# Patient Record
Sex: MALE | URBAN - METROPOLITAN AREA
[De-identification: names, ages, dates, MRNs, and addresses within clinical notes are randomized per-mention and may not be internally consistent; named-entity substitution may affect disease eponyms.]

---

## 2019-02-27 ENCOUNTER — APPOINTMENT (RX ONLY)
Dept: URBAN - METROPOLITAN AREA CLINIC 12 | Facility: CLINIC | Age: 84
Setting detail: DERMATOLOGY
End: 2019-02-27

## 2019-02-27 DIAGNOSIS — L81.4 OTHER MELANIN HYPERPIGMENTATION: ICD-10-CM

## 2019-02-27 DIAGNOSIS — D22 MELANOCYTIC NEVI: ICD-10-CM

## 2019-02-27 DIAGNOSIS — L82.1 OTHER SEBORRHEIC KERATOSIS: ICD-10-CM

## 2019-02-27 DIAGNOSIS — D18.0 HEMANGIOMA: ICD-10-CM

## 2019-02-27 DIAGNOSIS — Z85.828 PERSONAL HISTORY OF OTHER MALIGNANT NEOPLASM OF SKIN: ICD-10-CM

## 2019-02-27 PROBLEM — D18.01 HEMANGIOMA OF SKIN AND SUBCUTANEOUS TISSUE: Status: ACTIVE | Noted: 2019-02-27

## 2019-02-27 PROBLEM — C44.42 SQUAMOUS CELL CARCINOMA OF SKIN OF SCALP AND NECK: Status: ACTIVE | Noted: 2019-02-27

## 2019-02-27 PROBLEM — D22.9 MELANOCYTIC NEVI, UNSPECIFIED: Status: ACTIVE | Noted: 2019-02-27

## 2019-02-27 PROBLEM — D04.39 CARCINOMA IN SITU OF SKIN OF OTHER PARTS OF FACE: Status: ACTIVE | Noted: 2019-02-27

## 2019-02-27 PROCEDURE — 99213 OFFICE O/P EST LOW 20 MIN: CPT | Mod: 25

## 2019-02-27 PROCEDURE — 17272 DSTR MAL LES S/N/H/F/G 1.1-2: CPT

## 2019-02-27 PROCEDURE — ? SUNSCREEN RECOMMENDATIONS

## 2019-02-27 PROCEDURE — ? CURETTAGE AND DESTRUCTION WITH PATHOLOGY

## 2019-02-27 PROCEDURE — ? COUNSELING

## 2019-02-27 PROCEDURE — 17282 DSTR MAL LS F/E/E/N/L/M1.1-2: CPT

## 2019-02-27 NOTE — HPI: EVALUATION OF SKIN LESION(S)
Hpi Title: Evaluation of a Skin Lesion
How Severe Are Your Spot(S)?: mild
Have Your Spot(S) Been Treated In The Past?: has not been treated
Additional History: Pt. Has skin lesions on the top of his head and he would like one of them removed.

## 2019-02-27 NOTE — PROCEDURE: CURETTAGE AND DESTRUCTION WITH PATHOLOGY
Cautery Type: electrodesiccation
Bill 28468 For Specimen Handling/Conveyance To Laboratory?: no
Size Of Lesion After Curettage: 1.7
Detail Level: Detailed
Biopsy Type: H and E
Post-Care Instructions: I reviewed with the patient in detail post-care instructions. Patient is to keep the area dry for 48 hours, and not to engage in any swimming until the area is healed. Should the patient develop any fevers, chills, bleeding, severe pain patient will contact the office immediately.
Bill As A Line Item Or As Units: Line Item
Number Of Curettages: 3
Billing Type: Third-Party Bill
Anesthesia Type: 1% lidocaine with epinephrine
Consent was obtained from the patient. The risks, benefits and alternatives to therapy were discussed in detail. Specifically, the risks of infection, scarring, bleeding, prolonged wound healing, nerve injury, incomplete removal, allergy to anesthesia and recurrence were addressed. Alternatives to ED&C, such as: surgical removal and XRT were also discussed.  Prior to the procedure, the treatment site was clearly identified and confirmed by the patient. All components of Universal Protocol/PAUSE Rule completed.
Body Location Override (Optional - Billing Will Still Be Based On Selected Body Map Location If Applicable): upper mid scalp
Lab: -101
Histology Text: Following the procedure a portion of the curetted material was sent for histologic evaluation.
Size Of Lesion In Cm: 1.5
Additional Information: (Optional): The wound was cleaned, and a pressure dressing was applied.  The patient received detailed post-op instructions.
Anesthesia Volume In Cc: 1
What Was Performed First?: Curettage
Size Of Lesion After Curettage: 1.6
Size Of Lesion In Cm: 1.4
Body Location Override (Optional - Billing Will Still Be Based On Selected Body Map Location If Applicable): left lateral frontal scalp

## 2019-08-28 ENCOUNTER — APPOINTMENT (RX ONLY)
Dept: URBAN - METROPOLITAN AREA CLINIC 12 | Facility: CLINIC | Age: 84
Setting detail: DERMATOLOGY
End: 2019-08-28

## 2019-08-28 PROBLEM — C44.42 SQUAMOUS CELL CARCINOMA OF SKIN OF SCALP AND NECK: Status: ACTIVE | Noted: 2019-08-28

## 2019-08-28 PROCEDURE — 17273 DSTR MAL LES S/N/H/F/G 2.1-3: CPT

## 2019-08-28 PROCEDURE — ? PRESCRIPTION

## 2019-08-28 PROCEDURE — ? BIOPSY BY SHAVE METHOD AND DESTRUCTION

## 2019-08-28 RX ORDER — MUPIROCIN 20 MG/G
OINTMENT TOPICAL
Qty: 1 | Refills: 0 | Status: ERX | COMMUNITY
Start: 2019-08-28

## 2019-08-28 RX ADMIN — MUPIROCIN: 20 OINTMENT TOPICAL at 19:00

## 2019-08-28 NOTE — PROCEDURE: BIOPSY BY SHAVE METHOD AND DESTRUCTION
Render Path Notes In Note?: No
Anesthesia Volume In Cc: 0.5
Detail Level: Detailed
Size Of Lesion In Cm (Optional): 1.9
Number Of Curettages: 3
Bill As?: Malignant Destruction
Size Of Lesion After Curettage: 2.1
Hemostasis: Electrocautery and Aluminum Chloride
Post-Care Instructions: I reviewed with the patient in detail post-care instructions. Patient is to keep the biopsy site dry for 24-48 hours.  Wash daily with gentle soap and apply bacitracin or aquaphor and bandaid until completely healed.
Billing Type: Third-Party Bill
Anesthesia Type: 1% lidocaine with epinephrine
Wound Care: Petrolatum
Path Notes (To The Dermatopathologist): Please check margins. Please check what type SCC
Consent: Written consent was obtained and risks were reviewed including but not limited to scarring, infection, bleeding, scabbing, incomplete removal, nerve damage and allergy to anesthesia.
Notification Instructions: Patient will be notified of biopsy results. However, patient instructed to call the office if not contacted within 2 weeks.
Destruction Type: electrodesiccation
Biopsy Type: H and E
Render Post-Care Instructions In Note?: yes
Lab: -101

## 2020-01-01 ENCOUNTER — APPOINTMENT (OUTPATIENT)
Dept: GENERAL RADIOLOGY | Age: 85
DRG: 177 | End: 2020-01-01
Payer: MEDICARE

## 2020-01-01 ENCOUNTER — TELEPHONE (OUTPATIENT)
Dept: PRIMARY CARE CLINIC | Age: 85
End: 2020-01-01

## 2020-01-01 ENCOUNTER — APPOINTMENT (OUTPATIENT)
Dept: CT IMAGING | Age: 85
DRG: 177 | End: 2020-01-01
Payer: MEDICARE

## 2020-01-01 ENCOUNTER — APPOINTMENT (OUTPATIENT)
Dept: ULTRASOUND IMAGING | Age: 85
DRG: 543 | End: 2020-01-01
Payer: MEDICARE

## 2020-01-01 ENCOUNTER — APPOINTMENT (OUTPATIENT)
Dept: GENERAL RADIOLOGY | Age: 85
DRG: 543 | End: 2020-01-01
Payer: MEDICARE

## 2020-01-01 ENCOUNTER — HOSPITAL ENCOUNTER (INPATIENT)
Age: 85
LOS: 5 days | Discharge: NURSING, REHAB OR LONG-TERM CARE | DRG: 177 | End: 2020-10-10
Attending: EMERGENCY MEDICINE | Admitting: FAMILY MEDICINE
Payer: MEDICARE

## 2020-01-01 ENCOUNTER — APPOINTMENT (OUTPATIENT)
Dept: MRI IMAGING | Age: 85
DRG: 543 | End: 2020-01-01
Payer: MEDICARE

## 2020-01-01 ENCOUNTER — APPOINTMENT (OUTPATIENT)
Dept: GENERAL RADIOLOGY | Age: 85
DRG: 560 | End: 2020-01-01
Attending: PHYSICAL MEDICINE & REHABILITATION
Payer: MEDICARE

## 2020-01-01 ENCOUNTER — OFFICE VISIT (OUTPATIENT)
Dept: PRIMARY CARE CLINIC | Age: 85
End: 2020-01-01
Payer: MEDICARE

## 2020-01-01 ENCOUNTER — TELEPHONE (OUTPATIENT)
Dept: ADMINISTRATIVE | Age: 85
End: 2020-01-01

## 2020-01-01 ENCOUNTER — ANESTHESIA (OUTPATIENT)
Dept: ENDOSCOPY | Age: 85
DRG: 177 | End: 2020-01-01
Payer: MEDICARE

## 2020-01-01 ENCOUNTER — HOSPITAL ENCOUNTER (INPATIENT)
Age: 85
LOS: 15 days | Discharge: HOME | DRG: 560 | End: 2020-06-18
Attending: PHYSICAL MEDICINE & REHABILITATION | Admitting: PHYSICAL MEDICINE & REHABILITATION
Payer: MEDICARE

## 2020-01-01 ENCOUNTER — HOSPITAL ENCOUNTER (INPATIENT)
Age: 85
LOS: 9 days | Discharge: ANOTHER HOSPITAL | DRG: 177 | End: 2020-12-31
Attending: EMERGENCY MEDICINE | Admitting: INTERNAL MEDICINE
Payer: MEDICARE

## 2020-01-01 ENCOUNTER — HOSPITAL ENCOUNTER (INPATIENT)
Age: 85
LOS: 7 days | Discharge: NURSING, REHAB OR LONG-TERM CARE | DRG: 543 | End: 2020-06-03
Attending: EMERGENCY MEDICINE | Admitting: INTERNAL MEDICINE
Payer: MEDICARE

## 2020-01-01 ENCOUNTER — APPOINTMENT (OUTPATIENT)
Dept: CT IMAGING | Age: 85
DRG: 543 | End: 2020-01-01
Payer: MEDICARE

## 2020-01-01 ENCOUNTER — OUTSIDE SERVICES (OUTPATIENT)
Dept: FAMILY MEDICINE CLINIC | Age: 85
End: 2020-01-01
Payer: MEDICARE

## 2020-01-01 ENCOUNTER — TELEPHONE (OUTPATIENT)
Dept: CARDIOLOGY CLINIC | Age: 85
End: 2020-01-01

## 2020-01-01 ENCOUNTER — ANESTHESIA EVENT (OUTPATIENT)
Dept: ENDOSCOPY | Age: 85
DRG: 177 | End: 2020-01-01
Payer: MEDICARE

## 2020-01-01 VITALS
BODY MASS INDEX: 17.89 KG/M2 | RESPIRATION RATE: 18 BRPM | HEART RATE: 101 BPM | WEIGHT: 113.98 LBS | DIASTOLIC BLOOD PRESSURE: 58 MMHG | HEIGHT: 67 IN | SYSTOLIC BLOOD PRESSURE: 105 MMHG | OXYGEN SATURATION: 96 % | TEMPERATURE: 98.3 F

## 2020-01-01 VITALS
RESPIRATION RATE: 16 BRPM | DIASTOLIC BLOOD PRESSURE: 61 MMHG | HEART RATE: 71 BPM | HEIGHT: 67 IN | BODY MASS INDEX: 17.74 KG/M2 | WEIGHT: 113 LBS | TEMPERATURE: 97.8 F | OXYGEN SATURATION: 95 % | SYSTOLIC BLOOD PRESSURE: 111 MMHG

## 2020-01-01 VITALS
OXYGEN SATURATION: 95 % | WEIGHT: 104.3 LBS | SYSTOLIC BLOOD PRESSURE: 99 MMHG | RESPIRATION RATE: 18 BRPM | HEART RATE: 84 BPM | BODY MASS INDEX: 16.37 KG/M2 | DIASTOLIC BLOOD PRESSURE: 56 MMHG | TEMPERATURE: 97.8 F | HEIGHT: 67 IN

## 2020-01-01 VITALS
HEART RATE: 63 BPM | HEIGHT: 67 IN | WEIGHT: 96.3 LBS | BODY MASS INDEX: 15.11 KG/M2 | TEMPERATURE: 96.9 F | OXYGEN SATURATION: 98 % | DIASTOLIC BLOOD PRESSURE: 59 MMHG | RESPIRATION RATE: 20 BRPM | SYSTOLIC BLOOD PRESSURE: 125 MMHG

## 2020-01-01 VITALS
SYSTOLIC BLOOD PRESSURE: 110 MMHG | HEART RATE: 63 BPM | OXYGEN SATURATION: 96 % | RESPIRATION RATE: 18 BRPM | TEMPERATURE: 97.8 F | DIASTOLIC BLOOD PRESSURE: 60 MMHG | WEIGHT: 102 LBS | HEIGHT: 67 IN | BODY MASS INDEX: 16.01 KG/M2

## 2020-01-01 VITALS
SYSTOLIC BLOOD PRESSURE: 145 MMHG | OXYGEN SATURATION: 97 % | RESPIRATION RATE: 8 BRPM | DIASTOLIC BLOOD PRESSURE: 63 MMHG

## 2020-01-01 DIAGNOSIS — R09.02 HYPOXIA: ICD-10-CM

## 2020-01-01 DIAGNOSIS — U07.1 COVID-19 VIRUS DETECTED: Primary | ICD-10-CM

## 2020-01-01 LAB
ALBUMIN SERPL-MCNC: 2.8 G/DL (ref 3.5–5.2)
ALBUMIN SERPL-MCNC: 3 G/DL (ref 3.5–5.2)
ALBUMIN SERPL-MCNC: 3 G/DL (ref 3.5–5.2)
ALBUMIN SERPL-MCNC: 3.2 G/DL (ref 3.5–5.2)
ALBUMIN SERPL-MCNC: 3.2 G/DL (ref 3.5–5.2)
ALBUMIN SERPL-MCNC: 3.5 G/DL (ref 3.5–5.2)
ALBUMIN SERPL-MCNC: 3.7 G/DL (ref 3.5–5.2)
ALP BLD-CCNC: 60 U/L (ref 40–129)
ALP BLD-CCNC: 74 U/L (ref 40–129)
ALP BLD-CCNC: 75 U/L (ref 40–129)
ALP BLD-CCNC: 75 U/L (ref 40–129)
ALP BLD-CCNC: 78 U/L (ref 40–129)
ALP BLD-CCNC: 78 U/L (ref 40–129)
ALP BLD-CCNC: 79 U/L (ref 40–129)
ALP BLD-CCNC: 85 U/L (ref 40–129)
ALP BLD-CCNC: 90 U/L (ref 40–129)
ALT SERPL-CCNC: 12 U/L (ref 0–40)
ALT SERPL-CCNC: 12 U/L (ref 0–40)
ALT SERPL-CCNC: 14 U/L (ref 0–40)
ALT SERPL-CCNC: 14 U/L (ref 0–40)
ALT SERPL-CCNC: 15 U/L (ref 0–40)
ALT SERPL-CCNC: 17 U/L (ref 0–40)
ALT SERPL-CCNC: 25 U/L (ref 0–40)
ALT SERPL-CCNC: 8 U/L (ref 0–40)
ALT SERPL-CCNC: 9 U/L (ref 0–40)
AMORPHOUS: ABNORMAL
ANION GAP SERPL CALCULATED.3IONS-SCNC: 10 MMOL/L (ref 7–16)
ANION GAP SERPL CALCULATED.3IONS-SCNC: 11 MMOL/L (ref 7–16)
ANION GAP SERPL CALCULATED.3IONS-SCNC: 13 MMOL/L (ref 7–16)
ANION GAP SERPL CALCULATED.3IONS-SCNC: 2 MMOL/L (ref 7–16)
ANION GAP SERPL CALCULATED.3IONS-SCNC: 5 MMOL/L (ref 7–16)
ANION GAP SERPL CALCULATED.3IONS-SCNC: 6 MMOL/L (ref 7–16)
ANION GAP SERPL CALCULATED.3IONS-SCNC: 7 MMOL/L (ref 7–16)
ANION GAP SERPL CALCULATED.3IONS-SCNC: 7 MMOL/L (ref 7–16)
ANION GAP SERPL CALCULATED.3IONS-SCNC: 8 MMOL/L (ref 7–16)
ANION GAP SERPL CALCULATED.3IONS-SCNC: 9 MMOL/L (ref 7–16)
ANION GAP SERPL CALCULATED.3IONS-SCNC: 9 MMOL/L (ref 7–16)
AST SERPL-CCNC: 11 U/L (ref 0–39)
AST SERPL-CCNC: 15 U/L (ref 0–39)
AST SERPL-CCNC: 15 U/L (ref 0–39)
AST SERPL-CCNC: 17 U/L (ref 0–39)
AST SERPL-CCNC: 17 U/L (ref 0–39)
AST SERPL-CCNC: 19 U/L (ref 0–39)
AST SERPL-CCNC: 22 U/L (ref 0–39)
AST SERPL-CCNC: 22 U/L (ref 0–39)
AST SERPL-CCNC: 39 U/L (ref 0–39)
ATYPICAL LYMPHOCYTE RELATIVE PERCENT: 2.6 % (ref 0–4)
B.E.: -0.2 MMOL/L (ref -3–3)
B.E.: -2.6 MMOL/L (ref -3–3)
BACTERIA: ABNORMAL /HPF
BACTERIA: ABNORMAL /HPF
BASOPHILS ABSOLUTE: 0 E9/L (ref 0–0.2)
BASOPHILS ABSOLUTE: 0.01 E9/L (ref 0–0.2)
BASOPHILS ABSOLUTE: 0.01 E9/L (ref 0–0.2)
BASOPHILS ABSOLUTE: 0.02 E9/L (ref 0–0.2)
BASOPHILS RELATIVE PERCENT: 0 % (ref 0–2)
BASOPHILS RELATIVE PERCENT: 0.1 % (ref 0–2)
BASOPHILS RELATIVE PERCENT: 0.2 % (ref 0–2)
BASOPHILS RELATIVE PERCENT: 0.3 % (ref 0–2)
BASOPHILS RELATIVE PERCENT: 0.4 % (ref 0–2)
BILIRUB SERPL-MCNC: 0.3 MG/DL (ref 0–1.2)
BILIRUB SERPL-MCNC: 0.4 MG/DL (ref 0–1.2)
BILIRUB SERPL-MCNC: 0.4 MG/DL (ref 0–1.2)
BILIRUB SERPL-MCNC: 0.7 MG/DL (ref 0–1.2)
BILIRUB SERPL-MCNC: <0.2 MG/DL (ref 0–1.2)
BILIRUBIN URINE: NEGATIVE
BILIRUBIN URINE: NEGATIVE
BLOOD CULTURE, ROUTINE: NORMAL
BLOOD, URINE: ABNORMAL
BLOOD, URINE: NEGATIVE
BUN BLDV-MCNC: 19 MG/DL (ref 8–23)
BUN BLDV-MCNC: 20 MG/DL (ref 8–23)
BUN BLDV-MCNC: 23 MG/DL (ref 8–23)
BUN BLDV-MCNC: 23 MG/DL (ref 8–23)
BUN BLDV-MCNC: 24 MG/DL (ref 8–23)
BUN BLDV-MCNC: 25 MG/DL (ref 8–23)
BUN BLDV-MCNC: 28 MG/DL (ref 8–23)
BUN BLDV-MCNC: 28 MG/DL (ref 8–23)
BUN BLDV-MCNC: 29 MG/DL (ref 8–23)
BUN BLDV-MCNC: 31 MG/DL (ref 8–23)
BUN BLDV-MCNC: 31 MG/DL (ref 8–23)
BUN BLDV-MCNC: 32 MG/DL (ref 8–23)
BUN BLDV-MCNC: 34 MG/DL (ref 8–23)
BUN BLDV-MCNC: 36 MG/DL (ref 8–23)
C-REACTIVE PROTEIN: 2.6 MG/DL (ref 0–0.4)
C-REACTIVE PROTEIN: 7.1 MG/DL (ref 0–0.4)
CALCIUM SERPL-MCNC: 8.3 MG/DL (ref 8.6–10.2)
CALCIUM SERPL-MCNC: 8.5 MG/DL (ref 8.6–10.2)
CALCIUM SERPL-MCNC: 8.7 MG/DL (ref 8.6–10.2)
CALCIUM SERPL-MCNC: 8.8 MG/DL (ref 8.6–10.2)
CALCIUM SERPL-MCNC: 8.9 MG/DL (ref 8.6–10.2)
CALCIUM SERPL-MCNC: 9.1 MG/DL (ref 8.6–10.2)
CALCIUM SERPL-MCNC: 9.2 MG/DL (ref 8.6–10.2)
CALCIUM SERPL-MCNC: 9.3 MG/DL (ref 8.6–10.2)
CALCIUM SERPL-MCNC: 9.3 MG/DL (ref 8.6–10.2)
CALCIUM SERPL-MCNC: 9.5 MG/DL (ref 8.6–10.2)
CALCIUM SERPL-MCNC: 9.6 MG/DL (ref 8.6–10.2)
CALCIUM SERPL-MCNC: 9.6 MG/DL (ref 8.6–10.2)
CALCIUM SERPL-MCNC: 9.7 MG/DL (ref 8.6–10.2)
CALCIUM SERPL-MCNC: 9.8 MG/DL (ref 8.6–10.2)
CHLORIDE BLD-SCNC: 101 MMOL/L (ref 98–107)
CHLORIDE BLD-SCNC: 102 MMOL/L (ref 98–107)
CHLORIDE BLD-SCNC: 103 MMOL/L (ref 98–107)
CHLORIDE BLD-SCNC: 103 MMOL/L (ref 98–107)
CHLORIDE BLD-SCNC: 104 MMOL/L (ref 98–107)
CHLORIDE BLD-SCNC: 107 MMOL/L (ref 98–107)
CHLORIDE BLD-SCNC: 108 MMOL/L (ref 98–107)
CHLORIDE BLD-SCNC: 108 MMOL/L (ref 98–107)
CHLORIDE BLD-SCNC: 98 MMOL/L (ref 98–107)
CHLORIDE URINE RANDOM: 178 MMOL/L
CLARITY: CLEAR
CLARITY: CLEAR
CO2: 22 MMOL/L (ref 22–29)
CO2: 23 MMOL/L (ref 22–29)
CO2: 25 MMOL/L (ref 22–29)
CO2: 25 MMOL/L (ref 22–29)
CO2: 26 MMOL/L (ref 22–29)
CO2: 27 MMOL/L (ref 22–29)
CO2: 27 MMOL/L (ref 22–29)
CO2: 28 MMOL/L (ref 22–29)
CO2: 29 MMOL/L (ref 22–29)
CO2: 29 MMOL/L (ref 22–29)
CO2: 30 MMOL/L (ref 22–29)
CO2: 31 MMOL/L (ref 22–29)
CO2: 32 MMOL/L (ref 22–29)
COHB: 0.5 % (ref 0–1.5)
COHB: 1.2 % (ref 0–1.5)
COLOR: YELLOW
COLOR: YELLOW
CREAT SERPL-MCNC: 0.5 MG/DL (ref 0.7–1.2)
CREAT SERPL-MCNC: 0.5 MG/DL (ref 0.7–1.2)
CREAT SERPL-MCNC: 0.6 MG/DL (ref 0.7–1.2)
CREAT SERPL-MCNC: 0.7 MG/DL (ref 0.7–1.2)
CREAT SERPL-MCNC: 0.7 MG/DL (ref 0.7–1.2)
CREAT SERPL-MCNC: 0.8 MG/DL (ref 0.7–1.2)
CREAT SERPL-MCNC: 1 MG/DL (ref 0.7–1.2)
CREAT SERPL-MCNC: 1.2 MG/DL (ref 0.7–1.2)
CRITICAL: ABNORMAL
CRITICAL: NORMAL
CRYSTALS, UA: ABNORMAL /HPF
CULTURE, BLOOD 2: NORMAL
D DIMER: 342 NG/ML DDU
D DIMER: 431 NG/ML DDU
D DIMER: 557 NG/ML DDU
DATE ANALYZED: ABNORMAL
DATE ANALYZED: NORMAL
DATE OF COLLECTION: ABNORMAL
DATE OF COLLECTION: NORMAL
EKG ATRIAL RATE: 110 BPM
EKG ATRIAL RATE: 71 BPM
EKG ATRIAL RATE: 97 BPM
EKG P AXIS: 28 DEGREES
EKG P AXIS: 68 DEGREES
EKG P AXIS: 75 DEGREES
EKG P-R INTERVAL: 142 MS
EKG P-R INTERVAL: 144 MS
EKG P-R INTERVAL: 152 MS
EKG Q-T INTERVAL: 322 MS
EKG Q-T INTERVAL: 346 MS
EKG Q-T INTERVAL: 392 MS
EKG QRS DURATION: 80 MS
EKG QRS DURATION: 82 MS
EKG QRS DURATION: 82 MS
EKG QTC CALCULATION (BAZETT): 425 MS
EKG QTC CALCULATION (BAZETT): 435 MS
EKG QTC CALCULATION (BAZETT): 439 MS
EKG R AXIS: -28 DEGREES
EKG R AXIS: 20 DEGREES
EKG R AXIS: 63 DEGREES
EKG T AXIS: 24 DEGREES
EKG T AXIS: 75 DEGREES
EKG T AXIS: 76 DEGREES
EKG VENTRICULAR RATE: 110 BPM
EKG VENTRICULAR RATE: 71 BPM
EKG VENTRICULAR RATE: 97 BPM
EOSINOPHILS ABSOLUTE: 0 E9/L (ref 0.05–0.5)
EOSINOPHILS ABSOLUTE: 0.02 E9/L (ref 0.05–0.5)
EOSINOPHILS ABSOLUTE: 0.04 E9/L (ref 0.05–0.5)
EOSINOPHILS ABSOLUTE: 0.06 E9/L (ref 0.05–0.5)
EOSINOPHILS ABSOLUTE: 0.09 E9/L (ref 0.05–0.5)
EOSINOPHILS ABSOLUTE: 0.11 E9/L (ref 0.05–0.5)
EOSINOPHILS ABSOLUTE: 0.12 E9/L (ref 0.05–0.5)
EOSINOPHILS RELATIVE PERCENT: 0 % (ref 0–6)
EOSINOPHILS RELATIVE PERCENT: 0.4 % (ref 0–6)
EOSINOPHILS RELATIVE PERCENT: 0.6 % (ref 0–6)
EOSINOPHILS RELATIVE PERCENT: 0.9 % (ref 0–6)
EOSINOPHILS RELATIVE PERCENT: 1 % (ref 0–6)
EOSINOPHILS RELATIVE PERCENT: 1.4 % (ref 0–6)
EOSINOPHILS RELATIVE PERCENT: 1.9 % (ref 0–6)
EPITHELIAL CELLS, UA: ABNORMAL /HPF
FERRITIN: 678 NG/ML
FERRITIN: 846 NG/ML
FIBRINOGEN: 592 MG/DL (ref 225–540)
FOLATE: 19.5 NG/ML (ref 4.8–24.2)
GFR AFRICAN AMERICAN: >60
GFR NON-AFRICAN AMERICAN: 56 ML/MIN/1.73
GFR NON-AFRICAN AMERICAN: >60 ML/MIN/1.73
GLUCOSE BLD-MCNC: 101 MG/DL (ref 74–99)
GLUCOSE BLD-MCNC: 102 MG/DL (ref 74–99)
GLUCOSE BLD-MCNC: 109 MG/DL (ref 74–99)
GLUCOSE BLD-MCNC: 109 MG/DL (ref 74–99)
GLUCOSE BLD-MCNC: 113 MG/DL (ref 74–99)
GLUCOSE BLD-MCNC: 114 MG/DL (ref 74–99)
GLUCOSE BLD-MCNC: 120 MG/DL (ref 74–99)
GLUCOSE BLD-MCNC: 125 MG/DL (ref 74–99)
GLUCOSE BLD-MCNC: 126 MG/DL (ref 74–99)
GLUCOSE BLD-MCNC: 127 MG/DL (ref 74–99)
GLUCOSE BLD-MCNC: 133 MG/DL (ref 74–99)
GLUCOSE BLD-MCNC: 144 MG/DL (ref 74–99)
GLUCOSE BLD-MCNC: 152 MG/DL (ref 74–99)
GLUCOSE BLD-MCNC: 154 MG/DL (ref 74–99)
GLUCOSE BLD-MCNC: 161 MG/DL (ref 74–99)
GLUCOSE BLD-MCNC: 165 MG/DL (ref 74–99)
GLUCOSE BLD-MCNC: 84 MG/DL (ref 74–99)
GLUCOSE BLD-MCNC: 90 MG/DL (ref 74–99)
GLUCOSE BLD-MCNC: 96 MG/DL (ref 74–99)
GLUCOSE URINE: NEGATIVE MG/DL
GLUCOSE URINE: NEGATIVE MG/DL
HCO3: 21.2 MMOL/L (ref 22–26)
HCO3: 24.7 MMOL/L (ref 22–26)
HCT VFR BLD CALC: 28.9 % (ref 37–54)
HCT VFR BLD CALC: 31.6 % (ref 37–54)
HCT VFR BLD CALC: 32.3 % (ref 37–54)
HCT VFR BLD CALC: 34.7 % (ref 37–54)
HCT VFR BLD CALC: 34.9 % (ref 37–54)
HCT VFR BLD CALC: 35.3 % (ref 37–54)
HCT VFR BLD CALC: 35.4 % (ref 37–54)
HCT VFR BLD CALC: 35.7 % (ref 37–54)
HCT VFR BLD CALC: 36.3 % (ref 37–54)
HCT VFR BLD CALC: 36.4 % (ref 37–54)
HCT VFR BLD CALC: 36.6 % (ref 37–54)
HCT VFR BLD CALC: 37.1 % (ref 37–54)
HCT VFR BLD CALC: 37.7 % (ref 37–54)
HCT VFR BLD CALC: 37.8 % (ref 37–54)
HCT VFR BLD CALC: 39.1 % (ref 37–54)
HCT VFR BLD CALC: 39.6 % (ref 37–54)
HCT VFR BLD CALC: 40 % (ref 37–54)
HCT VFR BLD CALC: 40.1 % (ref 37–54)
HCT VFR BLD CALC: 40.3 % (ref 37–54)
HCT VFR BLD CALC: 40.7 % (ref 37–54)
HEMOGLOBIN: 10.3 G/DL (ref 12.5–16.5)
HEMOGLOBIN: 10.7 G/DL (ref 12.5–16.5)
HEMOGLOBIN: 11.2 G/DL (ref 12.5–16.5)
HEMOGLOBIN: 11.3 G/DL (ref 12.5–16.5)
HEMOGLOBIN: 11.4 G/DL (ref 12.5–16.5)
HEMOGLOBIN: 11.6 G/DL (ref 12.5–16.5)
HEMOGLOBIN: 11.7 G/DL (ref 12.5–16.5)
HEMOGLOBIN: 11.7 G/DL (ref 12.5–16.5)
HEMOGLOBIN: 12 G/DL (ref 12.5–16.5)
HEMOGLOBIN: 12.2 G/DL (ref 12.5–16.5)
HEMOGLOBIN: 12.6 G/DL (ref 12.5–16.5)
HEMOGLOBIN: 12.9 G/DL (ref 12.5–16.5)
HEMOGLOBIN: 13.1 G/DL (ref 12.5–16.5)
HEMOGLOBIN: 13.1 G/DL (ref 12.5–16.5)
HEMOGLOBIN: 13.2 G/DL (ref 12.5–16.5)
HEMOGLOBIN: 13.3 G/DL (ref 12.5–16.5)
HEMOGLOBIN: 13.5 G/DL (ref 12.5–16.5)
HEMOGLOBIN: 9.5 G/DL (ref 12.5–16.5)
HHB: 10.4 % (ref 0–5)
HHB: 3.2 % (ref 0–5)
HYALINE CASTS: ABNORMAL /LPF (ref 0–2)
IMMATURE GRANULOCYTES #: 0.01 E9/L
IMMATURE GRANULOCYTES #: 0.01 E9/L
IMMATURE GRANULOCYTES #: 0.02 E9/L
IMMATURE GRANULOCYTES #: 0.04 E9/L
IMMATURE GRANULOCYTES #: 0.04 E9/L
IMMATURE GRANULOCYTES #: 0.05 E9/L
IMMATURE GRANULOCYTES #: 0.05 E9/L
IMMATURE GRANULOCYTES #: 0.06 E9/L
IMMATURE GRANULOCYTES #: 0.06 E9/L
IMMATURE GRANULOCYTES %: 0.2 % (ref 0–5)
IMMATURE GRANULOCYTES %: 0.2 % (ref 0–5)
IMMATURE GRANULOCYTES %: 0.3 % (ref 0–5)
IMMATURE GRANULOCYTES %: 0.4 % (ref 0–5)
IMMATURE GRANULOCYTES %: 0.4 % (ref 0–5)
IMMATURE GRANULOCYTES %: 0.6 % (ref 0–5)
IMMATURE GRANULOCYTES %: 0.6 % (ref 0–5)
IMMATURE GRANULOCYTES %: 0.7 % (ref 0–5)
IMMATURE GRANULOCYTES %: 0.8 % (ref 0–5)
IMMATURE GRANULOCYTES %: 0.8 % (ref 0–5)
IMMATURE GRANULOCYTES %: 0.9 % (ref 0–5)
INR BLD: 1.1
KETONES, URINE: 15 MG/DL
KETONES, URINE: NEGATIVE MG/DL
LAB: ABNORMAL
LAB: NORMAL
LACTIC ACID: 1.3 MMOL/L (ref 0.5–2.2)
LEUKOCYTE ESTERASE, URINE: NEGATIVE
LEUKOCYTE ESTERASE, URINE: NEGATIVE
LV EF: 40 %
LVEF MODALITY: NORMAL
LYMPHOCYTES ABSOLUTE: 0.28 E9/L (ref 1.5–4)
LYMPHOCYTES ABSOLUTE: 0.32 E9/L (ref 1.5–4)
LYMPHOCYTES ABSOLUTE: 0.5 E9/L (ref 1.5–4)
LYMPHOCYTES ABSOLUTE: 0.51 E9/L (ref 1.5–4)
LYMPHOCYTES ABSOLUTE: 0.53 E9/L (ref 1.5–4)
LYMPHOCYTES ABSOLUTE: 0.59 E9/L (ref 1.5–4)
LYMPHOCYTES ABSOLUTE: 0.61 E9/L (ref 1.5–4)
LYMPHOCYTES ABSOLUTE: 0.66 E9/L (ref 1.5–4)
LYMPHOCYTES ABSOLUTE: 0.69 E9/L (ref 1.5–4)
LYMPHOCYTES ABSOLUTE: 0.72 E9/L (ref 1.5–4)
LYMPHOCYTES ABSOLUTE: 0.81 E9/L (ref 1.5–4)
LYMPHOCYTES ABSOLUTE: 0.88 E9/L (ref 1.5–4)
LYMPHOCYTES ABSOLUTE: 0.95 E9/L (ref 1.5–4)
LYMPHOCYTES ABSOLUTE: 0.97 E9/L (ref 1.5–4)
LYMPHOCYTES RELATIVE PERCENT: 12.2 % (ref 20–42)
LYMPHOCYTES RELATIVE PERCENT: 12.9 % (ref 20–42)
LYMPHOCYTES RELATIVE PERCENT: 13.4 % (ref 20–42)
LYMPHOCYTES RELATIVE PERCENT: 13.5 % (ref 20–42)
LYMPHOCYTES RELATIVE PERCENT: 14.8 % (ref 20–42)
LYMPHOCYTES RELATIVE PERCENT: 14.9 % (ref 20–42)
LYMPHOCYTES RELATIVE PERCENT: 16.7 % (ref 20–42)
LYMPHOCYTES RELATIVE PERCENT: 20.8 % (ref 20–42)
LYMPHOCYTES RELATIVE PERCENT: 4.4 % (ref 20–42)
LYMPHOCYTES RELATIVE PERCENT: 4.4 % (ref 20–42)
LYMPHOCYTES RELATIVE PERCENT: 5.2 % (ref 20–42)
LYMPHOCYTES RELATIVE PERCENT: 5.5 % (ref 20–42)
LYMPHOCYTES RELATIVE PERCENT: 7.7 % (ref 20–42)
LYMPHOCYTES RELATIVE PERCENT: 9.1 % (ref 20–42)
Lab: ABNORMAL
Lab: NORMAL
MAGNESIUM: 1.9 MG/DL (ref 1.6–2.6)
MCH RBC QN AUTO: 32.7 PG (ref 26–35)
MCH RBC QN AUTO: 33.1 PG (ref 26–35)
MCH RBC QN AUTO: 33.2 PG (ref 26–35)
MCH RBC QN AUTO: 33.3 PG (ref 26–35)
MCH RBC QN AUTO: 33.4 PG (ref 26–35)
MCH RBC QN AUTO: 33.5 PG (ref 26–35)
MCH RBC QN AUTO: 33.6 PG (ref 26–35)
MCH RBC QN AUTO: 33.6 PG (ref 26–35)
MCH RBC QN AUTO: 33.7 PG (ref 26–35)
MCH RBC QN AUTO: 33.7 PG (ref 26–35)
MCH RBC QN AUTO: 33.8 PG (ref 26–35)
MCH RBC QN AUTO: 34 PG (ref 26–35)
MCH RBC QN AUTO: 34 PG (ref 26–35)
MCH RBC QN AUTO: 34.2 PG (ref 26–35)
MCH RBC QN AUTO: 34.2 PG (ref 26–35)
MCHC RBC AUTO-ENTMCNC: 32 % (ref 32–34.5)
MCHC RBC AUTO-ENTMCNC: 32.2 % (ref 32–34.5)
MCHC RBC AUTO-ENTMCNC: 32.2 % (ref 32–34.5)
MCHC RBC AUTO-ENTMCNC: 32.3 % (ref 32–34.5)
MCHC RBC AUTO-ENTMCNC: 32.4 % (ref 32–34.5)
MCHC RBC AUTO-ENTMCNC: 32.5 % (ref 32–34.5)
MCHC RBC AUTO-ENTMCNC: 32.6 % (ref 32–34.5)
MCHC RBC AUTO-ENTMCNC: 32.7 % (ref 32–34.5)
MCHC RBC AUTO-ENTMCNC: 32.9 % (ref 32–34.5)
MCHC RBC AUTO-ENTMCNC: 32.9 % (ref 32–34.5)
MCHC RBC AUTO-ENTMCNC: 33 % (ref 32–34.5)
MCHC RBC AUTO-ENTMCNC: 33.1 % (ref 32–34.5)
MCHC RBC AUTO-ENTMCNC: 33.1 % (ref 32–34.5)
MCHC RBC AUTO-ENTMCNC: 33.3 % (ref 32–34.5)
MCHC RBC AUTO-ENTMCNC: 33.8 % (ref 32–34.5)
MCV RBC AUTO: 100.5 FL (ref 80–99.9)
MCV RBC AUTO: 100.5 FL (ref 80–99.9)
MCV RBC AUTO: 101.4 FL (ref 80–99.9)
MCV RBC AUTO: 101.5 FL (ref 80–99.9)
MCV RBC AUTO: 101.8 FL (ref 80–99.9)
MCV RBC AUTO: 101.9 FL (ref 80–99.9)
MCV RBC AUTO: 101.9 FL (ref 80–99.9)
MCV RBC AUTO: 102 FL (ref 80–99.9)
MCV RBC AUTO: 102.5 FL (ref 80–99.9)
MCV RBC AUTO: 102.6 FL (ref 80–99.9)
MCV RBC AUTO: 102.8 FL (ref 80–99.9)
MCV RBC AUTO: 103.2 FL (ref 80–99.9)
MCV RBC AUTO: 103.6 FL (ref 80–99.9)
MCV RBC AUTO: 103.7 FL (ref 80–99.9)
MCV RBC AUTO: 103.9 FL (ref 80–99.9)
MCV RBC AUTO: 104.1 FL (ref 80–99.9)
MCV RBC AUTO: 104.2 FL (ref 80–99.9)
MCV RBC AUTO: 104.4 FL (ref 80–99.9)
MCV RBC AUTO: 99.8 FL (ref 80–99.9)
METAMYELOCYTES RELATIVE PERCENT: 3.6 % (ref 0–1)
METHB: 0 % (ref 0–1.5)
METHB: 0.3 % (ref 0–1.5)
MODE: ABNORMAL
MODE: NORMAL
MONOCYTES ABSOLUTE: 0.27 E9/L (ref 0.1–0.95)
MONOCYTES ABSOLUTE: 0.28 E9/L (ref 0.1–0.95)
MONOCYTES ABSOLUTE: 0.29 E9/L (ref 0.1–0.95)
MONOCYTES ABSOLUTE: 0.3 E9/L (ref 0.1–0.95)
MONOCYTES ABSOLUTE: 0.34 E9/L (ref 0.1–0.95)
MONOCYTES ABSOLUTE: 0.38 E9/L (ref 0.1–0.95)
MONOCYTES ABSOLUTE: 0.44 E9/L (ref 0.1–0.95)
MONOCYTES ABSOLUTE: 0.46 E9/L (ref 0.1–0.95)
MONOCYTES ABSOLUTE: 0.47 E9/L (ref 0.1–0.95)
MONOCYTES ABSOLUTE: 0.49 E9/L (ref 0.1–0.95)
MONOCYTES ABSOLUTE: 0.49 E9/L (ref 0.1–0.95)
MONOCYTES ABSOLUTE: 0.52 E9/L (ref 0.1–0.95)
MONOCYTES ABSOLUTE: 0.61 E9/L (ref 0.1–0.95)
MONOCYTES ABSOLUTE: 0.76 E9/L (ref 0.1–0.95)
MONOCYTES RELATIVE PERCENT: 10.2 % (ref 2–12)
MONOCYTES RELATIVE PERCENT: 11.7 % (ref 2–12)
MONOCYTES RELATIVE PERCENT: 12 % (ref 2–12)
MONOCYTES RELATIVE PERCENT: 4.4 % (ref 2–12)
MONOCYTES RELATIVE PERCENT: 4.8 % (ref 2–12)
MONOCYTES RELATIVE PERCENT: 5 % (ref 2–12)
MONOCYTES RELATIVE PERCENT: 5.1 % (ref 2–12)
MONOCYTES RELATIVE PERCENT: 5.4 % (ref 2–12)
MONOCYTES RELATIVE PERCENT: 6.1 % (ref 2–12)
MONOCYTES RELATIVE PERCENT: 6.6 % (ref 2–12)
MONOCYTES RELATIVE PERCENT: 7.7 % (ref 2–12)
MONOCYTES RELATIVE PERCENT: 7.7 % (ref 2–12)
MONOCYTES RELATIVE PERCENT: 7.9 % (ref 2–12)
MONOCYTES RELATIVE PERCENT: 8.8 % (ref 2–12)
MUCUS: PRESENT /LPF
NEUTROPHILS ABSOLUTE: 1.94 E9/L (ref 1.8–7.3)
NEUTROPHILS ABSOLUTE: 2.24 E9/L (ref 1.8–7.3)
NEUTROPHILS ABSOLUTE: 3.88 E9/L (ref 1.8–7.3)
NEUTROPHILS ABSOLUTE: 3.92 E9/L (ref 1.8–7.3)
NEUTROPHILS ABSOLUTE: 4.12 E9/L (ref 1.8–7.3)
NEUTROPHILS ABSOLUTE: 4.66 E9/L (ref 1.8–7.3)
NEUTROPHILS ABSOLUTE: 4.74 E9/L (ref 1.8–7.3)
NEUTROPHILS ABSOLUTE: 4.92 E9/L (ref 1.8–7.3)
NEUTROPHILS ABSOLUTE: 4.94 E9/L (ref 1.8–7.3)
NEUTROPHILS ABSOLUTE: 6.28 E9/L (ref 1.8–7.3)
NEUTROPHILS ABSOLUTE: 6.42 E9/L (ref 1.8–7.3)
NEUTROPHILS ABSOLUTE: 7.66 E9/L (ref 1.8–7.3)
NEUTROPHILS ABSOLUTE: 8.2 E9/L (ref 1.8–7.3)
NEUTROPHILS ABSOLUTE: 8.59 E9/L (ref 1.8–7.3)
NEUTROPHILS RELATIVE PERCENT: 68.6 % (ref 43–80)
NEUTROPHILS RELATIVE PERCENT: 70.3 % (ref 43–80)
NEUTROPHILS RELATIVE PERCENT: 70.7 % (ref 43–80)
NEUTROPHILS RELATIVE PERCENT: 74.4 % (ref 43–80)
NEUTROPHILS RELATIVE PERCENT: 75.5 % (ref 43–80)
NEUTROPHILS RELATIVE PERCENT: 77.4 % (ref 43–80)
NEUTROPHILS RELATIVE PERCENT: 80.3 % (ref 43–80)
NEUTROPHILS RELATIVE PERCENT: 80.9 % (ref 43–80)
NEUTROPHILS RELATIVE PERCENT: 81.1 % (ref 43–80)
NEUTROPHILS RELATIVE PERCENT: 85 % (ref 43–80)
NEUTROPHILS RELATIVE PERCENT: 86.9 % (ref 43–80)
NEUTROPHILS RELATIVE PERCENT: 88.9 % (ref 43–80)
NEUTROPHILS RELATIVE PERCENT: 89.6 % (ref 43–80)
NEUTROPHILS RELATIVE PERCENT: 91.2 % (ref 43–80)
NITRITE, URINE: NEGATIVE
NITRITE, URINE: NEGATIVE
NUCLEATED RED BLOOD CELLS: 0 /100 WBC
NUCLEATED RED BLOOD CELLS: 0.9 /100 WBC
NUCLEATED RED BLOOD CELLS: 1.8 /100 WBC
O2 CONTENT: 15.9 ML/DL
O2 CONTENT: 17.3 ML/DL
O2 SATURATION: 89.4 % (ref 92–98.5)
O2 SATURATION: 96.8 % (ref 92–98.5)
O2HB: 88.1 % (ref 94–97)
O2HB: 96.3 % (ref 94–97)
OPERATOR ID: 2856
OPERATOR ID: 3186
OVALOCYTES: ABNORMAL
PATIENT TEMP: 37 C
PATIENT TEMP: 37 C
PCO2: 34 MMHG (ref 35–45)
PCO2: 41.3 MMHG (ref 35–45)
PDW BLD-RTO: 12.6 FL (ref 11.5–15)
PDW BLD-RTO: 12.7 FL (ref 11.5–15)
PDW BLD-RTO: 12.8 FL (ref 11.5–15)
PDW BLD-RTO: 12.9 FL (ref 11.5–15)
PDW BLD-RTO: 12.9 FL (ref 11.5–15)
PDW BLD-RTO: 13 FL (ref 11.5–15)
PDW BLD-RTO: 13 FL (ref 11.5–15)
PDW BLD-RTO: 13.2 FL (ref 11.5–15)
PDW BLD-RTO: 13.2 FL (ref 11.5–15)
PDW BLD-RTO: 13.4 FL (ref 11.5–15)
PDW BLD-RTO: 13.7 FL (ref 11.5–15)
PDW BLD-RTO: 13.9 FL (ref 11.5–15)
PDW BLD-RTO: 14 FL (ref 11.5–15)
PDW BLD-RTO: 14.1 FL (ref 11.5–15)
PDW BLD-RTO: 14.1 FL (ref 11.5–15)
PDW BLD-RTO: 14.4 FL (ref 11.5–15)
PDW BLD-RTO: 14.5 FL (ref 11.5–15)
PH BLOOD GAS: 7.39 (ref 7.35–7.45)
PH BLOOD GAS: 7.41 (ref 7.35–7.45)
PH UA: 6 (ref 5–9)
PH UA: 7 (ref 5–9)
PHOSPHORUS: 2.6 MG/DL (ref 2.5–4.5)
PLATELET # BLD: 121 E9/L (ref 130–450)
PLATELET # BLD: 131 E9/L (ref 130–450)
PLATELET # BLD: 133 E9/L (ref 130–450)
PLATELET # BLD: 149 E9/L (ref 130–450)
PLATELET # BLD: 164 E9/L (ref 130–450)
PLATELET # BLD: 165 E9/L (ref 130–450)
PLATELET # BLD: 168 E9/L (ref 130–450)
PLATELET # BLD: 178 E9/L (ref 130–450)
PLATELET # BLD: 184 E9/L (ref 130–450)
PLATELET # BLD: 196 E9/L (ref 130–450)
PLATELET # BLD: 200 E9/L (ref 130–450)
PLATELET # BLD: 204 E9/L (ref 130–450)
PLATELET # BLD: 207 E9/L (ref 130–450)
PLATELET # BLD: 211 E9/L (ref 130–450)
PLATELET # BLD: 214 E9/L (ref 130–450)
PLATELET # BLD: 237 E9/L (ref 130–450)
PLATELET # BLD: 258 E9/L (ref 130–450)
PLATELET # BLD: 289 E9/L (ref 130–450)
PLATELET # BLD: 308 E9/L (ref 130–450)
PMV BLD AUTO: 10.1 FL (ref 7–12)
PMV BLD AUTO: 10.2 FL (ref 7–12)
PMV BLD AUTO: 10.3 FL (ref 7–12)
PMV BLD AUTO: 10.6 FL (ref 7–12)
PMV BLD AUTO: 10.9 FL (ref 7–12)
PMV BLD AUTO: 11.1 FL (ref 7–12)
PMV BLD AUTO: 9.8 FL (ref 7–12)
PMV BLD AUTO: 9.9 FL (ref 7–12)
PO2: 54.1 MMHG (ref 75–100)
PO2: 90 MMHG (ref 75–100)
POIKILOCYTES: ABNORMAL
POIKILOCYTES: ABNORMAL
POLYCHROMASIA: ABNORMAL
POLYCHROMASIA: ABNORMAL
POTASSIUM REFLEX MAGNESIUM: 3.6 MMOL/L (ref 3.5–5)
POTASSIUM REFLEX MAGNESIUM: 4 MMOL/L (ref 3.5–5)
POTASSIUM REFLEX MAGNESIUM: 4.1 MMOL/L (ref 3.5–5)
POTASSIUM REFLEX MAGNESIUM: 4.1 MMOL/L (ref 3.5–5)
POTASSIUM SERPL-SCNC: 3.7 MMOL/L (ref 3.5–5)
POTASSIUM SERPL-SCNC: 3.9 MMOL/L (ref 3.5–5)
POTASSIUM SERPL-SCNC: 4 MMOL/L (ref 3.5–5)
POTASSIUM SERPL-SCNC: 4.2 MMOL/L (ref 3.5–5)
POTASSIUM SERPL-SCNC: 4.3 MMOL/L (ref 3.5–5)
POTASSIUM SERPL-SCNC: 4.4 MMOL/L (ref 3.5–5)
POTASSIUM SERPL-SCNC: 4.5 MMOL/L (ref 3.5–5)
POTASSIUM SERPL-SCNC: 5 MMOL/L (ref 3.5–5)
POTASSIUM, UR: 30.1 MMOL/L
PRO-BNP: 2185 PG/ML (ref 0–450)
PRO-BNP: 5948 PG/ML (ref 0–450)
PRO-BNP: 787 PG/ML (ref 0–450)
PROCALCITONIN: 0.62 NG/ML (ref 0–0.08)
PROSTATE SPECIFIC ANTIGEN: 0.42 NG/ML (ref 0–4)
PROTEIN UA: ABNORMAL MG/DL
PROTEIN UA: NEGATIVE MG/DL
PROTHROMBIN TIME: 13.3 SEC (ref 9.3–12.4)
RBC # BLD: 2.82 E12/L (ref 3.8–5.8)
RBC # BLD: 3.05 E12/L (ref 3.8–5.8)
RBC # BLD: 3.17 E12/L (ref 3.8–5.8)
RBC # BLD: 3.39 E12/L (ref 3.8–5.8)
RBC # BLD: 3.42 E12/L (ref 3.8–5.8)
RBC # BLD: 3.46 E12/L (ref 3.8–5.8)
RBC # BLD: 3.49 E12/L (ref 3.8–5.8)
RBC # BLD: 3.5 E12/L (ref 3.8–5.8)
RBC # BLD: 3.57 E12/L (ref 3.8–5.8)
RBC # BLD: 3.57 E12/L (ref 3.8–5.8)
RBC # BLD: 3.59 E12/L (ref 3.8–5.8)
RBC # BLD: 3.61 E12/L (ref 3.8–5.8)
RBC # BLD: 3.76 E12/L (ref 3.8–5.8)
RBC # BLD: 3.84 E12/L (ref 3.8–5.8)
RBC # BLD: 3.85 E12/L (ref 3.8–5.8)
RBC # BLD: 3.86 E12/L (ref 3.8–5.8)
RBC # BLD: 3.96 E12/L (ref 3.8–5.8)
RBC # BLD: 4.01 E12/L (ref 3.8–5.8)
RBC # BLD: 4.01 E12/L (ref 3.8–5.8)
RBC # BLD: NORMAL 10*6/UL
RBC UA: ABNORMAL /HPF (ref 0–2)
RBC UA: ABNORMAL /HPF (ref 0–2)
SARS-COV-2, NAAT: DETECTED
SARS-COV-2, NAAT: NOT DETECTED
SEDIMENTATION RATE, ERYTHROCYTE: 26 MM/HR (ref 0–15)
SODIUM BLD-SCNC: 132 MMOL/L (ref 132–146)
SODIUM BLD-SCNC: 134 MMOL/L (ref 132–146)
SODIUM BLD-SCNC: 135 MMOL/L (ref 132–146)
SODIUM BLD-SCNC: 136 MMOL/L (ref 132–146)
SODIUM BLD-SCNC: 137 MMOL/L (ref 132–146)
SODIUM BLD-SCNC: 138 MMOL/L (ref 132–146)
SODIUM BLD-SCNC: 139 MMOL/L (ref 132–146)
SODIUM BLD-SCNC: 140 MMOL/L (ref 132–146)
SODIUM BLD-SCNC: 144 MMOL/L (ref 132–146)
SODIUM BLD-SCNC: 146 MMOL/L (ref 132–146)
SODIUM URINE: 168 MMOL/L
SOURCE, BLOOD GAS: ABNORMAL
SOURCE, BLOOD GAS: NORMAL
SPECIFIC GRAVITY UA: 1.01 (ref 1–1.03)
SPECIFIC GRAVITY UA: >=1.03 (ref 1–1.03)
T4 FREE: 1.31 NG/DL (ref 0.93–1.7)
THB: 11.7 G/DL (ref 11.5–16.5)
THB: 14 G/DL (ref 11.5–16.5)
THYROGLOBULIN AB: <0.9 IU/ML (ref 0–4)
THYROID PEROXIDASE (TPO) ABS: 3.2 IU/ML (ref 0–9)
TIME ANALYZED: 1429
TIME ANALYZED: 2030
TOTAL CK: 533 U/L (ref 20–200)
TOTAL PROTEIN: 5.5 G/DL (ref 6.4–8.3)
TOTAL PROTEIN: 5.6 G/DL (ref 6.4–8.3)
TOTAL PROTEIN: 5.7 G/DL (ref 6.4–8.3)
TOTAL PROTEIN: 5.9 G/DL (ref 6.4–8.3)
TOTAL PROTEIN: 5.9 G/DL (ref 6.4–8.3)
TOTAL PROTEIN: 6.3 G/DL (ref 6.4–8.3)
TOTAL PROTEIN: 6.8 G/DL (ref 6.4–8.3)
TOXIC GRANULATION: ABNORMAL
TROPONIN: 0.01 NG/ML (ref 0–0.03)
TROPONIN: 0.01 NG/ML (ref 0–0.03)
TROPONIN: 0.02 NG/ML (ref 0–0.03)
TROPONIN: 0.03 NG/ML (ref 0–0.03)
TROPONIN: 0.03 NG/ML (ref 0–0.03)
TROPONIN: <0.01 NG/ML (ref 0–0.03)
TROPONIN: <0.01 NG/ML (ref 0–0.03)
TSH SERPL DL<=0.05 MIU/L-ACNC: 0.03 UIU/ML (ref 0.27–4.2)
TSH SERPL DL<=0.05 MIU/L-ACNC: 2.56 UIU/ML (ref 0.27–4.2)
UROBILINOGEN, URINE: 0.2 E.U./DL
UROBILINOGEN, URINE: 0.2 E.U./DL
VITAMIN B-12: >2000 PG/ML (ref 211–946)
VITAMIN D 25-HYDROXY: 47 NG/ML (ref 30–100)
WBC # BLD: 2.8 E9/L (ref 4.5–11.5)
WBC # BLD: 3.2 E9/L (ref 4.5–11.5)
WBC # BLD: 4.5 E9/L (ref 4.5–11.5)
WBC # BLD: 5 E9/L (ref 4.5–11.5)
WBC # BLD: 5.1 E9/L (ref 4.5–11.5)
WBC # BLD: 5.7 E9/L (ref 4.5–11.5)
WBC # BLD: 6.1 E9/L (ref 4.5–11.5)
WBC # BLD: 6.1 E9/L (ref 4.5–11.5)
WBC # BLD: 6.3 E9/L (ref 4.5–11.5)
WBC # BLD: 6.4 E9/L (ref 4.5–11.5)
WBC # BLD: 6.6 E9/L (ref 4.5–11.5)
WBC # BLD: 6.9 E9/L (ref 4.5–11.5)
WBC # BLD: 7.2 E9/L (ref 4.5–11.5)
WBC # BLD: 7.6 E9/L (ref 4.5–11.5)
WBC # BLD: 7.9 E9/L (ref 4.5–11.5)
WBC # BLD: 9 E9/L (ref 4.5–11.5)
WBC # BLD: 9.2 E9/L (ref 4.5–11.5)
WBC # BLD: 9.5 E9/L (ref 4.5–11.5)
WBC # BLD: 9.6 E9/L (ref 4.5–11.5)
WBC UA: ABNORMAL /HPF (ref 0–5)
WBC UA: ABNORMAL /HPF (ref 0–5)

## 2020-01-01 PROCEDURE — 94640 AIRWAY INHALATION TREATMENT: CPT

## 2020-01-01 PROCEDURE — 97530 THERAPEUTIC ACTIVITIES: CPT

## 2020-01-01 PROCEDURE — 6360000002 HC RX W HCPCS: Performed by: INTERNAL MEDICINE

## 2020-01-01 PROCEDURE — 92526 ORAL FUNCTION THERAPY: CPT

## 2020-01-01 PROCEDURE — 36415 COLL VENOUS BLD VENIPUNCTURE: CPT

## 2020-01-01 PROCEDURE — 6370000000 HC RX 637 (ALT 250 FOR IP): Performed by: INTERNAL MEDICINE

## 2020-01-01 PROCEDURE — 80053 COMPREHEN METABOLIC PANEL: CPT

## 2020-01-01 PROCEDURE — 6370000000 HC RX 637 (ALT 250 FOR IP): Performed by: PHYSICAL MEDICINE & REHABILITATION

## 2020-01-01 PROCEDURE — 80048 BASIC METABOLIC PNL TOTAL CA: CPT

## 2020-01-01 PROCEDURE — 1280000000 HC REHAB R&B

## 2020-01-01 PROCEDURE — 84100 ASSAY OF PHOSPHORUS: CPT

## 2020-01-01 PROCEDURE — 83880 ASSAY OF NATRIURETIC PEPTIDE: CPT

## 2020-01-01 PROCEDURE — 1111F DSCHRG MED/CURRENT MED MERGE: CPT | Performed by: FAMILY MEDICINE

## 2020-01-01 PROCEDURE — 51798 US URINE CAPACITY MEASURE: CPT

## 2020-01-01 PROCEDURE — 6370000000 HC RX 637 (ALT 250 FOR IP): Performed by: FAMILY MEDICINE

## 2020-01-01 PROCEDURE — 97130 THER IVNTJ EA ADDL 15 MIN: CPT

## 2020-01-01 PROCEDURE — 99233 SBSQ HOSP IP/OBS HIGH 50: CPT | Performed by: INTERNAL MEDICINE

## 2020-01-01 PROCEDURE — 97162 PT EVAL MOD COMPLEX 30 MIN: CPT

## 2020-01-01 PROCEDURE — 99232 SBSQ HOSP IP/OBS MODERATE 35: CPT | Performed by: FAMILY MEDICINE

## 2020-01-01 PROCEDURE — 85025 COMPLETE CBC W/AUTO DIFF WBC: CPT

## 2020-01-01 PROCEDURE — 99233 SBSQ HOSP IP/OBS HIGH 50: CPT | Performed by: PHYSICAL MEDICINE & REHABILITATION

## 2020-01-01 PROCEDURE — 92526 ORAL FUNCTION THERAPY: CPT | Performed by: SPEECH-LANGUAGE PATHOLOGIST

## 2020-01-01 PROCEDURE — 99232 SBSQ HOSP IP/OBS MODERATE 35: CPT | Performed by: PHYSICAL MEDICINE & REHABILITATION

## 2020-01-01 PROCEDURE — 6360000002 HC RX W HCPCS: Performed by: FAMILY MEDICINE

## 2020-01-01 PROCEDURE — 97535 SELF CARE MNGMENT TRAINING: CPT

## 2020-01-01 PROCEDURE — 76870 US EXAM SCROTUM: CPT

## 2020-01-01 PROCEDURE — 2060000000 HC ICU INTERMEDIATE R&B

## 2020-01-01 PROCEDURE — 97129 THER IVNTJ 1ST 15 MIN: CPT

## 2020-01-01 PROCEDURE — 3E0G76Z INTRODUCTION OF NUTRITIONAL SUBSTANCE INTO UPPER GI, VIA NATURAL OR ARTIFICIAL OPENING: ICD-10-PCS | Performed by: SURGERY

## 2020-01-01 PROCEDURE — 85384 FIBRINOGEN ACTIVITY: CPT

## 2020-01-01 PROCEDURE — 85027 COMPLETE CBC AUTOMATED: CPT

## 2020-01-01 PROCEDURE — 99232 SBSQ HOSP IP/OBS MODERATE 35: CPT | Performed by: INTERNAL MEDICINE

## 2020-01-01 PROCEDURE — 97129 THER IVNTJ 1ST 15 MIN: CPT | Performed by: SPEECH-LANGUAGE PATHOLOGIST

## 2020-01-01 PROCEDURE — 99222 1ST HOSP IP/OBS MODERATE 55: CPT | Performed by: INTERNAL MEDICINE

## 2020-01-01 PROCEDURE — 97110 THERAPEUTIC EXERCISES: CPT

## 2020-01-01 PROCEDURE — 99285 EMERGENCY DEPT VISIT HI MDM: CPT

## 2020-01-01 PROCEDURE — 6360000002 HC RX W HCPCS: Performed by: NURSE PRACTITIONER

## 2020-01-01 PROCEDURE — 99204 OFFICE O/P NEW MOD 45 MIN: CPT | Performed by: FAMILY MEDICINE

## 2020-01-01 PROCEDURE — 2580000003 HC RX 258: Performed by: FAMILY MEDICINE

## 2020-01-01 PROCEDURE — 6370000000 HC RX 637 (ALT 250 FOR IP): Performed by: NURSE PRACTITIONER

## 2020-01-01 PROCEDURE — 93010 ELECTROCARDIOGRAM REPORT: CPT | Performed by: INTERNAL MEDICINE

## 2020-01-01 PROCEDURE — 74230 X-RAY XM SWLNG FUNCJ C+: CPT

## 2020-01-01 PROCEDURE — U0002 COVID-19 LAB TEST NON-CDC: HCPCS

## 2020-01-01 PROCEDURE — APPSS45 APP SPLIT SHARED TIME 31-45 MINUTES: Performed by: NURSE PRACTITIONER

## 2020-01-01 PROCEDURE — 82805 BLOOD GASES W/O2 SATURATION: CPT

## 2020-01-01 PROCEDURE — 97112 NEUROMUSCULAR REEDUCATION: CPT

## 2020-01-01 PROCEDURE — 6370000000 HC RX 637 (ALT 250 FOR IP): Performed by: EMERGENCY MEDICINE

## 2020-01-01 PROCEDURE — 2580000003 HC RX 258: Performed by: INTERNAL MEDICINE

## 2020-01-01 PROCEDURE — 86140 C-REACTIVE PROTEIN: CPT

## 2020-01-01 PROCEDURE — G8419 CALC BMI OUT NRM PARAM NOF/U: HCPCS | Performed by: FAMILY MEDICINE

## 2020-01-01 PROCEDURE — 2500000003 HC RX 250 WO HCPCS: Performed by: INTERNAL MEDICINE

## 2020-01-01 PROCEDURE — 99222 1ST HOSP IP/OBS MODERATE 55: CPT | Performed by: SURGERY

## 2020-01-01 PROCEDURE — L1932 AFO RIG ANT TIB PREFAB TCF/=: HCPCS

## 2020-01-01 PROCEDURE — 93005 ELECTROCARDIOGRAM TRACING: CPT | Performed by: STUDENT IN AN ORGANIZED HEALTH CARE EDUCATION/TRAINING PROGRAM

## 2020-01-01 PROCEDURE — 99223 1ST HOSP IP/OBS HIGH 75: CPT | Performed by: INTERNAL MEDICINE

## 2020-01-01 PROCEDURE — 97161 PT EVAL LOW COMPLEX 20 MIN: CPT | Performed by: PHYSICAL THERAPIST

## 2020-01-01 PROCEDURE — 92610 EVALUATE SWALLOWING FUNCTION: CPT | Performed by: SPEECH-LANGUAGE PATHOLOGIST

## 2020-01-01 PROCEDURE — 97130 THER IVNTJ EA ADDL 15 MIN: CPT | Performed by: SPEECH-LANGUAGE PATHOLOGIST

## 2020-01-01 PROCEDURE — APPSS30 APP SPLIT SHARED TIME 16-30 MINUTES: Performed by: NURSE PRACTITIONER

## 2020-01-01 PROCEDURE — 70450 CT HEAD/BRAIN W/O DYE: CPT

## 2020-01-01 PROCEDURE — 71045 X-RAY EXAM CHEST 1 VIEW: CPT

## 2020-01-01 PROCEDURE — 94660 CPAP INITIATION&MGMT: CPT

## 2020-01-01 PROCEDURE — 2580000003 HC RX 258: Performed by: EMERGENCY MEDICINE

## 2020-01-01 PROCEDURE — 2580000003 HC RX 258: Performed by: NURSE PRACTITIONER

## 2020-01-01 PROCEDURE — 1200000000 HC SEMI PRIVATE

## 2020-01-01 PROCEDURE — 87040 BLOOD CULTURE FOR BACTERIA: CPT

## 2020-01-01 PROCEDURE — 1123F ACP DISCUSS/DSCN MKR DOCD: CPT | Performed by: FAMILY MEDICINE

## 2020-01-01 PROCEDURE — 3700000001 HC ADD 15 MINUTES (ANESTHESIA): Performed by: SURGERY

## 2020-01-01 PROCEDURE — 84133 ASSAY OF URINE POTASSIUM: CPT

## 2020-01-01 PROCEDURE — 85378 FIBRIN DEGRADE SEMIQUANT: CPT

## 2020-01-01 PROCEDURE — 97166 OT EVAL MOD COMPLEX 45 MIN: CPT

## 2020-01-01 PROCEDURE — 84484 ASSAY OF TROPONIN QUANT: CPT

## 2020-01-01 PROCEDURE — 2700000000 HC OXYGEN THERAPY PER DAY

## 2020-01-01 PROCEDURE — 92523 SPEECH SOUND LANG COMPREHEN: CPT | Performed by: SPEECH-LANGUAGE PATHOLOGIST

## 2020-01-01 PROCEDURE — 0DH63UZ INSERTION OF FEEDING DEVICE INTO STOMACH, PERCUTANEOUS APPROACH: ICD-10-PCS | Performed by: SURGERY

## 2020-01-01 PROCEDURE — 72192 CT PELVIS W/O DYE: CPT

## 2020-01-01 PROCEDURE — 3609013300 HC EGD TUBE PLACEMENT: Performed by: SURGERY

## 2020-01-01 PROCEDURE — 71250 CT THORAX DX C-: CPT

## 2020-01-01 PROCEDURE — 99221 1ST HOSP IP/OBS SF/LOW 40: CPT | Performed by: PHYSICAL MEDICINE & REHABILITATION

## 2020-01-01 PROCEDURE — 74018 RADEX ABDOMEN 1 VIEW: CPT

## 2020-01-01 PROCEDURE — 97165 OT EVAL LOW COMPLEX 30 MIN: CPT

## 2020-01-01 PROCEDURE — 99239 HOSP IP/OBS DSCHRG MGMT >30: CPT | Performed by: INTERNAL MEDICINE

## 2020-01-01 PROCEDURE — 97161 PT EVAL LOW COMPLEX 20 MIN: CPT

## 2020-01-01 PROCEDURE — 2500000003 HC RX 250 WO HCPCS: Performed by: FAMILY MEDICINE

## 2020-01-01 PROCEDURE — 93306 TTE W/DOPPLER COMPLETE: CPT

## 2020-01-01 PROCEDURE — 4040F PNEUMOC VAC/ADMIN/RCVD: CPT | Performed by: FAMILY MEDICINE

## 2020-01-01 PROCEDURE — 7100000011 HC PHASE II RECOVERY - ADDTL 15 MIN: Performed by: SURGERY

## 2020-01-01 PROCEDURE — 84439 ASSAY OF FREE THYROXINE: CPT

## 2020-01-01 PROCEDURE — 96374 THER/PROPH/DIAG INJ IV PUSH: CPT

## 2020-01-01 PROCEDURE — 99283 EMERGENCY DEPT VISIT LOW MDM: CPT

## 2020-01-01 PROCEDURE — 84443 ASSAY THYROID STIM HORMONE: CPT

## 2020-01-01 PROCEDURE — 83735 ASSAY OF MAGNESIUM: CPT

## 2020-01-01 PROCEDURE — 99223 1ST HOSP IP/OBS HIGH 75: CPT | Performed by: FAMILY MEDICINE

## 2020-01-01 PROCEDURE — 99239 HOSP IP/OBS DSCHRG MGMT >30: CPT | Performed by: PHYSICAL MEDICINE & REHABILITATION

## 2020-01-01 PROCEDURE — 93005 ELECTROCARDIOGRAM TRACING: CPT | Performed by: EMERGENCY MEDICINE

## 2020-01-01 PROCEDURE — 85651 RBC SED RATE NONAUTOMATED: CPT

## 2020-01-01 PROCEDURE — 72195 MRI PELVIS W/O DYE: CPT

## 2020-01-01 PROCEDURE — APPSS60 APP SPLIT SHARED TIME 46-60 MINUTES: Performed by: NURSE PRACTITIONER

## 2020-01-01 PROCEDURE — 6360000002 HC RX W HCPCS: Performed by: EMERGENCY MEDICINE

## 2020-01-01 PROCEDURE — 2580000003 HC RX 258: Performed by: NURSE ANESTHETIST, CERTIFIED REGISTERED

## 2020-01-01 PROCEDURE — 82306 VITAMIN D 25 HYDROXY: CPT

## 2020-01-01 PROCEDURE — 99233 SBSQ HOSP IP/OBS HIGH 50: CPT | Performed by: FAMILY MEDICINE

## 2020-01-01 PROCEDURE — 84145 PROCALCITONIN (PCT): CPT

## 2020-01-01 PROCEDURE — 86800 THYROGLOBULIN ANTIBODY: CPT

## 2020-01-01 PROCEDURE — 94664 DEMO&/EVAL PT USE INHALER: CPT

## 2020-01-01 PROCEDURE — 43246 EGD PLACE GASTROSTOMY TUBE: CPT | Performed by: SURGERY

## 2020-01-01 PROCEDURE — 81001 URINALYSIS AUTO W/SCOPE: CPT

## 2020-01-01 PROCEDURE — 99309 SBSQ NF CARE MODERATE MDM 30: CPT | Performed by: NURSE PRACTITIONER

## 2020-01-01 PROCEDURE — 85018 HEMOGLOBIN: CPT

## 2020-01-01 PROCEDURE — 2709999900 HC NON-CHARGEABLE SUPPLY: Performed by: SURGERY

## 2020-01-01 PROCEDURE — 82728 ASSAY OF FERRITIN: CPT

## 2020-01-01 PROCEDURE — 6360000002 HC RX W HCPCS: Performed by: NURSE ANESTHETIST, CERTIFIED REGISTERED

## 2020-01-01 PROCEDURE — 73502 X-RAY EXAM HIP UNI 2-3 VIEWS: CPT

## 2020-01-01 PROCEDURE — 92611 MOTION FLUOROSCOPY/SWALLOW: CPT

## 2020-01-01 PROCEDURE — 99223 1ST HOSP IP/OBS HIGH 75: CPT | Performed by: PHYSICAL MEDICINE & REHABILITATION

## 2020-01-01 PROCEDURE — 72125 CT NECK SPINE W/O DYE: CPT

## 2020-01-01 PROCEDURE — G8427 DOCREV CUR MEDS BY ELIG CLIN: HCPCS | Performed by: FAMILY MEDICINE

## 2020-01-01 PROCEDURE — 2500000003 HC RX 250 WO HCPCS: Performed by: PHYSICIAN ASSISTANT

## 2020-01-01 PROCEDURE — 83605 ASSAY OF LACTIC ACID: CPT

## 2020-01-01 PROCEDURE — 92610 EVALUATE SWALLOWING FUNCTION: CPT

## 2020-01-01 PROCEDURE — 84300 ASSAY OF URINE SODIUM: CPT

## 2020-01-01 PROCEDURE — 82607 VITAMIN B-12: CPT

## 2020-01-01 PROCEDURE — 2500000003 HC RX 250 WO HCPCS: Performed by: EMERGENCY MEDICINE

## 2020-01-01 PROCEDURE — 0HBRXZZ EXCISION OF TOE NAIL, EXTERNAL APPROACH: ICD-10-PCS | Performed by: PODIATRIST

## 2020-01-01 PROCEDURE — 82436 ASSAY OF URINE CHLORIDE: CPT

## 2020-01-01 PROCEDURE — 1036F TOBACCO NON-USER: CPT | Performed by: FAMILY MEDICINE

## 2020-01-01 PROCEDURE — 85014 HEMATOCRIT: CPT

## 2020-01-01 PROCEDURE — 3700000000 HC ANESTHESIA ATTENDED CARE: Performed by: SURGERY

## 2020-01-01 PROCEDURE — 84153 ASSAY OF PSA TOTAL: CPT

## 2020-01-01 PROCEDURE — XW033E5 INTRODUCTION OF REMDESIVIR ANTI-INFECTIVE INTO PERIPHERAL VEIN, PERCUTANEOUS APPROACH, NEW TECHNOLOGY GROUP 5: ICD-10-PCS | Performed by: INTERNAL MEDICINE

## 2020-01-01 PROCEDURE — 7100000010 HC PHASE II RECOVERY - FIRST 15 MIN: Performed by: SURGERY

## 2020-01-01 PROCEDURE — 82746 ASSAY OF FOLIC ACID SERUM: CPT

## 2020-01-01 PROCEDURE — 86376 MICROSOMAL ANTIBODY EACH: CPT

## 2020-01-01 PROCEDURE — 85610 PROTHROMBIN TIME: CPT

## 2020-01-01 PROCEDURE — 82550 ASSAY OF CK (CPK): CPT

## 2020-01-01 RX ORDER — CARVEDILOL 3.12 MG/1
3.12 TABLET ORAL 2 TIMES DAILY WITH MEALS
Status: DISCONTINUED | OUTPATIENT
Start: 2020-01-01 | End: 2020-01-01 | Stop reason: HOSPADM

## 2020-01-01 RX ORDER — TAMSULOSIN HYDROCHLORIDE 0.4 MG/1
0.4 CAPSULE ORAL NIGHTLY
Status: DISCONTINUED | OUTPATIENT
Start: 2020-01-01 | End: 2020-01-01 | Stop reason: HOSPADM

## 2020-01-01 RX ORDER — IPRATROPIUM BROMIDE AND ALBUTEROL SULFATE 2.5; .5 MG/3ML; MG/3ML
3 SOLUTION RESPIRATORY (INHALATION) 4 TIMES DAILY
Status: DISCONTINUED | OUTPATIENT
Start: 2020-01-01 | End: 2020-01-01 | Stop reason: CLARIF

## 2020-01-01 RX ORDER — SODIUM CHLORIDE 0.9 % (FLUSH) 0.9 %
10 SYRINGE (ML) INJECTION PRN
Status: DISCONTINUED | OUTPATIENT
Start: 2020-01-01 | End: 2020-01-01

## 2020-01-01 RX ORDER — FUROSEMIDE 20 MG/1
20 TABLET ORAL DAILY
Qty: 60 TABLET | Refills: 0 | DISCHARGE
Start: 2021-01-01

## 2020-01-01 RX ORDER — FUROSEMIDE 20 MG/1
20 TABLET ORAL DAILY
COMMUNITY
End: 2020-01-01 | Stop reason: SDUPTHER

## 2020-01-01 RX ORDER — POTASSIUM CHLORIDE 1500 MG/1
20 TABLET, FILM COATED, EXTENDED RELEASE ORAL DAILY
Qty: 30 TABLET | Refills: 0 | Status: ON HOLD | OUTPATIENT
Start: 2020-01-01 | End: 2020-01-01 | Stop reason: HOSPADM

## 2020-01-01 RX ORDER — SODIUM CHLORIDE 9 MG/ML
INJECTION, SOLUTION INTRAVENOUS CONTINUOUS
Status: ACTIVE | OUTPATIENT
Start: 2020-01-01 | End: 2020-01-01

## 2020-01-01 RX ORDER — DIMENHYDRINATE 50 MG
1000 TABLET ORAL DAILY
Status: DISCONTINUED | OUTPATIENT
Start: 2020-01-01 | End: 2020-01-01 | Stop reason: CLARIF

## 2020-01-01 RX ORDER — TAMSULOSIN HYDROCHLORIDE 0.4 MG/1
0.4 CAPSULE ORAL NIGHTLY
Qty: 30 CAPSULE | Refills: 1 | Status: SHIPPED | OUTPATIENT
Start: 2020-01-01 | End: 2020-01-01 | Stop reason: SDUPTHER

## 2020-01-01 RX ORDER — SODIUM CHLORIDE 9 MG/ML
INJECTION, SOLUTION INTRAVENOUS CONTINUOUS
Status: DISCONTINUED | OUTPATIENT
Start: 2020-01-01 | End: 2020-01-01

## 2020-01-01 RX ORDER — OYSTER SHELL CALCIUM WITH VITAMIN D 500; 200 MG/1; [IU]/1
1 TABLET, FILM COATED ORAL 2 TIMES DAILY
Qty: 60 TABLET | Refills: 0 | Status: SHIPPED | OUTPATIENT
Start: 2020-01-01

## 2020-01-01 RX ORDER — CHLORAL HYDRATE 500 MG
3000 CAPSULE ORAL DAILY
Status: DISCONTINUED | OUTPATIENT
Start: 2020-01-01 | End: 2020-01-01 | Stop reason: CLARIF

## 2020-01-01 RX ORDER — ONDANSETRON 2 MG/ML
4 INJECTION INTRAMUSCULAR; INTRAVENOUS EVERY 6 HOURS PRN
Status: DISCONTINUED | OUTPATIENT
Start: 2020-01-01 | End: 2020-01-01

## 2020-01-01 RX ORDER — 0.9 % SODIUM CHLORIDE 0.9 %
30 INTRAVENOUS SOLUTION INTRAVENOUS PRN
Status: DISCONTINUED | OUTPATIENT
Start: 2020-01-01 | End: 2020-01-01 | Stop reason: HOSPADM

## 2020-01-01 RX ORDER — IPRATROPIUM BROMIDE AND ALBUTEROL SULFATE 2.5; .5 MG/3ML; MG/3ML
3 SOLUTION RESPIRATORY (INHALATION) 4 TIMES DAILY
Qty: 360 ML | Refills: 0
Start: 2020-01-01

## 2020-01-01 RX ORDER — GUAIFENESIN/DEXTROMETHORPHAN 100-10MG/5
5 SYRUP ORAL EVERY 4 HOURS PRN
Status: DISCONTINUED | OUTPATIENT
Start: 2020-01-01 | End: 2020-01-01 | Stop reason: HOSPADM

## 2020-01-01 RX ORDER — HYDROCODONE BITARTRATE AND ACETAMINOPHEN 5; 325 MG/1; MG/1
1 TABLET ORAL EVERY 6 HOURS PRN
Status: DISCONTINUED | OUTPATIENT
Start: 2020-01-01 | End: 2020-01-01 | Stop reason: HOSPADM

## 2020-01-01 RX ORDER — DOCUSATE SODIUM 100 MG/1
100 CAPSULE, LIQUID FILLED ORAL 2 TIMES DAILY
Status: DISCONTINUED | OUTPATIENT
Start: 2020-01-01 | End: 2020-01-01 | Stop reason: HOSPADM

## 2020-01-01 RX ORDER — ACETAMINOPHEN 650 MG/1
650 SUPPOSITORY RECTAL EVERY 6 HOURS PRN
Status: DISCONTINUED | OUTPATIENT
Start: 2020-01-01 | End: 2020-01-01 | Stop reason: HOSPADM

## 2020-01-01 RX ORDER — ACETAMINOPHEN 325 MG/1
650 TABLET ORAL EVERY 6 HOURS PRN
Status: DISCONTINUED | OUTPATIENT
Start: 2020-01-01 | End: 2020-01-01 | Stop reason: HOSPADM

## 2020-01-01 RX ORDER — ASCORBIC ACID 500 MG
1000 TABLET ORAL 2 TIMES DAILY
Status: DISCONTINUED | OUTPATIENT
Start: 2020-01-01 | End: 2020-01-01 | Stop reason: HOSPADM

## 2020-01-01 RX ORDER — GUAIFENESIN/DEXTROMETHORPHAN 100-10MG/5
5 SYRUP ORAL EVERY 4 HOURS PRN
Qty: 120 ML | DISCHARGE
Start: 2020-01-01 | End: 2021-01-01

## 2020-01-01 RX ORDER — IPRATROPIUM BROMIDE AND ALBUTEROL SULFATE 2.5; .5 MG/3ML; MG/3ML
1 SOLUTION RESPIRATORY (INHALATION) ONCE
Status: COMPLETED | OUTPATIENT
Start: 2020-01-01 | End: 2020-01-01

## 2020-01-01 RX ORDER — CARVEDILOL 3.12 MG/1
3.12 TABLET ORAL 2 TIMES DAILY WITH MEALS
Qty: 60 TABLET | Refills: 0 | Status: SHIPPED | OUTPATIENT
Start: 2020-01-01

## 2020-01-01 RX ORDER — POLYETHYLENE GLYCOL 3350 17 G/17G
17 POWDER, FOR SOLUTION ORAL DAILY PRN
Status: DISCONTINUED | OUTPATIENT
Start: 2020-01-01 | End: 2020-01-01 | Stop reason: HOSPADM

## 2020-01-01 RX ORDER — PROMETHAZINE HYDROCHLORIDE 25 MG/1
12.5 TABLET ORAL EVERY 6 HOURS PRN
Status: DISCONTINUED | OUTPATIENT
Start: 2020-01-01 | End: 2020-01-01 | Stop reason: HOSPADM

## 2020-01-01 RX ORDER — TAMSULOSIN HYDROCHLORIDE 0.4 MG/1
0.4 CAPSULE ORAL NIGHTLY
Qty: 30 CAPSULE | Refills: 0 | Status: SHIPPED
Start: 2020-01-01 | End: 2021-01-01

## 2020-01-01 RX ORDER — HEPARIN SODIUM 10000 [USP'U]/ML
5000 INJECTION, SOLUTION INTRAVENOUS; SUBCUTANEOUS EVERY 8 HOURS
Status: ON HOLD | DISCHARGE
Start: 2021-01-01 | End: 2021-01-01 | Stop reason: HOSPADM

## 2020-01-01 RX ORDER — METOPROLOL TARTRATE 5 MG/5ML
5 INJECTION INTRAVENOUS EVERY 6 HOURS
Status: DISCONTINUED | OUTPATIENT
Start: 2020-01-01 | End: 2020-01-01 | Stop reason: HOSPADM

## 2020-01-01 RX ORDER — MIDODRINE HYDROCHLORIDE 5 MG/1
10 TABLET ORAL
Status: DISCONTINUED | OUTPATIENT
Start: 2020-01-01 | End: 2020-01-01 | Stop reason: HOSPADM

## 2020-01-01 RX ORDER — IPRATROPIUM BROMIDE AND ALBUTEROL SULFATE 2.5; .5 MG/3ML; MG/3ML
1 SOLUTION RESPIRATORY (INHALATION)
Status: DISCONTINUED | OUTPATIENT
Start: 2020-01-01 | End: 2020-01-01

## 2020-01-01 RX ORDER — ERGOCALCIFEROL 1.25 MG/1
50000 CAPSULE ORAL WEEKLY
Status: DISCONTINUED | OUTPATIENT
Start: 2020-01-01 | End: 2020-01-01 | Stop reason: HOSPADM

## 2020-01-01 RX ORDER — SODIUM CHLORIDE 0.9 % (FLUSH) 0.9 %
10 SYRINGE (ML) INJECTION EVERY 12 HOURS SCHEDULED
Status: DISCONTINUED | OUTPATIENT
Start: 2020-01-01 | End: 2020-01-01 | Stop reason: HOSPADM

## 2020-01-01 RX ORDER — AMOXICILLIN AND CLAVULANATE POTASSIUM 500; 125 MG/1; MG/1
1 TABLET, FILM COATED ORAL 3 TIMES DAILY
Qty: 9 TABLET | Refills: 0
Start: 2020-01-01 | End: 2020-01-01

## 2020-01-01 RX ORDER — SODIUM CHLORIDE 0.9 % (FLUSH) 0.9 %
10 SYRINGE (ML) INJECTION PRN
Status: CANCELLED | OUTPATIENT
Start: 2020-01-01

## 2020-01-01 RX ORDER — CARVEDILOL 3.12 MG/1
3.12 TABLET ORAL 2 TIMES DAILY WITH MEALS
Qty: 60 TABLET | Refills: 0
Start: 2020-01-01 | End: 2020-01-01 | Stop reason: SDUPTHER

## 2020-01-01 RX ORDER — LEVOTHYROXINE AND LIOTHYRONINE 19; 4.5 UG/1; UG/1
60 TABLET ORAL DAILY
Status: DISCONTINUED | OUTPATIENT
Start: 2020-01-01 | End: 2020-01-01 | Stop reason: HOSPADM

## 2020-01-01 RX ORDER — HYDROCODONE BITARTRATE AND ACETAMINOPHEN 5; 325 MG/1; MG/1
1 TABLET ORAL EVERY 6 HOURS PRN
Status: DISCONTINUED | OUTPATIENT
Start: 2020-01-01 | End: 2020-01-01

## 2020-01-01 RX ORDER — HEPARIN SODIUM 10000 [USP'U]/ML
5000 INJECTION, SOLUTION INTRAVENOUS; SUBCUTANEOUS EVERY 12 HOURS
Status: DISCONTINUED | OUTPATIENT
Start: 2020-01-01 | End: 2020-01-01 | Stop reason: HOSPADM

## 2020-01-01 RX ORDER — SODIUM CHLORIDE 0.9 % (FLUSH) 0.9 %
10 SYRINGE (ML) INJECTION EVERY 12 HOURS SCHEDULED
Status: DISCONTINUED | OUTPATIENT
Start: 2020-01-01 | End: 2020-01-01

## 2020-01-01 RX ORDER — SODIUM CHLORIDE 9 MG/ML
INJECTION, SOLUTION INTRAVENOUS CONTINUOUS
Status: CANCELLED | OUTPATIENT
Start: 2020-01-01

## 2020-01-01 RX ORDER — ASCORBIC ACID 500 MG
500 TABLET ORAL DAILY
Status: DISCONTINUED | OUTPATIENT
Start: 2020-01-01 | End: 2020-01-01 | Stop reason: HOSPADM

## 2020-01-01 RX ORDER — BENZONATATE 100 MG/1
100 CAPSULE ORAL 3 TIMES DAILY PRN
Status: DISCONTINUED | OUTPATIENT
Start: 2020-01-01 | End: 2020-01-01 | Stop reason: HOSPADM

## 2020-01-01 RX ORDER — METOCLOPRAMIDE 10 MG/1
5 TABLET ORAL
Status: DISCONTINUED | OUTPATIENT
Start: 2020-01-01 | End: 2020-01-01 | Stop reason: HOSPADM

## 2020-01-01 RX ORDER — SODIUM CHLORIDE 0.9 % (FLUSH) 0.9 %
10 SYRINGE (ML) INJECTION EVERY 12 HOURS SCHEDULED
Status: CANCELLED | OUTPATIENT
Start: 2020-01-01

## 2020-01-01 RX ORDER — BISACODYL 10 MG
10 SUPPOSITORY, RECTAL RECTAL DAILY PRN
Status: DISCONTINUED | OUTPATIENT
Start: 2020-01-01 | End: 2020-01-01 | Stop reason: HOSPADM

## 2020-01-01 RX ORDER — FENTANYL CITRATE 50 UG/ML
25 INJECTION, SOLUTION INTRAMUSCULAR; INTRAVENOUS ONCE
Status: DISCONTINUED | OUTPATIENT
Start: 2020-01-01 | End: 2020-01-01 | Stop reason: HOSPADM

## 2020-01-01 RX ORDER — ONDANSETRON 2 MG/ML
4 INJECTION INTRAMUSCULAR; INTRAVENOUS EVERY 6 HOURS PRN
Status: DISCONTINUED | OUTPATIENT
Start: 2020-01-01 | End: 2020-01-01 | Stop reason: HOSPADM

## 2020-01-01 RX ORDER — DIAPER,BRIEF,INFANT-TODD,DISP
EACH MISCELLANEOUS 2 TIMES DAILY
Status: DISCONTINUED | OUTPATIENT
Start: 2020-01-01 | End: 2020-01-01 | Stop reason: HOSPADM

## 2020-01-01 RX ORDER — OMEGA-3S/DHA/EPA/FISH OIL/D3 300MG-1000
400 CAPSULE ORAL DAILY
Status: DISCONTINUED | OUTPATIENT
Start: 2020-01-01 | End: 2020-01-01

## 2020-01-01 RX ORDER — ASPIRIN 81 MG/1
81 TABLET, CHEWABLE ORAL DAILY
Status: DISCONTINUED | OUTPATIENT
Start: 2020-01-01 | End: 2020-01-01 | Stop reason: HOSPADM

## 2020-01-01 RX ORDER — TRAMADOL HYDROCHLORIDE 50 MG/1
50 TABLET ORAL EVERY 6 HOURS PRN
Status: DISCONTINUED | OUTPATIENT
Start: 2020-01-01 | End: 2020-01-01 | Stop reason: HOSPADM

## 2020-01-01 RX ORDER — MULTIVITAMIN WITH IRON
1 TABLET ORAL DAILY
Status: DISCONTINUED | OUTPATIENT
Start: 2020-01-01 | End: 2020-01-01 | Stop reason: HOSPADM

## 2020-01-01 RX ORDER — ASCORBIC ACID 1000 MG
3 TABLET ORAL DAILY
Status: DISCONTINUED | OUTPATIENT
Start: 2020-01-01 | End: 2020-01-01 | Stop reason: CLARIF

## 2020-01-01 RX ORDER — DEXAMETHASONE SODIUM PHOSPHATE 4 MG/ML
6 INJECTION, SOLUTION INTRA-ARTICULAR; INTRALESIONAL; INTRAMUSCULAR; INTRAVENOUS; SOFT TISSUE DAILY
Status: DISCONTINUED | OUTPATIENT
Start: 2020-01-01 | End: 2020-01-01 | Stop reason: HOSPADM

## 2020-01-01 RX ORDER — MIDODRINE HYDROCHLORIDE 5 MG/1
15 TABLET ORAL
Status: DISCONTINUED | OUTPATIENT
Start: 2020-01-01 | End: 2020-01-01

## 2020-01-01 RX ORDER — SODIUM CHLORIDE 9 MG/ML
INJECTION, SOLUTION INTRAVENOUS CONTINUOUS
Status: DISCONTINUED | OUTPATIENT
Start: 2020-01-01 | End: 2020-01-01 | Stop reason: HOSPADM

## 2020-01-01 RX ORDER — PROPOFOL 10 MG/ML
INJECTION, EMULSION INTRAVENOUS PRN
Status: DISCONTINUED | OUTPATIENT
Start: 2020-01-01 | End: 2020-01-01 | Stop reason: SDUPTHER

## 2020-01-01 RX ORDER — TAMSULOSIN HYDROCHLORIDE 0.4 MG/1
0.4 CAPSULE ORAL NIGHTLY
Status: CANCELLED | OUTPATIENT
Start: 2020-01-01

## 2020-01-01 RX ORDER — FUROSEMIDE 10 MG/ML
20 INJECTION INTRAMUSCULAR; INTRAVENOUS ONCE
Status: COMPLETED | OUTPATIENT
Start: 2020-01-01 | End: 2020-01-01

## 2020-01-01 RX ORDER — ACETAMINOPHEN 325 MG/1
650 TABLET ORAL EVERY 6 HOURS PRN
Status: CANCELLED | OUTPATIENT
Start: 2020-01-01

## 2020-01-01 RX ORDER — PANTOPRAZOLE SODIUM 40 MG/1
40 TABLET, DELAYED RELEASE ORAL
Status: DISCONTINUED | OUTPATIENT
Start: 2020-01-01 | End: 2020-01-01 | Stop reason: HOSPADM

## 2020-01-01 RX ORDER — POLYETHYLENE GLYCOL 3350 17 G/17G
17 POWDER, FOR SOLUTION ORAL DAILY PRN
Status: CANCELLED | OUTPATIENT
Start: 2020-01-01

## 2020-01-01 RX ORDER — OYSTER SHELL CALCIUM WITH VITAMIN D 500; 200 MG/1; [IU]/1
1 TABLET, FILM COATED ORAL 2 TIMES DAILY
Status: DISCONTINUED | OUTPATIENT
Start: 2020-01-01 | End: 2020-01-01 | Stop reason: HOSPADM

## 2020-01-01 RX ORDER — PSEUDOEPHEDRINE HCL 30 MG
100 TABLET ORAL 2 TIMES DAILY
Qty: 60 CAPSULE | Refills: 0 | Status: ON HOLD
Start: 2020-01-01 | End: 2020-01-01 | Stop reason: HOSPADM

## 2020-01-01 RX ORDER — TRAMADOL HYDROCHLORIDE 50 MG/1
50 TABLET ORAL EVERY 6 HOURS PRN
Status: CANCELLED | OUTPATIENT
Start: 2020-01-01

## 2020-01-01 RX ORDER — HYDROCODONE BITARTRATE AND ACETAMINOPHEN 5; 325 MG/1; MG/1
0.5 TABLET ORAL EVERY 6 HOURS PRN
Status: DISCONTINUED | OUTPATIENT
Start: 2020-01-01 | End: 2020-01-01

## 2020-01-01 RX ORDER — MIDODRINE HYDROCHLORIDE 5 MG/1
10 TABLET ORAL
Status: CANCELLED | OUTPATIENT
Start: 2020-01-01

## 2020-01-01 RX ORDER — ONDANSETRON 2 MG/ML
4 INJECTION INTRAMUSCULAR; INTRAVENOUS EVERY 6 HOURS PRN
Status: CANCELLED | OUTPATIENT
Start: 2020-01-01

## 2020-01-01 RX ORDER — 0.9 % SODIUM CHLORIDE 0.9 %
500 INTRAVENOUS SOLUTION INTRAVENOUS ONCE
Status: DISCONTINUED | OUTPATIENT
Start: 2020-01-01 | End: 2020-01-01 | Stop reason: HOSPADM

## 2020-01-01 RX ORDER — PROMETHAZINE HYDROCHLORIDE 25 MG/1
12.5 TABLET ORAL EVERY 6 HOURS PRN
Status: CANCELLED | OUTPATIENT
Start: 2020-01-01

## 2020-01-01 RX ORDER — HEPARIN SODIUM 10000 [USP'U]/ML
5000 INJECTION, SOLUTION INTRAVENOUS; SUBCUTANEOUS EVERY 8 HOURS
Status: DISCONTINUED | OUTPATIENT
Start: 2020-01-01 | End: 2020-01-01 | Stop reason: HOSPADM

## 2020-01-01 RX ORDER — MIDODRINE HYDROCHLORIDE 5 MG/1
10 TABLET ORAL
Status: DISCONTINUED | OUTPATIENT
Start: 2020-01-01 | End: 2020-01-01

## 2020-01-01 RX ORDER — IPRATROPIUM BROMIDE AND ALBUTEROL SULFATE 2.5; .5 MG/3ML; MG/3ML
1 SOLUTION RESPIRATORY (INHALATION)
Status: DISCONTINUED | OUTPATIENT
Start: 2020-01-01 | End: 2020-01-01 | Stop reason: HOSPADM

## 2020-01-01 RX ORDER — LANOLIN ALCOHOL/MO/W.PET/CERES
1.5 CREAM (GRAM) TOPICAL NIGHTLY
Qty: 30 TABLET | Refills: 0 | Status: ON HOLD
Start: 2020-01-01 | End: 2020-01-01 | Stop reason: HOSPADM

## 2020-01-01 RX ORDER — IPRATROPIUM BROMIDE AND ALBUTEROL SULFATE 2.5; .5 MG/3ML; MG/3ML
1 SOLUTION RESPIRATORY (INHALATION) EVERY 4 HOURS PRN
Status: DISCONTINUED | OUTPATIENT
Start: 2020-01-01 | End: 2020-01-01 | Stop reason: HOSPADM

## 2020-01-01 RX ORDER — ACETAMINOPHEN 325 MG/1
650 TABLET ORAL EVERY 6 HOURS PRN
Status: DISCONTINUED | OUTPATIENT
Start: 2020-01-01 | End: 2020-01-01

## 2020-01-01 RX ORDER — IPRATROPIUM BROMIDE AND ALBUTEROL SULFATE 2.5; .5 MG/3ML; MG/3ML
1 SOLUTION RESPIRATORY (INHALATION) 4 TIMES DAILY
Status: DISCONTINUED | OUTPATIENT
Start: 2020-01-01 | End: 2020-01-01 | Stop reason: HOSPADM

## 2020-01-01 RX ORDER — AMANTADINE HYDROCHLORIDE 100 MG/1
100 CAPSULE, GELATIN COATED ORAL
Status: DISCONTINUED | OUTPATIENT
Start: 2020-01-01 | End: 2020-01-01 | Stop reason: HOSPADM

## 2020-01-01 RX ORDER — LANOLIN ALCOHOL/MO/W.PET/CERES
3 CREAM (GRAM) TOPICAL NIGHTLY PRN
Status: DISCONTINUED | OUTPATIENT
Start: 2020-01-01 | End: 2020-01-01

## 2020-01-01 RX ORDER — FUROSEMIDE 20 MG/1
20 TABLET ORAL DAILY
Status: DISCONTINUED | OUTPATIENT
Start: 2020-01-01 | End: 2020-01-01 | Stop reason: HOSPADM

## 2020-01-01 RX ORDER — SODIUM CHLORIDE 0.9 % (FLUSH) 0.9 %
10 SYRINGE (ML) INJECTION PRN
Status: DISCONTINUED | OUTPATIENT
Start: 2020-01-01 | End: 2020-01-01 | Stop reason: HOSPADM

## 2020-01-01 RX ORDER — LANOLIN ALCOHOL/MO/W.PET/CERES
1.5 CREAM (GRAM) TOPICAL NIGHTLY
Status: DISCONTINUED | OUTPATIENT
Start: 2020-01-01 | End: 2020-01-01 | Stop reason: HOSPADM

## 2020-01-01 RX ORDER — IPRATROPIUM BROMIDE AND ALBUTEROL SULFATE 2.5; .5 MG/3ML; MG/3ML
3 SOLUTION RESPIRATORY (INHALATION) EVERY 4 HOURS PRN
Qty: 360 ML | Refills: 0
Start: 2020-01-01

## 2020-01-01 RX ORDER — ACETAMINOPHEN 650 MG/1
650 SUPPOSITORY RECTAL EVERY 6 HOURS PRN
Status: CANCELLED | OUTPATIENT
Start: 2020-01-01

## 2020-01-01 RX ORDER — POLYETHYLENE GLYCOL 3350 17 G/17G
17 POWDER, FOR SOLUTION ORAL DAILY PRN
Status: DISCONTINUED | OUTPATIENT
Start: 2020-01-01 | End: 2020-01-01

## 2020-01-01 RX ORDER — LANOLIN ALCOHOL/MO/W.PET/CERES
3 CREAM (GRAM) TOPICAL NIGHTLY
Status: DISCONTINUED | OUTPATIENT
Start: 2020-01-01 | End: 2020-01-01

## 2020-01-01 RX ORDER — ASCORBIC ACID 500 MG
500 TABLET ORAL DAILY
Status: CANCELLED | OUTPATIENT
Start: 2020-01-01

## 2020-01-01 RX ORDER — DEXAMETHASONE 6 MG/1
6 TABLET ORAL
Qty: 1 TABLET | Refills: 0 | DISCHARGE
Start: 2021-01-01 | End: 2021-01-01

## 2020-01-01 RX ORDER — TRAMADOL HYDROCHLORIDE 50 MG/1
50 TABLET ORAL EVERY 6 HOURS PRN
Status: DISCONTINUED | OUTPATIENT
Start: 2020-01-01 | End: 2020-01-01

## 2020-01-01 RX ORDER — METHOCARBAMOL 500 MG/1
1000 TABLET, FILM COATED ORAL 3 TIMES DAILY
Status: DISCONTINUED | OUTPATIENT
Start: 2020-01-01 | End: 2020-01-01

## 2020-01-01 RX ORDER — PROMETHAZINE HYDROCHLORIDE 25 MG/1
12.5 TABLET ORAL EVERY 6 HOURS PRN
Status: DISCONTINUED | OUTPATIENT
Start: 2020-01-01 | End: 2020-01-01

## 2020-01-01 RX ORDER — SODIUM CHLORIDE 9 MG/ML
INJECTION, SOLUTION INTRAVENOUS CONTINUOUS PRN
Status: DISCONTINUED | OUTPATIENT
Start: 2020-01-01 | End: 2020-01-01 | Stop reason: SDUPTHER

## 2020-01-01 RX ORDER — METOCLOPRAMIDE 5 MG/1
5 TABLET ORAL
Qty: 120 TABLET | Refills: 0
Start: 2020-01-01 | End: 2020-01-01 | Stop reason: SDUPTHER

## 2020-01-01 RX ORDER — FUROSEMIDE 20 MG/1
40 TABLET ORAL DAILY
Qty: 60 TABLET | Refills: 0 | Status: ON HOLD
Start: 2020-01-01 | End: 2020-01-01 | Stop reason: HOSPADM

## 2020-01-01 RX ORDER — ZINC SULFATE 50(220)MG
50 CAPSULE ORAL 2 TIMES DAILY
Status: DISCONTINUED | OUTPATIENT
Start: 2020-01-01 | End: 2020-01-01 | Stop reason: HOSPADM

## 2020-01-01 RX ORDER — METOCLOPRAMIDE 5 MG/1
5 TABLET ORAL
Qty: 60 TABLET | Refills: 0 | Status: SHIPPED | OUTPATIENT
Start: 2020-01-01

## 2020-01-01 RX ORDER — ACETAMINOPHEN 650 MG/1
650 SUPPOSITORY RECTAL EVERY 6 HOURS PRN
Status: DISCONTINUED | OUTPATIENT
Start: 2020-01-01 | End: 2020-01-01

## 2020-01-01 RX ORDER — FUROSEMIDE 40 MG/1
40 TABLET ORAL DAILY
Status: DISCONTINUED | OUTPATIENT
Start: 2020-01-01 | End: 2020-01-01

## 2020-01-01 RX ORDER — AMOXICILLIN AND CLAVULANATE POTASSIUM 600; 42.9 MG/5ML; MG/5ML
600 POWDER, FOR SUSPENSION ORAL EVERY 12 HOURS SCHEDULED
Status: DISCONTINUED | OUTPATIENT
Start: 2020-01-01 | End: 2020-01-01 | Stop reason: HOSPADM

## 2020-01-01 RX ORDER — ASPIRIN 81 MG/1
81 TABLET, CHEWABLE ORAL DAILY
Qty: 30 TABLET | Refills: 0
Start: 2020-01-01

## 2020-01-01 RX ORDER — ACETAMINOPHEN 325 MG/1
650 TABLET ORAL EVERY 4 HOURS PRN
Status: DISCONTINUED | OUTPATIENT
Start: 2020-01-01 | End: 2020-01-01 | Stop reason: HOSPADM

## 2020-01-01 RX ORDER — POTASSIUM CHLORIDE 1500 MG/1
20 TABLET, FILM COATED, EXTENDED RELEASE ORAL DAILY
COMMUNITY
End: 2020-01-01 | Stop reason: SDUPTHER

## 2020-01-01 RX ORDER — VITAMIN B COMPLEX
1000 TABLET ORAL DAILY
Status: DISCONTINUED | OUTPATIENT
Start: 2020-01-01 | End: 2020-01-01 | Stop reason: HOSPADM

## 2020-01-01 RX ORDER — MULTIVITAMIN WITH IRON
1 TABLET ORAL DAILY
Qty: 30 TABLET | Refills: 0 | Status: SHIPPED | OUTPATIENT
Start: 2020-01-01

## 2020-01-01 RX ORDER — HYDROCODONE BITARTRATE AND ACETAMINOPHEN 5; 325 MG/1; MG/1
1 TABLET ORAL EVERY 6 HOURS PRN
Status: CANCELLED | OUTPATIENT
Start: 2020-01-01

## 2020-01-01 RX ORDER — SENNA PLUS 8.6 MG/1
1 TABLET ORAL
Status: DISCONTINUED | OUTPATIENT
Start: 2020-01-01 | End: 2020-01-01 | Stop reason: HOSPADM

## 2020-01-01 RX ORDER — AMOXICILLIN AND CLAVULANATE POTASSIUM 600; 42.9 MG/5ML; MG/5ML
600 POWDER, FOR SUSPENSION ORAL EVERY 12 HOURS SCHEDULED
Qty: 50 ML | Refills: 0 | DISCHARGE
Start: 2020-01-01 | End: 2021-01-01

## 2020-01-01 RX ORDER — LEVOTHYROXINE AND LIOTHYRONINE 38; 9 UG/1; UG/1
60 TABLET ORAL DAILY
Status: CANCELLED | OUTPATIENT
Start: 2020-01-01

## 2020-01-01 RX ORDER — OMEGA-3S/DHA/EPA/FISH OIL/D3 300MG-1000
400 CAPSULE ORAL DAILY
Status: DISCONTINUED | OUTPATIENT
Start: 2020-01-01 | End: 2020-01-01 | Stop reason: HOSPADM

## 2020-01-01 RX ADMIN — Medication 1000 UNITS: at 08:49

## 2020-01-01 RX ADMIN — BACITRACIN ZINC: 500 OINTMENT TOPICAL at 08:00

## 2020-01-01 RX ADMIN — MELATONIN 3 MG ORAL TABLET 3 MG: 3 TABLET ORAL at 22:01

## 2020-01-01 RX ADMIN — OXYCODONE HYDROCHLORIDE AND ACETAMINOPHEN 1000 MG: 500 TABLET ORAL at 08:55

## 2020-01-01 RX ADMIN — IPRATROPIUM BROMIDE AND ALBUTEROL SULFATE 1 AMPULE: .5; 3 SOLUTION RESPIRATORY (INHALATION) at 07:25

## 2020-01-01 RX ADMIN — MIDODRINE HYDROCHLORIDE 10 MG: 5 TABLET ORAL at 14:03

## 2020-01-01 RX ADMIN — OXYCODONE HYDROCHLORIDE AND ACETAMINOPHEN 500 MG: 500 TABLET ORAL at 09:14

## 2020-01-01 RX ADMIN — Medication 500 MG: at 16:47

## 2020-01-01 RX ADMIN — IPRATROPIUM BROMIDE AND ALBUTEROL 1 PUFF: 20; 100 SPRAY, METERED RESPIRATORY (INHALATION) at 10:30

## 2020-01-01 RX ADMIN — BACITRACIN ZINC: 500 OINTMENT TOPICAL at 10:43

## 2020-01-01 RX ADMIN — ENOXAPARIN SODIUM 40 MG: 40 INJECTION SUBCUTANEOUS at 09:21

## 2020-01-01 RX ADMIN — METOPROLOL TARTRATE 5 MG: 5 INJECTION INTRAVENOUS at 17:05

## 2020-01-01 RX ADMIN — AMPICILLIN SODIUM AND SULBACTAM SODIUM 3 G: 2; 1 INJECTION, POWDER, FOR SOLUTION INTRAMUSCULAR; INTRAVENOUS at 21:00

## 2020-01-01 RX ADMIN — IPRATROPIUM BROMIDE AND ALBUTEROL SULFATE 1 AMPULE: 2.5; .5 SOLUTION RESPIRATORY (INHALATION) at 16:22

## 2020-01-01 RX ADMIN — HEPARIN SODIUM 5000 UNITS: 10000 INJECTION INTRAVENOUS; SUBCUTANEOUS at 22:00

## 2020-01-01 RX ADMIN — ERGOCALCIFEROL 50000 UNITS: 1.25 CAPSULE ORAL at 08:28

## 2020-01-01 RX ADMIN — CALCIUM CARBONATE-VITAMIN D TAB 500 MG-200 UNIT 1 TABLET: 500-200 TAB at 21:45

## 2020-01-01 RX ADMIN — CARVEDILOL 3.12 MG: 3.12 TABLET, FILM COATED ORAL at 17:25

## 2020-01-01 RX ADMIN — CARVEDILOL 3.12 MG: 3.12 TABLET, FILM COATED ORAL at 09:02

## 2020-01-01 RX ADMIN — PANTOPRAZOLE SODIUM 40 MG: 40 TABLET, DELAYED RELEASE ORAL at 06:20

## 2020-01-01 RX ADMIN — METHOCARBAMOL TABLETS 1000 MG: 500 TABLET, COATED ORAL at 10:53

## 2020-01-01 RX ADMIN — ENOXAPARIN SODIUM 40 MG: 40 INJECTION SUBCUTANEOUS at 08:27

## 2020-01-01 RX ADMIN — SODIUM CHLORIDE: 9 INJECTION, SOLUTION INTRAVENOUS at 11:46

## 2020-01-01 RX ADMIN — SODIUM CHLORIDE, PRESERVATIVE FREE 10 ML: 5 INJECTION INTRAVENOUS at 22:49

## 2020-01-01 RX ADMIN — LEVOTHYROXINE, LIOTHYRONINE 60 MG: 19; 4.5 TABLET ORAL at 09:27

## 2020-01-01 RX ADMIN — SODIUM CHLORIDE: 9 INJECTION, SOLUTION INTRAVENOUS at 17:34

## 2020-01-01 RX ADMIN — OYSTER SHELL CALCIUM WITH VITAMIN D 1 TABLET: 500; 200 TABLET, FILM COATED ORAL at 08:04

## 2020-01-01 RX ADMIN — CALCIUM CARBONATE-VITAMIN D TAB 500 MG-200 UNIT 1 TABLET: 500-200 TAB at 21:49

## 2020-01-01 RX ADMIN — HEPARIN SODIUM 5000 UNITS: 10000 INJECTION INTRAVENOUS; SUBCUTANEOUS at 06:30

## 2020-01-01 RX ADMIN — AMANTADINE HYDROCHLORIDE 100 MG: 100 CAPSULE, LIQUID FILLED ORAL at 06:48

## 2020-01-01 RX ADMIN — MULTIVITAMIN TABLET 1 TABLET: TABLET at 10:29

## 2020-01-01 RX ADMIN — SODIUM CHLORIDE, PRESERVATIVE FREE 10 ML: 5 INJECTION INTRAVENOUS at 09:27

## 2020-01-01 RX ADMIN — MULTIVITAMIN TABLET 1 TABLET: TABLET at 09:02

## 2020-01-01 RX ADMIN — METOCLOPRAMIDE 5 MG: 10 TABLET ORAL at 15:57

## 2020-01-01 RX ADMIN — PANTOPRAZOLE SODIUM 40 MG: 40 TABLET, DELAYED RELEASE ORAL at 05:17

## 2020-01-01 RX ADMIN — METOCLOPRAMIDE 5 MG: 10 TABLET ORAL at 16:45

## 2020-01-01 RX ADMIN — BACITRACIN ZINC: 500 OINTMENT TOPICAL at 21:13

## 2020-01-01 RX ADMIN — IPRATROPIUM BROMIDE AND ALBUTEROL 1 PUFF: 20; 100 SPRAY, METERED RESPIRATORY (INHALATION) at 08:50

## 2020-01-01 RX ADMIN — DOCUSATE SODIUM 100 MG: 100 CAPSULE, LIQUID FILLED ORAL at 21:12

## 2020-01-01 RX ADMIN — Medication 1 TABLET: at 07:49

## 2020-01-01 RX ADMIN — OXYCODONE HYDROCHLORIDE AND ACETAMINOPHEN 1000 MG: 500 TABLET ORAL at 22:53

## 2020-01-01 RX ADMIN — SODIUM CHLORIDE, PRESERVATIVE FREE 10 ML: 5 INJECTION INTRAVENOUS at 10:56

## 2020-01-01 RX ADMIN — Medication 10 ML: at 22:19

## 2020-01-01 RX ADMIN — METOCLOPRAMIDE 5 MG: 10 TABLET ORAL at 17:03

## 2020-01-01 RX ADMIN — OYSTER SHELL CALCIUM WITH VITAMIN D 1 TABLET: 500; 200 TABLET, FILM COATED ORAL at 22:48

## 2020-01-01 RX ADMIN — SODIUM CHLORIDE 3 G: 900 INJECTION INTRAVENOUS at 22:10

## 2020-01-01 RX ADMIN — SENNOSIDES 8.6 MG: 8.6 TABLET, FILM COATED ORAL at 12:27

## 2020-01-01 RX ADMIN — OYSTER SHELL CALCIUM WITH VITAMIN D 1 TABLET: 500; 200 TABLET, FILM COATED ORAL at 21:39

## 2020-01-01 RX ADMIN — CALCIUM CARBONATE-VITAMIN D TAB 500 MG-200 UNIT 1 TABLET: 500-200 TAB at 08:25

## 2020-01-01 RX ADMIN — METHOCARBAMOL TABLETS 1000 MG: 500 TABLET, COATED ORAL at 14:28

## 2020-01-01 RX ADMIN — MULTIVITAMIN TABLET 1 TABLET: TABLET at 09:05

## 2020-01-01 RX ADMIN — SODIUM CHLORIDE: 9 INJECTION, SOLUTION INTRAVENOUS at 01:01

## 2020-01-01 RX ADMIN — HEPARIN SODIUM 5000 UNITS: 10000 INJECTION INTRAVENOUS; SUBCUTANEOUS at 12:35

## 2020-01-01 RX ADMIN — SODIUM CHLORIDE, PRESERVATIVE FREE 10 ML: 5 INJECTION INTRAVENOUS at 22:54

## 2020-01-01 RX ADMIN — IPRATROPIUM BROMIDE AND ALBUTEROL 1 PUFF: 20; 100 SPRAY, METERED RESPIRATORY (INHALATION) at 17:11

## 2020-01-01 RX ADMIN — MICONAZOLE NITRATE: 2 OINTMENT TOPICAL at 23:03

## 2020-01-01 RX ADMIN — Medication 10 ML: at 09:10

## 2020-01-01 RX ADMIN — SENNOSIDES 8.6 MG: 8.6 TABLET, FILM COATED ORAL at 21:45

## 2020-01-01 RX ADMIN — IPRATROPIUM BROMIDE AND ALBUTEROL 1 PUFF: 20; 100 SPRAY, METERED RESPIRATORY (INHALATION) at 22:09

## 2020-01-01 RX ADMIN — METHOCARBAMOL TABLETS 1000 MG: 500 TABLET, COATED ORAL at 20:37

## 2020-01-01 RX ADMIN — PANTOPRAZOLE SODIUM 40 MG: 40 TABLET, DELAYED RELEASE ORAL at 06:49

## 2020-01-01 RX ADMIN — OYSTER SHELL CALCIUM WITH VITAMIN D 1 TABLET: 500; 200 TABLET, FILM COATED ORAL at 20:40

## 2020-01-01 RX ADMIN — IPRATROPIUM BROMIDE AND ALBUTEROL SULFATE 1 AMPULE: .5; 3 SOLUTION RESPIRATORY (INHALATION) at 12:13

## 2020-01-01 RX ADMIN — OXYCODONE HYDROCHLORIDE AND ACETAMINOPHEN 1000 MG: 500 TABLET ORAL at 11:33

## 2020-01-01 RX ADMIN — Medication 500 MG: at 09:22

## 2020-01-01 RX ADMIN — Medication 1 TABLET: at 08:05

## 2020-01-01 RX ADMIN — DOCUSATE SODIUM 100 MG: 100 CAPSULE, LIQUID FILLED ORAL at 20:54

## 2020-01-01 RX ADMIN — CARVEDILOL 3.12 MG: 3.12 TABLET, FILM COATED ORAL at 16:45

## 2020-01-01 RX ADMIN — SENNOSIDES 8.6 MG: 8.6 TABLET, FILM COATED ORAL at 11:59

## 2020-01-01 RX ADMIN — CALCIUM CARBONATE-VITAMIN D TAB 500 MG-200 UNIT 1 TABLET: 500-200 TAB at 08:42

## 2020-01-01 RX ADMIN — HEPARIN SODIUM 5000 UNITS: 10000 INJECTION INTRAVENOUS; SUBCUTANEOUS at 13:30

## 2020-01-01 RX ADMIN — PANTOPRAZOLE SODIUM 40 MG: 40 TABLET, DELAYED RELEASE ORAL at 06:44

## 2020-01-01 RX ADMIN — TAMSULOSIN HYDROCHLORIDE 0.4 MG: 0.4 CAPSULE ORAL at 22:30

## 2020-01-01 RX ADMIN — HEPARIN SODIUM 5000 UNITS: 10000 INJECTION INTRAVENOUS; SUBCUTANEOUS at 13:24

## 2020-01-01 RX ADMIN — AMPICILLIN SODIUM AND SULBACTAM SODIUM 3 G: 2; 1 INJECTION, POWDER, FOR SOLUTION INTRAMUSCULAR; INTRAVENOUS at 01:31

## 2020-01-01 RX ADMIN — Medication 500 MG: at 08:53

## 2020-01-01 RX ADMIN — LEVOTHYROXINE, LIOTHYRONINE 60 MG: 19; 4.5 TABLET ORAL at 09:44

## 2020-01-01 RX ADMIN — Medication 500 MG: at 07:49

## 2020-01-01 RX ADMIN — METHOCARBAMOL TABLETS 1000 MG: 500 TABLET, COATED ORAL at 13:43

## 2020-01-01 RX ADMIN — HEPARIN SODIUM 5000 UNITS: 10000 INJECTION INTRAVENOUS; SUBCUTANEOUS at 05:50

## 2020-01-01 RX ADMIN — MIDODRINE HYDROCHLORIDE 15 MG: 5 TABLET ORAL at 08:43

## 2020-01-01 RX ADMIN — CARVEDILOL 3.12 MG: 3.12 TABLET, FILM COATED ORAL at 17:11

## 2020-01-01 RX ADMIN — ASPIRIN 81 MG CHEWABLE TABLET 81 MG: 81 TABLET CHEWABLE at 08:55

## 2020-01-01 RX ADMIN — SODIUM CHLORIDE: 9 INJECTION, SOLUTION INTRAVENOUS at 21:44

## 2020-01-01 RX ADMIN — DEXAMETHASONE SODIUM PHOSPHATE 6 MG: 4 INJECTION, SOLUTION INTRAMUSCULAR; INTRAVENOUS at 09:09

## 2020-01-01 RX ADMIN — OXYCODONE HYDROCHLORIDE AND ACETAMINOPHEN 1000 MG: 500 TABLET ORAL at 20:15

## 2020-01-01 RX ADMIN — SODIUM CHLORIDE 3 G: 900 INJECTION INTRAVENOUS at 04:35

## 2020-01-01 RX ADMIN — BACITRACIN ZINC: 500 OINTMENT TOPICAL at 21:10

## 2020-01-01 RX ADMIN — OXYCODONE HYDROCHLORIDE AND ACETAMINOPHEN 1000 MG: 500 TABLET ORAL at 21:56

## 2020-01-01 RX ADMIN — METOCLOPRAMIDE 5 MG: 10 TABLET ORAL at 06:05

## 2020-01-01 RX ADMIN — IPRATROPIUM BROMIDE AND ALBUTEROL 1 PUFF: 20; 100 SPRAY, METERED RESPIRATORY (INHALATION) at 09:05

## 2020-01-01 RX ADMIN — FUROSEMIDE 40 MG: 40 TABLET ORAL at 09:34

## 2020-01-01 RX ADMIN — CARVEDILOL 3.12 MG: 3.12 TABLET, FILM COATED ORAL at 08:50

## 2020-01-01 RX ADMIN — Medication 10 ML: at 01:05

## 2020-01-01 RX ADMIN — PROPOFOL 25 MG: 10 INJECTION, EMULSION INTRAVENOUS at 15:30

## 2020-01-01 RX ADMIN — MIDODRINE HYDROCHLORIDE 10 MG: 5 TABLET ORAL at 09:27

## 2020-01-01 RX ADMIN — ENOXAPARIN SODIUM 40 MG: 40 INJECTION SUBCUTANEOUS at 08:36

## 2020-01-01 RX ADMIN — IPRATROPIUM BROMIDE AND ALBUTEROL SULFATE 1 AMPULE: .5; 3 SOLUTION RESPIRATORY (INHALATION) at 20:33

## 2020-01-01 RX ADMIN — SODIUM CHLORIDE, PRESERVATIVE FREE 10 ML: 5 INJECTION INTRAVENOUS at 21:10

## 2020-01-01 RX ADMIN — SODIUM CHLORIDE 3 G: 900 INJECTION INTRAVENOUS at 15:57

## 2020-01-01 RX ADMIN — BACITRACIN ZINC: 500 OINTMENT TOPICAL at 08:25

## 2020-01-01 RX ADMIN — IPRATROPIUM BROMIDE AND ALBUTEROL 1 PUFF: 20; 100 SPRAY, METERED RESPIRATORY (INHALATION) at 10:20

## 2020-01-01 RX ADMIN — OYSTER SHELL CALCIUM WITH VITAMIN D 1 TABLET: 500; 200 TABLET, FILM COATED ORAL at 21:10

## 2020-01-01 RX ADMIN — LEVOTHYROXINE, LIOTHYRONINE 60 MG: 19; 4.5 TABLET ORAL at 08:04

## 2020-01-01 RX ADMIN — HEPARIN SODIUM 5000 UNITS: 10000 INJECTION INTRAVENOUS; SUBCUTANEOUS at 14:08

## 2020-01-01 RX ADMIN — HEPARIN SODIUM 5000 UNITS: 10000 INJECTION INTRAVENOUS; SUBCUTANEOUS at 21:29

## 2020-01-01 RX ADMIN — DOCUSATE SODIUM 100 MG: 100 CAPSULE, LIQUID FILLED ORAL at 08:07

## 2020-01-01 RX ADMIN — ZINC SULFATE 220 MG (50 MG) CAPSULE 50 MG: CAPSULE at 08:55

## 2020-01-01 RX ADMIN — ZINC SULFATE 220 MG (50 MG) CAPSULE 50 MG: CAPSULE at 10:29

## 2020-01-01 RX ADMIN — LEVOTHYROXINE, LIOTHYRONINE 60 MG: 19; 4.5 TABLET ORAL at 08:43

## 2020-01-01 RX ADMIN — Medication 500 MG: at 10:53

## 2020-01-01 RX ADMIN — BARIUM SULFATE 15 ML: 400 PASTE ORAL at 11:30

## 2020-01-01 RX ADMIN — CALCIUM CARBONATE-VITAMIN D TAB 500 MG-200 UNIT 1 TABLET: 500-200 TAB at 08:52

## 2020-01-01 RX ADMIN — DOXYCYCLINE 200 MG: 100 INJECTION, POWDER, LYOPHILIZED, FOR SOLUTION INTRAVENOUS at 15:29

## 2020-01-01 RX ADMIN — REMDESIVIR 100 MG: 100 INJECTION, POWDER, LYOPHILIZED, FOR SOLUTION INTRAVENOUS at 22:13

## 2020-01-01 RX ADMIN — PANTOPRAZOLE SODIUM 40 MG: 40 TABLET, DELAYED RELEASE ORAL at 06:45

## 2020-01-01 RX ADMIN — CARVEDILOL 3.12 MG: 3.12 TABLET, FILM COATED ORAL at 09:33

## 2020-01-01 RX ADMIN — Medication 500 MG: at 08:43

## 2020-01-01 RX ADMIN — HEPARIN SODIUM 5000 UNITS: 10000 INJECTION INTRAVENOUS; SUBCUTANEOUS at 14:00

## 2020-01-01 RX ADMIN — Medication 1000 UNITS: at 11:46

## 2020-01-01 RX ADMIN — DOCUSATE SODIUM 100 MG: 100 CAPSULE, LIQUID FILLED ORAL at 21:05

## 2020-01-01 RX ADMIN — METHOCARBAMOL TABLETS 1000 MG: 500 TABLET, COATED ORAL at 14:25

## 2020-01-01 RX ADMIN — Medication 1000 UNITS: at 09:05

## 2020-01-01 RX ADMIN — ENOXAPARIN SODIUM 40 MG: 40 INJECTION SUBCUTANEOUS at 09:18

## 2020-01-01 RX ADMIN — HEPARIN SODIUM 5000 UNITS: 10000 INJECTION INTRAVENOUS; SUBCUTANEOUS at 22:30

## 2020-01-01 RX ADMIN — CALCIUM CARBONATE-VITAMIN D TAB 500 MG-200 UNIT 1 TABLET: 500-200 TAB at 08:28

## 2020-01-01 RX ADMIN — METOCLOPRAMIDE 5 MG: 10 TABLET ORAL at 07:04

## 2020-01-01 RX ADMIN — DOXYCYCLINE 100 MG: 100 INJECTION, POWDER, LYOPHILIZED, FOR SOLUTION INTRAVENOUS at 03:04

## 2020-01-01 RX ADMIN — METHOCARBAMOL TABLETS 1000 MG: 500 TABLET, COATED ORAL at 08:04

## 2020-01-01 RX ADMIN — IPRATROPIUM BROMIDE AND ALBUTEROL SULFATE 1 AMPULE: .5; 3 SOLUTION RESPIRATORY (INHALATION) at 12:06

## 2020-01-01 RX ADMIN — DEXAMETHASONE SODIUM PHOSPHATE 6 MG: 4 INJECTION, SOLUTION INTRAMUSCULAR; INTRAVENOUS at 10:29

## 2020-01-01 RX ADMIN — Medication 1 TABLET: at 08:07

## 2020-01-01 RX ADMIN — AMPICILLIN SODIUM AND SULBACTAM SODIUM 3 G: 2; 1 INJECTION, POWDER, FOR SOLUTION INTRAMUSCULAR; INTRAVENOUS at 14:51

## 2020-01-01 RX ADMIN — SODIUM CHLORIDE, PRESERVATIVE FREE 10 ML: 5 INJECTION INTRAVENOUS at 21:00

## 2020-01-01 RX ADMIN — OYSTER SHELL CALCIUM WITH VITAMIN D 1 TABLET: 500; 200 TABLET, FILM COATED ORAL at 09:44

## 2020-01-01 RX ADMIN — SODIUM CHLORIDE, PRESERVATIVE FREE 10 ML: 5 INJECTION INTRAVENOUS at 08:53

## 2020-01-01 RX ADMIN — CALCIUM CARBONATE-VITAMIN D TAB 500 MG-200 UNIT 1 TABLET: 500-200 TAB at 20:40

## 2020-01-01 RX ADMIN — AMPICILLIN SODIUM AND SULBACTAM SODIUM 3 G: 2; 1 INJECTION, POWDER, FOR SOLUTION INTRAMUSCULAR; INTRAVENOUS at 16:48

## 2020-01-01 RX ADMIN — SODIUM CHLORIDE, PRESERVATIVE FREE 10 ML: 5 INJECTION INTRAVENOUS at 09:08

## 2020-01-01 RX ADMIN — METHOCARBAMOL TABLETS 1000 MG: 500 TABLET, COATED ORAL at 13:41

## 2020-01-01 RX ADMIN — Medication 500 MG: at 08:23

## 2020-01-01 RX ADMIN — METOCLOPRAMIDE 5 MG: 10 TABLET ORAL at 06:00

## 2020-01-01 RX ADMIN — SENNOSIDES 8.6 MG: 8.6 TABLET, FILM COATED ORAL at 12:02

## 2020-01-01 RX ADMIN — CHOLECALCIFEROL TAB 10 MCG (400 UNIT) 400 UNITS: 10 TAB at 08:07

## 2020-01-01 RX ADMIN — CALCIUM CARBONATE-VITAMIN D TAB 500 MG-200 UNIT 1 TABLET: 500-200 TAB at 07:49

## 2020-01-01 RX ADMIN — HYDROCODONE BITARTRATE AND ACETAMINOPHEN 1 TABLET: 5; 325 TABLET ORAL at 15:41

## 2020-01-01 RX ADMIN — AMPICILLIN SODIUM AND SULBACTAM SODIUM 3 G: 2; 1 INJECTION, POWDER, FOR SOLUTION INTRAMUSCULAR; INTRAVENOUS at 13:32

## 2020-01-01 RX ADMIN — ZINC SULFATE 220 MG (50 MG) CAPSULE 50 MG: CAPSULE at 20:15

## 2020-01-01 RX ADMIN — MELATONIN 3 MG ORAL TABLET 1.5 MG: 3 TABLET ORAL at 20:40

## 2020-01-01 RX ADMIN — ZINC SULFATE 220 MG (50 MG) CAPSULE 50 MG: CAPSULE at 09:02

## 2020-01-01 RX ADMIN — DOCUSATE SODIUM 100 MG: 100 CAPSULE, LIQUID FILLED ORAL at 20:40

## 2020-01-01 RX ADMIN — IPRATROPIUM BROMIDE AND ALBUTEROL SULFATE 1 AMPULE: 2.5; .5 SOLUTION RESPIRATORY (INHALATION) at 13:35

## 2020-01-01 RX ADMIN — ZINC SULFATE 220 MG (50 MG) CAPSULE 50 MG: CAPSULE at 10:18

## 2020-01-01 RX ADMIN — CALCIUM CARBONATE-VITAMIN D TAB 500 MG-200 UNIT 1 TABLET: 500-200 TAB at 20:38

## 2020-01-01 RX ADMIN — ZINC SULFATE 220 MG (50 MG) CAPSULE 50 MG: CAPSULE at 10:20

## 2020-01-01 RX ADMIN — REMDESIVIR 100 MG: 100 INJECTION, POWDER, LYOPHILIZED, FOR SOLUTION INTRAVENOUS at 21:37

## 2020-01-01 RX ADMIN — IPRATROPIUM BROMIDE AND ALBUTEROL SULFATE 1 AMPULE: .5; 3 SOLUTION RESPIRATORY (INHALATION) at 07:45

## 2020-01-01 RX ADMIN — OXYCODONE HYDROCHLORIDE AND ACETAMINOPHEN 1000 MG: 500 TABLET ORAL at 10:18

## 2020-01-01 RX ADMIN — ZINC SULFATE 220 MG (50 MG) CAPSULE 50 MG: CAPSULE at 21:00

## 2020-01-01 RX ADMIN — IPRATROPIUM BROMIDE AND ALBUTEROL 1 PUFF: 20; 100 SPRAY, METERED RESPIRATORY (INHALATION) at 21:01

## 2020-01-01 RX ADMIN — HEPARIN SODIUM 5000 UNITS: 10000 INJECTION INTRAVENOUS; SUBCUTANEOUS at 06:00

## 2020-01-01 RX ADMIN — IPRATROPIUM BROMIDE AND ALBUTEROL SULFATE 1 AMPULE: .5; 3 SOLUTION RESPIRATORY (INHALATION) at 19:33

## 2020-01-01 RX ADMIN — DEXAMETHASONE SODIUM PHOSPHATE 6 MG: 4 INJECTION, SOLUTION INTRAMUSCULAR; INTRAVENOUS at 10:18

## 2020-01-01 RX ADMIN — CALCIUM CARBONATE-VITAMIN D TAB 500 MG-200 UNIT 1 TABLET: 500-200 TAB at 08:15

## 2020-01-01 RX ADMIN — SODIUM CHLORIDE, PRESERVATIVE FREE 10 ML: 5 INJECTION INTRAVENOUS at 20:25

## 2020-01-01 RX ADMIN — MIDODRINE HYDROCHLORIDE 15 MG: 5 TABLET ORAL at 16:56

## 2020-01-01 RX ADMIN — MELATONIN 3 MG ORAL TABLET 3 MG: 3 TABLET ORAL at 20:53

## 2020-01-01 RX ADMIN — IPRATROPIUM BROMIDE AND ALBUTEROL SULFATE 1 AMPULE: .5; 3 SOLUTION RESPIRATORY (INHALATION) at 12:20

## 2020-01-01 RX ADMIN — IPRATROPIUM BROMIDE AND ALBUTEROL SULFATE 1 AMPULE: .5; 3 SOLUTION RESPIRATORY (INHALATION) at 20:46

## 2020-01-01 RX ADMIN — HEPARIN SODIUM 5000 UNITS: 10000 INJECTION INTRAVENOUS; SUBCUTANEOUS at 14:53

## 2020-01-01 RX ADMIN — CHOLECALCIFEROL TAB 10 MCG (400 UNIT) 400 UNITS: 10 TAB at 09:14

## 2020-01-01 RX ADMIN — SODIUM CHLORIDE, PRESERVATIVE FREE 10 ML: 5 INJECTION INTRAVENOUS at 21:27

## 2020-01-01 RX ADMIN — MULTIVITAMIN TABLET 1 TABLET: TABLET at 09:33

## 2020-01-01 RX ADMIN — REMDESIVIR 200 MG: 100 INJECTION, POWDER, LYOPHILIZED, FOR SOLUTION INTRAVENOUS at 01:03

## 2020-01-01 RX ADMIN — DOCUSATE SODIUM 100 MG: 100 CAPSULE, LIQUID FILLED ORAL at 08:43

## 2020-01-01 RX ADMIN — Medication 1 TABLET: at 08:52

## 2020-01-01 RX ADMIN — DEXAMETHASONE SODIUM PHOSPHATE 6 MG: 4 INJECTION, SOLUTION INTRAMUSCULAR; INTRAVENOUS at 09:06

## 2020-01-01 RX ADMIN — ENOXAPARIN SODIUM 40 MG: 40 INJECTION SUBCUTANEOUS at 09:27

## 2020-01-01 RX ADMIN — METOCLOPRAMIDE 5 MG: 10 TABLET ORAL at 16:28

## 2020-01-01 RX ADMIN — IPRATROPIUM BROMIDE AND ALBUTEROL 1 PUFF: 20; 100 SPRAY, METERED RESPIRATORY (INHALATION) at 09:34

## 2020-01-01 RX ADMIN — SODIUM CHLORIDE, PRESERVATIVE FREE 10 ML: 5 INJECTION INTRAVENOUS at 08:36

## 2020-01-01 RX ADMIN — TAMSULOSIN HYDROCHLORIDE 0.4 MG: 0.4 CAPSULE ORAL at 20:15

## 2020-01-01 RX ADMIN — ENOXAPARIN SODIUM 30 MG: 30 INJECTION SUBCUTANEOUS at 08:04

## 2020-01-01 RX ADMIN — TAMSULOSIN HYDROCHLORIDE 0.4 MG: 0.4 CAPSULE ORAL at 20:40

## 2020-01-01 RX ADMIN — MAGNESIUM HYDROXIDE 30 ML: 2400 SUSPENSION ORAL at 09:45

## 2020-01-01 RX ADMIN — METOCLOPRAMIDE 5 MG: 10 TABLET ORAL at 06:56

## 2020-01-01 RX ADMIN — ENOXAPARIN SODIUM 40 MG: 40 INJECTION SUBCUTANEOUS at 08:53

## 2020-01-01 RX ADMIN — ASPIRIN 81 MG CHEWABLE TABLET 81 MG: 81 TABLET CHEWABLE at 09:05

## 2020-01-01 RX ADMIN — METOCLOPRAMIDE 5 MG: 10 TABLET ORAL at 17:58

## 2020-01-01 RX ADMIN — HEPARIN SODIUM 5000 UNITS: 10000 INJECTION INTRAVENOUS; SUBCUTANEOUS at 13:28

## 2020-01-01 RX ADMIN — DOCUSATE SODIUM 100 MG: 100 CAPSULE, LIQUID FILLED ORAL at 20:38

## 2020-01-01 RX ADMIN — ERGOCALCIFEROL 50000 UNITS: 1.25 CAPSULE ORAL at 08:17

## 2020-01-01 RX ADMIN — AMPICILLIN SODIUM AND SULBACTAM SODIUM 3 G: 2; 1 INJECTION, POWDER, FOR SOLUTION INTRAMUSCULAR; INTRAVENOUS at 08:47

## 2020-01-01 RX ADMIN — DOCUSATE SODIUM 100 MG: 100 CAPSULE, LIQUID FILLED ORAL at 08:17

## 2020-01-01 RX ADMIN — SODIUM CHLORIDE, PRESERVATIVE FREE 10 ML: 5 INJECTION INTRAVENOUS at 21:39

## 2020-01-01 RX ADMIN — ERGOCALCIFEROL 50000 UNITS: 1.25 CAPSULE ORAL at 08:15

## 2020-01-01 RX ADMIN — TAMSULOSIN HYDROCHLORIDE 0.4 MG: 0.4 CAPSULE ORAL at 22:18

## 2020-01-01 RX ADMIN — LEVOTHYROXINE, LIOTHYRONINE 60 MG: 19; 4.5 TABLET ORAL at 10:53

## 2020-01-01 RX ADMIN — OXYCODONE HYDROCHLORIDE AND ACETAMINOPHEN 500 MG: 500 TABLET ORAL at 13:24

## 2020-01-01 RX ADMIN — PETROLATUM: 420 OINTMENT TOPICAL at 13:08

## 2020-01-01 RX ADMIN — OXYCODONE HYDROCHLORIDE AND ACETAMINOPHEN 1000 MG: 500 TABLET ORAL at 09:33

## 2020-01-01 RX ADMIN — METOCLOPRAMIDE 5 MG: 10 TABLET ORAL at 06:37

## 2020-01-01 RX ADMIN — DEXAMETHASONE SODIUM PHOSPHATE 6 MG: 4 INJECTION, SOLUTION INTRAMUSCULAR; INTRAVENOUS at 08:49

## 2020-01-01 RX ADMIN — METOCLOPRAMIDE 5 MG: 10 TABLET ORAL at 18:04

## 2020-01-01 RX ADMIN — Medication 1000 UNITS: at 10:18

## 2020-01-01 RX ADMIN — ASPIRIN 81 MG CHEWABLE TABLET 81 MG: 81 TABLET CHEWABLE at 08:50

## 2020-01-01 RX ADMIN — OXYCODONE HYDROCHLORIDE AND ACETAMINOPHEN 1000 MG: 500 TABLET ORAL at 10:29

## 2020-01-01 RX ADMIN — METHOCARBAMOL TABLETS 1000 MG: 500 TABLET, COATED ORAL at 09:44

## 2020-01-01 RX ADMIN — ENOXAPARIN SODIUM 30 MG: 30 INJECTION SUBCUTANEOUS at 08:42

## 2020-01-01 RX ADMIN — CARVEDILOL 3.12 MG: 3.12 TABLET, FILM COATED ORAL at 09:14

## 2020-01-01 RX ADMIN — Medication 1 TABLET: at 08:15

## 2020-01-01 RX ADMIN — SODIUM CHLORIDE, PRESERVATIVE FREE 10 ML: 5 INJECTION INTRAVENOUS at 21:46

## 2020-01-01 RX ADMIN — HEPARIN SODIUM 5000 UNITS: 10000 INJECTION INTRAVENOUS; SUBCUTANEOUS at 22:53

## 2020-01-01 RX ADMIN — SENNOSIDES 8.6 MG: 8.6 TABLET, FILM COATED ORAL at 12:14

## 2020-01-01 RX ADMIN — Medication 10 ML: at 22:30

## 2020-01-01 RX ADMIN — PROPOFOL 25 MG: 10 INJECTION, EMULSION INTRAVENOUS at 15:37

## 2020-01-01 RX ADMIN — Medication 400 MG: at 08:37

## 2020-01-01 RX ADMIN — SENNOSIDES 8.6 MG: 8.6 TABLET, FILM COATED ORAL at 12:22

## 2020-01-01 RX ADMIN — OXYCODONE HYDROCHLORIDE AND ACETAMINOPHEN 1000 MG: 500 TABLET ORAL at 09:05

## 2020-01-01 RX ADMIN — IPRATROPIUM BROMIDE AND ALBUTEROL 1 PUFF: 20; 100 SPRAY, METERED RESPIRATORY (INHALATION) at 13:12

## 2020-01-01 RX ADMIN — MELATONIN 3 MG ORAL TABLET 3 MG: 3 TABLET ORAL at 21:27

## 2020-01-01 RX ADMIN — CALCIUM CARBONATE-VITAMIN D TAB 500 MG-200 UNIT 1 TABLET: 500-200 TAB at 22:09

## 2020-01-01 RX ADMIN — IPRATROPIUM BROMIDE AND ALBUTEROL SULFATE 1 AMPULE: .5; 3 SOLUTION RESPIRATORY (INHALATION) at 11:27

## 2020-01-01 RX ADMIN — OYSTER SHELL CALCIUM WITH VITAMIN D 1 TABLET: 500; 200 TABLET, FILM COATED ORAL at 09:27

## 2020-01-01 RX ADMIN — CALCIUM CARBONATE-VITAMIN D TAB 500 MG-200 UNIT 1 TABLET: 500-200 TAB at 20:53

## 2020-01-01 RX ADMIN — Medication 500 MG: at 09:07

## 2020-01-01 RX ADMIN — MICONAZOLE NITRATE: 2 OINTMENT TOPICAL at 22:30

## 2020-01-01 RX ADMIN — METHOCARBAMOL TABLETS 1000 MG: 500 TABLET, COATED ORAL at 09:27

## 2020-01-01 RX ADMIN — AMPICILLIN SODIUM AND SULBACTAM SODIUM 3 G: 2; 1 INJECTION, POWDER, FOR SOLUTION INTRAMUSCULAR; INTRAVENOUS at 06:50

## 2020-01-01 RX ADMIN — Medication 1000 UNITS: at 09:02

## 2020-01-01 RX ADMIN — REMDESIVIR 100 MG: 100 INJECTION, POWDER, LYOPHILIZED, FOR SOLUTION INTRAVENOUS at 22:35

## 2020-01-01 RX ADMIN — MULTIVITAMIN TABLET 1 TABLET: TABLET at 11:35

## 2020-01-01 RX ADMIN — CARVEDILOL 3.12 MG: 3.12 TABLET, FILM COATED ORAL at 17:58

## 2020-01-01 RX ADMIN — OXYCODONE HYDROCHLORIDE AND ACETAMINOPHEN 500 MG: 500 TABLET ORAL at 08:07

## 2020-01-01 RX ADMIN — AMPICILLIN SODIUM AND SULBACTAM SODIUM 3 G: 2; 1 INJECTION, POWDER, FOR SOLUTION INTRAMUSCULAR; INTRAVENOUS at 22:52

## 2020-01-01 RX ADMIN — SODIUM CHLORIDE, PRESERVATIVE FREE 10 ML: 5 INJECTION INTRAVENOUS at 20:44

## 2020-01-01 RX ADMIN — BACITRACIN ZINC: 500 OINTMENT TOPICAL at 20:38

## 2020-01-01 RX ADMIN — METHOCARBAMOL TABLETS 1000 MG: 500 TABLET, COATED ORAL at 08:36

## 2020-01-01 RX ADMIN — SODIUM CHLORIDE, PRESERVATIVE FREE 10 ML: 5 INJECTION INTRAVENOUS at 08:47

## 2020-01-01 RX ADMIN — FUROSEMIDE 40 MG: 40 TABLET ORAL at 08:50

## 2020-01-01 RX ADMIN — ZINC SULFATE 220 MG (50 MG) CAPSULE 50 MG: CAPSULE at 11:34

## 2020-01-01 RX ADMIN — DOCUSATE SODIUM 100 MG: 100 CAPSULE, LIQUID FILLED ORAL at 20:01

## 2020-01-01 RX ADMIN — IPRATROPIUM BROMIDE AND ALBUTEROL SULFATE 1 AMPULE: .5; 3 SOLUTION RESPIRATORY (INHALATION) at 19:58

## 2020-01-01 RX ADMIN — METHOCARBAMOL TABLETS 1000 MG: 500 TABLET, COATED ORAL at 21:10

## 2020-01-01 RX ADMIN — SODIUM CHLORIDE: 9 INJECTION, SOLUTION INTRAVENOUS at 14:28

## 2020-01-01 RX ADMIN — DOCUSATE SODIUM 100 MG: 100 CAPSULE, LIQUID FILLED ORAL at 09:21

## 2020-01-01 RX ADMIN — GUAIFENESIN AND DEXTROMETHORPHAN 5 ML: 100; 10 SYRUP ORAL at 17:57

## 2020-01-01 RX ADMIN — IPRATROPIUM BROMIDE AND ALBUTEROL 1 PUFF: 20; 100 SPRAY, METERED RESPIRATORY (INHALATION) at 11:17

## 2020-01-01 RX ADMIN — OYSTER SHELL CALCIUM WITH VITAMIN D 1 TABLET: 500; 200 TABLET, FILM COATED ORAL at 22:00

## 2020-01-01 RX ADMIN — DEXAMETHASONE SODIUM PHOSPHATE 6 MG: 4 INJECTION, SOLUTION INTRAMUSCULAR; INTRAVENOUS at 08:42

## 2020-01-01 RX ADMIN — MELATONIN 3 MG ORAL TABLET 3 MG: 3 TABLET ORAL at 21:49

## 2020-01-01 RX ADMIN — ASPIRIN 81 MG CHEWABLE TABLET 81 MG: 81 TABLET CHEWABLE at 10:29

## 2020-01-01 RX ADMIN — DOCUSATE SODIUM 100 MG: 100 CAPSULE, LIQUID FILLED ORAL at 21:46

## 2020-01-01 RX ADMIN — IPRATROPIUM BROMIDE AND ALBUTEROL SULFATE 1 AMPULE: .5; 3 SOLUTION RESPIRATORY (INHALATION) at 07:32

## 2020-01-01 RX ADMIN — CALCIUM CARBONATE-VITAMIN D TAB 500 MG-200 UNIT 1 TABLET: 500-200 TAB at 09:22

## 2020-01-01 RX ADMIN — PANTOPRAZOLE SODIUM 40 MG: 40 TABLET, DELAYED RELEASE ORAL at 06:19

## 2020-01-01 RX ADMIN — OXYCODONE HYDROCHLORIDE AND ACETAMINOPHEN 1000 MG: 500 TABLET ORAL at 09:02

## 2020-01-01 RX ADMIN — Medication 10 ML: at 09:06

## 2020-01-01 RX ADMIN — IPRATROPIUM BROMIDE AND ALBUTEROL SULFATE 1 AMPULE: 2.5; .5 SOLUTION RESPIRATORY (INHALATION) at 20:13

## 2020-01-01 RX ADMIN — DOCUSATE SODIUM 100 MG: 100 CAPSULE ORAL at 21:59

## 2020-01-01 RX ADMIN — PANTOPRAZOLE SODIUM 40 MG: 40 TABLET, DELAYED RELEASE ORAL at 06:33

## 2020-01-01 RX ADMIN — METHOCARBAMOL TABLETS 1000 MG: 500 TABLET, COATED ORAL at 20:44

## 2020-01-01 RX ADMIN — ENOXAPARIN SODIUM 40 MG: 40 INJECTION SUBCUTANEOUS at 09:08

## 2020-01-01 RX ADMIN — REMDESIVIR 100 MG: 100 INJECTION, POWDER, LYOPHILIZED, FOR SOLUTION INTRAVENOUS at 21:02

## 2020-01-01 RX ADMIN — ENOXAPARIN SODIUM 40 MG: 40 INJECTION SUBCUTANEOUS at 08:06

## 2020-01-01 RX ADMIN — METOPROLOL TARTRATE 5 MG: 5 INJECTION INTRAVENOUS at 12:29

## 2020-01-01 RX ADMIN — Medication 500 MG: at 08:05

## 2020-01-01 RX ADMIN — OYSTER SHELL CALCIUM WITH VITAMIN D 1 TABLET: 500; 200 TABLET, FILM COATED ORAL at 09:14

## 2020-01-01 RX ADMIN — BACITRACIN ZINC: 500 OINTMENT TOPICAL at 20:40

## 2020-01-01 RX ADMIN — TAMSULOSIN HYDROCHLORIDE 0.4 MG: 0.4 CAPSULE ORAL at 21:56

## 2020-01-01 RX ADMIN — SODIUM CHLORIDE 3 G: 900 INJECTION INTRAVENOUS at 09:33

## 2020-01-01 RX ADMIN — PANTOPRAZOLE SODIUM 40 MG: 40 TABLET, DELAYED RELEASE ORAL at 06:31

## 2020-01-01 RX ADMIN — SENNOSIDES 8.6 MG: 8.6 TABLET, FILM COATED ORAL at 12:30

## 2020-01-01 RX ADMIN — Medication 1 TABLET: at 07:59

## 2020-01-01 RX ADMIN — SODIUM CHLORIDE 3 G: 900 INJECTION INTRAVENOUS at 16:45

## 2020-01-01 RX ADMIN — CALCIUM CARBONATE-VITAMIN D TAB 500 MG-200 UNIT 1 TABLET: 500-200 TAB at 08:23

## 2020-01-01 RX ADMIN — CARVEDILOL 3.12 MG: 3.12 TABLET, FILM COATED ORAL at 08:07

## 2020-01-01 RX ADMIN — CALCIUM CARBONATE-VITAMIN D TAB 500 MG-200 UNIT 1 TABLET: 500-200 TAB at 08:08

## 2020-01-01 RX ADMIN — HEPARIN SODIUM 5000 UNITS: 10000 INJECTION INTRAVENOUS; SUBCUTANEOUS at 06:15

## 2020-01-01 RX ADMIN — SENNOSIDES 8.6 MG: 8.6 TABLET, FILM COATED ORAL at 11:35

## 2020-01-01 RX ADMIN — SODIUM CHLORIDE 3 G: 900 INJECTION INTRAVENOUS at 16:27

## 2020-01-01 RX ADMIN — Medication 10 ML: at 21:17

## 2020-01-01 RX ADMIN — CALCIUM CARBONATE-VITAMIN D TAB 500 MG-200 UNIT 1 TABLET: 500-200 TAB at 20:46

## 2020-01-01 RX ADMIN — MIDODRINE HYDROCHLORIDE 15 MG: 5 TABLET ORAL at 12:35

## 2020-01-01 RX ADMIN — DEXAMETHASONE SODIUM PHOSPHATE 6 MG: 4 INJECTION, SOLUTION INTRAMUSCULAR; INTRAVENOUS at 09:34

## 2020-01-01 RX ADMIN — SODIUM CHLORIDE, PRESERVATIVE FREE 10 ML: 5 INJECTION INTRAVENOUS at 22:08

## 2020-01-01 RX ADMIN — SODIUM CHLORIDE 3 G: 900 INJECTION INTRAVENOUS at 22:30

## 2020-01-01 RX ADMIN — IPRATROPIUM BROMIDE AND ALBUTEROL SULFATE 1 AMPULE: .5; 3 SOLUTION RESPIRATORY (INHALATION) at 16:23

## 2020-01-01 RX ADMIN — DOCUSATE SODIUM 100 MG: 100 CAPSULE, LIQUID FILLED ORAL at 21:26

## 2020-01-01 RX ADMIN — IPRATROPIUM BROMIDE AND ALBUTEROL 1 PUFF: 20; 100 SPRAY, METERED RESPIRATORY (INHALATION) at 09:02

## 2020-01-01 RX ADMIN — SODIUM CHLORIDE, PRESERVATIVE FREE 10 ML: 5 INJECTION INTRAVENOUS at 08:04

## 2020-01-01 RX ADMIN — PANTOPRAZOLE SODIUM 40 MG: 40 TABLET, DELAYED RELEASE ORAL at 06:39

## 2020-01-01 RX ADMIN — SODIUM CHLORIDE: 9 INJECTION, SOLUTION INTRAVENOUS at 01:34

## 2020-01-01 RX ADMIN — POLYETHYLENE GLYCOL 3350 17 G: 17 POWDER, FOR SOLUTION ORAL at 01:34

## 2020-01-01 RX ADMIN — MIDODRINE HYDROCHLORIDE 15 MG: 5 TABLET ORAL at 16:16

## 2020-01-01 RX ADMIN — CALCIUM CARBONATE-VITAMIN D TAB 500 MG-200 UNIT 1 TABLET: 500-200 TAB at 22:01

## 2020-01-01 RX ADMIN — Medication 1000 UNITS: at 10:29

## 2020-01-01 RX ADMIN — DOCUSATE SODIUM 100 MG: 100 CAPSULE, LIQUID FILLED ORAL at 21:32

## 2020-01-01 RX ADMIN — METOCLOPRAMIDE 5 MG: 10 TABLET ORAL at 06:43

## 2020-01-01 RX ADMIN — DOCUSATE SODIUM 100 MG: 100 CAPSULE, LIQUID FILLED ORAL at 08:03

## 2020-01-01 RX ADMIN — PANTOPRAZOLE SODIUM 40 MG: 40 TABLET, DELAYED RELEASE ORAL at 06:38

## 2020-01-01 RX ADMIN — METOCLOPRAMIDE 5 MG: 10 TABLET ORAL at 17:29

## 2020-01-01 RX ADMIN — SODIUM CHLORIDE: 9 INJECTION, SOLUTION INTRAVENOUS at 13:35

## 2020-01-01 RX ADMIN — AMPICILLIN SODIUM AND SULBACTAM SODIUM 3 G: 2; 1 INJECTION, POWDER, FOR SOLUTION INTRAMUSCULAR; INTRAVENOUS at 02:00

## 2020-01-01 RX ADMIN — SODIUM CHLORIDE, PRESERVATIVE FREE 10 ML: 5 INJECTION INTRAVENOUS at 20:37

## 2020-01-01 RX ADMIN — MIDODRINE HYDROCHLORIDE 15 MG: 5 TABLET ORAL at 12:22

## 2020-01-01 RX ADMIN — IPRATROPIUM BROMIDE AND ALBUTEROL 1 PUFF: 20; 100 SPRAY, METERED RESPIRATORY (INHALATION) at 22:30

## 2020-01-01 RX ADMIN — GUAIFENESIN AND DEXTROMETHORPHAN 5 ML: 100; 10 SYRUP ORAL at 22:53

## 2020-01-01 RX ADMIN — SODIUM CHLORIDE 3 G: 900 INJECTION INTRAVENOUS at 22:45

## 2020-01-01 RX ADMIN — ENOXAPARIN SODIUM 40 MG: 40 INJECTION SUBCUTANEOUS at 10:15

## 2020-01-01 RX ADMIN — CARVEDILOL 3.12 MG: 3.12 TABLET, FILM COATED ORAL at 10:18

## 2020-01-01 RX ADMIN — SODIUM CHLORIDE: 9 INJECTION, SOLUTION INTRAVENOUS at 04:18

## 2020-01-01 RX ADMIN — SODIUM CHLORIDE: 9 INJECTION, SOLUTION INTRAVENOUS at 01:26

## 2020-01-01 RX ADMIN — MIDODRINE HYDROCHLORIDE 10 MG: 5 TABLET ORAL at 17:25

## 2020-01-01 RX ADMIN — HEPARIN SODIUM 5000 UNITS: 10000 INJECTION INTRAVENOUS; SUBCUTANEOUS at 12:27

## 2020-01-01 RX ADMIN — DOCUSATE SODIUM 100 MG: 100 CAPSULE, LIQUID FILLED ORAL at 07:48

## 2020-01-01 RX ADMIN — BARIUM SULFATE 15 ML: 0.81 POWDER, FOR SUSPENSION ORAL at 11:30

## 2020-01-01 RX ADMIN — IPRATROPIUM BROMIDE AND ALBUTEROL 1 PUFF: 20; 100 SPRAY, METERED RESPIRATORY (INHALATION) at 17:14

## 2020-01-01 RX ADMIN — Medication 500 MG: at 08:15

## 2020-01-01 RX ADMIN — Medication 1000 UNITS: at 10:20

## 2020-01-01 RX ADMIN — ASPIRIN 81 MG CHEWABLE TABLET 81 MG: 81 TABLET CHEWABLE at 09:33

## 2020-01-01 RX ADMIN — DOCUSATE SODIUM 100 MG: 100 CAPSULE, LIQUID FILLED ORAL at 22:01

## 2020-01-01 RX ADMIN — Medication 400 MG: at 08:06

## 2020-01-01 RX ADMIN — HEPARIN SODIUM 5000 UNITS: 10000 INJECTION INTRAVENOUS; SUBCUTANEOUS at 22:18

## 2020-01-01 RX ADMIN — OYSTER SHELL CALCIUM WITH VITAMIN D 1 TABLET: 500; 200 TABLET, FILM COATED ORAL at 08:06

## 2020-01-01 RX ADMIN — SODIUM CHLORIDE: 9 INJECTION, SOLUTION INTRAVENOUS at 05:08

## 2020-01-01 RX ADMIN — OXYCODONE HYDROCHLORIDE AND ACETAMINOPHEN 1000 MG: 500 TABLET ORAL at 22:30

## 2020-01-01 RX ADMIN — LEVOTHYROXINE, LIOTHYRONINE 60 MG: 19; 4.5 TABLET ORAL at 08:36

## 2020-01-01 RX ADMIN — LEVOTHYROXINE, LIOTHYRONINE 60 MG: 19; 4.5 TABLET ORAL at 08:18

## 2020-01-01 RX ADMIN — SENNOSIDES 8.6 MG: 8.6 TABLET, FILM COATED ORAL at 11:56

## 2020-01-01 RX ADMIN — CARVEDILOL 3.12 MG: 3.12 TABLET, FILM COATED ORAL at 17:14

## 2020-01-01 RX ADMIN — IPRATROPIUM BROMIDE AND ALBUTEROL SULFATE 1 AMPULE: .5; 3 SOLUTION RESPIRATORY (INHALATION) at 09:09

## 2020-01-01 RX ADMIN — TAMSULOSIN HYDROCHLORIDE 0.4 MG: 0.4 CAPSULE ORAL at 21:06

## 2020-01-01 RX ADMIN — ENOXAPARIN SODIUM 40 MG: 40 INJECTION SUBCUTANEOUS at 08:42

## 2020-01-01 RX ADMIN — ENOXAPARIN SODIUM 30 MG: 30 INJECTION SUBCUTANEOUS at 10:53

## 2020-01-01 RX ADMIN — AMPICILLIN SODIUM AND SULBACTAM SODIUM 3 G: 2; 1 INJECTION, POWDER, FOR SOLUTION INTRAMUSCULAR; INTRAVENOUS at 06:41

## 2020-01-01 RX ADMIN — Medication 500 MG: at 08:28

## 2020-01-01 RX ADMIN — PANTOPRAZOLE SODIUM 40 MG: 40 TABLET, DELAYED RELEASE ORAL at 07:26

## 2020-01-01 RX ADMIN — SODIUM CHLORIDE: 9 INJECTION, SOLUTION INTRAVENOUS at 15:12

## 2020-01-01 RX ADMIN — CALCIUM CARBONATE-VITAMIN D TAB 500 MG-200 UNIT 1 TABLET: 500-200 TAB at 21:32

## 2020-01-01 RX ADMIN — DEXAMETHASONE SODIUM PHOSPHATE 6 MG: 4 INJECTION, SOLUTION INTRAMUSCULAR; INTRAVENOUS at 11:33

## 2020-01-01 RX ADMIN — LEVOTHYROXINE, LIOTHYRONINE 60 MG: 19; 4.5 TABLET ORAL at 08:05

## 2020-01-01 RX ADMIN — LEVOTHYROXINE, LIOTHYRONINE 60 MG: 19; 4.5 TABLET ORAL at 08:23

## 2020-01-01 RX ADMIN — Medication 10 ML: at 20:16

## 2020-01-01 RX ADMIN — CARVEDILOL 3.12 MG: 3.12 TABLET, FILM COATED ORAL at 18:04

## 2020-01-01 RX ADMIN — LEVOTHYROXINE, LIOTHYRONINE 60 MG: 19; 4.5 TABLET ORAL at 08:07

## 2020-01-01 RX ADMIN — PROPOFOL 25 MG: 10 INJECTION, EMULSION INTRAVENOUS at 15:31

## 2020-01-01 RX ADMIN — CARVEDILOL 3.12 MG: 3.12 TABLET, FILM COATED ORAL at 10:29

## 2020-01-01 RX ADMIN — MIDODRINE HYDROCHLORIDE 10 MG: 5 TABLET ORAL at 08:28

## 2020-01-01 RX ADMIN — Medication 10 ML: at 22:23

## 2020-01-01 RX ADMIN — Medication 500 MG: at 08:17

## 2020-01-01 RX ADMIN — METOCLOPRAMIDE 5 MG: 10 TABLET ORAL at 16:48

## 2020-01-01 RX ADMIN — ZINC SULFATE 220 MG (50 MG) CAPSULE 50 MG: CAPSULE at 22:18

## 2020-01-01 RX ADMIN — TAMSULOSIN HYDROCHLORIDE 0.4 MG: 0.4 CAPSULE ORAL at 22:14

## 2020-01-01 RX ADMIN — METHOCARBAMOL TABLETS 1000 MG: 500 TABLET, COATED ORAL at 09:07

## 2020-01-01 RX ADMIN — IPRATROPIUM BROMIDE AND ALBUTEROL SULFATE 1 AMPULE: .5; 3 SOLUTION RESPIRATORY (INHALATION) at 19:40

## 2020-01-01 RX ADMIN — CARVEDILOL 3.12 MG: 3.12 TABLET, FILM COATED ORAL at 09:05

## 2020-01-01 RX ADMIN — Medication 500 MG: at 08:25

## 2020-01-01 RX ADMIN — CALCIUM CARBONATE-VITAMIN D TAB 500 MG-200 UNIT 1 TABLET: 500-200 TAB at 21:27

## 2020-01-01 RX ADMIN — SODIUM CHLORIDE: 9 INJECTION, SOLUTION INTRAVENOUS at 10:53

## 2020-01-01 RX ADMIN — IPRATROPIUM BROMIDE AND ALBUTEROL SULFATE 1 AMPULE: 2.5; .5 SOLUTION RESPIRATORY (INHALATION) at 13:43

## 2020-01-01 RX ADMIN — MIDODRINE HYDROCHLORIDE 15 MG: 5 TABLET ORAL at 11:55

## 2020-01-01 RX ADMIN — METOPROLOL TARTRATE 5 MG: 5 INJECTION INTRAVENOUS at 17:25

## 2020-01-01 RX ADMIN — IPRATROPIUM BROMIDE AND ALBUTEROL SULFATE 1 AMPULE: 2.5; .5 SOLUTION RESPIRATORY (INHALATION) at 19:32

## 2020-01-01 RX ADMIN — BACITRACIN ZINC: 500 OINTMENT TOPICAL at 07:49

## 2020-01-01 RX ADMIN — AMPICILLIN SODIUM AND SULBACTAM SODIUM 3 G: 2; 1 INJECTION, POWDER, FOR SOLUTION INTRAMUSCULAR; INTRAVENOUS at 15:17

## 2020-01-01 RX ADMIN — DOCUSATE SODIUM 100 MG: 100 CAPSULE, LIQUID FILLED ORAL at 08:04

## 2020-01-01 RX ADMIN — ENOXAPARIN SODIUM 40 MG: 40 INJECTION SUBCUTANEOUS at 10:36

## 2020-01-01 RX ADMIN — AMPICILLIN SODIUM AND SULBACTAM SODIUM 3 G: 2; 1 INJECTION, POWDER, FOR SOLUTION INTRAMUSCULAR; INTRAVENOUS at 12:25

## 2020-01-01 RX ADMIN — LEVOTHYROXINE, LIOTHYRONINE 60 MG: 19; 4.5 TABLET ORAL at 08:15

## 2020-01-01 RX ADMIN — Medication 10 ML: at 10:20

## 2020-01-01 RX ADMIN — IPRATROPIUM BROMIDE AND ALBUTEROL SULFATE 1 AMPULE: .5; 3 SOLUTION RESPIRATORY (INHALATION) at 15:49

## 2020-01-01 RX ADMIN — LEVOTHYROXINE, LIOTHYRONINE 60 MG: 19; 4.5 TABLET ORAL at 08:25

## 2020-01-01 RX ADMIN — Medication 1000 UNITS: at 08:55

## 2020-01-01 RX ADMIN — MULTIVITAMIN TABLET 1 TABLET: TABLET at 08:50

## 2020-01-01 RX ADMIN — Medication 1 TABLET: at 12:22

## 2020-01-01 RX ADMIN — OYSTER SHELL CALCIUM WITH VITAMIN D 1 TABLET: 500; 200 TABLET, FILM COATED ORAL at 08:37

## 2020-01-01 RX ADMIN — HEPARIN SODIUM 5000 UNITS: 10000 INJECTION INTRAVENOUS; SUBCUTANEOUS at 15:03

## 2020-01-01 RX ADMIN — ASPIRIN 81 MG CHEWABLE TABLET 81 MG: 81 TABLET CHEWABLE at 10:18

## 2020-01-01 RX ADMIN — METHOCARBAMOL TABLETS 1000 MG: 500 TABLET, COATED ORAL at 17:25

## 2020-01-01 RX ADMIN — CALCIUM CARBONATE-VITAMIN D TAB 500 MG-200 UNIT 1 TABLET: 500-200 TAB at 08:18

## 2020-01-01 RX ADMIN — MULTIVITAMIN TABLET 1 TABLET: TABLET at 10:20

## 2020-01-01 RX ADMIN — IPRATROPIUM BROMIDE AND ALBUTEROL 1 PUFF: 20; 100 SPRAY, METERED RESPIRATORY (INHALATION) at 16:04

## 2020-01-01 RX ADMIN — METOCLOPRAMIDE 5 MG: 10 TABLET ORAL at 17:12

## 2020-01-01 RX ADMIN — Medication 500 MG: at 09:27

## 2020-01-01 RX ADMIN — IPRATROPIUM BROMIDE AND ALBUTEROL SULFATE 1 AMPULE: 2.5; .5 SOLUTION RESPIRATORY (INHALATION) at 12:59

## 2020-01-01 RX ADMIN — SODIUM CHLORIDE 3 G: 900 INJECTION INTRAVENOUS at 08:55

## 2020-01-01 RX ADMIN — ZINC SULFATE 220 MG (50 MG) CAPSULE 50 MG: CAPSULE at 21:08

## 2020-01-01 RX ADMIN — DOCUSATE SODIUM 100 MG: 100 CAPSULE, LIQUID FILLED ORAL at 09:18

## 2020-01-01 RX ADMIN — DOCUSATE SODIUM 100 MG: 100 CAPSULE, LIQUID FILLED ORAL at 20:46

## 2020-01-01 RX ADMIN — DEXAMETHASONE SODIUM PHOSPHATE 6 MG: 4 INJECTION, SOLUTION INTRAMUSCULAR; INTRAVENOUS at 10:20

## 2020-01-01 RX ADMIN — MELATONIN 3 MG ORAL TABLET 3 MG: 3 TABLET ORAL at 20:01

## 2020-01-01 RX ADMIN — Medication 1.5 MG: at 21:39

## 2020-01-01 RX ADMIN — OXYCODONE HYDROCHLORIDE AND ACETAMINOPHEN 1000 MG: 500 TABLET ORAL at 22:14

## 2020-01-01 RX ADMIN — OYSTER SHELL CALCIUM WITH VITAMIN D 1 TABLET: 500; 200 TABLET, FILM COATED ORAL at 20:44

## 2020-01-01 RX ADMIN — METHOCARBAMOL TABLETS 1000 MG: 500 TABLET, COATED ORAL at 21:20

## 2020-01-01 RX ADMIN — CARVEDILOL 3.12 MG: 3.12 TABLET, FILM COATED ORAL at 18:20

## 2020-01-01 RX ADMIN — OYSTER SHELL CALCIUM WITH VITAMIN D 1 TABLET: 500; 200 TABLET, FILM COATED ORAL at 20:37

## 2020-01-01 RX ADMIN — LEVOTHYROXINE, LIOTHYRONINE 60 MG: 19; 4.5 TABLET ORAL at 10:33

## 2020-01-01 RX ADMIN — Medication 500 MG: at 08:18

## 2020-01-01 RX ADMIN — ASPIRIN 81 MG: 81 TABLET, CHEWABLE ORAL at 08:06

## 2020-01-01 RX ADMIN — ZINC SULFATE 220 MG (50 MG) CAPSULE 50 MG: CAPSULE at 09:05

## 2020-01-01 RX ADMIN — LEVOTHYROXINE, LIOTHYRONINE 60 MG: 19; 4.5 TABLET ORAL at 09:23

## 2020-01-01 RX ADMIN — ACETAMINOPHEN 650 MG: 325 TABLET ORAL at 22:52

## 2020-01-01 RX ADMIN — CALCIUM CARBONATE-VITAMIN D TAB 500 MG-200 UNIT 1 TABLET: 500-200 TAB at 08:06

## 2020-01-01 RX ADMIN — AMPICILLIN SODIUM AND SULBACTAM SODIUM 3 G: 2; 1 INJECTION, POWDER, FOR SOLUTION INTRAMUSCULAR; INTRAVENOUS at 23:39

## 2020-01-01 RX ADMIN — METHOCARBAMOL TABLETS 1000 MG: 500 TABLET, COATED ORAL at 20:40

## 2020-01-01 RX ADMIN — MIDODRINE HYDROCHLORIDE 15 MG: 5 TABLET ORAL at 09:22

## 2020-01-01 RX ADMIN — GUAIFENESIN AND DEXTROMETHORPHAN 5 ML: 100; 10 SYRUP ORAL at 10:56

## 2020-01-01 RX ADMIN — MELATONIN 3 MG ORAL TABLET 1.5 MG: 3 TABLET ORAL at 21:07

## 2020-01-01 RX ADMIN — IPRATROPIUM BROMIDE AND ALBUTEROL SULFATE 1 AMPULE: .5; 3 SOLUTION RESPIRATORY (INHALATION) at 07:58

## 2020-01-01 RX ADMIN — LEVOTHYROXINE, LIOTHYRONINE 60 MG: 19; 4.5 TABLET ORAL at 09:07

## 2020-01-01 RX ADMIN — SENNOSIDES 8.6 MG: 8.6 TABLET, FILM COATED ORAL at 12:35

## 2020-01-01 RX ADMIN — DOCUSATE SODIUM 100 MG: 100 CAPSULE, LIQUID FILLED ORAL at 22:08

## 2020-01-01 RX ADMIN — TAMSULOSIN HYDROCHLORIDE 0.4 MG: 0.4 CAPSULE ORAL at 22:53

## 2020-01-01 RX ADMIN — CALCIUM CARBONATE-VITAMIN D TAB 500 MG-200 UNIT 1 TABLET: 500-200 TAB at 07:59

## 2020-01-01 RX ADMIN — HEPARIN SODIUM 5000 UNITS: 10000 INJECTION INTRAVENOUS; SUBCUTANEOUS at 06:40

## 2020-01-01 RX ADMIN — HEPARIN SODIUM 5000 UNITS: 10000 INJECTION INTRAVENOUS; SUBCUTANEOUS at 13:32

## 2020-01-01 RX ADMIN — Medication 1 TABLET: at 08:42

## 2020-01-01 RX ADMIN — Medication 500 MG: at 08:04

## 2020-01-01 RX ADMIN — TAMSULOSIN HYDROCHLORIDE 0.4 MG: 0.4 CAPSULE ORAL at 21:09

## 2020-01-01 RX ADMIN — IPRATROPIUM BROMIDE AND ALBUTEROL SULFATE 1 AMPULE: .5; 3 SOLUTION RESPIRATORY (INHALATION) at 12:23

## 2020-01-01 RX ADMIN — BACITRACIN ZINC: 500 OINTMENT TOPICAL at 21:46

## 2020-01-01 RX ADMIN — Medication 1000 UNITS: at 09:34

## 2020-01-01 RX ADMIN — OYSTER SHELL CALCIUM WITH VITAMIN D 1 TABLET: 500; 200 TABLET, FILM COATED ORAL at 08:36

## 2020-01-01 RX ADMIN — Medication 1 TABLET: at 08:18

## 2020-01-01 RX ADMIN — DOCUSATE SODIUM 100 MG: 100 CAPSULE, LIQUID FILLED ORAL at 08:53

## 2020-01-01 RX ADMIN — SODIUM CHLORIDE: 9 INJECTION, SOLUTION INTRAVENOUS at 14:22

## 2020-01-01 RX ADMIN — Medication 500 MG: at 08:36

## 2020-01-01 RX ADMIN — SODIUM CHLORIDE 3 G: 900 INJECTION INTRAVENOUS at 10:41

## 2020-01-01 RX ADMIN — Medication 10 ML: at 10:29

## 2020-01-01 RX ADMIN — OYSTER SHELL CALCIUM WITH VITAMIN D 1 TABLET: 500; 200 TABLET, FILM COATED ORAL at 21:20

## 2020-01-01 RX ADMIN — DOCUSATE SODIUM 100 MG: 100 CAPSULE, LIQUID FILLED ORAL at 07:59

## 2020-01-01 RX ADMIN — PANTOPRAZOLE SODIUM 40 MG: 40 TABLET, DELAYED RELEASE ORAL at 06:48

## 2020-01-01 RX ADMIN — ASPIRIN 81 MG: 81 TABLET, CHEWABLE ORAL at 09:14

## 2020-01-01 RX ADMIN — BACITRACIN ZINC: 500 OINTMENT TOPICAL at 08:17

## 2020-01-01 RX ADMIN — IPRATROPIUM BROMIDE AND ALBUTEROL SULFATE 1 AMPULE: 2.5; .5 SOLUTION RESPIRATORY (INHALATION) at 15:26

## 2020-01-01 RX ADMIN — OXYCODONE HYDROCHLORIDE AND ACETAMINOPHEN 1000 MG: 500 TABLET ORAL at 22:18

## 2020-01-01 RX ADMIN — DOCUSATE SODIUM 100 MG: 100 CAPSULE ORAL at 21:39

## 2020-01-01 RX ADMIN — OXYCODONE HYDROCHLORIDE AND ACETAMINOPHEN 1000 MG: 500 TABLET ORAL at 21:08

## 2020-01-01 RX ADMIN — IPRATROPIUM BROMIDE AND ALBUTEROL 1 PUFF: 20; 100 SPRAY, METERED RESPIRATORY (INHALATION) at 16:49

## 2020-01-01 RX ADMIN — LEVOTHYROXINE, LIOTHYRONINE 60 MG: 19; 4.5 TABLET ORAL at 09:22

## 2020-01-01 RX ADMIN — IPRATROPIUM BROMIDE AND ALBUTEROL SULFATE 1 AMPULE: .5; 3 SOLUTION RESPIRATORY (INHALATION) at 00:00

## 2020-01-01 RX ADMIN — HEPARIN SODIUM 5000 UNITS: 10000 INJECTION INTRAVENOUS; SUBCUTANEOUS at 06:36

## 2020-01-01 RX ADMIN — DOCUSATE SODIUM 100 MG: 100 CAPSULE, LIQUID FILLED ORAL at 08:25

## 2020-01-01 RX ADMIN — Medication 1 TABLET: at 09:22

## 2020-01-01 RX ADMIN — ZINC SULFATE 220 MG (50 MG) CAPSULE 50 MG: CAPSULE at 21:56

## 2020-01-01 RX ADMIN — ZINC SULFATE 220 MG (50 MG) CAPSULE 50 MG: CAPSULE at 09:34

## 2020-01-01 RX ADMIN — IPRATROPIUM BROMIDE AND ALBUTEROL 1 PUFF: 20; 100 SPRAY, METERED RESPIRATORY (INHALATION) at 22:50

## 2020-01-01 RX ADMIN — Medication 1.5 MG: at 21:59

## 2020-01-01 RX ADMIN — MULTIVITAMIN TABLET 1 TABLET: TABLET at 13:35

## 2020-01-01 RX ADMIN — METOCLOPRAMIDE 5 MG: 10 TABLET ORAL at 05:54

## 2020-01-01 RX ADMIN — GUAIFENESIN AND DEXTROMETHORPHAN 5 ML: 100; 10 SYRUP ORAL at 22:14

## 2020-01-01 RX ADMIN — ZINC SULFATE 220 MG (50 MG) CAPSULE 50 MG: CAPSULE at 08:50

## 2020-01-01 RX ADMIN — SENNOSIDES 8.6 MG: 8.6 TABLET, FILM COATED ORAL at 11:53

## 2020-01-01 RX ADMIN — MIDODRINE HYDROCHLORIDE 15 MG: 5 TABLET ORAL at 12:27

## 2020-01-01 RX ADMIN — SENNOSIDES 8.6 MG: 8.6 TABLET, FILM COATED ORAL at 12:17

## 2020-01-01 RX ADMIN — DOCUSATE SODIUM 100 MG: 100 CAPSULE, LIQUID FILLED ORAL at 08:15

## 2020-01-01 RX ADMIN — BENZONATATE 100 MG: 100 CAPSULE ORAL at 08:06

## 2020-01-01 RX ADMIN — AMPICILLIN SODIUM AND SULBACTAM SODIUM 3 G: 2; 1 INJECTION, POWDER, FOR SOLUTION INTRAMUSCULAR; INTRAVENOUS at 17:25

## 2020-01-01 RX ADMIN — OYSTER SHELL CALCIUM WITH VITAMIN D 1 TABLET: 500; 200 TABLET, FILM COATED ORAL at 20:25

## 2020-01-01 RX ADMIN — PANTOPRAZOLE SODIUM 40 MG: 40 TABLET, DELAYED RELEASE ORAL at 06:55

## 2020-01-01 RX ADMIN — LEVOTHYROXINE, LIOTHYRONINE 60 MG: 19; 4.5 TABLET ORAL at 08:53

## 2020-01-01 RX ADMIN — IPRATROPIUM BROMIDE AND ALBUTEROL 1 PUFF: 20; 100 SPRAY, METERED RESPIRATORY (INHALATION) at 10:19

## 2020-01-01 RX ADMIN — IPRATROPIUM BROMIDE AND ALBUTEROL SULFATE 1 AMPULE: 2.5; .5 SOLUTION RESPIRATORY (INHALATION) at 19:09

## 2020-01-01 RX ADMIN — Medication 500 MG: at 07:59

## 2020-01-01 RX ADMIN — FUROSEMIDE 20 MG: 10 INJECTION, SOLUTION INTRAMUSCULAR; INTRAVENOUS at 10:56

## 2020-01-01 RX ADMIN — MIDODRINE HYDROCHLORIDE 15 MG: 5 TABLET ORAL at 08:24

## 2020-01-01 RX ADMIN — DOCUSATE SODIUM 100 MG: 100 CAPSULE, LIQUID FILLED ORAL at 08:28

## 2020-01-01 RX ADMIN — SODIUM CHLORIDE 3 G: 900 INJECTION INTRAVENOUS at 04:30

## 2020-01-01 RX ADMIN — HEPARIN SODIUM 5000 UNITS: 10000 INJECTION INTRAVENOUS; SUBCUTANEOUS at 21:10

## 2020-01-01 RX ADMIN — SODIUM CHLORIDE, PRESERVATIVE FREE 10 ML: 5 INJECTION INTRAVENOUS at 22:00

## 2020-01-01 RX ADMIN — AMANTADINE HYDROCHLORIDE 100 MG: 100 CAPSULE, LIQUID FILLED ORAL at 06:31

## 2020-01-01 RX ADMIN — AMPICILLIN SODIUM AND SULBACTAM SODIUM 3 G: 2; 1 INJECTION, POWDER, FOR SOLUTION INTRAMUSCULAR; INTRAVENOUS at 02:32

## 2020-01-01 RX ADMIN — SODIUM CHLORIDE: 9 INJECTION, SOLUTION INTRAVENOUS at 01:30

## 2020-01-01 RX ADMIN — MIDODRINE HYDROCHLORIDE 15 MG: 5 TABLET ORAL at 12:14

## 2020-01-01 RX ADMIN — CALCIUM CARBONATE-VITAMIN D TAB 500 MG-200 UNIT 1 TABLET: 500-200 TAB at 08:17

## 2020-01-01 RX ADMIN — DOCUSATE SODIUM 100 MG: 100 CAPSULE, LIQUID FILLED ORAL at 20:53

## 2020-01-01 RX ADMIN — ZINC SULFATE 220 MG (50 MG) CAPSULE 50 MG: CAPSULE at 22:30

## 2020-01-01 RX ADMIN — METOCLOPRAMIDE 5 MG: 10 TABLET ORAL at 12:26

## 2020-01-01 RX ADMIN — CALCIUM CARBONATE-VITAMIN D TAB 500 MG-200 UNIT 1 TABLET: 500-200 TAB at 20:01

## 2020-01-01 RX ADMIN — MIDODRINE HYDROCHLORIDE 15 MG: 5 TABLET ORAL at 17:30

## 2020-01-01 RX ADMIN — SODIUM CHLORIDE 3 G: 900 INJECTION INTRAVENOUS at 14:13

## 2020-01-01 RX ADMIN — TAMSULOSIN HYDROCHLORIDE 0.4 MG: 0.4 CAPSULE ORAL at 21:12

## 2020-01-01 RX ADMIN — HEPARIN SODIUM 5000 UNITS: 10000 INJECTION INTRAVENOUS; SUBCUTANEOUS at 02:00

## 2020-01-01 RX ADMIN — BACITRACIN ZINC: 500 OINTMENT TOPICAL at 21:37

## 2020-01-01 RX ADMIN — OYSTER SHELL CALCIUM WITH VITAMIN D 1 TABLET: 500; 200 TABLET, FILM COATED ORAL at 10:53

## 2020-01-01 RX ADMIN — Medication 1 TABLET: at 08:23

## 2020-01-01 RX ADMIN — WATER 2 G: 1 INJECTION INTRAMUSCULAR; INTRAVENOUS; SUBCUTANEOUS at 15:28

## 2020-01-01 RX ADMIN — DOCUSATE SODIUM 100 MG: 100 CAPSULE, LIQUID FILLED ORAL at 21:49

## 2020-01-01 RX ADMIN — BARIUM SULFATE 15 ML: 400 SUSPENSION ORAL at 11:30

## 2020-01-01 RX ADMIN — SODIUM CHLORIDE 3 G: 900 INJECTION INTRAVENOUS at 22:42

## 2020-01-01 RX ADMIN — LEVOTHYROXINE, LIOTHYRONINE 60 MG: 19; 4.5 TABLET ORAL at 07:49

## 2020-01-01 RX ADMIN — IPRATROPIUM BROMIDE AND ALBUTEROL SULFATE 1 AMPULE: .5; 3 SOLUTION RESPIRATORY (INHALATION) at 15:50

## 2020-01-01 RX ADMIN — LEVOTHYROXINE, LIOTHYRONINE 60 MG: 19; 4.5 TABLET ORAL at 07:59

## 2020-01-01 RX ADMIN — MULTIVITAMIN TABLET 1 TABLET: TABLET at 08:06

## 2020-01-01 RX ADMIN — CALCIUM CARBONATE-VITAMIN D TAB 500 MG-200 UNIT 1 TABLET: 500-200 TAB at 08:04

## 2020-01-01 RX ADMIN — MULTIVITAMIN TABLET 1 TABLET: TABLET at 08:37

## 2020-01-01 RX ADMIN — METHOCARBAMOL TABLETS 1000 MG: 500 TABLET, COATED ORAL at 20:25

## 2020-01-01 RX ADMIN — HEPARIN SODIUM 5000 UNITS: 10000 INJECTION INTRAVENOUS; SUBCUTANEOUS at 22:15

## 2020-01-01 RX ADMIN — OYSTER SHELL CALCIUM WITH VITAMIN D 1 TABLET: 500; 200 TABLET, FILM COATED ORAL at 09:07

## 2020-01-01 RX ADMIN — SENNOSIDES 8.6 MG: 8.6 TABLET, FILM COATED ORAL at 11:50

## 2020-01-01 RX ADMIN — MIDODRINE HYDROCHLORIDE 15 MG: 5 TABLET ORAL at 08:52

## 2020-01-01 RX ADMIN — Medication 1 TABLET: at 08:04

## 2020-01-01 RX ADMIN — HEPARIN SODIUM 5000 UNITS: 10000 INJECTION INTRAVENOUS; SUBCUTANEOUS at 01:30

## 2020-01-01 RX ADMIN — METOCLOPRAMIDE 5 MG: 10 TABLET ORAL at 06:15

## 2020-01-01 RX ADMIN — ASPIRIN 81 MG CHEWABLE TABLET 81 MG: 81 TABLET CHEWABLE at 09:02

## 2020-01-01 RX ADMIN — AMANTADINE HYDROCHLORIDE 100 MG: 100 CAPSULE, LIQUID FILLED ORAL at 06:33

## 2020-01-01 RX ADMIN — SODIUM CHLORIDE 3 G: 900 INJECTION INTRAVENOUS at 10:18

## 2020-01-01 RX ADMIN — Medication 500 MG: at 09:44

## 2020-01-01 RX ADMIN — OYSTER SHELL CALCIUM WITH VITAMIN D 1 TABLET: 500; 200 TABLET, FILM COATED ORAL at 10:36

## 2020-01-01 RX ADMIN — MIDODRINE HYDROCHLORIDE 15 MG: 5 TABLET ORAL at 16:48

## 2020-01-01 RX ADMIN — SODIUM CHLORIDE 3 G: 900 INJECTION INTRAVENOUS at 04:13

## 2020-01-01 RX ADMIN — ZINC SULFATE 220 MG (50 MG) CAPSULE 50 MG: CAPSULE at 22:14

## 2020-01-01 RX ADMIN — MULTIVITAMIN TABLET 1 TABLET: TABLET at 08:56

## 2020-01-01 RX ADMIN — Medication 10 ML: at 22:40

## 2020-01-01 RX ADMIN — Medication 400 MG: at 09:14

## 2020-01-01 RX ADMIN — METOCLOPRAMIDE 5 MG: 10 TABLET ORAL at 17:14

## 2020-01-01 RX ADMIN — ENOXAPARIN SODIUM 40 MG: 40 INJECTION SUBCUTANEOUS at 09:44

## 2020-01-01 RX ADMIN — IPRATROPIUM BROMIDE AND ALBUTEROL SULFATE 1 AMPULE: .5; 3 SOLUTION RESPIRATORY (INHALATION) at 13:48

## 2020-01-01 RX ADMIN — MICONAZOLE NITRATE: 2 OINTMENT TOPICAL at 10:19

## 2020-01-01 RX ADMIN — IPRATROPIUM BROMIDE AND ALBUTEROL 1 PUFF: 20; 100 SPRAY, METERED RESPIRATORY (INHALATION) at 22:37

## 2020-01-01 RX ADMIN — SENNOSIDES 8.6 MG: 8.6 TABLET, FILM COATED ORAL at 11:55

## 2020-01-01 RX ADMIN — MELATONIN 3 MG ORAL TABLET 1.5 MG: 3 TABLET ORAL at 21:05

## 2020-01-01 RX ADMIN — Medication 500 MG: at 08:08

## 2020-01-01 RX ADMIN — OYSTER SHELL CALCIUM WITH VITAMIN D 1 TABLET: 500; 200 TABLET, FILM COATED ORAL at 22:52

## 2020-01-01 RX ADMIN — Medication 1 TABLET: at 08:28

## 2020-01-01 RX ADMIN — MIDODRINE HYDROCHLORIDE 15 MG: 5 TABLET ORAL at 16:43

## 2020-01-01 RX ADMIN — MICONAZOLE NITRATE: 2 OINTMENT TOPICAL at 10:29

## 2020-01-01 RX ADMIN — Medication 1 TABLET: at 08:17

## 2020-01-01 RX ADMIN — ASPIRIN 81 MG CHEWABLE TABLET 81 MG: 81 TABLET CHEWABLE at 10:20

## 2020-01-01 RX ADMIN — METHOCARBAMOL TABLETS 1000 MG: 500 TABLET, COATED ORAL at 17:49

## 2020-01-01 RX ADMIN — DOCUSATE SODIUM 100 MG: 100 CAPSULE, LIQUID FILLED ORAL at 08:24

## 2020-01-01 RX ADMIN — ZINC SULFATE 220 MG (50 MG) CAPSULE 50 MG: CAPSULE at 22:53

## 2020-01-01 RX ADMIN — Medication 500 MG: at 10:33

## 2020-01-01 RX ADMIN — LEVOTHYROXINE, LIOTHYRONINE 60 MG: 19; 4.5 TABLET ORAL at 08:28

## 2020-01-01 RX ADMIN — PANTOPRAZOLE SODIUM 40 MG: 40 TABLET, DELAYED RELEASE ORAL at 05:05

## 2020-01-01 RX ADMIN — Medication 1 TABLET: at 08:25

## 2020-01-01 RX ADMIN — IPRATROPIUM BROMIDE AND ALBUTEROL SULFATE 1 AMPULE: 2.5; .5 SOLUTION RESPIRATORY (INHALATION) at 16:26

## 2020-01-01 RX ADMIN — SODIUM CHLORIDE, PRESERVATIVE FREE 10 ML: 5 INJECTION INTRAVENOUS at 08:07

## 2020-01-01 RX ADMIN — CHOLECALCIFEROL TAB 10 MCG (400 UNIT) 400 UNITS: 10 TAB at 13:24

## 2020-01-01 RX ADMIN — METHOCARBAMOL TABLETS 1000 MG: 500 TABLET, COATED ORAL at 10:34

## 2020-01-01 RX ADMIN — DOCUSATE SODIUM 100 MG: 100 CAPSULE, LIQUID FILLED ORAL at 21:45

## 2020-01-01 RX ADMIN — MELATONIN 3 MG ORAL TABLET 3 MG: 3 TABLET ORAL at 20:46

## 2020-01-01 RX ADMIN — Medication 10 ML: at 08:50

## 2020-01-01 RX ADMIN — MULTIVITAMIN TABLET 1 TABLET: TABLET at 09:14

## 2020-01-01 RX ADMIN — OXYCODONE HYDROCHLORIDE AND ACETAMINOPHEN 1000 MG: 500 TABLET ORAL at 08:49

## 2020-01-01 RX ADMIN — OXYCODONE HYDROCHLORIDE AND ACETAMINOPHEN 1000 MG: 500 TABLET ORAL at 21:00

## 2020-01-01 RX ADMIN — SODIUM CHLORIDE: 9 INJECTION, SOLUTION INTRAVENOUS at 14:20

## 2020-01-01 RX ADMIN — GUAIFENESIN AND DEXTROMETHORPHAN 5 ML: 100; 10 SYRUP ORAL at 07:04

## 2020-01-01 RX ADMIN — ASPIRIN 81 MG CHEWABLE TABLET 81 MG: 81 TABLET CHEWABLE at 11:35

## 2020-01-01 RX ADMIN — Medication 10 ML: at 09:34

## 2020-01-01 RX ADMIN — AMPICILLIN SODIUM AND SULBACTAM SODIUM 3 G: 2; 1 INJECTION, POWDER, FOR SOLUTION INTRAMUSCULAR; INTRAVENOUS at 08:37

## 2020-01-01 RX ADMIN — ASPIRIN 81 MG: 81 TABLET, CHEWABLE ORAL at 08:37

## 2020-01-01 RX ADMIN — TAMSULOSIN HYDROCHLORIDE 0.4 MG: 0.4 CAPSULE ORAL at 21:00

## 2020-01-01 RX ADMIN — HEPARIN SODIUM 5000 UNITS: 10000 INJECTION INTRAVENOUS; SUBCUTANEOUS at 13:35

## 2020-01-01 RX ADMIN — IPRATROPIUM BROMIDE AND ALBUTEROL 1 PUFF: 20; 100 SPRAY, METERED RESPIRATORY (INHALATION) at 21:10

## 2020-01-01 RX ADMIN — ACETAMINOPHEN 650 MG: 325 TABLET ORAL at 11:46

## 2020-01-01 RX ADMIN — BENZONATATE 100 MG: 100 CAPSULE ORAL at 08:17

## 2020-01-01 RX ADMIN — CALCIUM CARBONATE-VITAMIN D TAB 500 MG-200 UNIT 1 TABLET: 500-200 TAB at 22:17

## 2020-01-01 RX ADMIN — SODIUM CHLORIDE 3 G: 900 INJECTION INTRAVENOUS at 04:45

## 2020-01-01 RX ADMIN — METOCLOPRAMIDE 5 MG: 10 TABLET ORAL at 08:07

## 2020-01-01 ASSESSMENT — PAIN DESCRIPTION - PAIN TYPE: TYPE: ACUTE PAIN

## 2020-01-01 ASSESSMENT — PAIN SCALES - GENERAL
PAINLEVEL_OUTOF10: 0
PAINLEVEL_OUTOF10: 7
PAINLEVEL_OUTOF10: 0
PAINLEVEL_OUTOF10: 3
PAINLEVEL_OUTOF10: 0
PAINLEVEL_OUTOF10: 3
PAINLEVEL_OUTOF10: 0
PAINLEVEL_OUTOF10: 5
PAINLEVEL_OUTOF10: 0

## 2020-01-01 ASSESSMENT — ENCOUNTER SYMPTOMS
PHOTOPHOBIA: 0
SHORTNESS OF BREATH: 0
ABDOMINAL PAIN: 0
CONSTIPATION: 0
COUGH: 0
NAUSEA: 0
NAUSEA: 0
BACK PAIN: 0
SHORTNESS OF BREATH: 0
SORE THROAT: 0
COLOR CHANGE: 0
VOMITING: 0
ABDOMINAL PAIN: 0
BLOOD IN STOOL: 0
RHINORRHEA: 0
CONSTIPATION: 0
DIARRHEA: 0
DIARRHEA: 0
WHEEZING: 0
VOMITING: 0
COUGH: 0

## 2020-01-01 ASSESSMENT — PATIENT HEALTH QUESTIONNAIRE - PHQ9
SUM OF ALL RESPONSES TO PHQ QUESTIONS 1-9: 0
SUM OF ALL RESPONSES TO PHQ QUESTIONS 1-9: 0
1. LITTLE INTEREST OR PLEASURE IN DOING THINGS: 0
SUM OF ALL RESPONSES TO PHQ9 QUESTIONS 1 & 2: 0
2. FEELING DOWN, DEPRESSED OR HOPELESS: 0

## 2020-01-01 ASSESSMENT — PAIN DESCRIPTION - LOCATION: LOCATION: HIP

## 2020-01-01 ASSESSMENT — PAIN DESCRIPTION - DESCRIPTORS: DESCRIPTORS: ACHING

## 2020-01-01 ASSESSMENT — PAIN DESCRIPTION - ORIENTATION: ORIENTATION: RIGHT

## 2020-05-27 PROBLEM — R55 SYNCOPE AND COLLAPSE: Status: ACTIVE | Noted: 2020-01-01

## 2020-05-28 NOTE — CARE COORDINATION
Social Work discharge planning   Sw spoke to pt over phone for discharge planning. Pt lives alone 1 story 2-3 steps to enter. Has a cane and ww. Pt said his neighbor Yemi Alfaro goes grocery shopping for him. Pt said the last 4-5 days he has been weaker than normal. Pt said he is not sure about needing any type of rehab (Sw noted dx pelvic fx). Pt denied hx of hhc and denied ever going to SNF. Pt said his nearest family is near [place]. Moe called Jennifer Parks at [phone], who advised she is pt's daughter and they live in [place]. Sw discussed discharge planning with Jennifer Parks as well, We discussed ARU, SNF, ICF, AL, private duty care, and hhc. Pt is NOT a [place]. Jennifer Parks said the ultimate goal is to get pt back to his independent state, which she said pt was very active, rode a bike every day, and is of \"sound mind\". Jennifer Parks wants to see how pt does with PT OT once medically able to do evals. She said \"I don't want him to go to a nursing home for rehab, and then they don't push him and do rehab\". Moe explained to her that ARU is more aggressive at 3 hours a day, but not sure if he would be a candidate or not. At this time, Moe emailed SNF, ARU, hhc, private duty, and [employer] and brochure to her [email or web address]. MOE will need to follow up with pt and daughter once PT OT AMPAC scores are known.    Electronically signed by Lena Ramirez on 5/28/2020 at 11:18 AM

## 2020-05-28 NOTE — PROGRESS NOTES
Physical Therapy    Facility/Department: [address] INTERNAL MEDICINE 2      NAME: Connor Mcgregor  : 7/3/1927  MRN: [id number]     Chart reviewed and PT eval attempted this am.  Per nursing, hold PT eval this am due to low BP. Will check back at later time/date. Checked back in PM to complete PT eval.  Awaiting orthopedic recommendations. Will continue to follow.       Shannon Moscoso, [address]

## 2020-05-28 NOTE — PROGRESS NOTES
05/27/20 2046 05/28/20  0555    140   K 4.3 4.0    107   CO2 31* 27   BUN 31* 28*   CREATININE 1.0 0.8   GLUCOSE 127* 90   CALCIUM 9.2 8.7       Recent Labs     05/27/20 2046 05/28/20  0555   WBC 6.6 5.7   RBC 3.85 3.57*   HGB 13.1 12.2*   HCT 40.1 37.1   .2* 103.9*   MCH 34.0 34.2   MCHC 32.7 32.9   RDW 12.7 12.6    200   MPV 9.8 9.8           Assessment:    Active Problems:    Syncope and collapse  Resolved Problems:    * No resolved hospital problems. *      Plan:  1. Right Hip Inferior Ramus Fracture: Related to recent fall. +ortho BP. Ortho consult placed, appreciate input. Hip precautions. Continue IVF for now. Monitor I&O closely. 2. Osteopenia with likely Osteoporosis: Supportive care. Will need to follow up outpatient with PCP for treatment. Continue Vitamin D supplement. 3. CAD:History. Does not take any current medications for this. Monitor. 4. CKD stage 3: Kidney function stable. Continue to monitor. 5. Hypothyroidism: Continue home medication. NOTE: This report was transcribed using voice recognition software. Every effort was made to ensure accuracy; however, inadvertent computerized transcription errors may be present.   Electronically signed by SAHRA Marsh CNP on 5/28/2020 at 11:55 AM

## 2020-05-28 NOTE — DISCHARGE INSTR - COC
Pulse 67   Temp 97.6 °F (36.4 °C)   Resp 18   Ht 5' 7\" (1.702 m)   Wt 96 lb 4.8 oz (43.7 kg)   SpO2 97%   BMI 15.08 kg/m²     Last documented pain score (0-10 scale): Pain Level: 0  Last Weight:   Wt Readings from Last 1 Encounters:   05/28/20 96 lb 4.8 oz (43.7 kg)     Mental Status:  {IP PT MENTAL STATUS:20030}    IV Access:  { ELAYNE IV ACCESS:947766237}    Nursing Mobility/ADLs:  Walking   {CHP DME GQAN:165563354}  Transfer  {CHP DME LSAX:363065462}  Bathing  {CHP DME JAJF:939042752}  Dressing  {CHP DME RNQY:324822766}  Toileting  {CHP DME RRZC:240881812}  Feeding  {CHP DME YLCH:520353277}  Med Admin  {P DME VAUS:165911028}  Med Delivery   { ELAYNE MED Delivery:211012095}    Wound Care Documentation and Therapy:  Wound 03/09/12 Catheter entry/exit site Groin Right (Active)   Number of days: 3001        Elimination:  Continence:   · Bowel: {YES / HY:03165}  · Bladder: {YES / HARMON:98802}  Urinary Catheter: {Urinary Catheter:247558750}   Colostomy/Ileostomy/Ileal Conduit: {YES / CU:94400}       Date of Last BM: ***    Intake/Output Summary (Last 24 hours) at 5/28/2020 1140  Last data filed at 5/28/2020 0948  Gross per 24 hour   Intake 0 ml   Output 200 ml   Net -200 ml     No intake/output data recorded.     Safety Concerns:     [address] Safety Concerns:741422686}    Impairments/Disabilities:      [address] Impairments/Disabilities:018974638}    Nutrition Therapy:  Current Nutrition Therapy:   [address] Diet List:553173691}    Routes of Feeding: {CHP DME Other Feedings:806588652}  Liquids: {Slp liquid thickness:62469}  Daily Fluid Restriction: {CHP DME Yes amt example:566890326}  Last Modified Barium Swallow with Video (Video Swallowing Test): {Done Not Done GI:228599958}    Treatments at the Time of Hospital Discharge:   Respiratory Treatments: ***  Oxygen Therapy:  {Therapy; copd oxygen:37347}  Ventilator:    {MH CC Vent EWZA:600983265}    Rehab Therapies: {THERAPEUTIC INTERVENTION:0146866489}  Weight Bearing Status/Restrictions: [address] CC Weight Bearin}  Other Medical Equipment (for information only, NOT a DME order):  {EQUIPMENT:940094329}  Other Treatments: ***    Patient's personal belongings (please select all that are sent with patient):  {CHP DME Belongings:769533929}    RN SIGNATURE:  {Esignature:688145411}    CASE MANAGEMENT/SOCIAL WORK SECTION    Inpatient Status Date: ***    Readmission Risk Assessment Score:  Readmission Risk              Risk of Unplanned Readmission:        12           Discharging to Facility/ Agency   · Name: [facility]. · Address:  · Phone:[phone]  · Fax:    Dialysis Facility (if applicable)   · Name:  · Address:  · Dialysis Schedule:  · Phone:  · Fax:    / signature: Electronically signed by RADHA Ross on 2020 at 11:24 AM      PHYSICIAN SECTION    Prognosis: {Prognosis:7956043283}    Condition at Discharge: [address] Patient Condition:210222578}    Rehab Potential (if transferring to Rehab): {Prognosis:7892748763}    Recommended Labs or Other Treatments After Discharge: ***    Physician Certification: I certify the above information and transfer of Connor Mcgregor  is necessary for the continuing treatment of the diagnosis listed and that he requires acute rehab for {LESS:323977562} 30 days.      Update Admission H&P: {CHP DME Changes in HGSPT:620604823}    PHYSICIAN SIGNATURE:  {Esignature:774818095}

## 2020-05-28 NOTE — H&P
Date Taking? Authorizing Provider   Betaine, Trimethylglycine, (TMG, TRIMETHYLGLYCINE, PO) Take  by mouth. Historical Provider, MD   Omega-3 Fatty Acids (FISH OIL PO) Take 3 g by mouth daily. Historical Provider, MD   Flaxseed, Linseed, (FLAX SEED OIL) 1000 MG CAPS Take 1,000 mg by mouth daily. Historical Provider, MD   Ascorbic Acid (VITAMIN C) 500 MG CAPS Take 500 mg by mouth daily. Historical Provider, MD   Linda, Zingiber officinalis, (LINDA ROOT PO) Take 3 g by mouth daily. Historical Provider, MD   GRAPE SEED EXTRACT PO Take 150 mg by mouth daily. Historical Provider, MD   Magnesium 400 MG CAPS Take  by mouth daily. Historical Provider, MD   VITAMIN A PO Take 5,000 Units by mouth. Historical Provider, MD   vitamin D (ERGOCALCIFEROL) 400 UNITS CAPS Take 400 Units by mouth daily. Historical Provider, MD   thyroid (ARMOUR) 60 MG tablet Take 60 mg by mouth daily. Historical Provider, MD       Allergies:    Quinolones and Septra [bactrim]    Social History:    reports that he has never smoked. He has never used smokeless tobacco. He reports that he does not drink alcohol or use drugs.     Family History:   Not reviewed, no significant family history    PHYSICAL EXAM:  Vitals:  BP (!) 103/53   Pulse 73   Temp 97.9 °F (36.6 °C) (Oral)   Resp 18   SpO2 97%   General Appearance: alert and oriented to person, place and time and in no acute distress  Skin: warm and dry, turgor not diminished, has lesions on his scalp  Head: normocephalic and atraumatic  Eyes: pupils equal, round, and reactive to light, extraocular eye movements intact, conjunctivae normal  Neck: neck supple and non tender without mass   Pulmonary/Chest: clear to auscultation bilaterally- no wheezes, rales or rhonchi, normal air movement, no respiratory distress  Cardiovascular: normal rate, normal S1 and S2 and noR/R 2 out of 6 systolic ejection murmur  Abdomen: soft, non-tender, non-distended, normal

## 2020-05-28 NOTE — ED PROVIDER NOTES
Head: Normocephalic and atraumatic. Right Ear: External ear normal.      Left Ear: External ear normal.      Mouth/Throat:      Pharynx: No oropharyngeal exudate. Eyes:      Pupils: Pupils are equal, round, and reactive to light. Neck:      Musculoskeletal: Normal range of motion. Cardiovascular:      Rate and Rhythm: Normal rate and regular rhythm. Heart sounds: Normal heart sounds. No murmur. No friction rub. No gallop. Comments: +2/4 DP and PT pulses bilaterally. Pulmonary:      Effort: Pulmonary effort is normal. No respiratory distress. Breath sounds: Normal breath sounds. No wheezing or rales. Chest:      Chest wall: No tenderness. Abdominal:      General: Bowel sounds are normal. There is no distension. Palpations: Abdomen is soft. There is no mass. Tenderness: There is no abdominal tenderness. There is no guarding or rebound. Hernia: No hernia is present. Musculoskeletal:         General: Tenderness (tenderness to palpation of the right hip) present. No deformity. Comments: Decrease ROM of the right extremity due to pain. Lymphadenopathy:      Cervical: No cervical adenopathy. Skin:     General: Skin is warm and dry. Capillary Refill: Capillary refill takes less than 2 seconds. Coloration: Skin is not pale. Findings: No erythema or rash. Neurological:      General: No focal deficit present. Mental Status: He is alert and oriented to person, place, and time. Mental status is at baseline. Cranial Nerves: No cranial nerve deficit. Sensory: No sensory deficit. Motor: No weakness. Coordination: Coordination normal.   Psychiatric:         Behavior: Behavior normal.         Thought Content:  Thought content normal.         Judgment: Judgment normal.          Procedures   --------------------------------------------- PAST HISTORY ---------------------------------------------  Past Medical History:  has a past medical (H) 8 - 23 mg/dL    CREATININE 1.0 0.7 - 1.2 mg/dL    GFR Non-African American >60 >=60 mL/min/1.73    GFR African American >60     Calcium 9.2 8.6 - 10.2 mg/dL   Brain Natriuretic Peptide   Result Value Ref Range    Pro- (H) 0 - 450 pg/mL   Protime-INR   Result Value Ref Range    Protime 13.3 (H) 9.3 - 12.4 sec    INR 1.1    Troponin   Result Value Ref Range    Troponin <0.01 0.00 - 0.03 ng/mL   Urinalysis with Microscopic   Result Value Ref Range    Color, UA Yellow Straw/Yellow    Clarity, UA Clear Clear    Glucose, Ur Negative Negative mg/dL    Bilirubin Urine Negative Negative    Ketones, Urine Negative Negative mg/dL    Specific Gravity, UA 1.015 1.005 - 1.030    Blood, Urine Negative Negative    pH, UA 7.0 5.0 - 9.0    Protein, UA Negative Negative mg/dL    Urobilinogen, Urine 0.2 <2.0 E.U./dL    Nitrite, Urine Negative Negative    Leukocyte Esterase, Urine Negative Negative   EKG 12 Lead   Result Value Ref Range    Ventricular Rate 71 BPM    Atrial Rate 71 BPM    P-R Interval 152 ms    QRS Duration 82 ms    Q-T Interval 392 ms    QTc Calculation (Bazett) 425 ms    P Axis 68 degrees    R Axis 63 degrees    T Axis 75 degrees       RADIOLOGY:  Ct Head Wo Contrast    Result Date: 2020  Patient MRN:  [id number] : 7/3/1927 Age: 80 years Gender: Male Order Date:  2020 8:30 PM TECHNIQUE/NUMBER OF IMAGES/COMPARISON/CLINICAL HISTORY: CT brain Axial images were obtained sagittal and coronal reconstructions 6 images Comparison 2014. The 44-year-old male patient with a history of fall few days ago trauma. FINDINGS: Peripheral CSF space and ventricular system correlates with the age group. There is no focal mass effect or midline shift. Hypodensity in the white matter of both cerebral hemispheres correlate with chronic small vessel changes or microangiopathy particularly considering the age group. There is no indication for an acute intracranial process to the brain parenchyma.  Midline for radiation dose reduction. Clinical indications: Pain status post trauma. Pelvic fracture. COMPARISON: Conventional radiographs of the pelvis obtained earlier today. TECHNIQUE: Axial, sagittal, and coronal computed tomography of the pelvis was performed without contrast. FINDINGS: These images confirm acute essentially nondisplaced cortical disruption of the right inferior ramus. Sclerotic lesion in the left symphysis pubis is probably a benign bone island with blastic malignancy to be excluded. There are no comparison studies. Diffuse bone demineralization. Degenerative spondylosis, rotoscoliosis and facet arthropathy within the spine. Osteophytosis and eburnation of sacroiliac joints and hips. Arterial calcifications are present. Nondisplaced fracture of the right inferior pubic ramus. Sclerotic lesion in the left symphysis pubis is probably a benign bone island with blastic malignancy to be excluded. There are no comparison studies. Osteopenia. Xr Chest Portable    Result Date: 2020  Patient MRN: [id number] : 7/3/1927 Age:  80 years Gender: Male Order Date: 2020 8:15 PM Exam: XR CHEST PORTABLE Number of Images: 1 view Indication:   fever fever Comparison: 2020 FINDINGS: There is streaky opacity at the left base which may relate to atelectasis and/or infiltrate. The heart and pulmonary vascularity are normal. Neither costophrenic angles blunted. Left lower lobe atelectasis and/or infiltrate. Xr Hip 2-3 Vw W Pelvis Right    Result Date: 2020  3 views of the pelvis and right hip. COMPARISON: None. There is a linear density and evidence of slight buckling of the inferior right ramus. No displaced fracture or dislocation is seen. There is diffuse bone demineralization. Degenerative spondylosis, rotoscoliosis and facet arthropathy are identified in this time. Osteophytosis and eburnation of sacroiliac joints and hips.      Cannot exclude nondisplaced fracture of the inferior right ramus. ------------------------- NURSING NOTES AND VITALS REVIEWED ---------------------------  Date / Time Roomed:  5/27/2020  8:00 PM  ED Bed Assignment:  20/20    The nursing notes within the ED encounter and vital signs as below have been reviewed. Patient Vitals for the past 24 hrs:   BP Temp Temp src Pulse Resp SpO2   05/27/20 2000 (!) 103/53 97.9 °F (36.6 °C) Oral 73 18 97 %       Oxygen Saturation Interpretation: Normal    ------------------------------------------ PROGRESS NOTES ------------------------------------------  ED Course as of May 27 2301   Wed May 27, 2020   2054 EKG: This EKG is signed and interpreted by me. Rate: 71  Rhythm: Sinus  Interpretation: NSR, normal NE interval, normal QRS, normal QT interval, nonspecific T wave changes appear similar when compared to prior EKG  Comparison: stable as compared to patient's most recent EKG      [JA]   2242 I spoke with the hospitalist, Dr. Mamta Mijares. He accepted the patient for admission. [JA]      ED Course User Index  [JA] Zofia Murillo MD       Counseling:  I have spoken with the patient and discussed todays results, in addition to providing specific details for the plan of care and counseling regarding the diagnosis and prognosis. Their questions are answered at this time and they are agreeable with the plan of admission.    --------------------------------- ADDITIONAL PROVIDER NOTES ---------------------------------  Consultations:  Time: 2242. Spoke with Dr. Mamta Mijares. Discussed case. They will admit the patient. This patient's ED course included: a personal history and physicial examination, re-evaluation prior to disposition, multiple bedside re-evaluations, IV medications, cardiac monitoring and continuous pulse oximetry    This patient has remained hemodynamically stable during their ED course. Diagnosis:  1. Closed fracture of other parts of pelvis, initial encounter ([facility] [address]    2.  Syncope and collapse Disposition:  Patient's disposition: Admit to telemetry  Patient's condition is stable. MDM  Number of Diagnoses or Management Options  Diagnosis management comments: Patient is a 40-year-old male presented to ED after fall. Patient evaluated, found to have an inferior ramus fracture nondisplaced. Patient evaluated, rest comfortably otherwise no acute distress. Patient's lab work was unremarkable. He was agreeable with plan. Patient is nonambulatory, and needs to be evaluated for syncope and collapse as well as the fracture. Agreeable with admission. Discussed with family, they were agreeable with admission, stated that they would come in and see the patient as well. Patient's daughter is German Alexandra [phone]. Lavell Weems also a contact - [phone]. Amount and/or Complexity of Data Reviewed  Clinical lab tests: ordered and reviewed  Tests in the radiology section of CPT®: ordered and reviewed         ED Course as of May 27 2301   Wed May 27, 2020   2054 EKG: This EKG is signed and interpreted by me. Rate: 71  Rhythm: Sinus  Interpretation: NSR, normal CT interval, normal QRS, normal QT interval, nonspecific T wave changes appear similar when compared to prior EKG  Comparison: stable as compared to patient's most recent EKG      [JA]   0205 I spoke with the hospitalist, Dr. Christian Carty. He accepted the patient for admission.     [JA]      ED Course User Index  [JA] MD Allyson Abbasi DO  Resident  05/27/20 0141

## 2020-05-28 NOTE — PROGRESS NOTES
Pharmacy Note    Saintclair Peabody was ordered Flax Seed Oil, Linda Root, Fish Oil, and Vitamin A. As per the [address] [place] [address], herbals and certain dietary supplements will be discontinued.   The herbals or dietary supplements may be continued after discharge from the hospital.  Lucio Matta, [facility]  5/28/2020  1:04 AM

## 2020-05-28 NOTE — ED NOTES
Neighbor Poppy Lujan updated on plan of care and probable admission to hospital.       Kelvin Guerrero, LIZBETH  05/27/20 5015

## 2020-05-29 PROBLEM — E44.0 MODERATE PROTEIN-CALORIE MALNUTRITION (HCC): Chronic | Status: ACTIVE | Noted: 2020-01-01

## 2020-05-29 NOTE — PROGRESS NOTES
Occupational Therapy  OCCUPATIONAL THERAPY INITIAL EVALUATION      Date:2020  Patient Name: Myra Cuevas  MRN: [id number]  : 7/3/1927  Room: [address] [place] [address]    Referring Provider: Marcelle Beltre MD  Evaluating OT: Ish Chi. Kavin Jeong - [phone]    AM-PAC Daily Activity Raw Score: 1524      Recommended Adaptive Equipment: Continue to assess. Diagnosis: Syncope and collapse [R55]   Patient presented s/p fall at home. Per ortho note, patient found to have:  1. Nondisplaced right inferior pubic ramus fracture. 2.  Sclerotic lesion left symphysis pubis, probably benign bone island, rule out blastic malignancy. Pertinent Medical History: CHF, CAD, DJD    Precautions: falls, WBAT B LEs, bed/chair alarms, skin integrity - waffle cushion    Home Living: Patient lives alone in a two-floor home (two steps to enter); patient's bedroom and bathroom are on the second floor. Patient noted that the laundry is in the basement. Equipment Owned: rollator    Prior Level of Function (PLOF): Per patient, he was independent, independent with most IADLs, and independent with functional mobility (with walker on main living level) prior to this hospitalization. Patient noted that there are wall-mounted grab bars throughout the hallway in the second floor of his home. Patient noted that he has intermittent assistance with some IADLs (e.g. provision of an occasional meal) from a neighbor. Driving: Yes - a neighbor assists with transportation, as needed. Pain Level: Patient reported experiencing pain in his R hip/groin, but did not rate his pain. Cognition: Patient alert and oriented grossly. WFL command follow demonstrated. Patient pleasant and cooperative.    Memory: Fair+   Sequencing: Fair   Problem Solving: Fair   Judgement/Safety: Impaired    Functional Assessment:   Initial Eval Status  Date: 2020 Treatment Status  Date:  Short Term Goals  Treatment Frequency/Duration: 2-5x/week for 3-7 days PRN   Feeding

## 2020-05-29 NOTE — PLAN OF CARE
Problem: Falls - Risk of:  Goal: Will remain free from falls  Description: Will remain free from falls  5/29/2020 1820 by Yonathan Garcia RN  Outcome: Met This Shift  5/29/2020 1820 by Yonathan Garcia RN  Outcome: Met This Shift  Goal: Absence of physical injury  Description: Absence of physical injury  5/29/2020 1820 by Yonathan Garcia RN  Outcome: Met This Shift  5/29/2020 1820 by Yonathan Garcia RN  Outcome: Met This Shift

## 2020-05-29 NOTE — PROGRESS NOTES
HGB 13.1 12.2*   HCT 40.1 37.1   .2* 103.9*   MCH 34.0 34.2   MCHC 32.7 32.9   RDW 12.7 12.6    200   MPV 9.8 9.8       CBC:   Lab Results   Component Value Date    WBC 5.7 05/28/2020    RBC 3.57 05/28/2020    HGB 12.2 05/28/2020    HCT 37.1 05/28/2020    .9 05/28/2020    MCH 34.2 05/28/2020    MCHC 32.9 05/28/2020    RDW 12.6 05/28/2020     05/28/2020    MPV 9.8 05/28/2020     CBC with Differential:    Lab Results   Component Value Date    WBC 5.7 05/28/2020    RBC 3.57 05/28/2020    HGB 12.2 05/28/2020    HCT 37.1 05/28/2020     05/28/2020    .9 05/28/2020    MCH 34.2 05/28/2020    MCHC 32.9 05/28/2020    RDW 12.6 05/28/2020    LYMPHOPCT 14.8 05/27/2020    MONOPCT 7.9 05/27/2020    BASOPCT 0.3 05/27/2020    MONOSABS 0.52 05/27/2020    LYMPHSABS 0.97 05/27/2020    EOSABS 0.04 05/27/2020    BASOSABS 0.02 05/27/2020     BMP:    Lab Results   Component Value Date     05/28/2020    K 4.0 05/28/2020     05/28/2020    CO2 27 05/28/2020    BUN 28 05/28/2020    LABALBU 3.4 03/11/2012    CREATININE 0.8 05/28/2020    CALCIUM 8.7 05/28/2020    GFRAA >60 05/28/2020    LABGLOM >60 05/28/2020    GLUCOSE 90 05/28/2020    GLUCOSE 95 03/11/2012     PT/INR:    Lab Results   Component Value Date    PROTIME 13.3 05/27/2020    PROTIME 11.7 03/09/2012    INR 1.1 05/27/2020     Troponin:    Lab Results   Component Value Date    TROPONINI <0.01 05/27/2020     U/A:    Lab Results   Component Value Date    COLORU Yellow 05/27/2020    PROTEINU Negative 05/27/2020    PHUR 7.0 05/27/2020    WBCUA 0-1 05/27/2020    RBCUA NONE 05/27/2020    BACTERIA MODERATE 05/27/2020    CLARITYU Clear 05/27/2020    SPECGRAV 1.015 05/27/2020    LEUKOCYTESUR Negative 05/27/2020    UROBILINOGEN 0.2 05/27/2020    BILIRUBINUR Negative 05/27/2020    BLOODU Negative 05/27/2020    GLUCOSEU Negative 05/27/2020    AMORPHOUS FEW 05/27/2020        Radiology:   MRI PELVIS WO CONTRAST   Final Result   1. Limited study. by nursing    Time spent is 35 min      Electronically signed by Patricia Britt DO on 5/29/2020 at 5:52 PM

## 2020-05-29 NOTE — PROGRESS NOTES
Physical Therapy    Facility/Department: [address] INTERNAL MEDICINE 2  Initial Assessment    NAME: Elvin Sierra  : 7/3/1927  MRN: [id number]    Date of Service: 2020      Attending Provider:  Ana Caputo DO    Evaluating PT:  Shannan Tipton PT DPT     Room #:  1636/6960-Z  Diagnosis:  Syncope and collapse; 1. Nondisplaced right inferior pubic ramus fracture. 2.  Sclerotic lesion left symphysis pubis, probably benign bone island,  rule out blastic malignancy. Pertinent PMHx/PSHx:  See chart  Procedure/Surgery:  NA  Precautions:  WBAT B LE's  Equipment Needs: To be determined. SUBJECTIVE:    Pt lives alone in a 2 story home but reports can stay first floor, 2 stairs and 0 rail to enter. There are 14 steps to 2nd floor bed and bath. Pt ambulated with w/w PTA. OBJECTIVE:   Initial Evaluation  Date:  Treatment Short Term/ Long Term   Goals   Was pt agreeable to Eval/treatment? yes     Does pt have pain? With activity in R groin area     Bed Mobility  Rolling: NT  Supine to sit: mod A   Sit to supine: NT  Scooting: NT  Min A    Transfers Sit to stand: mod A x 2  Stand to sit: max A x 2  Stand pivot: mod A x 2  Mod A    Ambulation   3-4 steps from bed to bedside chair with w/w mod A x 2  50 feet with w/w min A    Stair negotiation: ascended and descended NT  NT   AM-PAC 6 Clicks        BLE ROM is WFL. BLE strength is grossly 3/5. Sensation:  Pt denies numbness and tingling to extremities  Edema:  None noted. Balance: sitting varies SB to min A  and standing with w/w  is mod A x 2  Endurance: poor    Therapeutic Exercises:  NT    Patient education  Pt educated on mobility, precautions    Patient response to education:   Pt verbalized understanding Pt demonstrated skill Pt requires further education in this area     x     ASSESSMENT:    Comments:  Pt found laying in bed agreeable to evaluation.      Treatment:  Patient practiced and was instructed in the following treatment:

## 2020-05-30 NOTE — PLAN OF CARE
Problem: Falls - Risk of:  Goal: Will remain free from falls  Description: Will remain free from falls  5/30/2020 0528 by Juan Galvez RN  Outcome: Met This Shift  5/29/2020 1820 by Phylicia Figueroa RN  Outcome: Met This Shift  5/29/2020 1820 by Phylicia Figueroa RN  Outcome: Met This Shift     Problem: Falls - Risk of:  Goal: Absence of physical injury  Description: Absence of physical injury  5/30/2020 0528 by Juan Galvez RN  Outcome: Met This Shift  5/29/2020 1820 by Phylicia Figueroa RN  Outcome: Met This Shift  5/29/2020 1820 by Phylicia Figueroa RN  Outcome: Met This Shift

## 2020-05-30 NOTE — PLAN OF CARE
Problem: Falls - Risk of:  Goal: Will remain free from falls  Description: Will remain free from falls  5/30/2020 1201 by Marissa Gould RN  Outcome: Met This Shift  5/30/2020 0528 by Piedad Clay RN  Outcome: Met This Shift  Goal: Absence of physical injury  Description: Absence of physical injury  5/30/2020 1201 by Marissa Gould RN  Outcome: Met This Shift  5/30/2020 0528 by Piedad Clay RN  Outcome: Met This Shift

## 2020-05-31 NOTE — PROGRESS NOTES
Dedicated R upper thigh MRI reviewed and d/w Dr. Marci Florian. Report states straining of R pectineus and right obturator externus muscles in addition to fx of the superior and inferior ramus of the R pubic bone. Also straining of the L pectineus and L obturator externus muscles. There is presence of a larger complex of formation with the fat component in the L scrotal region. Lipomatous lesion of the L hemiscrotum is considered. It can represent a complicated lipoma. It can further be evaluated with testicular US which can be complemented by CT or MRI with and without contrast.    Plan: 1. WBAT BLE            2. F/u with Dr. Marci Florian in 2-3 weeks            3. If medicine feels further workup is necessary re: L lipomatous scrotal lesion, testicular US, CT or MRI and urology eval may be indicated.

## 2020-05-31 NOTE — PROGRESS NOTES
left symphysis pubis is probably a benign bone   island with blastic malignancy to be excluded. There are no comparison   studies. Osteopenia. CT Head WO Contrast   Final Result   No indication for an acute intracranial process. Suspect a small focal lytic lesion in the right frontal bone. Further evaluation with a nuclear medicine bone scan for skeletal   survey is recommended on routine basis. CT Cervical Spine WO Contrast   Final Result      No acute fracture or spondylolisthesis is identified on CT of cervical   spine. Diffuse degenerative disc and facet disease result in multilevel   central and foraminal stenosis. Atherosclerotic vascular disease. XR CHEST PORTABLE   Final Result   Left lower lobe atelectasis and/or infiltrate. XR HIP 2-3 VW W PELVIS RIGHT   Final Result      Cannot exclude nondisplaced fracture of the inferior right ramus. Assessment:    Active Problems:    Syncope and collapse    Closed fracture of pelvis (HCC)    Moderate protein-calorie malnutrition (HCC)    Orthostatic hypotension  Resolved Problems:    * No resolved hospital problems. *      Plan:  1. Orthostatic hypotension continue iv fluids  2. Dehydration continue iv fluids  3. Moderate protein calorie malnutrition dietician on consult  4. Acute right superior and inferior ramus fractures ortho following MRI per ortho activity per ortho  5. Hypothyroidism continue med  6.  Scrotal mass will check u/s        Electronically signed by Ramakrishna Hardy DO on 5/31/2020 at 2:48 PM

## 2020-06-01 NOTE — PROGRESS NOTES
Initial Inpatient Wound Care    Admit Date: 5/27/2020  8:00 PM    Reason for consult: reddened coccyx  Significant history:  Adm with hip pain, fell backwards at home    See H&P    Findings: awake and verbal  mepilex in place over sacrum. Coccyx buttocks intact. Heels intact  Impression: At risk    Interventions in place:  Comfort glide, heel protectors  Plan: continue SOS and monitor  Ml Screen.  DARLIN Whipple, Alvarado 6/1/2020 12:08 PM

## 2020-06-01 NOTE — CONSULTS
SKIN: No erythema, C/D/I  PSYCH: normal mood and affect    Neuro Exam:    Cranial Nerves:  I: olfactory not tested  II: Full to confrontation  III, IV, VI: extra occular muscles intact  Pupils (II, III): ERRL  V: Facial Sensation/Jaw clench: intact  VII: Facial Motor: intact  VIII. Hearing: intact  XI/X: Palate: intact  XI: Shoulder shrug/SCM: intact  XII: Tongue: intact    Neglect testing: No evidence of tactile or visual neglect    Sensory:    LT: intact    Motor:   Tone was normal    Motor Findings  Strength: Right  Strength: Left    Deltoid  4/5  4/5    Biceps  4/5  4/5    Triceps  4/5  4/5    Wrist Extensors  4/5  4/5    FDP 4/5 4/5   Finger abductors 4/5 4/5   Iliospoas  3/5  3/5    Quadriceps  4/5  4/5    Tibialis anterior  4/5  2/5    EHL 4/5 2/5   Gastroc soleus  4/5  2/5        DTRs:  Reflex Right Left   Biceps 1+ 1+   Triceps 1+ 1+   Brachioradialis 1+ 1+   Patella 0 0   Achilles  0 0   Babinski Negative Negative   Yesika Negative Negative       Balance/Gait:  Gait: NT    Laboratory:    Lab Results   Component Value Date    WBC 9.0 06/01/2020    HGB 12.2 (L) 06/01/2020    HCT 37.7 06/01/2020    .4 (H) 06/01/2020     06/01/2020     Lab Results   Component Value Date     06/01/2020    K 4.4 06/01/2020    K 4.0 05/28/2020     06/01/2020    CO2 25 06/01/2020    BUN 20 06/01/2020    CREATININE 0.6 06/01/2020    GLUCOSE 96 06/01/2020    GLUCOSE 95 03/11/2012    CALCIUM 9.2 06/01/2020      Lab Results   Component Value Date    ALT 12 06/01/2020    AST 15 06/01/2020    ALKPHOS 79 06/01/2020    BILITOT 0.3 06/01/2020     Lab Results   Component Value Date    COLORU Yellow 05/27/2020    NITRU Negative 05/27/2020    GLUCOSEU Negative 05/27/2020    KETUA Negative 05/27/2020    UROBILINOGEN 0.2 05/27/2020    BILIRUBINUR Negative 05/27/2020     No results found for: LABA1C  No results found for: CANELO    Microbiology:   5/27/20: UA -   + bacteria    Pertinent Imaging (radiologist

## 2020-06-01 NOTE — HOME CARE
Received notification from Dr. Thuy Sweeney office patient is no longer an active patient there. Will not follow home care. Unable to reach patient in room this a.m. spoke with daughter. [facility] patient has not established with any other physician and is currently not being seen by any Dr in [facility]. Explained that in order to have home care patient would need a physician to follow and sign plan of care. States understanding. If patient does not have a physician to follow will not be able to receive home care at discharge, he would need to establish with a pcp and then they can refer back to home care once patient is seen if appropriate. Updated social work. Awaiting to see what plan will be at d/c and if Dr there is willing to follow.    Valerie Yanez LPN/[facility]

## 2020-06-01 NOTE — PROGRESS NOTES
[facility] Hospitalist   Progress Note    Admitting Date and Time: 5/27/2020  8:00 PM  Admit Dx: Syncope and collapse [R55]    Subjective:    Pt feels well this morning. Patient denies shortness of breath, fevers, chills, N/V, and pain. Per RN: Patient for possible placement to [facility].    ROS: denies fever, chills, cp, sob, n/v, HA unless stated above.  sodium chloride  500 mL Intravenous Once    enoxaparin  40 mg Subcutaneous Daily    ascorbic acid  500 mg Oral Daily    thyroid  60 mg Oral Daily    sodium chloride flush  10 mL Intravenous 2 times per day    methocarbamol  1,000 mg Oral TID    calcium-vitamin D  1 tablet Oral BID    fentanNYL  25 mcg Intravenous Once     sodium chloride flush, 10 mL, PRN  acetaminophen, 650 mg, Q6H PRN    Or  acetaminophen, 650 mg, Q6H PRN  polyethylene glycol, 17 g, Daily PRN  promethazine, 12.5 mg, Q6H PRN    Or  ondansetron, 4 mg, Q6H PRN  HYDROcodone 5 mg - acetaminophen, 1 tablet, Q6H PRN  traMADol, 50 mg, Q6H PRN         Objective:    BP (!) 86/40 Comment: manual  Pulse 96   Temp 97.4 °F (36.3 °C)   Resp 20   Ht 5' 7\" (1.702 m)   Wt 102 lb (46.3 kg)   SpO2 95%   BMI 15.98 kg/m²   General Appearance: alert and oriented to person, place and time and in no acute distress  Skin: warm and dry  Head: normocephalic and atraumatic  Eyes: pupils equal, round, and reactive to light, extraocular eye movements intact, conjunctivae normal  Neck: neck supple and non tender without mass   Pulmonary/Chest:- Diminished to auscultation bilaterally- no wheezes, rales or rhonchi, normal air movement, no respiratory distress  Cardiovascular: normal rate, normal S1 and S2 no rubs, gallops, murmur noted.   Abdomen: soft, non-tender, non-distended, normal bowel sounds, no masses or organomegaly  Extremities: no cyanosis, no clubbing and no edema  Neurologic: no cranial nerve deficit and speech normal      Recent Labs     05/31/20  0604 06/01/20  0420

## 2020-06-01 NOTE — PROGRESS NOTES
Acute Rehab Pre-Admission Screen      Referral date: 5/29/20  Onset/Hospital Admit Date: 5/27/2020  8:00 PM    [place] [facility] [id number]    Name: Danika Lopez  YOB: 1927  Age: 80 y.o. Admitting Diagnosis: syncope and collapse   Address: [address] [place] [id number]  Home Phone: [phone] (home)  [address]:     Sex: male  Race:   Marital Status:    Ethnic/Cultural/Jain Considerations: Baptist    Advanced Directives: [x] Full Code  [] [facility] [] Medications only       [] Living Will  [] DPOA      []Organ donor      [] No mechanical breathing or ventilation     [] no tube feeding, nutrition or hydration      [x] Patient does not have advanced directives or living will       COVERAGE INFORMATION   Primary Insurer: Medicare  Secondary Insurer: [employer] #: [id number]  Verified coverage: [] Patient  [] Family/caregiver    [x] financial department [] insurance carrier    MEDICAL UPDATE:  History of present admission: Patient is a 80-year-old male history of CAD, CHF, hypothyroidism, hyperlipidemia, presents the ED after fall 5/27/20. Patient states that he was going through a filing cabinet, and states that he is unsure how he had fallen but felt as if he was falling backwards. He states that he hit his head, and hit his bottom on a wooden floor. He states since then, he has been having worsening right sided hip pain, and called his neighbor, and brought the patient in for evaluation. CT of the head showed no significant normality, C-spine showed DJD. CT of the pelvis showed nondisplaced right inferior and superior pubic ramus fracture. Also findings that can indicate enthesopathy related with the muscle attachment of the right obturator external muscle with the posterior femoral attachment. Also straining of the L pectineus and L obturator externus muscles. He is WBAT BLE.  Pt also diagnosed with orthostatic hypotension and HERNIA REPAIR      TONSILLECTOMY         Past Medical:  Past Medical History:   Diagnosis Date    CAD (coronary artery disease)     [facility] [address]     CHF (congestive heart failure) (HCC)     DJD (degenerative joint disease) 3/10/2012    Hyperlipidemia     Hypothyroid     Hypothyroid     Myocardial infarction acute ([facility] [address] 3/12/12    BMS to LAD       Current Co-morbidities:  [] Alzheimer's   [] Dysphasia     [] Parkinsonism  [] Amputation   [] GERD  [] Peripheral artery disease   [] Anemia      [] Encephalopathy  [] Peripheral vascular disease  [] Anxiety   [] Gangrene   [] Pneumonia  [] Aphasia   [] Gout    [] Polyneuropathy  [] Asthma   [] Heart Failure (diastolic) [] Post-polio syndrome  [] Atrial fibrillation  [] Heart Failure (left-sided) [] Pseudomonas enteritis   [] Blind    [] Heart Failure (right-sided) [] Pulmonary embolism  [] Cellulitis     [] Heart Failure (systolic) [] Renal dialysis  [] Clostridium difficile  [] Hemiparesis   [] Renal failure  [x] Congestive heart failure [] Hypertension   [] Rheumatoid arthritis  [] COPD   [] Hypotension   [] Seizure disorder   [x] Coronary Artery Disease [x] Hypothyroidism  [] Septicemia   [x] DJD    [x] Hyperlipidemia   [] Sleep apnea  [] Depression   [] Morbid obesity  [] Spinal cord injury  [] Diabetes   [] MRSA   [] Stroke  [] Diabetic nephropathy  [] Myocardial infarction  [] Tracheostomy  [] Diabetic neuropathy  [] Osteoarthritis  [] Traumatic brain injury   [] Diabetic retinopathy  [] Osteoporosis   [] Urinary tract infection  [] DVT    [] Pancytopenia  [] Vocal cord paralysis  []  Spinal stenosis   []  kidney disease [] VRE  [] Post op    [x]pubic rami fracture  []        Medical/Functional Conditions requiring inpatient rehabilitation: CAD(monitor labs and vitals),  Pubic rami fracture (monitor pain)    Risk for Medical/Clinical Complications: Falls, injury, pain, skin breakdown, abnormal vitals, abnormal labs, DVT, PE, pneumonia, decreased mobility, neuro changes    CLINICAL DATA:     Height : 5'7\"     Weight:  102#   BMI: 16       Date: 6/1/20 Date: 6/2/20 Date:    temperature 97.4 97.4    pulse 96 93    respirations 20 16    Blood pressure 81/47 96/52    Pulse oximeter 95% room air 92% room air       ALLERGIES: Quinolones and Septra [bactrim]    DIET : DIET GENERAL;  Dietary Nutrition Supplements: Standard High Calorie Oral Supplement  Dietary Nutrition Supplements: Frozen Oral Supplement    Current Lab and Diagnostic Tests:   Recent Results (from the past 24 hour(s))   CBC Auto Differential    Collection Time: 06/01/20 11:56 AM   Result Value Ref Range    WBC 9.0 4.5 - 11.5 E9/L    RBC 3.61 (L) 3.80 - 5.80 E12/L    Hemoglobin 12.2 (L) 12.5 - 16.5 g/dL    Hematocrit 37.7 37.0 - 54.0 %    .4 (H) 80.0 - 99.9 fL    MCH 33.8 26.0 - 35.0 pg    MCHC 32.4 32.0 - 34.5 %    RDW 12.8 11.5 - 15.0 fL    Platelets 389 007 - 019 E9/L    MPV 9.9 7.0 - 12.0 fL    Neutrophils % 85.0 (H) 43.0 - 80.0 %    Immature Granulocytes % 0.7 0.0 - 5.0 %    Lymphocytes % 7.7 (L) 20.0 - 42.0 %    Monocytes % 5.4 2.0 - 12.0 %    Eosinophils % 1.0 0.0 - 6.0 %    Basophils % 0.2 0.0 - 2.0 %    Neutrophils Absolute 7.66 (H) 1.80 - 7.30 E9/L    Immature Granulocytes # 0.06 E9/L    Lymphocytes Absolute 0.69 (L) 1.50 - 4.00 E9/L    Monocytes Absolute 0.49 0.10 - 0.95 E9/L    Eosinophils Absolute 0.09 0.05 - 0.50 E9/L    Basophils Absolute 0.02 0.00 - 0.20 E9/L   Comprehensive Metabolic Panel    Collection Time: 06/02/20  3:38 AM   Result Value Ref Range    Sodium 137 132 - 146 mmol/L    Potassium 4.3 3.5 - 5.0 mmol/L    Chloride 104 98 - 107 mmol/L    CO2 25 22 - 29 mmol/L    Anion Gap 8 7 - 16 mmol/L    Glucose 102 (H) 74 - 99 mg/dL    BUN 23 8 - 23 mg/dL    CREATININE 0.6 (L) 0.7 - 1.2 mg/dL    GFR Non-African American >60 >=60 mL/min/1.73    GFR African American >60     Calcium 8.9 8.6 - 10.2 mg/dL    Total Protein 5.7 (L) 6.4 - 8.3 g/dL    Alb 3.0 (L) 3.5 - 5.2 g/dL    Total Bi-PAP [] CPAP  [] Nasal cannula  [] Liters:      [] Wound Care:   [] Pressure ulcers(stage and location) -    [] Wound vac   [] Wound or incision care    [] Pain Management (level of pain, meds):      [x] Incontinence Bladder [] Miguel  Insertion date:    []Hemodialysis and  Frequency:   [] Incontinence Bowel    [x] Last bowel movement : 6/2/20    Substance use history: [] Yes  [x] No   [] Tobacco  [] Alcohol  [] Other     [] Ethnic  [] Cultural  [] Spiritual  [] Language [] Needs  [] Other than English  [] Hearing Impaired  [] Visually Impaired  [] Speaking Impaired  [] Blind  [] Special equipment:  [] Devices/Splints  [] Type   [] Brace   [] Type  [] Bariatric bed  [] Extra wide commode  [] Extra wide wheelchair [] Extra wide walker  [] Ivan walker  [] Ivan wheelchair  [] Transfer lift    [] Other equipment     FUNCTIONAL STATUS PT / Zach Stark / Mary Horner:  Alejandro Wilson / JENNIFER Discipline Initial: 5/29/20 Follow Up: 6/1/20 Current: 6/3/2020   Eating OT Set up   Set up   na   Grooming OT Minimum assistance   nt Minimum assistance    Bathing OT Max Assist   nt nt   Dressing Upper Extremity OT Minimum assistance   nt nt   Dressing Lower Extremity OT Max Assist   Max Assist   Moderate Assist    Toileting OT Max Assist   Max Assist   Max Assist    Toilet Transfers OT Moderate Assist  x2 Minimum assistance   Moderate Assist    Tub/Shower Transfers OT nt nt nt   Homemaking OT nt nt nt   Bed Mobility PT Moderate Assist   Moderate Assist   Moderate Assist    Bed/Wheelchair Transfers PT Moderate Assist  To Max Assist   Moderate Assist   Moderate Assist    Locomotion Walk / Wheelchair  Device:  Distance: PT  3-4 steps from bed to bedside chair with w/w mod A x 2      30 feet x 1 using [employer] for support Min/Mod A for balance 20'Wheeled walker Moderate Assist    Endurance PT      Expression SP      Social Interaction SP      Problem Solving SP      Memory SP      Comprehension SP      Swallowing SP      Bowel Management NSG fracture    Primary Rehabilitation Diagnosis: pubic ramus fracture    Electronically signed by Anna Banks RN on 6/2/2020 at 10:56 AM    Prescreen completed __________________________________ (signature of prescreener)    Date:   6/3/20 Time:  0940    JUSTIFICATION FOR ADMISSION TO ACUTE REHABILITATION:  Patient has suffered decline in functional abilities for gait, transfers,  ADL's and IADL's as well as endurance. Patient has functional deficits requiring intensive therapy across multiple disciplines in order to return home safely. Patient will need physician oversight for respiratory issues, abnormal vital signs, nutritional and hydration status, safety issues, medications and therapy modalities. PT, OT  will work on deficits as noted in evaluations. Case management and social work will provide services for DME and management of a safe discharge home. RECOMMEND LEVEL OF CARE  Recommend inpatient rehabilitation: [x] Yes   [] No  If no indicate reason:  [] Functional level too high  [] Unmotivated  [] No insurance carrier approval [] Unlikely to return to community  [] No medical necessity  [] Patient or family chose other facility  [] Too medically complex  [] Inadequate discharge plan  [] Rehabilitation bed unavailable [] Functional level too low  [] patient or family refused ARU    If patient not accepted for IRF admission, recommended level of care:  [] [address]  [] [address]  [] [place]   [] Home Care  [] Other      [] LTAC       Physician Assigned:  [] Dr. Sadiq Roberts         [] Dr. Theron Dance              [] Dr. Magali Mohs [] Dr. Carline James  [x] Dr. Mani Manjarrez (if not admitted within 48 hours of initial pre-screen)    Medical Update/Changes: Prescreen updated to reflect current data since initiated. Functional Update/Changes: Therapy notes updated on prescreen graph to reflect current status.      Reviewer

## 2020-06-02 NOTE — PROGRESS NOTES
Good ability to reach to bilateral feet. Min A to manage B socks. See comments. SBA - with use of AE, as needed/appropriate   Bathing Max A  UE: Min A  LE: Mod A Min A - with use of AE/DME, as needed/appropriate   Toileting Max A Max A  With hygiene while standing   Min A   Bed Mobility  Supine-to-Sit: Mod A  Supine <> sit: Mod A  SBA   Functional Transfers Sit-to-Stand: Mod Ax2   from EOB  Posterior lean noted upon sit-to-stand. Cues needed to maximize safety with functional transfers.  STS: Min A from EOB 2xs SBA   Functional Mobility Mod Ax2   (with walker) for few steps from EOB to bedside chair; unsteadiness demonstrated. Min- Mod A using ww taking side steps towards HOB Min A - with use of device, as needed/appropriate   Balance Sitting: Fair+  (at EOB)  Standing: Poor+  (with walker) Sitting: SBA  Standing: Min A  Fair+ dynamic standing balance during completion of ADLs and other functional tasks. Activity Tolerance Fair-  Fair- Patient will demonstrate Good understanding and consistent implementation of energy conservation techniques and work simplification techniques into ADL/IADL routines. B UE were WFL during tasks. Comments: Upon arrival pt was supine in bed. At end of session pt was transferred back to bed per Rn request with alarm on, all lines and tubes intact and call light within reach. Treatment: Educated pt on management of sock aid to don B socks. Pt may required increased level of assistance when standing during LE dressing tasks due to balance deficits. Pt c/o dizziness when transferring to EOB. BP was 96/52. Per pt request BP was taking two additional times during tx. BP sittin/52 and standing 96/56. Due to dizziness pt was transferred back to bed per RN. Education: AE, safety training and techniques to improve standing balance. · Pt has made good progress towards set goals.    · Continue with current plan of care      Treatment Charges: Mins Units   Ther Ex

## 2020-06-02 NOTE — PROGRESS NOTES
inferior pubic ramus fracture-- WBAT bilateral lower extremities. Patient to follow-up as outpatient with Dr. Daniel Gonzalez in 2 to 3 weeks. Orthopedic surgery input appreciated  3. Orthostatic hypotension- continue IVF hydration. Follow closely. 4.Moderate protein-calorie malnutrition- continue frozen oral supplements/high-calorie oral supplements. Follow intake. Dietitian input appreciated. 5.Hypothyroidism- continue current regimen. 6.Scrotal mass- US with left hemiscrotal mass containing fat likely an inguinal hernia. 7.  Generalized weakness- 2/2 deconditioning. Continue PT/OT. For placement to [facility]. 8.Disposition- possible placement to [facility] at [facility] vs. home with home health. Case management following    NOTE: This report was transcribed using voice recognition software. Every effort was made to ensure accuracy; however, inadvertent computerized transcription errors may be present. Electronically signed by SAHRA Carlos CNP on 6/2/2020 at 9:50 AM     Addendum: I have personally participated in the history, exam, medical decision making with Janelle Warren NP on the date of service, I have personally reviewed imaging and lab data as well as EKG and I agree with all of the pertinent clinical information unless otherwise noted. I have also reviewed and agree with the past medical, family, and social history unless otherwise noted.       PHYSICAL EXAM:  Vitals:  /65   Pulse 98   Temp 97.7 °F (36.5 °C) (Oral)   Resp 16   Ht 5' 7\" (1.702 m)   Wt 106 lb 9.6 oz (48.4 kg)   SpO2 95%   BMI 16.70 kg/m²     Gen: NAD, alert, sitting in bed  HEENT: NCAT  CV: Tachycardia, no m/r/g  Resp: Equal chest rise b/l, CTAB  Abd: Soft, thin, NT, ND  Ext: Good ROM of b/l upper and lower extremities, no BLE edema    Impression:  Active Problems:    Syncope and collapse    Closed fracture of pelvis (HCC)    Moderate protein-calorie malnutrition (HCC)    Orthostatic hypotension  Resolved Problems:    * No

## 2020-06-02 NOTE — PROGRESS NOTES
6/2/2020  1:31 PM      Nutrition Assessment    Type and Reason for Visit: Reassess    Nutrition Recommendations: Continue current PO and ONS    Nutrition Assessment: Pt feeling better and discharge planning for Rehab, as pt was active/independent PTA. He remains weak with fair appetite    Malnutrition Assessment:  · Malnutrition Status: Meets the criteria for moderate malnutrition  · Context: Chronic illness  · Findings of the 6 clinical characteristics of malnutrition (Minimum of 2 out of 6 clinical characteristics is required to make the diagnosis of moderate or severe Protein Calorie Malnutrition based on AND/ASPEN Guidelines):  1. Energy Intake-Less than or equal to 50% of estimated energy requirement, Greater than or equal to 7 days    2. Weight Loss-Unable to assess(no recent EMR hx available),    3. Fat Loss-Moderate subcutaneous fat loss, Orbital, Triceps  4. Muscle Loss-Severe muscle mass loss, Clavicles (pectoralis and deltoids), Calf (gastrocnemius)  5. Fluid Accumulation-No significant fluid accumulation,    6.  Strength-Not measured    Nutrition Risk Level: Moderate    Nutrient Needs:  · Estimated Daily Total Kcal:  (MSJ x1.35)  · Estimated Daily Protein (g): 65-75 (1.4-1.6 g/kg)   Estimated Daily Total Fluid (ml/day):  (1 ml/christian)    Nutrition Diagnosis:   · Problem:  Moderate malnutrition, In context of chronic illness  · Etiology: related to Insufficient energy/nutrient consumption(had been feeling too weak PTA)     Signs and symptoms:  as evidenced by Severe muscle loss, Moderate loss of subcutaneous fat, BMI, Diet history of poor intake, Intake 25-50%    Objective Information:  · Nutrition-Focused Physical Findings: abdomen WNL, A&Ox4/[place], no edema, -I/O  · Wound Type: (per wound care - buttocks, heels intact and at risk of breakdown/sacrum with mepilex)  · Current Nutrition Therapies:  · Oral Diet Orders: General   · Oral Diet intake: 26-50%(average at meals)  · Oral Nutrition

## 2020-06-03 PROBLEM — S72.001D CLOSED RIGHT HIP FRACTURE, WITH ROUTINE HEALING, SUBSEQUENT ENCOUNTER: Status: ACTIVE | Noted: 2020-01-01

## 2020-06-03 NOTE — PROGRESS NOTES
Occupational Therapy  OT BEDSIDE TREATMENT NOTE      Date:6/3/2020  Patient Name: Anton Collado  MRN: [id number]  : 7/3/1927  Room: [address] [place] [address]     Referring Beulah Blizzard, MD  Evaluating OT: Griselda Bur. Petemary, OTR/L - [id number]     AM-PAC Daily Activity Raw Score: 15/24     Recommended Adaptive Equipment: Continue to assess.      Diagnosis: Syncope and collapse [R55]   Patient presented s/p fall at home. Per ortho note, patient found to have:  1.  Nondisplaced right inferior pubic ramus fracture. 2.  Sclerotic lesion left symphysis pubis, probably benign bone island, rule out blastic malignancy. Pertinent Medical History: CHF, CAD, DJD     Precautions: falls, WBAT B LEs, bed/chair alarms, skin integrity - waffle cushion     Home Living: Patient lives alone in a two-floor home (two steps to enter); patient's bedroom and bathroom are on the second floor. Patient noted that the laundry is in the basement. Equipment Owned: rollator     Prior Level of Function (PLOF): Per patient, he was independent, independent with most IADLs, and independent with functional mobility (with walker on main living level) prior to this hospitalization. Patient noted that there are wall-mounted grab bars throughout the hallway in the second floor of his home. Patient noted that he has intermittent assistance with some IADLs (e.g. provision of an occasional meal) from a neighbor.   Driving: Yes - a neighbor assists with transportation, as needed.     Pain Level: B hips- Mild  Cognition: Patient alert and oriented grossly with fair ability to follow commands.     Functional Assessment:    Initial Eval Status  Date: 2020 Treatment Status  Date: 6/3/20 Short Term Goals  Treatment Frequency/Duration: 2-5x/week for 3-7 days PRN   Feeding Setup   Independent   Grooming Min A  Min A Setup  (seated/standing at sink)   UB Dressing Min A   Setup   LB Dressing Max A  SBA to adjust socks while seated at EOB; patient demonstrates

## 2020-06-03 NOTE — PROGRESS NOTES
Charge nurse, [address] [place] [address] notified of need for discharge readmit navigator by attending physician. Also offer to provide assistance for the navigator if the physician needed. Notified [address] [place] [address] of need to change  time to 1400. Gave Jessica contact number for screener of [phone] if needed.

## 2020-06-03 NOTE — H&P
socks, shoes and brief   Bathing Max A     Toileting Max A Max A   Bed Mobility  Supine-to-Sit: Mod A  Supine to sit: Mod A    Functional Transfers Sit-to-Stand: Mod Ax2   from EOB  Posterior lean noted upon sit-to-stand. Cues needed to maximize safety with functional transfers.  STS: Mod A from EOB, arm chair and low surface commode. Functional Mobility Mod Ax2   (with walker) for few steps from EOB to bedside chair; unsteadiness demonstrated. Min- Mod A using ww in room   Balance Sitting: Fair+  (at EOB)  Standing: Poor+  (with walker) Sitting: SBA  Standing: Min A    Activity Tolerance Fair-  Fair-       Review Of Systems:   Constitutional: No Fever, chills  Skin: No rash, ulcers, or other lesions  HEENT: No HA, blurry vision  Respiratory: + intermittent cough. No SOB  Cardiovascular: No CP  Gastrointestinal: No nausea, vomiting, diarrhea, constipation, abdominal discomfort; + continent   Genitourinary: No Dysuria, frequency, urgency; + continent   Musculoskeletal: + weakness; rest as per HPI  Neurologic: No numbness or tingling; rest as per HPI  Psychiatric: No Depression, No anxiety    Physical Examination:  Vitals:   Vitals:    06/03/20 1830   Weight: 113 lb 15.7 oz (51.7 kg)   Height: 5' 7\" (1.702 m)       GEN: Frail elderly gentleman in NAD, conversational   HEENT: NC/AT, PERRLA, EOMI  RESP: + cough, CTAB, no R/R/W   CV: +S1 S2, RRR  ABD: thin, NT, ND, normal BS   : no garrett   EXT: Normal ROM, No clubbing/cyanosis, 2+ pulses   SKIN: No erythema, C/D/I  PSYCH: normal mood and affect    Neuro Exam:  Cranial Nerves:  I: olfactory not tested  II: Full to confrontation  III, IV, VI: extra occular muscles intact  Pupils (II, III): ERRL  V: Facial Sensation/Jaw clench: intact  VII: Facial Motor: intact  VIII. Hearing: intact  XI/X: Palate: intact  XI: Shoulder shrug/SCM: intact  XII: Tongue: intact     Neglect testing: No evidence of tactile or visual neglect     Sensory:    LT: intact     Motor:   Tone was for skeletal   survey is recommended on routine basis.        5/27/2020: CT C SPINE -   Impression       No acute fracture or spondylolisthesis is identified on CT of cervical   spine.       Diffuse degenerative disc and facet disease result in multilevel   central and foraminal stenosis.       Atherosclerotic vascular disease.      IMPRESSION:  Danika Lopez is a 80 y.o. male with PMH significant for atherosclerotic heart disease status post LAD stent in 1215, chronic diastolic heart failure, hypokinesis with LVEF of 40 to 45% in 2012, hypothyroidism, osteopenia; who presented to [facility] on 5/27/2020 s/p fall backwards at home. The patient was found to have a nondisplaced fracture of the right inferior pubic ramus which was managed nonoperatively. He was admitted into acute inpatient rehabilitation on 6/3/2020. Hospital course was complicated by right hip pain, anemia, dehydration, bacteriuria, orthostatic hypotension. Patient has impairments in:  Demonstrating impaired strength, impaired ROM, impaired balance, impaired safety awareness, decreased endurance. Patient has activities limitations in self care, mobility, functional communication and cognition, and is [facility] for acute comprehensive rehabilitation. I. Rehabilitation Necessity/Diagnosis:   Medical impact on function as a result of impaired functional mobility, ambulation, bed mobility, transfers, self-care/ADL's, strength, endurance, range of motion/flexibility, coordination and impaired gait/balance. Treatment plan will include a comprehensive rehabilitation program with intense therapies for 3 hours/day, 5 days/week.     1. Physical therapy to address bed mobidility, car and mat transfer, progressive ambulation with use of least restrictive assistive device, optimization of gait biomechanics, truncal strengthening, lower extremity strengthening, coordination, range of motion/flexibility, wheelchair and cushion evaluation, durable medical equipment evaluation, patient and family instruction and endurance training. 2. Occupational therapy to address feeding, grooming, upper and lower body dressing and bathing, toileting, tub/toilet/bed transfers, durable medical equipment evaluation, upper extremity strengthening, range of motion/flexibility, dexterity, coordination and patient and family instruction. 3. Rehabilitation nursing 24 hours per day to monitor bowel and bladder function, work on bowel routine, voiding trials/garrett catheter management as per bladder management protocol, assess falls risk upon admission and periodically thereafter, implement and revise falls prevention strategies, maintain skin integrity through initial and daily pressure sore risk assessment (Stuart scale), implement and revise pressure sore prevention strategies, educate patient and family members regarding medication administration, ADL's, transfers, and mobility and continue therapy carryover with ADL's, transfers, and mobility  4. Social work and case management consults for discharge planning/disposition issues, as well as coordination and communication of patient progress between family and providers. 5. Rehab psychology - as needed for adjustment, coping  6. Recreational therapy for community reintegration activities. II. Medical Management:  # Ortho - Nondisplaced fracture of the right inferior pubic ramus s/p fall  - Managed nonoperatively   - Will need ortho and PM&R OP follow up. - Control pain as below. # CVS - orthostatic hypotension  - Continue fluid resuscitation as needed. Thigh high TEDs. - Midodrine 10 mg TID. Can increase to 15 mg TID. # Pulm - At risk for atelectasis  - Continue good pull hygiene  - incentive spirometry. # GI/bowel/FEN -   - Diet: general, thin liquids.   High protein supplementation  - Bowel program: Colace 100 BID, senna 2 tabs at lunch,

## 2020-06-04 NOTE — PROGRESS NOTES
Patient oriented to room and new admission folder given. No patient guide present.  Magnolia Ferreira  6/4/2020  6:14 PM

## 2020-06-04 NOTE — PROGRESS NOTES
prior to initiation of this evaluation.      ADMITTING DIAGNOSIS: Closed right hip fracture, with routine healing, subsequent encounter [S72.001D]     ACTIVE PROBLEM LIST:   Patient Active Problem List   Diagnosis    [facility] transm[facility] anterior wall [facility] ([facility]    Hypothyroidism    DJD (degenerative joint disease)    Hyperlipidemia    Hypothyroid    CAD (coronary artery disease)    [facility]    CHF (congestive heart failure) (HCC)    Myocardial infarction acute (HCC)    Syncope and collapse    Closed fracture of pelvis (HCC)    Moderate protein-calorie malnutrition (HCC)    Orthostatic hypotension    Closed right hip fracture, with routine healing, subsequent encounter

## 2020-06-04 NOTE — PROGRESS NOTES
Occupational Therapy  OCCUPATIONAL THERAPY DAILY NOTE    Date:2020  Patient Name: Matias Patel  MRN: [id number]  : 7/3/1927  Room: [address] [place] [address]     Diagnosis: s/p fall backwards- nondisplaced R inferior & superior pubic rami fx    Additional Pertinent Hx: CAD, CHF, HLD, hx CABG, hypothyroidism & orthostatic hypotension    Precautions: falls ,WBAT     Functional Assessment:   Date Status AE  Comments   Feeding 20  Minimal Assist      Grooming 20  Minimal Assist      Bathing 20  Moderate Assist      UB Dressing 20  Moderate Assist       LB Dressing 20  Maximal Assist      Homemaking 20  TBA         Functional Transfers / Balance:   Date Status DME  Comments   Sit Balance 20  Stand by Assist      Stand Balance 20  Moderate Assist      [] Tub  [] Shower   Transfer 20  TBA      Commode   Transfer 20  Moderate Assist      Functional   Mobility 20  Moderate Assist   ww    Other:         Functional Exercises / Activity:       Sensory / Neuromuscular Re-Education:      Cognitive Skills:   Status Comments   Problem   Solving fair     Memory fair     Sequencing fair     Safety fair       Visual Perception:    Education:    [] Family teach completed on:    Pain Level: pt had complaints of shoulder pain but did not rate a number     Additional Notes:       Patient has made fair  progress during treatment sessions toward set goals. Therapy emphasis to obtain goals:Current Treatment Recommendations: Strengthening, Positioning, ROM, Gait Training, Safety Education & Training, Balance Training, Patient/Caregiver Education & Training, Self-Care / ADL, Functional Mobility Training, Equipment Evaluation, Education, & procurement, Home Management Training, Endurance Training, Cognitive Reorientation      [x] Continue with current OT Plan of care.   [] Prepare for Discharge     DISCHARGE RECOMMENDATIONS  Recommended DME:    Post Discharge Care:   []Home Independently  [x]Home with 24hr

## 2020-06-04 NOTE — PROGRESS NOTES
standing & min vc's for safety  Long term goal 4: Pt demo s/u UE & SBA-CGA LE dress with AE as needed & min vc's for sequencing & safety  Long term goal 5: Pt demo SBA-CGA commode trf with appropriate DME to ensure pt safety  Long term goals 6: Pt demo CGA tub trf with appropraite DME to ensure pt safety & min vc's to ensure pt safety  Long term goal 7: Pt demo MIn A light homemaking tasks & min vc's for initiation, sequencing & insight  Long term goal 8: Pt demo F+ enudrance for a 20 minute functional insight  Long term goal 9: Pt demo G- initiation, inisght, reasoning & safety with min vc's during functionally based tasks  Patient Goals   Patient goals : Pt u/a to state a goal when asked     Therapy Time   Individual Concurrent Group Co-treatment   Time In 535-OT eval  545-OT rx         Time Out 545-OT eval  630-OT rx         Minutes 55 OT total  10 Min OT eval  45 Min OT rx          Josie Lechuga OTR/L [id number]

## 2020-06-04 NOTE — PROGRESS NOTES
Physical Therapy    Facility/Department: [address] [facility]  Treatment Note     NAME: Jeanette Mosqueda  : 7/3/1927  MRN: [id number]    Date of Service: 2020  Evaluating Therapist: Kirstie Johnson PT, DPT    ROOM: R  DIAGNOSIS: Pubic Ramus Fracture  PRECAUTIONS: falls, WBAT BLE    HPI: Pt presents to ED following fall from standing height on , pt has history of CAD, CHF.  He states since then, he has been having worsening right sided hip pain, and called his neighbor, and brought the patient in for evaluation. CT of the head showed no significant normality, C-spine showed DJD.  CT of the pelvis showed nondisplaced right inferior and superior pubic ramus fracture. He is WBAT BLE. Pt also diagnosed with orthostatic hypotension and given IVF. Social Hx: Pt lives alone in a 2 story home with 1 + 1 + 1 steps without HR to enter. The pt's bedroom is on the 2nd floor. There are 14 steps to the 2nd floor with 2 rails. Bathroom on first and second floor. Laundry in basement with 12 steps down with 1 HR. Pt's daughter lives in [place], reports neighbor has been assisting him with transportation, yard work, some meal prep. Pt reports he was largely independent prior to fall leading to admission, ambulated without device, performed homemaking tasks, etc. Since fall at home pt has been utilizing [employer] and staying on first floor. Initial Evaluation  24 AM     PM    Short Term Goals Long Term Goals    Was pt agreeable to Eval/treatment? yes See eval See comments      Does pt have pain?  Moderate c/o R groin  Pain while standing       Bed Mobility  Rolling: SBA  Supine to sit: SBA  Sit to supine: SBA  Scooting: SBA   Supervision Modified Independent   Transfers Sit to stand: ModA  Stand to sit: Skye  Stand pivot: Skye with [employer]   SBA Supervision   Ambulation    50 feet x 2 reps with [employer] with WC follow and Skye/ModA   200 feet with AAD and  feet with AAD with Supervision   Walking 10 feet on uneven surface TBA   10 feet with AAD with CGA 10 feet with AAD with Supervision   Wheel Chair Mobility NT       Car Transfers TBA   Skye SBA   Stair negotiation: ascended and descended  4 steps with 2 rail with Skye/ModA   8 steps with 2 rail with SBA 14 steps with 1 rail with SBA   Curb Step:   ascended and descended TBA   2 inch step with [employer] and SBA 7.5 inch step with AAD and SBA   Picking up object off the floor TBA   Will  cone with SB assist Will  cone with Supervision   BLE ROM WNL       BLE Strength BLE grossly 4/5       Balance  Static and dynamic standing balance Skye/ModA with WW (moderate retropulsion)       Date Family Teach Completed        Is additional Family Teaching Needed? Y or N        Hindering Progress Balance deficits, debility, R groin pain       PT recommended ELOS        Team's Discharge Plan        Therapist at Team Meeting          PM  Pt sitting in chair sleeping. Several attempts to wake pt for therapy. Pt finally awakened and reported increased fatigue. Pt requested to return to bed. Assisted pt with transfers from  to ww with min A . Pt pivoted with ww with min A. Pt able to complete sit to supine with SBA. Pt fell right back to sleep once in supine. Shortly after pt returned to bed , transport arrived to take pt off unit for testing. Will attempt again at a later time. Continue with POC.     Time 1345  Time out [address] [place] [address] [id number]

## 2020-06-04 NOTE — CARE COORDINATION
Social Work Assessment Summary    PCP: Dr. Doyle Hicks    Patient Resides: Alone. He is a  since 2017. Home Architecture : He lives in a 2story home with 1 threshold step, walk a short distance then another threshold step,  Walk short distance and one last threshold step. The pt's bedroom is on the 2nd floor, he has been sleeping on the sofa only for the last week. There are (14) steps to the 2nd floor with (2) rails. Pt. Had a tub only (claw foot) on the 1st floor. Laundry in basement with (12-13) steps down (1) rail. Will you return to your own home? Pt. Would like to. Primary Caregiver: The pt. Has (1) daughter; Rohini Green (48). Candice Allen lives in [place] and works FT for [employer]. Archives as an .  The pt's neighbor, Noa Wise (63) lives next door and has only known the pt. For about 3 years. Level of Function PTA : The pt. Was independent in all areas prior to fall. Brianna Osuna did the grocery shopping and driving. He did not use a device but used the furniture. 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 DME Pta  : Nitin Dickerson, rollator    Community Agency Involvement PTA : [facility] (5/30/2020)    Do they have a medical profile: No    Family Teaching: N/A- Per hospital policy    Strength: \"[place]\"    Preference: Per [facility]. \"      NAME RELATION HOME # WORK # CELL #   Ines Sandoval daughter   [phone]   Noel tico in law   [phone]   Noa Wise neighbor   [phone]     [email or web address]    Height: 5'7  Weight: [address] to leave [place] to return to [place] on 6/5. SW emailed her a list of AL's in [address] [place] [address]. Candice Allen stated \"he can afford it but doesn't want to spend any of his money. \" She suggested we strongly emphasize our recommendation of 24hr supervision because if she suggests, he will shut her out. \"  There are no plans for him to leave [facility].     [facility]  6/4/2020

## 2020-06-04 NOTE — CARE COORDINATION
Per Team:  Plan re eval (3 wks). Updated the pt. And his daughter, Cammie Chandra. LTG OT:  SBA / Mod I   PT/SP: to eval.  Due to his age, lack of support, failure to thrive and possible slight TBI, the pt. Will require 24hr supervision post dc. He is unsteady on his feet and requires cues for safety and insight. Rohini aware he will need clothes. AL list emailed to Rohini: [email or web address]    Cammie Chandra has been in contact with [facility] for services if he was to dc home.     Kota Stuart  6/4/2020

## 2020-06-04 NOTE — PROGRESS NOTES
PM&R Progress Note  Patient: Holly Byrne  Age/sex: 80 y.o. male  Medical Record #: [id number]  Location: 5501/5501-B  Admission date: 6/3/2020    CC: ARU follow up; R hip fracture and TBI after fall    S: Patient was seen and examined. No acute events overnight. No acute issues this AM. Patient was planned for MBS today. Denies chest pain, sob, abd pain. No change in [facility] since H&P done on 6/3/2020.     Meds:  Scheduled  midodrine, 15 mg, TID WC  sodium chloride flush, 10 mL, 2 times per day  ascorbic acid, 500 mg, Daily  calcium-vitamin D, 1 tablet, BID  enoxaparin, 40 mg, Daily  thyroid, 60 mg, Daily  docusate sodium, 100 mg, BID  senna, 1 tablet, Lunch  vitamin D, 50,000 Units, Weekly  multivitamin, 1 tablet, Daily  pantoprazole, 40 mg, QAM AC        PRN  magnesium hydroxide, 30 mL, Daily PRN  barium sulfate, 15 mL, ONCE PRN  barium sulfate, 15 mL, ONCE PRN  barium sulfate, 15 mL, ONCE PRN  sodium chloride flush, 10 mL, PRN  acetaminophen, 650 mg, Q4H PRN  HYDROcodone 5 mg - acetaminophen, 0.5 tablet, Q6H PRN    Or  HYDROcodone 5 mg - acetaminophen, 1 tablet, Q6H PRN  bisacodyl, 10 mg, Daily PRN        O:  Vitals:    06/03/20 1830 06/03/20 1845 06/04/20 0800   BP:  (!) 106/56 (!) 103/47   Pulse:  76 82   Resp:  16 24   Temp:  97.3 °F (36.3 °C) 97.2 °F (36.2 °C)   TempSrc:  Oral Oral   SpO2:   (!) 87%   Weight: 113 lb 15.7 oz (51.7 kg)     Height: 5' 7\" (1.702 m)         GEN: Frail elderly gentleman in NAD, conversational   HEENT: NC/AT, PERRLA, EOMI  RESP: CTAB, no R/R/W   CV: +S1 S2, RRR  ABD: thin, NT, ND, normal BS   : no garrett   EXT: Normal ROM, No clubbing/cyanosis, 2+ pulses   SKIN: No erythema, C/D/I  NEURO: Alert, no new focal deficits     Labs:    Lab Results   Component Value Date    WBC 7.9 06/03/2020    HGB 12.0 (L) 06/03/2020    HCT 36.4 (L) 06/03/2020    .0 (H) 06/03/2020     06/03/2020     Lab Results   Component Value Date     06/03/2020    K 4.5 06/03/2020

## 2020-06-04 NOTE — PROGRESS NOTES
Physical Therapy    Facility/Department: [address] REHAB  Initial Assessment    NAME: Fabiana Concepcion  : 7/3/1927  MRN: [id number]    Date of Service: 2020  Evaluating Therapist: Juliann Birch PT, DPT    ROOM: 7482H  DIAGNOSIS: Pubic Ramus Fracture  PRECAUTIONS: falls, WBAT BLE    HPI: Pt presents to ED following fall from standing height on , pt has history of CAD, CHF.  He states since then, he has been having worsening right sided hip pain, and called his neighbor, and brought the patient in for evaluation. CT of the head showed no significant normality, C-spine showed DJD.  CT of the pelvis showed nondisplaced right inferior and superior pubic ramus fracture. He is WBAT BLE. Pt also diagnosed with orthostatic hypotension and given IVF. Social Hx: Pt lives alone in a 2 story home with 1 + 1 + 1 steps without HR to enter. The pt's bedroom is on the 2nd floor. There are 14 steps to the 2nd floor with 2 rails. Bathroom on first and second floor. Laundry in basement with 12 steps down with 1 HR. Pt's daughter lives in [place], reports neighbor has been assisting him with transportation, yard work, some meal prep. Pt reports he was largely independent prior to fall leading to admission, ambulated without device, performed homemaking tasks, etc. Since fall at home pt has been utilizing [employer] and staying on first floor. Initial Evaluation  24 AM     PM    Short Term Goals Long Term Goals    Was pt agreeable to Eval/treatment? yes       Does pt have pain?  Moderate c/o R groin  Pain while standing       Bed Mobility  Rolling: SBA  Supine to sit: SBA  Sit to supine: SBA  Scooting: SBA   Supervision Modified Independent   Transfers Sit to stand: ModA  Stand to sit: Skye  Stand pivot: Skye with [employer]   SBA Supervision   Ambulation    50 feet x 2 reps with [employer] with WC follow and Skye/ModA   200 feet with AAD and  feet with AAD with Supervision   Walking 10 feet on uneven surface TBA   10 feet with AAD with CGA 10 feet with AAD with Supervision   Wheel Chair Mobility NT       Car Transfers TBA   Skye SBA   Stair negotiation: ascended and descended  4 steps with 2 rail with Skye/ModA   8 steps with 2 rail with SBA 14 steps with 1 rail with SBA   Curb Step:   ascended and descended TBA   2 inch step with [employer] and SBA 7.5 inch step with AAD and SBA   Picking up object off the floor TBA   Will  cone with SB assist Will  cone with Supervision   BLE ROM WNL       BLE Strength BLE grossly 4/5       Balance  Static and dynamic standing balance Skye/ModA with WW (moderate retropulsion)       Date Family Teach Completed        Is additional Family Teaching Needed? Y or N        Hindering Progress Balance deficits, debility, R groin pain       PT recommended ELOS        Team's Discharge Plan        Therapist at Team Meeting          Pt is alert and Oriented x3, exhibits delayed response time  Sensation: grossly intact  Edema: none noted  Endurance: fair   Skin was inspected: grossly intact     ASSESSMENT  Pt displays functional ability as noted in the objective portion of this evaluation. Comments:  Pt pleasant and agreeable to session however is Syc[place] and executive function/attention are mildly impaired. Pt unaware of retropulsion with standing however can correct once identified by therapist. Pt has minimal c/o pain at R groin, worse after seated rest breaks upon initial standing. Verbal cueing required for postural correction and to avoid retropulsion. Pt ambulates with slow gait speed and increased step height bilaterally. Pt tolerated activity with mild c/o fatigue. Anticipate physical improvement however pt will likely require 24/7 supervision upon discharge for safety.      Patient education  Pt educated on safe hand placement with transfers    Patient response to education:   Pt verbalized understanding Pt demonstrated skill Pt requires further education in this area   yes partial yes     Rehab potential

## 2020-06-05 PROBLEM — S06.9XAA TRAUMATIC BRAIN INJURY: Status: ACTIVE | Noted: 2020-01-01

## 2020-06-05 NOTE — PROGRESS NOTES
Physical Therapy    Facility/Department: [address] REHAB  Daily Treatment Note    NAME: Sybil Chery  : 7/3/1927  MRN: [id number]    Date of Service: 2020  Evaluating Therapist: Evonne Pink PT, DPT    ROOM: 8650X  DIAGNOSIS: Pubic Ramus Fracture  PRECAUTIONS: falls, WBAT BLE, B DOC hose  HPI: Pt presents to ED following fall from standing height on , pt has history of CAD, CHF.  He states since then, he has been having worsening right sided hip pain, and called his neighbor, and brought the patient in for evaluation. CT of the head showed no significant normality, C-spine showed DJD.  CT of the pelvis showed nondisplaced right inferior and superior pubic ramus fracture. He is WBAT BLE. Pt also diagnosed with orthostatic hypotension and given IVF. Social Hx: Pt lives alone in a 2 story home with 1 + 1 + 1 steps without HR to enter. The pt's bedroom is on the 2nd floor. There are 14 steps to the 2nd floor with 2 rails. Bathroom on first and second floor. Laundry in basement with 12 steps down with 1 HR. Pt's daughter lives in [place], reports neighbor has been assisting him with transportation, yard work, some meal prep. Pt reports he was largely independent prior to fall leading to admission, ambulated without device, performed homemaking tasks, etc. Since fall at home pt has been utilizing [employer] and staying on first floor. Initial Evaluation  24 AM     PM    Short Term Goals Long Term Goals    Was pt agreeable to Eval/treatment? yes yes yes     Does pt have pain?  Moderate c/o R groin  Pain while standing Mild c/o R groin pain No c/o pain     Bed Mobility  Rolling: SBA  Supine to sit: SBA  Sit to supine: SBA  Scooting: SBA Supine>Sit SBA  Scooting SBA Supine>Sit SBA  Scooting SBA Supervision Modified Independent   Transfers Sit to stand: ModA  Stand to sit: Skye  Stand pivot: Skye with [employer] Sit<>Stand Skye  Stand-pivot Skye with [employer] Sit<>Stand Skye  Stand-pivot Skye with [employer] SBA Supervision understanding Pt demonstrated skill Pt requires further education in this area   yes partial yes     Additional Comments: Pt supine in bed upon room entry, agreeable to session. L steppage gait continues even with L foot drop corrected by L ankle DF wrap due to pt's gait habits. Pt able to correct retropulsion with verbal cueing to identify. Frequent cueing required for safe hand placement. Pt denies significant pain with standing activity during AM session. Trial L toe off brace caused mild discomfort on L tibial crest, will attempt with increased padding. Retropulsion during transfers and ambulation remains significant barrier to mobility without manual assistance. Plan to progress mobility training with [employer]    AM  Time in: 1000  Time out: 1045    PM  Time in: 1430  Time out: 1515    Pt is making good progress toward established Physical Therapy goals. Continue with physical therapy current plan of care.     Juan Ramon Munson, PT, DPT  [id number]

## 2020-06-05 NOTE — PROGRESS NOTES
PM&R Progress Note  Patient: Teddy May  Age/sex: 80 y.o. male  Medical Record #: [id number]  Location: 5501/5501-B  Admission date: 6/3/2020    CC: ARU follow up; TBI and R hip fracture after fall    S: Patient was seen and examined. No acute events overnight. No acute issues this AM.  Therapies just getting started. BP better controlled today. Denies chest pain, sob, abd pain. Downgraded to minced/moist diet with nectar thick liquids after MBS. LBM 6/5. No change in [facility] since H&P done on 6/3/2020.     Meds:  Scheduled  midodrine, 15 mg, TID WC  melatonin, 3 mg, Nightly  sodium chloride flush, 10 mL, 2 times per day  ascorbic acid, 500 mg, Daily  calcium-vitamin D, 1 tablet, BID  enoxaparin, 40 mg, Daily  thyroid, 60 mg, Daily  docusate sodium, 100 mg, BID  senna, 1 tablet, Lunch  vitamin D, 50,000 Units, Weekly  multivitamin, 1 tablet, Daily  pantoprazole, 40 mg, QAM AC        PRN  magnesium hydroxide, 30 mL, Daily PRN  sodium chloride flush, 10 mL, PRN  acetaminophen, 650 mg, Q4H PRN  HYDROcodone 5 mg - acetaminophen, 0.5 tablet, Q6H PRN    Or  HYDROcodone 5 mg - acetaminophen, 1 tablet, Q6H PRN  bisacodyl, 10 mg, Daily PRN        O:  Vitals:    06/03/20 1845 06/04/20 0800 06/04/20 1600 06/05/20 0249   BP: (!) 106/56 (!) 103/47 (!) 106/58 117/64   Pulse: 76 82 81 78   Resp: 16 24 20 18   Temp: 97.3 °F (36.3 °C) 97.2 °F (36.2 °C) 97.1 °F (36.2 °C) 99.7 °F (37.6 °C)   TempSrc: Oral Oral  Tympanic   SpO2:  (!) 87% 93%    Weight:       Height:           GEN: Frail elderly gentleman in NAD, conversational   HEENT: NC/AT, PERRLA, EOMI, moist vocal quality  RESP: CTAB, no R/R/W   CV: +S1 S2, RRR  ABD: thin, NT, ND, normal BS   : no garrett   EXT: Normal ROM, No clubbing/cyanosis, 2+ pulses   SKIN: No erythema, C/D/I  NEURO: Alert, no new focal deficits     Labs:    Lab Results   Component Value Date    WBC 7.9 06/03/2020    HGB 12.0 (L) 06/03/2020    HCT 36.4 (L) 06/03/2020    .0 (H) 06/03/2020    PLT 211 06/03/2020     Lab Results   Component Value Date     06/03/2020    K 4.5 06/03/2020     06/03/2020    CO2 23 06/03/2020    BUN 23 06/03/2020    CREATININE 0.6 (L) 06/03/2020    GLUCOSE 109 (H) 06/03/2020    CALCIUM 8.7 06/03/2020    PROT 5.6 (L) 06/03/2020    LABALBU 2.8 (L) 06/03/2020    BILITOT 0.3 06/03/2020    ALKPHOS 78 06/03/2020    AST 19 06/03/2020    ALT 15 06/03/2020    LABGLOM >60 06/03/2020    GFRAA >60 06/03/2020         Lab Results   Component Value Date    ALT 15 06/03/2020    AST 19 06/03/2020    ALKPHOS 78 06/03/2020    BILITOT 0.3 06/03/2020       Functional Status  Mobility:   General Mobility:  Min-Mod A  Gait:  48' with WW, Min-Mod A  Transfers:  Min-Mod A  Bed Mobility:  SBA    ADL's: Minimal assistance with feeding, grooming, UB bathing; Moderate assistance with LE bathing, UB dressing; Maximal assistance with LB dressing and toileting. Cognition:  TBD    IMPRESSION:  Vivek Miller is a 80 y.o. male with PMH significant for atherosclerotic heart disease status post LAD stent in 0285, chronic diastolic heart failure, hypokinesis with LVEF of 40 to 45% in 2012, hypothyroidism, osteopenia; who presented to [facility] on 5/27/2020 s/p fall backwards at home.  The patient was found to have a nondisplaced fracture of the right inferior pubic ramus which was managed nonoperatively. He was admitted into acute inpatient rehabilitation on 6/3/2020. Hospital course was complicated by right hip pain, anemia, dehydration, bacteriuria, orthostatic hypotension. PLAN:  I. Rehabilitation - PT, OT, SLP, Rehab Nursing    II. Medical management:  # Ortho - Nondisplaced fracture of the right inferior pubic ramus s/p fall  - Managed nonoperatively. WBAT BLE.  - Will need ortho and PM&R OP follow up. - Control pain as below.      # CVS - orthostatic hypotension  - Continue fluid resuscitation as needed, ideally orally. Thigh high TEDs. - Midodrine 15 mg TID. # Pulm - At risk for atelectasis  - Continue good pull hygiene  - incentive spirometry.      # GI/bowel/FEN -   - Diet: general, thin liquids. High protein supplementation  - Bowel program: Colace 100 BID, senna 2 tabs at lunch, Dulcolax suppository PRN  - GI prophylaxis: Protonix 40 mg daily  - Given high prevalence of Vitamin D deficiency in [place]: supplement with ergocalciferol 50K units/week x8 weeks. - MVI daily. Vitamin C 500 mg daily. # Pain control - Tylenol 650 mg q6H PRN for mild pain, Norco 0.5-1 tab q6H prn for moderate/severe pain     # DVT prophylaxis - chemoprophylaxis with Lovenox    # Endocrine - Hypothyroidism   - Continue [place] 60 mg daily     # Skin - at risk for DTI  - maintain skin integrity. - turns q2H.     # Renal/urinary -   - Renal: No evidence of renal failure on last BMP on 6/3/20.  - Bladder: check PVR's times three.      # Heme -   - ABLA: Hgb stable at 12.0 on 6/3/20.     # ID - no acute issues     # Psych - Melatonin 3 mg QHS PRN for insomnia     # Disposition/social - likely discharge home, will discuss in team rounds. # Rehab Discharge Goals: modified independence in mobility, ADLs, cog/comm, and swallowing. # IOPC: Please see individualized plan of care from progress note dated 6/4/2020. Paul Araujo, DO  Physical Medicine and Rehabilitation     Please note that the above documentation was prepared using voice recognition software. Every attempt was made to ensure accuracy but there may be spelling, grammatical, and contextual errors.

## 2020-06-05 NOTE — PROGRESS NOTES
Patient on list for telesitter per [facility] office. Patient is 3rd on list this AM. Will pass along in report to oncoming nurse.

## 2020-06-05 NOTE — PROGRESS NOTES
Patient frequently getting OOB without utilizing call light. Bed alarm noted to be going off. Patient educated on safety and fall prevention. Telesitter offjoele called for monitor, but none available at this time. Patient placed on waiting list and per javid in [facility] office, as soon as one is available. It will be placed in room. Will continue to monitor.

## 2020-06-06 NOTE — PROGRESS NOTES
Therapeutic Recreation Assessment     LEISURE INTEREST SURVEY               Key: P = Past Interest C = Current Interest F = Future Interest     Arts/Crafts:  _P__Mechanical  ___Woodworking    ___ Painting   ___Floral arranging ___ Ceramics         _ __ Knitting                                                     ___ Crocheting        ___Other: ___________      Cooking:  _ _Baking _C__Cooking    ___   Other: _______   Comments:       Games:  ___Cards  ___Darts  ___Board games    ___Word games  ___Crossword puzzles    ___Seek-n-find/jumble                   ___Other: _________      Horticulture:  _P__Vegetables  _P__Flowers  ___House plants                                                     ___Other: ___________      House/Yard Care:  ___Ironing  ___Laundry  ___Cleaning                                                      ___Repairs              ___Lawn care                                                     ___Other: ___________      Music Listening/Playing:  ___Classical       C__Big Band       ___Rock-n-Roll                                                     ___Country          ___R&B             ___Gospel/Hymns                                                     ___Other: ___________      Outdoor Activities:  ___Hunting          ___Fishing          ___Camping                                                     ___Other: ___________      Pets/Animals:  ___Dogs             P___Cats                ___Fish                                                     ___Birds             ___Livestock         ___Other: _________    Reading:   ___Newspapers  ___Magazines ___Other:stories                                                     ___Other: ___________      Sabianism Activities:  Buddhism: ___________   Babara Misty Activities: ___________      Shopping:   ___Grocery  ___Clothing  ___Flea market     ___Other: ___________      Socialization: _C__Family  _C__Friends  _C__Neighbors       ___Church Refused Services  []No Therapy  []Other: ___________    Objectives:  []Establish adaptations for leisure involvement   []Increase Self worth/self esteem  []Community reintegration training   [x]Social interaction  [x]Leisure participation  []Other: ___________    Goals for Therapeutic Recreation Services:   Social Interaction:   Admission Functional [place] Measure: ____4_______  Discharge Functional [place] Measure: ____5/6_______   Other:________________________________      Leisure Choice/ Increase [place] Quality of Life: To participate in 1 premorbid leisure activities of choice by discharge to increase leisure choice and independence thus increasing the overall quality of his/her life. Socialization/Social Interaction: To increase social interaction skills by introducing self 1 x per week at the beginning of TR session. Leisure Activity Tolerance: To increase leisure tolerance by attending 1 leisure activities per week for 15 minutes with active participation. Confusion/Disorientation:  To display greater awareness of surroundings by participating in reality orientation 1x per week    James Tran

## 2020-06-06 NOTE — PROGRESS NOTES
Speech Language Pathology  ACUTE REHABILITATION--DAILY PROGRESS NOTE            PATIENT NAME:  Kaity Magaña      :  7/3/1927         TODAY'S DATE:  2020 ROOM:  [address] [place], [place] [address]     ADMITTING DIAGNOSIS: Closed right hip fracture, with routine healing, subsequent encounter [W04.558J]    SPEECH PATHOLOGY DIAGNOSIS:    Moderate Cognitive-Linguistic Deficits     THERAPY RECOMMENDATIONS:   Speech Pathology intervention is recommended 3-6 times per week for LOS or when goals are met with emphasis on the following:     Attention to increase safety awareness during transfers/ ambulation and completion of ADL/iADL tasks with minimal.  Orientation to spatial and temporal surroundings with use of external memory aides. Immediate/ STM for functional information to complete tasks as instructed and recall pertinent medical information with minimal and/or use of external memory aide training. Problem solving/ insight/ judgement for various ADL/iADL tasks, including but not limited to: medication management and money/ finance management, overall safety awareness in the PLOF with 80% accuracy  Receptive and expressive language skills with adequate thought content, organization, and processing time to facilitate improved communication with minimal.  Provide appropriate verbal/motor responses to material presented in both auditory/written format (3 level commands, conversation level) on initial presentation with decreased latency.                                    FIMS SCORES      Swallowing    Current Status  5--Supervision    Short Term Goal 6--Modified Independent    Long Term Goal 6--Modified Independent     Receptive    Current Status  3--Moderate Assistance    Short Term Goal 4--Minimal Assistance    Long Term Goal 5--Supervision     Expressive    Current Status  3--Moderate Assistance    Short Term Goal 4--Minimal Assistance    Long Term Goal 5--Supervision     Social Interaction    Current Status  4--Minimal

## 2020-06-08 NOTE — PROGRESS NOTES
[employer] with WC follow and Efrem/ModA 100 feet x1 and 75 feet x1 with toe off brace Efrem 120 feet x 1 rep and 100 feet x 1 rep with [employer] with CGA/Efrem (L ankle DF wrap) 200 feet with AAD and  feet with AAD with Supervision   Walking 10 feet on uneven surface TBA NT NT 10 feet with AAD with CGA 10 feet with AAD with Supervision   Wheel Chair Mobility NT NT NT     Car Transfers TBA N/T NT Efrem SBA   Stair negotiation: ascended and descended  4 steps with 2 rail with Efrem/ModA 8 steps with 2 HR with Min A (NR) 8 steps with 2 HR x 1 rep and 4 steps with 2 HR x 1 rep with CGA 8 steps with 2 rail with SBA 14 steps with 1 rail with SBA   Curb Step:   ascended and descended TBA NT NT 2 inch step with [employer] and SBA 7.5 inch step with AAD and SBA   Picking up object off the floor TBA NT NT Will  cone with SB assist Will  cone with Supervision   BLE ROM WNL WNL WNL     BLE Strength BLE grossly 4/5 with exception of L ankle DF 0/5       Balance  Static and dynamic standing balance Efrem/ModA with WW (moderate retropulsion) Sitting: Supervision  Standing: Min A with [employer]      Date Family Teach Completed        Is additional Family Teaching Needed? Y or N  Y      Hindering Progress Balance deficits, debility, R groin pain Balance deficits, debility, R groin pain      PT recommended ELOS        Team's Discharge Plan        Therapist at Team Meeting          Therapeutic Exercise:   AM:   - Functional mobility as noted above in chart  - Sit<>stand x5 reps  PM:   FW/BW ambulation with [employer] 10 feet x 3 reps with CGA/Efrem  Weaving between 2 standing quad canes with [employer] x 4 reps with CGA      Patient education  Pt educated on safety with functional mobility, gait with [employer], decreasing posterolateral lean with ambulation    Patient response to education:   Pt verbalized understanding Pt demonstrated skill Pt requires further education in this area   yes partial yes     Additional Comments: Pt trialed toe off brace again.  Pt though

## 2020-06-08 NOTE — PROGRESS NOTES
CREATININE 0.6 (L) 06/03/2020    GLUCOSE 109 (H) 06/03/2020    CALCIUM 8.7 06/03/2020    PROT 5.6 (L) 06/03/2020    LABALBU 2.8 (L) 06/03/2020    BILITOT 0.3 06/03/2020    ALKPHOS 78 06/03/2020    AST 19 06/03/2020    ALT 15 06/03/2020    LABGLOM >60 06/03/2020    GFRAA >60 06/03/2020         Lab Results   Component Value Date    ALT 15 06/03/2020    AST 19 06/03/2020    ALKPHOS 78 06/03/2020    BILITOT 0.3 06/03/2020       Functional Status  Mobility:   General Mobility:  Min-Mod A  Gait:  48' with WW, Min A  Transfers:  Min A  Bed Mobility:  SBA    ADL's: Minimal assistance with feeding, grooming; Moderate assistance with bathing, UB dressing; Maximal assistance with LB dressing and toileting. Cognition:  Moderate Cognitive-Linguistic Deficits     IMPRESSION:  Matias Patel is a 80 y.o. male with PMH significant for atherosclerotic heart disease status post LAD stent in 8335, chronic diastolic heart failure, hypokinesis with LVEF of 40 to 45% in 2012, hypothyroidism, osteopenia; who presented to [facility] on 5/27/2020 s/p fall backwards at home.  The patient was found to have a nondisplaced fracture of the right inferior pubic ramus which was managed nonoperatively. He was admitted into acute inpatient rehabilitation on 6/3/2020. Hospital course was complicated by right hip pain, anemia, dehydration, bacteriuria, orthostatic hypotension. PLAN:  I. Rehabilitation - PT, OT, SLP, Rehab Nursing    II. Medical management:  # Ortho - Nondisplaced fracture of the right inferior pubic ramus s/p fall  - Managed nonoperatively. WBAT BLE.  - Will need ortho and PM&R OP follow up. - Control pain as below.      # CVS - orthostatic hypotension  - Continue fluid resuscitation as needed, ideally orally. Thigh high TEDs. - Midodrine 15 mg TID. Salt in diet (patient directed).                  # Pulm - At risk for atelectasis  - Continue good pull hygiene  - incentive spirometry.      # GI/bowel/FEN - - Diet: minced and moist, nectar thick liquids. High protein supplementation  - Bowel program: Colace 100 BID, senna 2 tabs at lunch, Dulcolax suppository PRN  - GI prophylaxis: Protonix 40 mg daily  - Given high prevalence of Vitamin D deficiency in [place]: supplement with ergocalciferol 50K units/week x8 weeks. - MVI daily. Vitamin C 500 mg daily. # Pain control - Tylenol 650 mg q6H PRN for mild pain, Norco 0.5-1 tab q6H prn for moderate/severe pain     # DVT prophylaxis - chemoprophylaxis with Lovenox    # Endocrine - Hypothyroidism   - Continue [place] 60 mg daily     # Skin - at risk for DTI  - maintain skin integrity. - turns q2H.     # Renal/urinary -   - Renal: No evidence of renal failure on last BMP on 6/3/20.  - Bladder: voiding volitionally.      # Heme -   - ABLA: Hgb stable at 12.0 on 6/3/20.     # ID - no acute issues     # Psych - Melatonin 3 mg QHS PRN for insomnia     # Disposition/social - likely discharge home, will discuss in team rounds. # Rehab Discharge Goals: modified independence in mobility, ADLs, cog/comm, and swallowing. # IOPC: Please see individualized plan of care from progress note dated 6/4/2020. Paul Araujo, DO  Physical Medicine and Rehabilitation     Please note that the above documentation was prepared using voice recognition software. Every attempt was made to ensure accuracy but there may be spelling, grammatical, and contextual errors.

## 2020-06-08 NOTE — PROGRESS NOTES
Reviewed aspiration precautions. Fair implementation with ongoing cues. Encouraged patient to engage SLP in unstructured Q&A session relative to identified deficit areas -- patient indicated understanding of all information provided via satisfactory verbal response. See other note for individual therapy session. SP recommended after discharge:  TBD    Will continue SP intervention as per previously established POC.     Minute Tracking:    Individual therapy:     0 minutes  Concurrent therapy:  15 minutes  Group therapy:     0 minutes  Co-treatment therapy:    0 minutes    Total minutes for 6/8/2020: 15 minutes

## 2020-06-09 NOTE — PROGRESS NOTES
and moist, nectar thick liquids. High protein supplementation  - Bowel program: Colace 100 BID, senna 2 tabs at lunch, Dulcolax suppository PRN  - GI prophylaxis: Protonix 40 mg daily  - Given high prevalence of Vitamin D deficiency in [place]: supplement with ergocalciferol 50K units/week x8 weeks. - MVI daily. Vitamin C 500 mg daily. # Pain control - Tylenol 650 mg q6H PRN for mild pain, Norco 0.5-1 tab q6H prn for moderate/severe pain. Not requiring meds.      # DVT prophylaxis - chemoprophylaxis with Lovenox    # Endocrine - Hypothyroidism   - Continue [place] 60 mg daily     # Skin - at risk for DTI  - maintain skin integrity. - turns q2H.     # Renal/urinary -   - Renal: No evidence of renal failure on last BMP on 6/3/20.  - Bladder: voiding volitionally.      # Heme -   - ABLA: Hgb stable at 12.0 on 6/3/20.     # ID - no acute issues     # Psych - Melatonin 3 mg QHS PRN for insomnia     # Disposition/social - likely discharge home, will discuss in team rounds. # Rehab Discharge Goals: modified independence in mobility, ADLs, cog/comm, and swallowing. # IOPC: Please see individualized plan of care from progress note dated 6/4/2020. Paul Araujo, DO  Physical Medicine and Rehabilitation     Please note that the above documentation was prepared using voice recognition software. Every attempt was made to ensure accuracy but there may be spelling, grammatical, and contextual errors.

## 2020-06-09 NOTE — PROGRESS NOTES
Occupational Therapy  OCCUPATIONAL THERAPY DAILY NOTE    Date:2020  Patient Name: Sybil Chery  MRN: [id number]  : 7/3/1927  Room: [address] [place] [address]     Diagnosis: s/p fall backwards- nondisplaced R inferior & superior pubic rami fx    Additional Pertinent Hx: CAD, CHF, HLD, hx CABG, hypothyroidism & orthostatic hypotension    Precautions: falls ,WBAT     Functional Assessment:   Date Status AE  Comments   Feeding 20  SBA     Grooming 20  modA     Bathing 20  Moderate Assist      UB Dressing 20  Elyssa     LB Dressing 20  modA     Homemaking 20  TBA         Functional Transfers / Balance:   Date Status DME  Comments   Sit Balance 20  Stand by Assist      Stand Balance 20  Min A   At ww level    [x] Tub    [] Shower   Transfer 20  Min Assist  Ww, extended tub bench  Verbal cues for hand placement    Commode   Transfer 20  Minimal Assist  ww    Functional   Mobility 20  Elyssa/CGA   ww Short distance in therapy apt setting over tile and carpet floor surfaces.  Verbal cues for walker management  PM session: pt ambulated throughout OT clinic wearing R AFO brace    Other:w/c to bed     On/off recliner  20  Minimal Assist       Minimal Assist  ww        ww       Verbal cues for hand placement on ww      Functional Exercises / Activity:   BUE ROM and strength exercises: shoulder ladder with 2 # dowel gideon 10 reps                                                             Uriel box with 8 # wt on table top surface 3 sets 10 reps in all planes   B forearm strength with red can do bar 3 sets 15 reps     Sensory / Neuromuscular Re-Education:      Cognitive Skills:   Status Comments   Problem   Solving fair     Memory fair     Sequencing fair     Safety fair       Visual Perception:    Education:  Pt was educated on safe functional transfers and types of DME for tub/shower combo     [] Family teach completed on:    Pain Level: 5/10 pelvis     Additional Notes: Patient has made fair  progress during treatment sessions toward set goals. Therapy emphasis to obtain goals:Current Treatment Recommendations: Strengthening, Positioning, ROM, Gait Training, Safety Education & Training, Balance Training, Patient/Caregiver Education & Training, Self-Care / ADL, Functional Mobility Training, Equipment Evaluation, Education, & procurement, Home Management Training, Endurance Training, Cognitive Reorientation      [x] Continue with current OT Plan of care.   [] Prepare for Discharge     DISCHARGE RECOMMENDATIONS  Recommended DME:  Ww, extended tub bench   Post Discharge Care:   []Home Independently  [x]Home with 24hr Care / Supervision []Home with Partial Supervision []Home with Home Health OT []Home with Out Pt OT []Other: ___   Comments:         Time in Time out Tx Time Breakdown  Variance:   First Session  0830  0915  [x] Individual Tx- 45mins  [] Concurrent Tx -  [] Co-Tx -   [] Group Tx -   [] Time Missed -     Second Session [id number]  [x] Individual Tx- 45  [] Concurrent Tx   [] Co-Tx -   [] Group Tx -   [] Time Missed -         Third Session    [] Individual Tx-   [] Concurrent Tx -  [] Co-Tx -   [] Group Tx -   [] Time Missed -         Total Tx Time: 90 mins        Adri Wilkinson OTR/L [id number]

## 2020-06-09 NOTE — PROGRESS NOTES
Skye/ModA 150 feet x 1 rep with [employer] and L trial toe off brace with  feet x 1 rep and 100 feet x 1 rep with [employer] with CGA/Skye (L ankle DF wrap) 200 feet with AAD and  feet with AAD with Supervision   Walking 10 feet on uneven surface TBA NT NT 10 feet with AAD with CGA 10 feet with AAD with Supervision   Wheel Chair Mobility NT NT NT     Car Transfers TBA N/T Skye with [employer] Skye SBA   Stair negotiation: ascended and descended  4 steps with 2 rail with Skye/ModA NT 12 steps with 2 HR x 1 rep with CGA 8 steps with 2 rail with SBA 14 steps with 1 rail with SBA   Curb Step:   ascended and descended TBA NT NT 2 inch step with [employer] and SBA 7.5 inch step with AAD and SBA   Picking up object off the floor TBA NT NT Will  cone with SB assist Will  cone with Supervision   BLE ROM WNL WNL WNL     BLE Strength BLE grossly 4/5 with exception of L ankle DF 0/5       Balance  Static and dynamic standing balance Skye/ModA with WW (moderate retropulsion) Sitting: Supervision  Standing: CGA with [employer]      Date Family Teach Completed        Is additional Family Teaching Needed?   Y or N  Y      Hindering Progress Balance deficits, debility, R groin pain Balance deficits, debility, R groin pain      PT recommended ELOS        Team's Discharge Plan        Therapist at Team Meeting          Therapeutic Exercise:   AM: Step ups onto 4 inch step alternating leading LE x 10 reps BLE with BUE support on parallel bars with SBA  NR stepping over 3 SPC on floor of parallel bars with single UE support x 10 reps with SBA/CGA  Ambulation length of parallel bars with single UE support x 8 reps with SBA/CGA  Side stepping length of parallel bars with single UE support x 3 reps L and R with SBA/CGA  Static standing without UE support 5 seconds x 5 reps with Skye/ModA  PM: Weaving between 3 standing quad canes with [employer] x 4 reps with CGA    Patient education  Pt educated on slowed speed and NR pattern to maintain standing balance with reduced UE support in parallel bars    Patient response to education:   Pt verbalized understanding Pt demonstrated skill Pt requires further education in this area   yes partial yes     Additional Comments: Pt exhibits improving activity tolerance and dynamic standing balance with WW support. L toe off brace continues to correct L foot drop, recommend orthotist consult for pt to receive personal toe off brace, RN notified. Pt exhibits significant difficulty with basic static standing without UE support indicating significant balance deficits. Pt compensates for balance deficits well with UE support on parallel bars/WW. Occasional retropulsion persists with turning using [employer] leading to mostly self corrected LOB. Plan to progress static standing balance tasks next session. AM  Time in: 1000  Time out: 1045    PM  Time in: 1345  Time out: 1430    Pt is making good progress toward established Physical Therapy goals. Continue with physical therapy current plan of care.     Cheryl Mckeon, PT, DPT  [phone]

## 2020-06-10 NOTE — PLAN OF CARE
Problem: Falls - Risk of:  Goal: Will remain free from falls  Description: Will remain free from falls  6/10/2020 1330 by Irl Billallie  Outcome: Met This Shift  6/10/2020 0101 by Pamella Goins RN  Outcome: Ongoing  Goal: Absence of physical injury  Description: Absence of physical injury  6/10/2020 1330 by Irl Nestor  Outcome: Met This Shift  6/10/2020 0101 by Pamella Goins RN  Outcome: Met This Shift     Problem: Musculor/Skeletal Functional Status  Goal: Absence of falls  6/10/2020 1330 by Irl Billallie  Outcome: Met This Shift  6/10/2020 0101 by Pamella Goins RN  Outcome: Met This Shift     Problem: Role Relationship:  Goal: Ability to participate appropriately in conversations will improve  Description: Ability to participate appropriately in conversations will improve  6/10/2020 1330 by Irl Billallie  Outcome: Met This Shift  6/10/2020 0101 by Pamella Goins RN  Outcome: Ongoing     Problem: Self-Care:  Goal: Ability to participate in self-care as condition permits will improve  Description: Ability to participate in self-care as condition permits will improve  6/10/2020 1330 by Irl Billallie  Outcome: Met This Shift  6/10/2020 0101 by Pamella Goins RN  Outcome: Ongoing     Problem: SAFETY  Goal: Free from accidental physical injury  6/10/2020 1330 by Irl Billallie  Outcome: Met This Shift  6/10/2020 0101 by Pamella Goins RN  Outcome: Met This Shift     Problem: DAILY CARE  Goal: Daily care needs are met  6/10/2020 1330 by Iraddison Baez  Outcome: Met This Shift  6/10/2020 0101 by Pamella Goins RN  Outcome: Ongoing     Problem: PAIN  Goal: Patient's pain/discomfort is manageable  6/10/2020 1330 by Irl Billallie  Outcome: Met This Shift  6/10/2020 0101 by Pamella Goins RN  Outcome: Met This Shift     Problem: SKIN INTEGRITY  Goal: Skin integrity is maintained or improved  6/10/2020 1330 by Irl Billallie  Outcome: Met This Shift  6/10/2020 0101 by Pamella Goins

## 2020-06-10 NOTE — PROGRESS NOTES
Occupational Therapy  OCCUPATIONAL THERAPY DAILY NOTE    Date:6/10/2020  Patient Name: Helene Alfaro  MRN: [id number]  : 7/3/1927  Room: [address] [place] [address]     Diagnosis: s/p fall backwards- nondisplaced R inferior & superior pubic rami fx    Additional Pertinent Hx: CAD, CHF, HLD, hx CABG, hypothyroidism & orthostatic hypotension    Precautions: falls ,WBAT     Functional Assessment:   Date Status AE  Comments   Feeding 20  SBA     Grooming 20  modA     Bathing 20  Moderate Assist      UB Dressing 20  Elyssa     LB Dressing 6/10/20  Mod A   Pt donned right shoe. Pt assisted with donning left AFO and shoe    Homemaking 20  TBA         Functional Transfers / Balance:   Date Status DME  Comments   Sit Balance 20  Stand by Assist      Stand Balance 20  Min A      [x] Tub    [] Shower   Transfer 6/10/20  Min Assist  Ww, extended tub bench  Cues for hand placement. Pt with loss of balance posteriorly    Commode   Transfer 6/10/20  Minimal Assist  Ww,BSC  Verbal cues for hand placement    Functional   Mobility 6/10/20  Min A  ww Short distance in pt's room   Pm session: in OT clinic with pt demonstrating decreased balance    Other:w/c to bed       On/off recliner  6/10/20         6/9/20  Minimal Assist       Minimal Assist  ww        ww      Functional Exercises / Activity:   BUE strength exercises: arm bike 5 mins for 3 reps                                             Thin red can do bar 3 sets 15 reps                                               Wheel and medium resistive theraputty on table top surface     Sensory / Neuromuscular Re-Education:      Cognitive Skills:   Status Comments   Problem   Solving fair     Memory fair     Sequencing fair     Safety fair       Visual Perception:    Education:  Pt was educated on donning L AFO brace     [] Family teach completed on:    Pain Level: 5/10 pelvis     Additional Notes:       Patient has made fair  progress during treatment sessions toward set

## 2020-06-10 NOTE — PROGRESS NOTES
significant balance deficits apparent with reduced UE support manifesting as strong retropulsion. Pt unable to even stand statically without UE support. Additional comments:  Pt received personal toe off brace for L foot drop. Recommend 24/7 supervision with FT with caregiver prior to discharge. Pt is a high fall risk.    DME:  [employer] (pt owns [employer])  After Care:  [address], PT, DPT  [phone]

## 2020-06-10 NOTE — PROGRESS NOTES
06/03/2020    K 4.5 06/03/2020     06/03/2020    CO2 23 06/03/2020    BUN 23 06/03/2020    CREATININE 0.6 (L) 06/03/2020    GLUCOSE 109 (H) 06/03/2020    CALCIUM 8.7 06/03/2020    PROT 5.6 (L) 06/03/2020    LABALBU 2.8 (L) 06/03/2020    BILITOT 0.3 06/03/2020    ALKPHOS 78 06/03/2020    AST 19 06/03/2020    ALT 15 06/03/2020    LABGLOM >60 06/03/2020    GFRAA >60 06/03/2020         Lab Results   Component Value Date    ALT 15 06/03/2020    AST 19 06/03/2020    ALKPHOS 78 06/03/2020    BILITOT 0.3 06/03/2020       Functional Status  Mobility:   General Mobility:  Min-Mod A  Gait:  48' with WW, Min A  Transfers:  Min A  Bed Mobility:  SBA    ADL's: Minimal assistance with feeding, grooming; Moderate assistance with bathing, UB dressing; Maximal assistance with LB dressing and toileting. Cognition:  Moderate Cognitive-Linguistic Deficits     IMPRESSION:  Teddy May is a 80 y.o. male with PMH significant for atherosclerotic heart disease status post LAD stent in 9457, chronic diastolic heart failure, hypokinesis with LVEF of 40 to 45% in 2012, hypothyroidism, osteopenia; who presented to [facility] on 5/27/2020 s/p fall backwards at home.  The patient was found to have a nondisplaced fracture of the right inferior pubic ramus which was managed nonoperatively. He was admitted into acute inpatient rehabilitation on 6/3/2020. Hospital course was complicated by right hip pain, anemia, dehydration, bacteriuria, orthostatic hypotension. PLAN:  I. Rehabilitation - PT, OT, SLP, Rehab Nursing    II. Medical management:  # Ortho - Nondisplaced fracture of the right inferior pubic ramus s/p fall  - Managed nonoperatively. WBAT BLE.  - Will need ortho and PM&R OP follow up. - Control pain as below.      # CVS - orthostatic hypotension  - Continue fluid resuscitation as needed, ideally orally. Thigh high TEDs. - Midodrine 15 mg TID. Salt in diet (patient directed).                  # Pulm - At risk for atelectasis  - Continue good pull hygiene  - incentive spirometry.      # GI/bowel/FEN -   - Diet: minced and moist, nectar thick liquids. High protein supplementation  - Bowel program: Colace 100 BID, senna 2 tabs at lunch, Dulcolax suppository PRN  - GI prophylaxis: Protonix 40 mg daily  - Given high prevalence of Vitamin D deficiency in [place]: supplement with ergocalciferol 50K units/week x8 weeks. - MVI daily. Vitamin C 500 mg daily. # Pain control - Tylenol 650 mg q6H PRN for mild pain, Norco 0.5-1 tab q6H prn for moderate/severe pain. Not requiring meds.      # DVT prophylaxis - ambulating >150 feet daily. # Endocrine - Hypothyroidism   - Continue [place] 60 mg daily     # Skin - at risk for DTI  - maintain skin integrity. - turns q2H.     # Renal/urinary -   - Renal: No evidence of renal failure on last BMP on 6/3/20.  - Bladder: voiding volitionally.      # Heme -   - ABLA: Hgb stable at 12.0 on 6/3/20.     # ID - no acute issues     # Psych - Melatonin 3 mg QHS PRN for insomnia     # Disposition/social - likely discharge home, will discuss in team rounds. # Rehab Discharge Goals: modified independence in mobility, ADLs, cog/comm, and swallowing. # IOPC: Please see individualized plan of care from progress note dated 6/4/2020. Paul Araujo, DO  Physical Medicine and Rehabilitation     Please note that the above documentation was prepared using voice recognition software. Every attempt was made to ensure accuracy but there may be spelling, grammatical, and contextual errors.

## 2020-06-10 NOTE — PROGRESS NOTES
Physical Therapy    Facility/Department: [address] REHAB  Daily Treatment Note    NAME: Jorge Arevalo  : 7/3/1927  MRN: [id number]    Date of Service: 6/10/2020  Evaluating Therapist: Mark Harmon PT, DPT    ROOM: 06B  DIAGNOSIS: Pubic Ramus Fracture  PRECAUTIONS: falls, WBAT BLE, B DOC hose  HPI: Pt presents to ED following fall from standing height on , pt has history of CAD, CHF.  He states since then, he has been having worsening right sided hip pain, and called his neighbor, and brought the patient in for evaluation. CT of the head showed no significant normality, C-spine showed DJD.  CT of the pelvis showed nondisplaced right inferior and superior pubic ramus fracture. He is WBAT BLE. Pt also diagnosed with orthostatic hypotension and given IVF. Social Hx: Pt lives alone in a 2 story home with 1 + 1 + 1 steps without HR to enter. The pt's bedroom is on the 2nd floor. There are 14 steps to the 2nd floor with 2 rails. Bathroom on first and second floor. Laundry in basement with 12 steps down with 1 HR. Pt's daughter lives in [place], reports neighbor has been assisting him with transportation, yard work, some meal prep. Pt reports he was largely independent prior to fall leading to admission, ambulated without device, performed homemaking tasks, etc. Since fall at home pt has been utilizing [employer] and staying on first floor. Initial Evaluation  24 AM     PM    Short Term Goals Long Term Goals    Was pt agreeable to Eval/treatment? yes yes yes     Does pt have pain?  Moderate c/o R groin  Pain while standing Mild c/o R groin pain No c/o pain     Bed Mobility  Rolling: SBA  Supine to sit: SBA  Sit to supine: SBA  Scooting: SBA N/T Supine<>Sit Supervision  Scooting/Rolling Supervision Supervision Modified Independent   Transfers Sit to stand: ModA  Stand to sit: Skye  Stand pivot: Skye with [facility] [place] Sit<>Stand CGA  Stand-pivot CGA with [facility] [place] Sit<>Stand CGA  Stand-pivot CGA with [facility] [place] SBA Supervision Ambulation    50 feet x 2 reps with [employer] with WC follow and Skye/ModA 200 feet x 1 rep and 100 feet x 1 rep with [employer] and L trial toe off brace with  feet x 1 rep and 100 feet x 1 rep with [employer] with CGA (L personal toe off brace 200 feet with AAD and  feet with AAD with Supervision   Walking 10 feet on uneven surface TBA NT NT 10 feet with AAD with CGA 10 feet with AAD with Supervision   Wheel Chair Mobility NT NT NT     Car Transfers TBA N/T Skye with [employer] Skye SBA   Stair negotiation: ascended and descended  4 steps with 2 rail with Skye/ModA NT 8 steps with 2 HR x 1 rep with CGA 8 steps with 2 rail with SBA 14 steps with 1 rail with SBA   Curb Step:   ascended and descended TBA NT 7.5 inch curb step x 3 reps with MaxA 2 inch step with [employer] and SBA 7.5 inch step with AAD and SBA   Picking up object off the floor TBA NT NT Will  cone with SB assist Will  cone with Supervision   BLE ROM WNL WNL WNL     BLE Strength BLE grossly 4/5 with exception of L ankle DF 0/5       Balance  Static and dynamic standing balance Skye/ModA with WW (moderate retropulsion) Sitting: Supervision  Standing: CGA with [employer]      Date Family Teach Completed        Is additional Family Teaching Needed?   Y or N  Y      Hindering Progress Balance deficits, debility, R groin pain Balance deficits, debility, R groin pain      PT recommended ELOS        Team's Discharge Plan        Therapist at Team Meeting          Therapeutic Exercise:   AM:  Static standing without UE support 5 seconds x 8 reps with Skye  Side stepping length of parallel bars with single UE support x  4 reps L and R with SBA/CGA  Ambulation length of parallel bars with single UE support x 8 reps with SBA  NR stepping over 4 quad canes on floor of parallel bars x 4 reps with single UE support with CGA  Ascending descending 2 inch step and 4 inch step with single UE support in parallel bars with CGA  PM: NT    Patient education  Pt educated on impact of speed on

## 2020-06-10 NOTE — PROGRESS NOTES
Nutrition Assessment    Type and Reason for Visit: Initial(LOS)    Nutrition Recommendations: Recommend to continue Magic Cup supplement BID to help meet increased nutritional needs. Recommend and start Boost Pudding supplement BID for additional calories, protein, and nutrients. Nutrition Assessment: Patients po intake has been improving since admission, averaging ~75% of most meals served in the past week ; Pt meets criteria for moderate malnutrition AEB muscle and fat wasting ;  pt admitted with R hip fx ; s/p fall ; will continue and modify ONS    Malnutrition Assessment:  · Malnutrition Status: Meets the criteria for moderate malnutrition  · Context: Chronic illness  · Findings of the 6 clinical characteristics of malnutrition (Minimum of 2 out of 6 clinical characteristics is required to make the diagnosis of moderate or severe Protein Calorie Malnutrition based on AND/ASPEN Guidelines):  1. Energy Intake-Greater than 75% of estimated energy requirement, Greater than or equal to 7 days    2. Weight Loss-Unable to assess(d/t lack of weight history ), unable to assess  3. Fat Loss-Moderate subcutaneous fat loss, Orbital, Triceps  4. Muscle Loss-Moderate muscle mass loss, Temples (temporalis muscle), Clavicles (pectoralis and deltoids)  5. Fluid Accumulation-No significant fluid accumulation,    6.  Strength-Not measured    Nutrition Risk Level: Moderate    Nutrient Needs:  · Estimated Daily Total Kcal: 3034-1709 (REE 1122 x 1.2 SF)  · Estimated Daily Protein (g): 65-80 (1.3-1.5g/kg CBW)  · Estimated Daily Total Fluid (ml/day): 8034-9508    Nutrition Diagnosis:   · Problem:  Moderate malnutrition, In context of chronic illness  · Etiology: related to Cognitive or neurological impairment     Signs and symptoms:  as evidenced by Moderate muscle loss, Moderate loss of subcutaneous fat    Objective Information:  · Nutrition-Focused Physical Findings: +I&Os (+3.5 L), 2+ pitting edema, active BS, dysphagia, dry mucous membranes, [place], missing teeth, A&O x 3, tremors, pale/dry skin, muscle and fat wasting      · Wound Type: None     · Current Nutrition Therapies:  · Oral Diet Orders: Dysphagia Minced and Moist (Dysphagia 2), Mildly Thick(NTL)   · Oral Diet intake: %(per flowsheets )  · Oral Nutrition Supplement (ONS) Orders: Frozen Oral Supplement  · ONS intake: %     · Anthropometric Measures:  · Ht: 5' 7\" (170.2 cm)   · Current Body Wt: 113 lb (51.3 kg)(6/3/20, actual )  · Admission Body Wt: 113 lb (51.3 kg)(6/3/20, actual )  · Usual Body Wt: 104 lb (47.2 kg)(5/27/20, bedscale)  · EMR shows only one weight recorded of 104# bedscale on 5/27/20   · Ideal Body Wt: 148 lb (67.1 kg), % Ideal Body 76%  · BMI Classification: BMI <18.5 Underweight    Nutrition Interventions:   Continue current diet, Continue current ONS, Modify current ONS  Continued Inpatient Monitoring, Coordination of Care, Speech Therapy, Swallow Evaluation    Nutrition Evaluation:   · Evaluation: Goals set   · Goals: Patient will consume ~75% of most meals and supplements served     · Monitoring: Meal Intake, Supplement Intake, Diet Tolerance, Skin Integrity, I&O, Monitor Hemodynamic Status, Monitor Bowel Function, Mental Status/Confusion, Weight, Pertinent Labs, Chewing/Swallowing      Electronically signed by Melissa Newberry RD, LD on 6/10/20 at 10:09 AM EDT    Contact Number: [phone]

## 2020-06-11 NOTE — PROGRESS NOTES
with intermittent mild crossing of BLE requiring cues to take smaller steps with turns. Pt had decreased step length and increased forward trunk lean at 150 feet with pt making corrects after cue to finish remaining 50 feet. Pt has significant increase in postural instability when taking backward step prior to sitting. Plan to progress dynamic balance in all directions and turning with use of WW in variable environments. The pt had some difficulty with standing initially but improved with repetition, dynamic standing activities performed to progress balance, pt's safety awareness remains poor. Patient/family education  Pt/family educated on maintaining wider LAWANDA with all activities to reduce risk of fall. Pt educated on standing with more erect posture to improve balance with ambulation. Patient/family response to education:   Pt/family verbalized understanding Pt/family demonstrated skill Pt/family requires further education in this area   yes partial yes     AM  Time in: 0915  Time out: 1000    PM  Time in: 1345  Time out: 1430    Pt is making good progress toward established Physical Therapy goals. Continue with physical therapy current plan of care. Gwenn Homans, SPT Laurene Ganser.  Bob Baltazar, [facility]  License Number: [id number]

## 2020-06-11 NOTE — PROGRESS NOTES
-  [] Co-Tx -   [] Group Tx -   [] Time Missed -     Second Session [phone] [x] Individual Tx-45    [] Concurrent Tx   [] Co-Tx -   [] Group Tx -   [] Time Missed -              Third Session    [] Individual Tx-   [] Concurrent Tx -  [] Co-Tx -   [] Group Tx -   [] Time Missed -         Total Tx Time: [facility] 6 MCMILLAN/L  [address] 6 OTR/L [id number]

## 2020-06-11 NOTE — PROGRESS NOTES
PM&R Progress Note  Patient: Speedy Lara  Age/sex: 80 y.o. male  Medical Record #: [id number]  Location: 5501/5501-B  Admission date: 6/3/2020    CC: ARU follow up; TBI and R hip fracture after fall    S: Patient was seen and examined. No acute events overnight. No acute issues this AM.  Per RN, patient refusing ramo hose and midodrine. Denies chest pain, sob, abd pain. LBM 6/10. Working with AFO in PT. No change in [facility] since H&P done on 6/3/2020.     Meds:  Scheduled  midodrine, 10 mg, TID WC  melatonin, 3 mg, Nightly  ascorbic acid, 500 mg, Daily  calcium-vitamin D, 1 tablet, BID  thyroid, 60 mg, Daily  docusate sodium, 100 mg, BID  senna, 1 tablet, Lunch  vitamin D, 50,000 Units, Weekly  multivitamin, 1 tablet, Daily  pantoprazole, 40 mg, QAM AC        PRN  benzonatate, 100 mg, TID PRN  magnesium hydroxide, 30 mL, Daily PRN  acetaminophen, 650 mg, Q4H PRN  HYDROcodone 5 mg - acetaminophen, 0.5 tablet, Q6H PRN    Or  HYDROcodone 5 mg - acetaminophen, 1 tablet, Q6H PRN  bisacodyl, 10 mg, Daily PRN        O:  Vitals:    06/10/20 1000 06/10/20 1515 06/10/20 1615 06/11/20 0800   BP:   121/65 133/70   Pulse:   83 75   Resp:    15   Temp:    97.3 °F (36.3 °C)   TempSrc:    Oral   SpO2:  95%  91%   Weight:       Height: 5' 7\" (1.702 m)          GEN: Frail elderly gentleman in NAD, conversational   HEENT: NC/AT, PERRLA, EOMI, moist vocal quality  RESP: CTAB, no R/R/W   CV: +S1 S2, RRR  ABD: thin, NT, ND, normal BS   : no garrett   EXT: Normal ROM, No clubbing/cyanosis, 2+ pulses   SKIN: No erythema, C/D/I  NEURO: Alert, no new focal deficits     Labs:    Lab Results   Component Value Date    WBC 7.9 06/03/2020    HGB 12.0 (L) 06/03/2020    HCT 36.4 (L) 06/03/2020    .0 (H) 06/03/2020     06/03/2020     Lab Results   Component Value Date     06/03/2020    K 4.5 06/03/2020     06/03/2020    CO2 23 06/03/2020    BUN 23 06/03/2020    CREATININE 0.6 (L) 06/03/2020    GLUCOSE 109 (H) 06/03/2020    CALCIUM 8.7 06/03/2020    PROT 5.6 (L) 06/03/2020    LABALBU 2.8 (L) 06/03/2020    BILITOT 0.3 06/03/2020    ALKPHOS 78 06/03/2020    AST 19 06/03/2020    ALT 15 06/03/2020    LABGLOM >60 06/03/2020    GFRAA >60 06/03/2020         Lab Results   Component Value Date    ALT 15 06/03/2020    AST 19 06/03/2020    ALKPHOS 78 06/03/2020    BILITOT 0.3 06/03/2020       Functional Status  Mobility:   General Mobility:  Min-Mod A  Gait:  48' with WW, Min A  Transfers:  Min A  Bed Mobility:  SBA    ADL's: Minimal assistance with feeding, grooming; Moderate assistance with bathing, UB dressing; Maximal assistance with LB dressing and toileting. Cognition:  Moderate Cognitive-Linguistic Deficits     IMPRESSION:  Teddy May is a 80 y.o. male with PMH significant for atherosclerotic heart disease status post LAD stent in 3290, chronic diastolic heart failure, hypokinesis with LVEF of 40 to 45% in 2012, hypothyroidism, osteopenia; who presented to [facility] on 5/27/2020 s/p fall backwards at home.  The patient was found to have a nondisplaced fracture of the right inferior pubic ramus which was managed nonoperatively. He was admitted into acute inpatient rehabilitation on 6/3/2020. Hospital course was complicated by right hip pain, anemia, dehydration, bacteriuria, orthostatic hypotension. PLAN:  I. Rehabilitation - PT, OT, SLP, Rehab Nursing    II. Medical management:  # Ortho - Nondisplaced fracture of the right inferior pubic ramus s/p fall  - Managed nonoperatively. WBAT BLE.  - Will need ortho and PM&R OP follow up. - Control pain as below.      # CVS - orthostatic hypotension  - Continue fluid resuscitation as needed, ideally orally. Thigh high TEDs. - Midodrine 10 mg TID. Salt in diet (patient directed). # Pulm - At risk for atelectasis  - Continue good pull hygiene  - incentive spirometry.      # GI/bowel/FEN -   - Diet: minced and moist, nectar thick liquids.

## 2020-06-11 NOTE — PROGRESS NOTES
tomorrow. Completed education with the patient regarding type of swallowing impairment. Reviewed current solid/liquid consistency diet recommendations and reinforced need for consistent use of compensatory strategies (multiple swallow and oral clearance) to ensure safe PO intake. Reviewed aspiration precautions. Fair implementation with ongoing cues. Encouraged patient to engage SLP in unstructured Q&A session relative to identified deficit areas -- patient indicated understanding of all information provided via satisfactory verbal response. See other note for individual therapy session. SP recommended after discharge:  TBD    Will continue SP intervention as per previously established POC.     Minute Tracking:    Individual therapy:     0 minutes  Concurrent therapy:  30 minutes  Group therapy:     0 minutes  Co-treatment therapy:    0 minutes    Total minutes for 6/10/2020: 30 minutes

## 2020-06-11 NOTE — CARE COORDINATION
Per team meeting:  D/c 6/18. Updated patient and daughter Marisol Womack. Patient is a high risk and balance issues. He requires verbal cues for all tasks and safety. He has a toe off brace in which he will need Assist.    He is impulsive with eating needs cues for small bites, and is now on mince and moist diet and nectar liquids. SW informed Marisol Womack and Fam Scales need for 24 hour Sup and some hands on assist.    Marisol Womack and Fam Scales state they will discuss with patient different options. SW met with patient. Discussed further the recommendations. He asked insightful question as to why we feel he cannot be alone and 24 hour sup. He did acknowledge balance issues and swallow issues when eating. He would like to take this all into consideration and discuss with Marisol Ramírezon and others his definite plans. MOE again reiterated that 24 hour care and A.L. is private pay and  what Medicare will cover in regards to [address]. SW will touch base on 6/15 and make final arrangements.     Lionel Handley Progress Notes by Yann Todd MD at 04/26/17 07:32 PM     Author:  Yann Todd MD Service:  (none) Author Type:  Physician     Filed:  04/26/17 07:35 PM Encounter Date:  4/24/2017 Status:  Signed     :  Yann Todd MD (Physician)            Chart note dictated.[NW1.1T]        Revision History        User Key Date/Time User Provider Type Action    > NW1.1 04/26/17 07:35 PM Yann Todd MD Physician Sign    T - Template

## 2020-06-11 NOTE — PROGRESS NOTES
Speech Language Pathology  ACUTE REHABILITATION--DAILY PROGRESS NOTE  ADMITTING DIAGNOSIS: Closed right hip fracture, with routine healing, subsequent encounter [K20.084V]     SPEECH PATHOLOGY DIAGNOSIS:    Moderate Cognitive-Linguistic Deficits      THERAPY RECOMMENDATIONS:   Speech Pathology intervention is recommended 3-6 times per week for LOS or when goals are met with emphasis on the following:      Attention to increase safety awareness during transfers/ ambulation and completion of ADL/iADL tasks with minimal.  Orientation to spatial and temporal surroundings with use of external memory aides. Immediate/ STM for functional information to complete tasks as instructed and recall pertinent medical information with minimal and/or use of external memory aide training. Problem solving/ insight/ judgement for various ADL/iADL tasks, including but not limited to: medication management and money/ finance management, overall safety awareness in the PLOF with 80% accuracy  Receptive and expressive language skills with adequate thought content, organization, and processing time to facilitate improved communication with minimal.  Provide appropriate verbal/motor responses to material presented in both auditory/written format (3 level commands, conversation level) on initial presentation with decreased latency.                                                                                                                                          FIMS SCORES                            Swallowing                          Current Status            4--Minimal Assistance             Short Term Goal         5--Supervision                    Long Term Goal          6--Modified Independent              Receptive                          Current Status            5--Supervision                         Short Term Goal         5--Supervision              Long Term Goal          5--Supervision      Expressive Current Status            5--Supervision                         Short Term Goal         5--Supervision              Long Term Goal          5--Supervision      Social Interaction                          Current Status            4--Minimal Assistance               Short Term Goal         5--Supervision               Long Term Goal          5--Supervision                                                     Problem Solving                          Current Status            4--Minimal Assistance               Short Term Goal         5--Supervision               Long Term Goal          5--Supervision                  Memory                          Current Status            4--Minimal Assistance               Short Term Goal         5--Supervision                 Long Term Goal          5--Supervision                                           SWALLOWING:     Patient actively participated in functionally-based mealtime assessment; required mod assistance to set up meal tray and was able to feed self independently. Reviewed need for slow rate of intake and regulated bite size prior to initiation of PO intake. Diet:   Dysphagia 2,  Mechanical Soft (Minced & Moist) solids with nectar consistency (mildly thick) liquids     Oral prep/oral-     No oral containment issues were identified. Prolonged oral prep and A-P propulsion of bolus were noted with fair+ clearance of oral residuals given verbal cues. Piecemeal presentation observed. Suspect premature spillage of bolus. Pharyngeal-     Strong cough x1 at beginning of meal. No additional difficulties observed. Completed education with the patient regarding type of swallowing impairment. Reviewed current solid/liquid consistency diet recommendations and reinforced need for consistent use of compensatory strategies (multiple swallow and oral clearance) to ensure safe PO intake. Reviewed aspiration precautions. Fair implementation with ongoing cues.

## 2020-06-11 NOTE — PATIENT CARE CONFERENCE
[address] [place], [place] [address] [facility]  INPATIENT ACUTE REHABILITATION  TEAM CONFERENCE NOTE/PATIENT PLAN OF CARE    Date: 2020  Admission date: 6/3/2020  Patient Name: Teddy May        MRN: [id number]    : 7/3/1927  (80 y.o.)  Gender: male   Rehab diagnosis/surgery with date:  Right inferior pubic ramus fracture, non-displaced 20  Impairment Group Code:  8.3      MEDICAL/FUNCTIONAL HISTORY/STATUS:  having orthostatic hypotension, on Midodrine which he is refusing     Consultations/Labs/X-rays: none recent      MEDICATION UPDATE:  no recent changes      NURSING FIMS:    Bowel:   Current level: continent    Bladder:   Current level: continent    Toilet Hygiene:   Current level : min assist  Short term Toilet hygiene goal: supervision  Long term toilet hygiene goal:  supervision    Skin integrity: intact  Pain: has Norco ordered-has not been taking anything    NUTRITION    Diet  minced and moist  Liquid consistency   nectar    SOCIAL INFORMATION:  Lives with: alone  Prior community services:  none  Home Architecture:  2 story home with 1 entry, bedroom on 2nd floor, had been sleeping on couch prior to admission, neighbor brought groceries over  Prior Level of function:  independent, no driving  DME:  quad cane, rollator    FAMILY / PATIENT EDUCATION:  will update family, safety and self care are ongoing    PHYSICAL THERAPY    Bed mobility:   Current level: supervision  Short term bed mobility goal: supervision  Long term bed mobility goal: Modified independent    Chair/bed transfers:  Current level: Contact guard assist with wheeled walker, retropulsive, needs lots of cues  Short term Chair/bed transfers goal: standby assist  Long term Chair/bed transfers goal: supervision      Ambulation:   Current level: 200 ft wheeled walker at Contact guard assist, has left toe off brace  Short term ambulation goal: standby assist, 200 ft. with appropriate device  Long term ambulation goal: 300 ft.  at supervision    Car transfers:   Current level: min assist with wheeled walker  Short term car transfers goal: met  Long term car transfers goal:standby assist    Stairs:   Current level : 8 with 2 rails at Contact guard assist, verbal cues  Short term stairs goal: 8 at standby assist, 2 rails  Long term stairs goal: 14 at standby assist, 1 rail    Lower Extremity Strength Issues: bilateral lowers are 4/5 except left ankle dorsiflexion is 0/5    Other comments: static and dynamic standing balance are Contact guard assist with a wheeled walker      OCCUPATIONAL THERAPY:      Tub/shower:   Current level: min assist with wheeled walker and bench, verbal cues, did not shower prior, did sponge bath at home  Short term tub/shower goal: Contact guard assist  Long term tub/shower goal: Contact guard assist-min assist      Feeding:  Current level: supervision  Short term feeding goal: Modified independent  Long term feeding goal: Modified independent    Grooming:   Current level: mod assist seated  Short term grooming goal: min assist  Long term grooming goal: supervision    Bathing:  Current level: mod assist  Short term bathing goal: min assist  Long term bathing goal: Contact guard assist    Homemaking:   Current level: to be assessed    Upper body dressing:  Current level: min assist  Short term upper body dressing goal: supervision  Long term upper body dressing goal: supervision    Lower body dressing:  Current level: mod assist due to toe off brace  Short term lower body dressing goal: min assist  Long term lower body dressing goal: min assist    Toilet transfer:   Current level: min assist with wheeled walker  Short term toilet transfer goal: min assist  Long term toilet transfer goal: standby assist      SPEECH THERAPY  Swallowing:  Current level:  min assist, impulsive  Short term swallowing goal: supervision  Long term swallowing Goal: Modified independent    Comprehension:   Current level: supervision  Short term comprehension goal:

## 2020-06-11 NOTE — PROGRESS NOTES
Assistance    Short Term Goal 5--Supervision    Long Term Goal 5--Supervision         Problem Solving    Current Status  3--Moderate Assistance    Short Term Goal 4--Minimal Assistance    Long Term Goal 5--Supervision      Memory    Current Status  3--Moderate Assistance    Short Term Goal 4--Minimal Assistance    Long Term Goal 5--Supervision       SWALLOWING:      Diet:  Dysphagia 2,  Mechanical Soft (Minced & Moist) solids with nectar consistency (mildly thick) liquids         LANGUAGE:       COGNITION:         SPEECH:      SP recommended after discharge:  yes    Will continue SP intervention as per previously established POC. Minute Tracking:    Individual therapy:    0 minutes  Concurrent therapy:    0 minutes  Group therapy:     0 minutes  Co-treatment therapy:    0 minutes    Total minutes for 6/11/2020:  0 minutes-- Pt sleeping soundly during scheduled ST session. Will re-attempt later.

## 2020-06-12 NOTE — CONSULTS
[address]                  [address] [place],  [facility]                                  CONSULTATION    PATIENT NAME: Layne Sanchze                   :        1927  MED REC NO:   [id number]                            ROOM:       550  ACCOUNT NO:   [de-identified]                           ADMIT DATE: 2020  PROVIDER:     Kyara Albrecht DPM    CONSULT DATE:  2020    REFERRAL FROM:  Nadja Quiles DO    HISTORY OF PRESENT ILLNESS:  I was asked to see this patient today, this  26-year-old male, for consultation visit in regards to the lower  extremity, foot problems. He was admitted with non-related foot  problems and I was asked to see him today for consultation. His review  of systems and his PMH are well documented on the H and P which I  reviewed, signed today. He has a history of closed _____ hip fracture. I reviewed his meds, allergies as well as social history,  noncontributory. PHYSICAL EXAMINATION:  His lower extremity exam today is consistent with  weak pulses, consistent with his age with signs of PAD. He has thin  atrophic skin bilaterally. There are no infection signs. There is no  cellulitis. There are severe mycotic ingrown nails bilaterally which  are yellow, thickened, crumbly, and painful. He has nonpalpable pulses  as mentioned above and the rest of the exam is unremarkable and  noncontributory. ASSESSMENT:  Onychomycosis with peripheral vascular disease with ingrown  nails. PLAN:  We will exam today. Discussed treatment options and I debrided  all the nails today in length and thickness by mechanical means. All  ten of his nails have some degree of fungal infection with ingrown  borders. I have recommended periodic debridement of the nails as an  outpatient and no other treatment is needed at this time.     I would like to thank Dr. Allen Frank for consulting me in regards to

## 2020-06-12 NOTE — PROGRESS NOTES
Speech Language Pathology  ACUTE REHABILITATION--DAILY PROGRESS NOTE            PATIENT NAME:  Julian Burnett      :  7/3/1927         TODAY'S DATE:  2020 ROOM:  [address] [place], [place] [address]     ADMITTING DIAGNOSIS: Closed right hip fracture, with routine healing, subsequent encounter [Y81.907V]    SPEECH PATHOLOGY DIAGNOSIS:    Moderate Cognitive-Linguistic Deficits     THERAPY RECOMMENDATIONS:   Speech Pathology intervention is recommended 3-6 times per week for LOS or when goals are met with emphasis on the following:     Attention to increase safety awareness during transfers/ ambulation and completion of ADL/iADL tasks with minimal.  Orientation to spatial and temporal surroundings with use of external memory aides. Immediate/ STM for functional information to complete tasks as instructed and recall pertinent medical information with minimal and/or use of external memory aide training. Problem solving/ insight/ judgement for various ADL/iADL tasks, including but not limited to: medication management and money/ finance management, overall safety awareness in the PLOF with 80% accuracy  Receptive and expressive language skills with adequate thought content, organization, and processing time to facilitate improved communication with minimal.  Provide appropriate verbal/motor responses to material presented in both auditory/written format (3 level commands, conversation level) on initial presentation with decreased latency.                                    FIMS SCORES      Swallowing    Current Status  5--Supervision    Short Term Goal 6--Modified Independent    Long Term Goal 6--Modified Independent     Receptive    Current Status  3--Moderate Assistance    Short Term Goal 4--Minimal Assistance    Long Term Goal 5--Supervision     Expressive    Current Status  3--Moderate Assistance    Short Term Goal 4--Minimal Assistance    Long Term Goal 5--Supervision     Social Interaction    Current Status  4--Minimal

## 2020-06-12 NOTE — PROGRESS NOTES
was refusing daily. Salt in diet (patient directed). # Pulm - At risk for atelectasis  - Continue good pull hygiene  - incentive spirometry.      # GI/bowel/FEN -   - Diet: minced and moist, nectar thick liquids. High protein supplementation  - Bowel program: Colace 100 BID, senna 2 tabs at lunch, Dulcolax suppository PRN  - GI prophylaxis: Protonix 40 mg daily  - Given high prevalence of Vitamin D deficiency in [place]: supplement with ergocalciferol 50K units/week x8 weeks. - MVI daily. Vitamin C 500 mg daily. # Pain control - Tylenol 650 mg q6H PRN for mild pain.     # DVT prophylaxis - ambulating >150 feet daily. # Endocrine - Hypothyroidism   - Continue Anabel 60 mg daily     # Skin - Skin tear to posterior L lower leg, due to AFO rubbing  - bacitracin BID. Protection with TEDs. - maintain skin integrity. - turns q2H.     # Renal/urinary -   - Renal: No evidence of renal failure on last BMP on 6/3/20.  - Bladder: voiding volitionally.      # Heme -   - ABLA: Hgb stable at 12.0 on 6/3/20.     # ID - no acute issues     # Psych - Melatonin 3 mg QHS PRN for insomnia     # Disposition/social - working on dispo planning with family, TDD 6/18, will continue to discuss in interdisciplinary team rounds. # Rehab Discharge Goals: modified independence in mobility, ADLs, cog/comm, and swallowing. # IOPC: Please see individualized plan of care from progress note dated 6/4/2020. Paul Araujo, DO  Physical Medicine and Rehabilitation     Please note that the above documentation was prepared using voice recognition software. Every attempt was made to ensure accuracy but there may be spelling, grammatical, and contextual errors.

## 2020-06-12 NOTE — PROGRESS NOTES
further education in this area   yes partial yes     Additional Comments: Pt found to have small skin tear (scabbed) at posterior calf from AFO strap being pulled too tightly. Pt initially unwilling to use DOC hose for protection of skin due to fear of hypertension. Once benefits explained to pt as well as larger size DOC hose used to act as barrier between AFO/Skin, pt agreeable to wear. Large Knee high DOC hose to be utilized as barrier between AFO strap and skin. Pt reports increased fatigue this date with mild/moderate L chest wall pain with deep inspiration, physician aware. Function not significantly affected by new complaints. O2 saturation remained at or above 92% on RA with all activity. Pt still exhibits strong retropulsion with curb step descent due to balance deficits and fear of falling. Cueing provided to slow speed during dynamic [employer] tasks during PM session. Plan to progress functional training with emphasis on [employer] safety. AM  Time in: 0915  Time out: 1000    PM  Time in: 1345  Time out: 1430    Pt is making good progress toward established Physical Therapy goals. Continue with physical therapy current plan of care.     Ede Larson, PT, DPT  [phone]

## 2020-06-13 NOTE — PROGRESS NOTES
Current Status            5--Supervision                         Short Term Goal         5--Supervision              Long Term Goal          5--Supervision      Social Interaction                          Current Status            4--Minimal Assistance               Short Term Goal         5--Supervision               Long Term Goal          5--Supervision                                                     Problem Solving                          Current Status            4--Minimal Assistance               Short Term Goal         5--Supervision               Long Term Goal          5--Supervision                  Memory                          Current Status            4--Minimal Assistance               Short Term Goal         5--Supervision                 Long Term Goal          5--Supervision                                           SWALLOWING:     Patient actively participated in functionally-based mealtime assessment; required mod assistance to set up meal tray and was able to feed self independently. The patient was not able to open the thickener on his own without scissors. He had large lumps of thickener after pouring the powder into the water and needed assistance with dissolving the powder. After that he was independent. Reviewed need for slow rate of intake and regulated bite size prior to initiation of PO intake. Diet:   Dysphagia 2,  Mechanical Soft (Minced & Moist) solids with nectar consistency (mildly thick) liquids     Oral prep/oral-     No oral containment issues were identified. Prolonged oral prep and A-P propulsion of bolus were noted with fair+ clearance of oral residuals given verbal cues. Piecemeal presentation observed. Suspect premature spillage of bolus. Pharyngeal-     Strong cough x1 at beginning of meal. No additional difficulties observed. Completed education with the patient regarding type of swallowing impairment.  Reviewed current solid/liquid consistency diet recommendations and reinforced need for consistent use of compensatory strategies (multiple swallow and oral clearance) to ensure safe PO intake. Reviewed aspiration precautions. Fair implementation with ongoing cues. Encouraged patient to engage SLP in unstructured Q&A session relative to identified deficit areas -- patient indicated understanding of all information provided via satisfactory verbal response. LANGUAGE:                 Functional given increased time for processing and response. Benefits from repetition of stimulus items and occasional v/c. COGNITION:                SLP reviewed results of MBSS and rationale for nectar (mildly) thick liquids. Patient indicated understanding. Safety: FAIR-/POOR -- balance issues and decrease insight negatively impact. Recommend  24/7 supervision at discharge. SPEECH:                 Moderate presbyphonia -- requires v/c to increase volume and decrease rate    SP recommended after discharge: yes    Will continue SP intervention as per previously established POC.     Minute Tracking:    Individual therapy:     0 minutes  Concurrent therapy:  15 minutes  Group therapy:     0 minutes  Co-treatment therapy:    0 minutes    Total minutes for 6/13/2020: 15 minutes

## 2020-06-13 NOTE — PROGRESS NOTES
feet with AAD with Supervision   Wheel Chair Mobility NT NT       Car Transfers TBA CGA with [employer] Skye SBA   Stair negotiation: ascended and descended  4 steps with 2 rail with Skye/ModA 12 steps with 1 HR with CGA (lateral ascent/descent) 8 steps with 2 rail with SBA 14 steps with 1 rail with SBA   Curb Step:   ascended and descended TBA 2 inch curb step with [employer] x 4 reps with Skye 2 inch step with [employer] and SBA 7.5 inch step with AAD and SBA   Picking up object off the floor TBA NT Will  cone with SB assist Will  cone with Supervision   BLE ROM WNL WNL       BLE Strength BLE grossly 4/5 with exception of L ankle DF 0/5 BLE grossly 4/5 with exception of L ankle DF 0/5       Balance  Static and dynamic standing balance Skye/ModA with WW (moderate retropulsion) Sitting: Supervision  Standing: CGA with [employer]             Scheduled Meds:   ipratropium-albuterol  1 ampule Inhalation Q4H WA    bacitracin zinc   Topical BID    ascorbic acid  500 mg Oral Daily    calcium-vitamin D  1 tablet Oral BID    thyroid  60 mg Oral Daily    docusate sodium  100 mg Oral BID    senna  1 tablet Oral Lunch    vitamin D  50,000 Units Oral Weekly    multivitamin  1 tablet Oral Daily    pantoprazole  40 mg Oral QAM AC     Continuous Infusions:  PRN Meds:melatonin, benzonatate, magnesium hydroxide, acetaminophen, bisacodyl  I/O last 3 completed shifts: In: 540 [P.O.:540]  Out: -   I/O this shift:  In: 360 [P.O.:360]  Out: -     Labs reviewed  CBC:   Recent Labs     06/12/20  1203   WBC 7.6   HGB 13.1        BMP:    Recent Labs     06/12/20  1203      K 5.0   CL 98   CO2 27   BUN 34*   CREATININE 0.8   GLUCOSE 120*     Hepatic: No results for input(s): AST, ALT, ALB, BILITOT, ALKPHOS in the last 72 hours. BNP: No results for input(s): BNP in the last 72 hours. Lipids: No results for input(s): CHOL, HDL in the last 72 hours.     Invalid input(s): LDLCALCU  INR: No results for input(s): INR in the last 72 hours.    Assessment/  Patient Active Problem List:     Acute transmural anterior wall MI (HCC)     Hypothyroidism     DJD (degenerative joint disease)     Hyperlipidemia     Hypothyroid     CAD (coronary artery disease)     Cardiomyopathy (HCC)     CHF (congestive heart failure) (HCC)     Myocardial infarction acute (HCC)     Syncope and collapse     Closed fracture of pelvis (HCC)     Moderate protein-calorie malnutrition (HCC)     Orthostatic hypotension     Closed right hip fracture, with routine healing, subsequent encounter     Traumatic brain injury ([facility] [address]      Plan/  1. Continue rehab program.  2. No change in his medications  3. Continue DVT prophylaxis  4. Continue to monitor his nonspecific complaints. We will address them symptomatically  5.  We will order labs for Monday given increasing BUN          April Lawrence MD

## 2020-06-14 NOTE — PROGRESS NOTES
safe walker management      Visual Perception:    Education:  Pt educated on safe functional mobility and transfers at ww level          [] Family teach completed on:    Pain Level: 0/10     Additional Notes:       Patient has made fair  progress during treatment sessions toward set goals. Therapy emphasis to obtain goals:Current Treatment Recommendations: Strengthening, Positioning, ROM, Gait Training, Safety Education & Training, Balance Training, Patient/Caregiver Education & Training, Self-Care / ADL, Functional Mobility Training, Equipment Evaluation, Education, & procurement, Home Management Training, Endurance Training, Cognitive Reorientation      [x] Continue with current OT Plan of care.   [] Prepare for Discharge     DISCHARGE RECOMMENDATIONS  Recommended DME:  Ww, extended tub bench   Post Discharge Care:   []Home Independently  [x]Home with 24hr Care / Supervision []Home with Partial Supervision []Home with Home Health OT []Home with Out Pt OT []Other: ___   Comments:         Time in Time out Tx Time Breakdown  Variance:   First Session  [id number] [x] Individual Tx- 35  [] Concurrent Tx -  [] Co-Tx -   [] Group Tx -   [] Time Missed -     Second Session   [] Individual Tx-   [] Concurrent Tx   [] Co-Tx -   [] Group Tx -   [] Time Missed -              Third Session    [] Individual Tx-   [] Concurrent Tx -  [] Co-Tx -   [] Group Tx -   [] Time Missed -         Total Tx Time: 28 mins        Talya Jo, [facility]

## 2020-06-15 NOTE — PROGRESS NOTES
with [employer] with WC follow and Skye/ModA 275 feet x 1 rep  with [employer] and L toe off brace with CGA/ feet with [employer] and L toe off brace with  feet with AAD and  feet with AAD with Supervision   Walking 10 feet on uneven surface TBA NT NT 10 feet with AAD with CGA 10 feet with AAD with Supervision   Wheel Chair Mobility NT NT NT     Car Transfers TBA N/T NT Skye SBA   Stair negotiation: ascended and descended  4 steps with 2 rail with Skye/ModA 12 steps with 1 RH with CGA/SBA (lateral technique) NT 8 steps with 2 rail with SBA 14 steps with 1 rail with SBA   Curb Step:   ascended and descended TBA 7.5 inch curb step with [employer] x 6 reps with Skye NT 2 inch step with [employer] and SBA 7.5 inch step with AAD and SBA   Picking up object off the floor TBA NT NT Will  cone with SB assist Will  cone with Supervision   BLE ROM WNL WNL      BLE Strength BLE grossly 4/5 with exception of L ankle DF 0/5 BLE grossly 4/5 with exception of L ankle DF 0/5      Balance  Static and dynamic standing balance Skye/ModA with WW (moderate retropulsion) Sitting: Supervision  Standing: CGA with [employer]      Date Family Teach Completed        Is additional Family Teaching Needed?   Y or N  Y      Hindering Progress Balance deficits, debility, R groin pain Balance deficits, debility, R groin pain      PT recommended ELOS        Team's Discharge Plan        Therapist at Team Meeting          Therapeutic Exercise:   AM: Sit<>Stand transfer x 5 reps from chair  NR stepping over 1 quad cane on floor with [employer] x 2 reps with Skye  PM: Ambulation length of parallel bars without UE support x 8 reps with CGA  Ambulation without AD 50 feet x 2 reps with intermittent hallway rail UE support with LOB with CGA (WC follow)  Ambulation without AD 50 feet x 6 reps with intermittent hallway rail UE support with LOB with CGA followed by turn to sit in chair  Short distance ambulation to chair without armrests x 1 rep with CGA/Skye    Patient education  Pt educated on fall risk and functional implications due to balance deficits    Patient response to education:   Pt verbalized understanding Pt demonstrated skill Pt requires further education in this area   yes N/a no     Additional Comments: Initial portion of session spent discussing recommendation of 24/7 supervision upon return home. Reviewed pt's current balance deficits and functional implications including high fall risk and potential injury from falling at this time. Pt verbalized understanding of recommendation and states he will consider 24/7 care at home vs alternative destination. Pt exhibits improving retropulsion with curb step negotiation however intermittent Skye still required due to intermittent posterior LOB. Occasional verbal cueing required to maintain safe AD approximation while turning to avoid posterior LOB. Activity with reduced UE support progressed during PM session. Pt able to ambulate with FW momentum without posterior LOB when UE support is withheld however pt is unable to stand still without posterior LOB. Plan to progress mobility training with [employer] with emphasis on slowed speed and safe device approximation. AM  Time in: 0915  Time out: 1000    PM  Time in: 1345  Time out: 1430    Pt is making good progress toward established Physical Therapy goals. Continue with physical therapy current plan of care.     Erendira Light, PT, DPT  [id number]

## 2020-06-15 NOTE — PROGRESS NOTES
Speech Language Pathology  ACUTE REHABILITATION--DAILY PROGRESS NOTE  ADMITTING DIAGNOSIS: Closed right hip fracture, with routine healing, subsequent encounter [T99.313K]     SPEECH PATHOLOGY DIAGNOSIS:    Moderate Cognitive-Linguistic Deficits      THERAPY RECOMMENDATIONS:   Speech Pathology intervention is recommended 3-6 times per week for LOS or when goals are met with emphasis on the following:      Attention to increase safety awareness during transfers/ ambulation and completion of ADL/iADL tasks with minimal.  Orientation to spatial and temporal surroundings with use of external memory aides. Immediate/ STM for functional information to complete tasks as instructed and recall pertinent medical information with minimal and/or use of external memory aide training. Problem solving/ insight/ judgement for various ADL/iADL tasks, including but not limited to: medication management and money/ finance management, overall safety awareness in the PLOF with 80% accuracy  Receptive and expressive language skills with adequate thought content, organization, and processing time to facilitate improved communication with minimal.  Provide appropriate verbal/motor responses to material presented in both auditory/written format (3 level commands, conversation level) on initial presentation with decreased latency.                                                                                                                                          FIMS SCORES                            Swallowing                          Current Status            4--Minimal Assistance             Short Term Goal         5--Supervision                    Long Term Goal          6--Modified Independent              Receptive                          Current Status            5--Supervision                         Short Term Goal         5--Supervision              Long Term Goal          5--Supervision      Expressive

## 2020-06-15 NOTE — CARE COORDINATION
Met wit patient - he stated he spoke with Eric Billingsley. Eric Billingsley and MOE spoke and the patient states he is going home. Eric Boointosh reports he does NOT want 24 hour care. MOE reiterated all the safety concerns to both Rohini and patient separately. Eric Billingsley will have neighbor in daily and is looking at the meals and more assist from [facility]. Referral to [facility] for Nursing/PT/OT/SP/aide/SW. [facility] for wheeled walker and  3 in 1 commode. FT - 6/18 pm with Rohini. The Plan for Transition of Care is related to the following treatment goals: Patient goal is HOME. The Patient and/or patient representative Eric Billingsley was provided with a choice of provider and agrees   with the discharge plan. [x] Yes [] No    Freedom of choice list was provided with basic dialogue that supports the patient's individualized plan of care/goals, treatment preferences and shares the quality data associated with the providers.  [x] Yes [] No    Wilmer Sea

## 2020-06-16 NOTE — PROGRESS NOTES
Please note that the above documentation was prepared using voice recognition software. Every attempt was made to ensure accuracy but there may be spelling, grammatical, and contextual errors.

## 2020-06-16 NOTE — PROGRESS NOTES
Current Status            5--Supervision                         Short Term Goal         5--Supervision              Long Term Goal          5--Supervision      Social Interaction                          Current Status            4--Minimal Assistance               Short Term Goal         5--Supervision               Long Term Goal          5--Supervision                                                     Problem Solving                          Current Status            4--Minimal Assistance               Short Term Goal         5--Supervision               Long Term Goal          5--Supervision                  Memory                          Current Status            4--Minimal Assistance               Short Term Goal         5--Supervision                 Long Term Goal          5--Supervision                                           SWALLOWING:              Not addressed this session     LANGUAGE:       Requires increased time for processing and response. Benefits from occasional v/c. COGNITION:       Safety awareness, insight, judgement and recall of sequence for sit to stand transfers assessed in functional PLOF env. Pt cont's to demonstrate insight into modifications needed to be made to home however his awareness of his deficits still persist. Problem solving and safety awareness is impaired overall. Ongoing cues were required to safely transfer from sit to stand transfers. Will required 24/7 supervision cues. Safety: Fair                SPEECH:                 Moderate presbyphonia -- requires v/c to increase volume and decrease rate    SP recommended after discharge: yes    Will continue SP intervention as per previously established POC.     Minute Tracking:    Individual therapy:     0 minutes  Concurrent therapy:    0 minutes  Group therapy:     0 minutes  Co-treatment therapy:    40 minutes    Total minutes for 6/16/2020: 40 minutes

## 2020-06-16 NOTE — PROGRESS NOTES
Occupational Therapy  OCCUPATIONAL THERAPY DAILY NOTE    Date:2020  Patient Name: Juan Christensen  MRN: [id number]  : 7/3/1927  Room: [address] [place] [address]     Diagnosis: s/p fall backwards- nondisplaced R inferior & superior pubic rami fx    Additional Pertinent Hx: CAD, CHF, HLD, hx CABG, hypothyroidism & orthostatic hypotension    Precautions: falls ,WBAT     Functional Assessment:   Date Status AE  Comments   Feeding 6/15/20   SUP      Grooming 6/15/20  Set up  seated    Bathing 6/15/20  UB- set up   LB- Mod A  Sponge bath    UB Dressing 6/15/20  Set up  seated    LB Dressing 6/15/20  Min A  LH [employer], AFO    Homemaking 20  Mod  A ww      Functional Transfers / Balance:   Date Status DME  Comments   Sit Balance 6/15/20  Supervision w/c    Stand Balance 20  Min A  ww At table top level   [x] Tub    [] Shower   Transfer 20   Min Assist  Ww, extended tub bench     Commode   Transfer 20   Minimal Assist  BSC placed over toilet     Functional    Mobility 20  CGA/Min A  ww  throughout therapy apt setting with ww. Pt needed Min A for balance when transitioning from tile to carpet floor surfaces    Other:w/c to bed         On/off recliner   ,couch, kitchen chair without arm rests, and standard queen bed  20  CGA            Minimal Assist  ww          ww           Occasional verbal cues for hand placement when going from sit to stand at ww level. Pt has frequent loss of balance posteriorly with Min A to correct. Pt has fast rate of ambulation at times.  Pt is a high fall risk     Functional Exercises / Activity:   BUE strength exercises: scott box with 8 # wt 3 sets 15 reps on table top surface                                             2 # dowel gideon 3 sets 10 reps with focus on shoulder flexion  Core strengthening and UB strength with 2 # weighted ball 3 sets 10 reps in all planes   Standing balance at table top level completing shoulder ROM arc at CGA to help maintain balance

## 2020-06-16 NOTE — PLAN OF CARE
Problem: Falls - Risk of:  Goal: Will remain free from falls  Description: Will remain free from falls  Outcome: Met This Shift  Goal: Absence of physical injury  Description: Absence of physical injury  Outcome: Met This Shift     Problem: DAILY CARE  Goal: Daily care needs are met  Outcome: Met This Shift     Problem: Urinary Elimination:  Goal: Will remain free from infection  Description: Will remain free from infection  Outcome: Met This Shift

## 2020-06-17 NOTE — PROGRESS NOTES
Labs:    Lab Results   Component Value Date    WBC 7.2 06/15/2020    HGB 11.4 (L) 06/16/2020    HCT 34.9 (L) 06/16/2020    .7 (H) 06/15/2020     06/15/2020     Lab Results   Component Value Date     06/15/2020    K 4.2 06/15/2020     06/15/2020    CO2 28 06/15/2020    BUN 25 (H) 06/15/2020    CREATININE 0.8 06/15/2020    GLUCOSE 101 (H) 06/15/2020    CALCIUM 9.3 06/15/2020    PROT 5.9 (L) 06/15/2020    LABALBU 3.2 (L) 06/15/2020    BILITOT <0.2 06/15/2020    ALKPHOS 90 06/15/2020    AST 11 06/15/2020    ALT 9 06/15/2020    LABGLOM >60 06/15/2020    GFRAA >60 06/15/2020         Lab Results   Component Value Date    ALT 9 06/15/2020    AST 11 06/15/2020    ALKPHOS 90 06/15/2020    BILITOT <0.2 06/15/2020       Functional Status  Mobility:   General Mobility:  Min A  Gait:  200 feet with [employer] + L toe off brace with CGA  Transfers:  CGA  Bed Mobility:  supervision    ADL's: Minimal assistance with feeding, grooming, UB dressing, UB bathing; Moderate assistance with LB bathing, LB dressing, homemaking, and toileting. Cognition:  Moderate Cognitive-Linguistic Deficits     IMPRESSION:  Issa Pennington is a 80 y.o. male with PMH significant for atherosclerotic heart disease status post LAD stent in 1084, chronic diastolic heart failure, hypokinesis with LVEF of 40 to 45% in 2012, hypothyroidism, osteopenia; who presented to [facility] on 5/27/2020 s/p fall backwards at home.  The patient was found to have a nondisplaced fracture of the right inferior pubic ramus which was managed nonoperatively. He was admitted into acute inpatient rehabilitation on 6/3/2020. Hospital course was complicated by right hip pain, anemia, dehydration, bacteriuria, orthostatic hypotension. PLAN:  I. Rehabilitation - PT, OT, SLP, Rehab Nursing    II. Medical management:  # Ortho - Nondisplaced fracture of the right inferior pubic ramus s/p fall  - Managed nonoperatively.   WBAT BLE.  - Will need

## 2020-06-17 NOTE — PROGRESS NOTES
Current Status            5--Supervision                         Short Term Goal         5--Supervision              Long Term Goal          5--Supervision      Social Interaction                          Current Status            4--Minimal Assistance               Short Term Goal         5--Supervision               Long Term Goal          5--Supervision                                                     Problem Solving                          Current Status            4--Minimal Assistance               Short Term Goal         5--Supervision               Long Term Goal          5--Supervision                  Memory                          Current Status            4--Minimal Assistance               Short Term Goal         5--Supervision                 Long Term Goal          5--Supervision                                           SWALLOWING:              Patient actively participated in functionally-based mealtime assessment; required min assistance to set up meal tray and was able to feed self independently. Reviewed need for slow rate of intake and regulated bite size prior to initiation of PO intake. Diet:   Dysphagia 2,  Mechanical Soft (Minced & Moist) solids with  nectar consistency (mildly thick) liquids     Oral prep/oral-     No oral containment issues were identified. Prolonged oral prep and A-P propulsion of bolus were noted with fair clearance of oral residuals- piecemeal delguttion with likely premature spillage. Pharyngeal-     Clear vocal quality was maintained throughout. No overt clinical indicators of aspiration were apparent. Intake: good    Completed education with the patient regarding type of swallowing impairment. Reviewed current solid/liquid consistency diet recommendations and reinforced need for consistent use of compensatory strategies to ensure safe PO intake. Reviewed aspiration precautions.      Encouraged patient to engage SLP in unstructured Q&A

## 2020-06-17 NOTE — PROGRESS NOTES
Date:  6/10/20  Supporting factors: Willingness to participate     Barriers to discharge:    Not safe, decrease balance, no supervision/assist at home, high risk for falls. Comments -  See previous team note. Pt asking a lot about caregiving alternatives. DME  -  ww ( pt owns rollator)  After care - HHPT    After Care:  [address], [place]  [address]              Date:  6/10/20  Supporting factors:  Pt agreeable to session, can recall majority of instructions for safe technique   Barriers to discharge:  Pt has delayed processing and mildly impaired attention/executive function. Fear of falling overcomes rational decision making. Pt has significant balance deficits apparent with reduced UE support manifesting as strong retropulsion. Pt unable to even stand statically without UE support. Additional comments:  Pt received personal toe off brace for L foot drop. Recommend 24/7 supervision with FT with caregiver prior to discharge. Pt is a high fall risk.    DME:  [employer] (pt owns ROLLATOR)  After Care:  [address] [facility], PT, DPT  [id number]

## 2020-06-17 NOTE — PROGRESS NOTES
Occupational Therapy  OCCUPATIONAL THERAPY DAILY NOTE    Date:2020  Patient Name: Patricia Solorzano  MRN: [id number]  : 7/3/1927  Room: [address] [place], [place] [address]     Diagnosis: s/p fall backwards- nondisplaced R inferior & superior pubic rami fx    Additional Pertinent Hx: CAD, CHF, HLD, hx CABG, hypothyroidism & orthostatic hypotension    Precautions: falls ,WBAT     Functional Assessment:   Date Status AE  Comments   Feeding 20  SUP      Grooming 6/15/20  Set up  seated    Bathing 6/15/20  UB- set up   LB- Mod A  Sponge bath    UB Dressing 6/15/20  Set up  seated    LB Dressing 6/15/20  Min A  LH shoehorn, AFO    Homemaking 20  Mod  A ww Pt needed assist for balance to retreive items from refrigerator at ww level. Pt needed verbal cues and assist to open package of thickener and container of juice. Recommend that pt have assist for all meal prep due to balance and safety issues. Functional Transfers / Balance:   Date Status DME  Comments   Sit Balance 6/15/20  Supervision w/c    Stand Balance 20  Min A  ww At kitchen counter level    [x] Tub    [] Shower   Transfer 20   Min Assist  Ww, extended tub bench     Commode   Transfer 20  CGA  Raised toilet seat with rails,ww    Functional    Mobility 20  CGA/Min A  ww Throughout therapy apt setting over tile and carpet floor surfaces. Pt continues to have loss of balance posteriorly frequently especially when transitioning from one surface to another. Discussed with pt concerns of him loosing balance when at home alone. Other:w/c to bed         On/off recliner   ,couch, kitchen chair without arm rests, and standard queen bed  20  CGA            Minimal Assist  ww          ww           Pt requires Min A when going from sit to stand from various furniture levels due to loss of balance. Pt needs verbal cues to remind him not to pull up on ww when going from sit to stand.       Functional Exercises / Activity:       Sensory / Neuromuscular Re-Education:      Cognitive Skills:   Status Comments   Problem   Solving fair     Memory fair     Sequencing fair     Safety fair + Verbal cues for safe walker management      Visual Perception:    Education:  Pt was educated on hand placement during sit to stand transfers at  level  Pt was educated on safe retrieval and transport of items in kitchen setting at  level     [] Family teach completed on:    Pain Level: 0/10     Additional Notes:       Patient has made fair  progress during treatment sessions toward set goals. Therapy emphasis to obtain goals:Current Treatment Recommendations: Strengthening, Positioning, ROM, Gait Training, Safety Education & Training, Balance Training, Patient/Caregiver Education & Training, Self-Care / ADL, Functional Mobility Training, Equipment Evaluation, Education, & procurement, Home Management Training, Endurance Training, Cognitive Reorientation      [x] Continue with current OT Plan of care.   [] Prepare for Discharge     DISCHARGE RECOMMENDATIONS  Recommended DME:  Ww, extended tub bench   Post Discharge Care:   []Home Independently  [x]Home with 24hr Care / Supervision []Home with Partial Supervision []Home with Home Health OT []Home with Out Pt OT []Other: ___   Comments:         Time in Time out Tx Time Breakdown  Variance:   First Session  0830  0915 [x] Individual Tx- 45   [] Concurrent Tx -  [] Co-Tx -   [] Group Tx -   [] Time Missed -     Second Session [id number] [x] Individual Tx- 45  [] Concurrent Tx   [] Co-Tx -   [] Group Tx -   [] Time Missed -              Third Session    [] Individual Tx-   [] Concurrent Tx -  [] Co-Tx -   [] Group Tx -   [] Time Missed -         Total Tx Time: 90 mins      Rachel Hernández OTR/L [id number]

## 2020-06-18 NOTE — PROGRESS NOTES
Assisted pt in bathroom, pt stated that he wanted to wipe himself since he was going home. Pt was able to perform toilet hygiene, however, he had to lean against the wall in order to pull up his clothes, he was not able to zip his pants \"because the zipper was too small\". He was very unsteady. This nurse informed the patient that he would probably benefit from wearing clothes a little easier for him to pull on and off. Pt verbalized an understanding of the information given.  Phoenix Melendez RN

## 2020-06-18 NOTE — PATIENT CARE CONFERENCE
walker  Short term Chair/bed transfers goal: standby assist  Long term Chair/bed transfers goal: supervision      Ambulation:   Current level: 250 ft wheeled walker at Contact guard assist, unsteady, retropulsive  Short term ambulation goal: met  Long term ambulation goal: 300 ft at supervision      Car transfers:   Current level: min assist with verbal cues  for safety  Short term car transfers goal: met  Long term car transfers goal:standby assist    Stairs:   Current level : 8 with 2 rails at Contact guard assist-min assist  Short term stairs goal: 8 at standby assist  Long term stairs goal: 14 at standby assist    Lower Extremity Strength Issues:  has 2 platform  steps with no rails to get into his house, is max assist, bilateral lowers ar 4/5, left ankle dorsiflexion is 0/5    Other comments: standing balance is Contact guard assist with a wheeled walker      OCCUPATIONAL THERAPY:      Tub/shower:   Current level: min assist, will sponge bathe at home, poor safety and decreased insight hinder  Short term tub/shower goal: met  Long term tub/shower goal: Contact guard assist      Feeding:  Current level: supervision  Short term feeding goal: Modified independent  Long term feeding goal: Modified independent    Grooming:   Current level: supervision  Short term grooming goal: supervision  Long term grooming goal: supervision    Bathing:  Current level: mod assist, retropulsive  Short term bathing goal: Contact guard assist  Long term bathing goal: Contact guard assist    Homemaking:   Current level: mod assist, due to balance  Short term homemaking goal: min assist  Long term homemaking goal: min assist    Upper body dressing:  Current level: supervision  Short term upper body dressing goal: supervision  Long term upper body dressing goal: supervision    Lower body dressing:  Current level: min assist  Short term lower body dressing goal: min assist  Long term lower body dressing goal: min assist    Toilet transfer:   Current level: min assist  Short term toilet transfer goal: standby assist  Long term toilet transfer goal: standby assist      Other comments: struggled in apartment with furniture transfers        SPEECH THERAPY  Swallowing:  Current level:  min assist -has been educated on thickened liquids for homegoing  Short term swallowing goal: supervision  Long term swallowing Goal: Modified independent    Comprehension:   Current level: supervision  Short term comprehension goal: supervision  Long term comprehension goal: supervision    Expression:   Current level: supervision  Short term expression goal: supervision  Long term expression goal: supervision    Problem solving:   Current level: min assist  Short term problem solving goal: supervision  Long term problem solving goal: supervision    Memory:  Current level: min assist  Short term memory goal: supervision  Long term memory goal: supervision    Social interaction:  min assist, goal supervision    Safety awareness: fair +      Patient/family's personal goals: \"go back home\"  Factors supporting goal achievement:  willingness to participate  Factors hindering goal achievement:  high fall risk      Discharge Plan   Estimated Length of Stay: 6/18/20            Destination: home  Services at Discharge: home health PT, OT, speech, nursing, aide, social work  Equipment at Discharge: wheeled walker, commode vs raised toilet seat,  refusing transport wheelchair      INTERDISCIPLINARY TEAM/PHYSICIAN RECOMMENDATION AND/OR REVISIONS OF PLAN OF CARE:  family education today and then discharge home, team is recommending 24 hour supervision , patient and family refusing      I approve the established interdisciplinary plan of care as documented within the medical record of Alis Jayro.       Electronically signed by Kaylee Hernandes RN  on 6/18/2020 at 8:32 AM  The following interdisciplinary team members were present:  LIZBETH Andersen, MSSA,LSW  Dhaval Stein, DPT  Candace Celis, OTR  Unique Kelley, Cookie Marlon 87, CCC-SLP

## 2020-06-18 NOTE — PROGRESS NOTES
Memory                          Current Status            4--Minimal Assistance               Short Term Goal         5--Supervision                 Long Term Goal          5--Supervision                                           SWALLOWING:              Patient actively participated in functionally-based mealtime assessment; required min assistance to set up meal tray and was able to feed self independently. Reviewed need for slow rate of intake and regulated bite size prior to initiation of PO intake. Diet:   Dysphagia 2,  Mechanical Soft (Minced & Moist) solids with  nectar consistency (mildly thick) liquids     Oral prep/oral-     No oral containment issues were identified. Prolonged oral prep and A-P propulsion of bolus were noted with fair clearance of oral residuals- piecemeal delguttion with likely premature spillage. Pharyngeal-     Strong cough observed x1 near end of meal.    Completed education with the patient and his daughter regarding type of swallowing impairment. Reviewed current solid/liquid consistency diet recommendations and reinforced need for consistent use of compensatory strategies to ensure safe PO intake. SLP discussed how to prepare altered consistencies and how to utilize thickening agents. Reviewed aspiration precautions. Encouraged patient and his daughter to engage SLP in unstructured Q&A session relative to identified deficit areas -- they indicated understanding of all information provided via satisfactory verbal response. LANGUAGE:       Requires increased time for processing and response. Benefits from occasional v/c. COGNITION:        Safety awareness, insight, judgement and recall of sequence for ambulatory tasks and sit to stand transfers assessed in therapy apartment. Patient has decreased insight into need for assistance after discharge from ARU today. Ongoing cues were required to safely transfer from sit to stand transfers.  Patient

## 2020-06-18 NOTE — PROGRESS NOTES
CLINICAL PHARMACY NOTE: MEDS TO [address] Select Patient?: No  Total # of Prescriptions Filled: 3   The following medications were delivered to the patient:  · Tab-A-Sofi  · Flomax 0.4  · Oyster Shell 500-200  Total # of Interventions Completed: 4  Time Spent (min): 45    Additional Documentation:

## 2020-06-18 NOTE — PROGRESS NOTES
Occupational Therapy  OCCUPATIONAL THERAPY DAILY NOTE/DISCHARGE SUMMARY     Date:2020  Patient Name: Rosas Richardson  MRN: [id number]  : 7/3/1927  Room: [address] [place] [address]     Diagnosis: s/p fall backwards- nondisplaced R inferior & superior pubic rami fx    Additional Pertinent Hx: CAD, CHF, HLD, hx CABG, hypothyroidism & orthostatic hypotension    Precautions: falls ,WBAT     Functional Assessment:   Date Status AE  Comments   Feeding 20  SUP   Pt is on an altered diet    Grooming 20  Set up  seated Seated at sink pt completed oral care    Bathing 20  UB- set up   LB- Mod A  Sponge bath Pt needed assist for balance to stand and wash buttocks    UB Dressing 20  Min A   Pt needed assist to complete buttons on button up shirt. Pt reports having a button hook at home that he uses    LB Dressing 20  Min A  LH shoehorn, AFO Pt threaded depends and pants on over BLE's. Pt donned socks.  Assist to shoes and L AFO    Homemaking 20  Mod  A ww      Functional Transfers / Balance:   Date Status DME  Comments   Sit Balance 20  Supervision w/c During LB dressing    Stand Balance 20   Min A  ww Standing for bathing and to pull pants up    [x] Tub    [] Shower   Transfer 20   Min Assist  Ww, extended tub bench     Commode   Transfer 20  CGA  Raised toilet seat with rails,ww Verbal cues for hand placement on ww    Functional    Mobility 20  CGA/Min A  ww Throughout therapy apt setting    Other:w/c to bed         On/off recliner   ,couch, kitchen chair without arm rests, and standard queen bed  20  CGA            Minimal Assist  ww          ww                Functional Exercises / Activity:       Sensory / Neuromuscular Re-Education:      Cognitive Skills:   Status Comments   Problem   Solving fair     Memory fair     Sequencing fair     Safety fair + Verbal cues for safe walker management      Visual Perception:    Education:  Pt was educated on safe

## 2020-06-18 NOTE — PROGRESS NOTES
with [facility] SBA Supervision   Ambulation    50 feet x 2 reps with [facility] with WC follow and Skye/ModA 400 feet x 1 rep  with [facility] and L toe off brace with CGA/Skye 250 feet with [facility] and L toe off brace with  feet with AAD and  feet with AAD with Supervision   Walking 10 feet on uneven surface TBA 10 feet with [facility] with Skye 10 feet with [facility] with Skye x 2 reps 10 feet with AAD with CGA 10 feet with AAD with Supervision   Wheel Chair Mobility NT NT NT     Car Transfers TBA CGA with [facility] CGA with [facility] Skye SBA   Stair negotiation: ascended and descended  4 steps with 2 rail with Skye/ModA 12 steps with 1 HR with BUE support (lateral ascent/descent) with CGA NT 8 steps with 2 rail with SBA 14 steps with 1 rail with SBA   Curb Step:   ascended and descended TBA 7.5 inch curb step with [facility] x 3 reps with Skye, 1 LOB with MaxA to recover 7.5 inch curb step with [facility] x 4 reps with Skye, 1 LOB with MaxA to recover 2 inch step with [facility] and SBA 7.5 inch step with AAD and SBA   Picking up object off the floor TBA Pt picked up cone with MaxA (posterior LOB) Pt picked up cone with Skye (posterior LOB) Will  cone with SB assist Will  cone with Supervision   BLE ROM WNL WNL      BLE Strength BLE grossly 4/5 with exception of L ankle DF 0/5 BLE grossly 4/5 with exception of L ankle DF 0/5      Balance  Static and dynamic standing balance Skye/ModA with WW (moderate retropulsion) Sitting: Supervision  Standing: CGA with [facility]      Date Family Teach Completed   Daughter present 6/18     Is additional Family Teaching Needed?   Y or N  Y N     Hindering Progress Balance deficits, debility, R groin pain Balance deficits, debility, R groin pain      PT recommended ELOS        Team's Discharge Plan        Therapist at Team Meeting          Therapeutic Exercise:   AM: NA  PM: NR stepping over 2 SPC on floor followed by weaving between 2 standing quad canes using [facility] x 2 reps with Skye    Patient education  Pt educated on brace donning/doffing principles    Patient response to education:   Pt verbalized understanding Pt demonstrated skill Pt requires further education in this area   yes partial yes     Additional Comments: Reviewed all mobility with [employer] in anticipation of discharge today. Reviewed L AFO donning/doffing technique which pt requires Skye to complete. Pt continues to exhibit intermittent posterior LOB with minor obstacle negotiation such as during turns and over thresholds. Pt unable to correct LOB and requires assistance from therapist to prevent falling. These balance deficits place pt at a high risk for falling and necessitates 24/7 hands on care upon discharge. This has been conveyed to patient and family on multiple occasions by various disciplines. Pt has elected to return home with intermittent assistance despite recommendations. Daughter present for hands on FT prior to discharge. Pt and daughter given gait belt to use at home, daughter provided hands on assistance with all mobility tasks as listed per grid. Daughter experienced firsthand pt lose his balance multiple times requiring her to correct LOB and prevent fall. Reiterated to pt and family recommendation of 24/7 supervision/assistance upon discharge citing observed LOB as reason for recommendations. Pt and daughter verbalized understanding of recommendations however plan is still for pt to return home with intermittent assistance. Plan for discharge, recommend [employer] use at all times and HHPT. AM  Time in: 0915  Time out: 1000    PM  Time in: 1345  Time out: 1430    Pt is making fair progress toward established Physical Therapy goals. Continue with physical therapy current plan of care.     Juan Ramon Munson, PT, DPT  [id number]

## 2020-06-18 NOTE — DISCHARGE SUMMARY
to 45% in 2012 with no echo since then, hypothyroidism, osteopenia; who presented to [facility] ([facility] [place] on 5/27/2020 s/p fall backwards at home.  Patient states he is unaware why he sustained a fall and denies loss of consciousness. [facility] did hit his head during the incident.  The patient was found to have a nondisplaced fracture of the right inferior pubic ramus which was managed nonoperatively.  There were no intracranial abnormalities on CT head. Once medically appropriate for an ARU level of care, patient was admitted to acute inpatient rehabilitation on 6/3/2020. Hospital course was complicated by right hip pain, anemia, dehydration, bacteriuria, orthostatic hypotension. Pertinent Admission Physical Examination:   Motor Findings  Strength: Right  Strength: Left    Deltoid  4/5  4/5    Biceps  5/5  5/5    Triceps  4/5  5/5    Wrist Extensors  4/5  4/5    FDP 5/5 5/5   Finger abductors 4/5 4/5   Iliospoas  4/5  4/5    Quadriceps  5/5  5/5    Tibialis anterior  4/5  NT - AFO   EHL 4/5 NT - AFO   Gastroc soleus  4/5  NT - AFO      Balance/Gait:  Gait: NT    ===================================================================  Living Environment:  Physical   Type of home: 2-story home   Number of steps to enter/steps inside: 2 stairs to enter (with 0 rails), and 14 stairs to B/B.  First floor set up for B/B available.     Social   Identified social support: Lives alone with no family support, neighbor with intermittent assistance   Return to work: retired   Driving: No    Functional Status     Functional Status at Admission Functional Status on Discharge   Bed Mobility Mod A SBA (unsteady)   Transfers Mod A CGA (unsteady)   Ambulation 20 feet x 1 and 15 feet x 2 using [employer] for support Min/Mod A for balance 250 feet ww CGA (unsteady)   Basic ADLs Min - Mod A Set up   Advanced ADLs Mod - Max A Min - Mod A   Speech Moderate Cognitive-Linguistic Deficits  Moderate Cognitive-Linguistic Deficits   Cognition patient did progress nicely with the intense rehab program consisting of PT, OT, SLP. Medically, we are managing hypotension, intermittent fatigue, constipation, urinary retention. In discharge planning, discussed with family and patient regarding likely 24/7 supervision at home due to his functional levels. Patient and family were resistant and ultimately declined our recommendations. The entire interdisciplinary team including myself, nursing, social work, therapy reiterated this message to patient/family daily. They were still declining. Unfortunately, patient will be discharged to home with no assistance from family/friends and is at significantly high risk of falling. He also refused different recommended assistive devices for his home. [facility] was notified of our concerns and they will evaluate once discharged. Patient will be set up with home PT, OT, SLP, social work, RN, and aide. 2. 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 Co-Morbidity #1: atherosclerotic heart disease status post LAD stent in 2012  4. Chronic diastolic heart failure  5. Hypokinesis with LVEF of 40 to 45% in 2012  6. Hypothyroidism  7.  Osteopenia   ==================================================================  Discharge Medications     Medication List      START taking these medications    calcium-vitamin D 500-200 MG-UNIT per tablet  Commonly known as:  OSCAL-500  Take 1 tablet by mouth 2 times daily     docusate 100 MG Caps  Commonly known as:  COLACE, DULCOLAX  Take 100 mg by mouth 2 times daily     melatonin 3 MG Tabs tablet  Take 0.5 tablets by mouth nightly     multivitamin Tabs tablet  Take 1 tablet by mouth daily  Start taking on:  June 19, 2020     tamsulosin 0.4 MG capsule  Commonly known as:  FLOMAX  Take 1 capsule by mouth nightly        CONTINUE taking these medications    FISH OIL PO     Flax Seed Oil 1000 MG Caps     ARNAV ROOT PO     GRAPE SEED EXTRACT PO Magnesium 400 MG Caps     thyroid 60 MG tablet  Commonly known as:  ARMOUR     TMG (TRIMETHYLGLYCINE) PO     VITAMIN A PO     Vitamin C 500 MG Caps     vitamin D 400 units Caps  Commonly known as:  ERGOCALCIFEROL           Where to Get Your Medications      These medications were sent to [facility] [address] [address]    Phone:  [phone]   · calcium-vitamin D 500-200 MG-UNIT per tablet  · docusate 100 MG Caps  · melatonin 3 MG Tabs tablet  · multivitamin Tabs tablet  · tamsulosin 0.4 MG capsule         Allergies  Quinolones and Septra [bactrim]    Recommended Patient Restrictions: No work or driving until cleared. Recommending 24/7 supervision and CGA-Min A with mobility/ADLs in his home - patient and family declining multiple attempts at these recommendations. Recommended Therapies at Discharge: Home PT, OT, SLP, RN, SW, aide. [facility]. APS notified of fall risk and concerns. Medical Equipment and Adaptive Aids Dispensed: wheeled walker, commode vs raised toilet seat (refusing transport wheelchair)    Follow-Up Appointments  1. PCP  2. PM&R  3. Ortho    Information provided to the patient and appropriate family members regarding discharge medications and follow up plans detailed in the DIF and given verbally to the patient. > 30 minutes spent in coordination of discharge, prescription preparation, reviewing discharge instructions and answering questions. Paul Araujo, DO  Physical Medicine and Rehabilitation     Please note that the above documentation was prepared using voice recognition software. Every attempt was made to ensure accuracy but there may be spelling, grammatical, and contextual errors.

## 2020-06-25 PROBLEM — M89.9 BONE LESION: Status: ACTIVE | Noted: 2020-01-01

## 2020-06-25 PROBLEM — D64.9 ANEMIA: Status: ACTIVE | Noted: 2020-01-01

## 2020-06-25 PROBLEM — R54 AGE-RELATED PHYSICAL DEBILITY: Status: ACTIVE | Noted: 2020-01-01

## 2020-06-25 NOTE — PROGRESS NOTES
Vitals:    06/25/20 1400   BP: 110/60   Pulse: 63   Resp: 18   Temp: 97.8 °F (36.6 °C)   SpO2: 96%   Weight: 102 lb (46.3 kg)   Height: 5' 7\" (1.702 m)       Estimated body mass index is 15.98 kg/m² as calculated from the following:    Height as of this encounter: 5' 7\" (1.702 m). Weight as of this encounter: 102 lb (46.3 kg). Physical Exam  Vitals signs and nursing note reviewed. Constitutional:       Appearance: He is well-developed. Comments: Frail, weak, underweight. HENT:      Head: Normocephalic. Right Ear: External ear normal.      Left Ear: External ear normal.      Nose: Nose normal.   Eyes:      Extraocular Movements: Extraocular movements intact. Conjunctiva/sclera: Conjunctivae normal.      Pupils: Pupils are equal, round, and reactive to light. Neck:      Thyroid: No thyromegaly. Trachea: Trachea normal.   Cardiovascular:      Rate and Rhythm: Normal rate and regular rhythm. Pulses:           Radial pulses are 2+ on the right side and 2+ on the left side. Heart sounds: Normal heart sounds. No murmur. Pulmonary:      Effort: Pulmonary effort is normal. No respiratory distress. Breath sounds: Normal breath sounds. No decreased breath sounds, wheezing or rales. Abdominal:      General: Bowel sounds are normal. There is no distension. Palpations: Abdomen is soft. Tenderness: There is no abdominal tenderness. There is no rebound. Musculoskeletal:      Right hip: He exhibits decreased range of motion and decreased strength. He exhibits no tenderness. Right lower leg: No edema. Left lower leg: No edema. Skin:     General: Skin is warm and dry. Findings: No rash. Neurological:      Mental Status: He is alert. Cranial Nerves: No cranial nerve deficit. Sensory: No sensory deficit. Deep Tendon Reflexes:      Reflex Scores:       Patellar reflexes are 1+ on the right side and 1+ on the left side.   Psychiatric: need a updated EKG and echocardiogram in the near future. Essentially asymptomatic. Health Maintenance - Shingles and PNA imm    Return in about 14 weeks (around 10/1/2020) for To Discuss Labs Results, Follow-up Appointment From Today's Visit. Educational materials and/or home exercises printed for patient's review and were included in patient instructions on his/her After Visit Summary and given to patient at the end of visit.       Counseled regarding above diagnosis, including possible risks and complications,  especially if left uncontrolled.     Counseled regarding the possible side effects, risks, benefits and alternatives to treatment; patient and/or guardianverbalizes understanding, agrees, feels comfortable with and wishes to proceed with above treatment plan.     Advised patient to call with any new medication issues, and read all Rx info from pharmacy to assure aware of all possible risks and side effects of medication before taking.     Reviewed age and gender appropriate health screening exams and vaccinations. Advised patient regarding importance of keeping up withrecommended health maintenance and to schedule as soon as possible if overdue, as this is important in assessing for undiagnosed pathology, especially cancer, as well as protecting against potentially harmful/lifethreatening disease.       Patient and/or guardian verbalizes understanding and agrees with abovecounseling, assessment and plan.     All questions answered. An  electronic signature was used to authenticatethis note. --Rick Velasco MD on 6/25/20 at 1:56 PM EDT    On the basis of positive falls risk screening, assessment and plan is as follows: patient declines any further evaluation/treatment for increased falls risk.

## 2020-10-05 PROBLEM — J18.9 PNEUMONIA: Status: ACTIVE | Noted: 2020-01-01

## 2020-10-05 NOTE — ED PROVIDER NOTES
HPI:  10/5/20,   Time: 1:05 PM EDT         Clarence Claros is a 80 y.o. male presenting to the ED for Fall SOB found on floor by neighbors last night, beginning 24 hours ago. The complaint has been persistent, severe in severity, and worsened by nothing. Pos SOB. No fever or chills reported. Presents in respiratory distress. He was merely suctioned upon arrival.  Was placed on supplemental oxygen and tolerating it well. Initial sats were 88% improved to 93 to 95% on nasal cannula. History is obtained from EMS. ROS:   Pertinent positives and negatives are stated within HPI, all other systems reviewed and are negative.  --------------------------------------------- PAST HISTORY ---------------------------------------------  Past Medical History:  has a past medical history of CAD (coronary artery disease), Cardiomyopathy ([facility] [address] CHF (congestive heart failure) ([facility] [address] DJD (degenerative joint disease), Hyperlipidemia, Hypothyroid, Hypothyroid, and [facility] Past Surgical History:  has a past surgical history that includes hernia repair; Tonsillectomy; and Coronary angioplasty with stent (03/09/2012). Social History:  reports that he has never smoked. He has never used smokeless tobacco. He reports that he does not drink alcohol or use drugs. Family History: family history is not on file. The patients home medications have been reviewed.     Allergies: Quinolones and Septra [bactrim]    -------------------------------------------------- RESULTS -------------------------------------------------  All laboratory and radiology results have been personally reviewed by myself   LABS:  Results for orders placed or performed during the hospital encounter of 10/05/20   Culture, Blood 1    Specimen: Blood   Result Value Ref Range    Blood Culture, Routine 24 Hours no growth    Culture, Blood 2    Specimen: Blood   Result Value Ref Range    Culture, Blood 2 24 Hours no growth    CBC Auto Differential   Result Value Ref Range    WBC 9.6 4.5 - 11.5 E9/L    RBC 4.01 3.80 - 5.80 E12/L    Hemoglobin 13.5 12.5 - 16.5 g/dL    Hematocrit 40.0 37.0 - 54.0 %    MCV 99.8 80.0 - 99.9 fL    MCH 33.7 26.0 - 35.0 pg    MCHC 33.8 32.0 - 34.5 %    RDW 13.2 11.5 - 15.0 fL    Platelets 448 146 - 552 E9/L    MPV 10.2 7.0 - 12.0 fL    Neutrophils % 89.6 (H) 43.0 - 80.0 %    Immature Granulocytes % 0.2 0.0 - 5.0 %    Lymphocytes % 5.2 (L) 20.0 - 42.0 %    Monocytes % 4.8 2.0 - 12.0 %    Eosinophils % 0.0 0.0 - 6.0 %    Basophils % 0.2 0.0 - 2.0 %    Neutrophils Absolute 8.59 (H) 1.80 - 7.30 E9/L    Immature Granulocytes # 0.02 E9/L    Lymphocytes Absolute 0.50 (L) 1.50 - 4.00 E9/L    Monocytes Absolute 0.46 0.10 - 0.95 E9/L    Eosinophils Absolute 0.00 (L) 0.05 - 0.50 E9/L    Basophils Absolute 0.02 0.00 - 0.20 E9/L    RBC Morphology Normal    Lactic Acid, Plasma   Result Value Ref Range    Lactic Acid 1.3 0.5 - 2.2 mmol/L   Urinalysis   Result Value Ref Range    Color, UA Yellow Straw/Yellow    Clarity, UA Clear Clear    Glucose, Ur Negative Negative mg/dL    Bilirubin Urine Negative Negative    Ketones, Urine 15 (A) Negative mg/dL    Specific Gravity, UA >=1.030 1.005 - 1.030    Blood, Urine SMALL (A) Negative    pH, UA 6.0 5.0 - 9.0    Protein, UA TRACE Negative mg/dL    Urobilinogen, Urine 0.2 <2.0 E.U./dL    Nitrite, Urine Negative Negative    Leukocyte Esterase, Urine Negative Negative   C-Reactive Protein   Result Value Ref Range    CRP 7.1 (H) 0.0 - 0.4 mg/dL   Sedimentation Rate   Result Value Ref Range    Sed Rate 26 (H) 0 - 15 mm/Hr   Troponin   Result Value Ref Range    Troponin 0.02 0.00 - 0.03 ng/mL   Brain Natriuretic Peptide   Result Value Ref Range    Pro-BNP 5,948 (H) 0 - 450 pg/mL   Comprehensive Metabolic Panel   Result Value Ref Range    Sodium 139 132 - 146 mmol/L    Potassium 4.0 3.5 - 5.0 mmol/L    Chloride 104 98 - 107 mmol/L    CO2 22 22 - 29 mmol/L    Anion Gap 13 7 - 16 mmol/L    Glucose 109 (H) 74 - 99 mg/dL    BUN 29 (H) 8 - 23 mg/dL    CREATININE 0.6 (L) 0.7 - 1.2 mg/dL    GFR Non-African American >60 >=60 mL/min/1.73    GFR African American >60     Calcium 9.5 8.6 - 10.2 mg/dL    Total Protein 6.3 (L) 6.4 - 8.3 g/dL    Alb 3.5 3.5 - 5.2 g/dL    Total Bilirubin 0.7 0.0 - 1.2 mg/dL    Alkaline Phosphatase 75 40 - 129 U/L    ALT 17 0 - 40 U/L    AST 39 0 - 39 U/L   CK   Result Value Ref Range    Total  (H) 20 - 200 U/L   COVID-19   Result Value Ref Range    SARS-CoV-2, NAAT Not Detected Not Detected   Blood Gas, Arterial   Result Value Ref Range    Date Analyzed 20201005     Time Analyzed 1429     Source: Blood Arterial     pH, Blood Gas 7.413 7.350 - 7.450    PCO2 34.0 (L) 35.0 - 45.0 mmHg    PO2 54.1 (L) 75.0 - 100.0 mmHg    HCO3 21.2 (L) 22.0 - 26.0 mmol/L    B.E. -2.6 -3.0 - 3.0 mmol/L    O2 Sat 89.4 (L) 92.0 - 98.5 %    O2Hb 88.1 (L) 94.0 - 97.0 %    COHb 1.2 0.0 - 1.5 %    MetHb 0.3 0.0 - 1.5 %    O2 Content 17.3 mL/dL    HHb 10.4 (H) 0.0 - 5.0 %    tHb (est) 14.0 11.5 - 16.5 g/dL    Mode RA     Date Of Collection      Time Collected      Pt Temp 37.0 C     ID [id number]     Lab [id number]     Critical(s) Notified .  No Critical Values    Microscopic Urinalysis   Result Value Ref Range    Hyaline Casts, UA 0-2 0 - 2 /LPF    Mucus, UA Present (A) None Seen /LPF    WBC, UA 2-5 0 - 5 /HPF    RBC, UA 2-5 0 - 2 /HPF    Epithelial Cells, UA FEW /HPF    Bacteria, UA FEW (A) None Seen /HPF    Crystals, UA Rare (A) None Seen /HPF   TSH without Reflex   Result Value Ref Range    TSH 0.026 (L) 0.270 - 4.200 uIU/mL   T4, free   Result Value Ref Range    T4 Free 1.31 0.93 - 1.70 ng/dL   TROPONIN   Result Value Ref Range    Troponin 0.03 0.00 - 0.03 ng/mL   TROPONIN   Result Value Ref Range    Troponin 0.03 0.00 - 0.03 ng/mL   PSA, DIAGNOSTIC   Result Value Ref Range    PSA 0.42 0.00 - 4.00 ng/mL   Basic Metabolic Panel w/ Reflex to MG   Result Value Ref Range    Sodium 136 132 - 146 mmol/L    Potassium reflex Magnesium 4.0 3.5 - 5.0 mmol/L    Chloride 102 98 - 107 mmol/L    CO2 26 22 - 29 mmol/L    Anion Gap 8 7 - 16 mmol/L    Glucose 154 (H) 74 - 99 mg/dL    BUN 32 (H) 8 - 23 mg/dL    CREATININE 0.8 0.7 - 1.2 mg/dL    GFR Non-African American >60 >=60 mL/min/1.73    GFR African American >60     Calcium 9.7 8.6 - 10.2 mg/dL   CBC auto differential   Result Value Ref Range    WBC 9.2 4.5 - 11.5 E9/L    RBC 3.76 (L) 3.80 - 5.80 E12/L    Hemoglobin 12.6 12.5 - 16.5 g/dL    Hematocrit 37.8 37.0 - 54.0 %    .5 (H) 80.0 - 99.9 fL    MCH 33.5 26.0 - 35.0 pg    MCHC 33.3 32.0 - 34.5 %    RDW 13.4 11.5 - 15.0 fL    Platelets 511 328 - 453 E9/L    MPV 10.3 7.0 - 12.0 fL    Neutrophils % 88.9 (H) 43.0 - 80.0 %    Immature Granulocytes % 0.4 0.0 - 5.0 %    Lymphocytes % 5.5 (L) 20.0 - 42.0 %    Monocytes % 5.1 2.0 - 12.0 %    Eosinophils % 0.0 0.0 - 6.0 %    Basophils % 0.1 0.0 - 2.0 %    Neutrophils Absolute 8.20 (H) 1.80 - 7.30 E9/L    Immature Granulocytes # 0.04 E9/L    Lymphocytes Absolute 0.51 (L) 1.50 - 4.00 E9/L    Monocytes Absolute 0.47 0.10 - 0.95 E9/L    Eosinophils Absolute 0.00 (L) 0.05 - 0.50 E9/L    Basophils Absolute 0.01 0.00 - 0.20 E9/L   EKG 12 Lead   Result Value Ref Range    Ventricular Rate 110 BPM    Atrial Rate 110 BPM    P-R Interval 142 ms    QRS Duration 80 ms    Q-T Interval 322 ms    QTc Calculation (Bazett) 435 ms    P Axis 75 degrees    R Axis 20 degrees    T Axis 76 degrees       RADIOLOGY:  Interpreted by Radiologist.  CT CHEST WO CONTRAST   Preliminary Result   COPD with bilateral lower lobe consolidation representing pneumonia in the   correct clinical setting. No endobronchial mass. Bilateral nephrolithiasis. XR CHEST PORTABLE   Final Result   Bibasilar atelectasis appears worsened. XR CHEST PORTABLE   Final Result   1. Right lower lobe infiltrate. 2. Chronic scarring seen within the left lung base. 3. Emphysematous changes. ------------------------- NURSING NOTES AND VITALS REVIEWED ---------------------------   The nursing notes within the ED encounter and vital signs as below have been reviewed. BP (!) 116/56   Pulse 80   Temp 96.7 °F (35.9 °C) (Axillary)   Resp 18   Ht 5' 7\" (1.702 m)   Wt 101 lb 6.4 oz (46 kg)   SpO2 98%   BMI 15.88 kg/m²   Oxygen Saturation Interpretation: Abnormal on supplemental Oxygen. ---------------------------------------------------PHYSICAL EXAM--------------------------------------      Constitutional/General: Alert and oriented x3, well appearing, non toxic in NAD  Head: NC/AT  Eyes: PERRL, EOMI  Mouth: Oropharynx clear, handling secretions, no trismus  Neck: Supple, full ROM, no meningeal signs  Pulmonary: Lungs clear to auscultation bilaterally, no wheezes, rales, Pos  rhonchi. Not in respiratory distress  Cardiovascular:  Regular rate and rhythm, no murmurs, gallops, or rubs. 2+ distal pulses  Abdomen: Soft, non tender, non distended,   Extremities: Moves all extremities x 4.  Warm and well perfused  Skin: warm and dry without rash  Neurologic: GCS 15,  Psych: Normal Affect      ------------------------------ ED COURSE/MEDICAL DECISION MAKING----------------------  Medications   vitamin C (ASCORBIC ACID) tablet 500 mg (500 mg Oral Not Given 10/6/20 1319)   calcium-vitamin D 500-200 MG-UNIT per tablet 1 tablet (1 tablet Oral Not Given 10/6/20 1318)   docusate sodium (COLACE) capsule 100 mg (100 mg Oral Not Given 10/6/20 1318)   magnesium oxide (MAG-OX) tablet 400 mg (400 mg Oral Not Given 10/6/20 1318)   melatonin tablet 1.5 mg (1.5 mg Oral Not Given 10/5/20 2220)   multivitamin 1 tablet (1 tablet Oral Not Given 10/6/20 1319)   vitamin D3 (CHOLECALCIFEROL) tablet 400 Units (400 Units Oral Not Given 10/6/20 1319)   sodium chloride flush 0.9 % injection 10 mL (10 mLs Intravenous Not Given 10/6/20 1319)   sodium chloride flush 0.9 % injection 10 mL (has no administration in time range) acetaminophen (TYLENOL) tablet 650 mg (has no administration in time range)     Or   acetaminophen (TYLENOL) suppository 650 mg (has no administration in time range)   polyethylene glycol (GLYCOLAX) packet 17 g (has no administration in time range)   promethazine (PHENERGAN) tablet 12.5 mg (has no administration in time range)     Or   ondansetron (ZOFRAN) injection 4 mg (has no administration in time range)   ipratropium-albuterol (DUONEB) nebulizer solution 1 ampule (1 ampule Inhalation Given 10/6/20 1550)   perflutren lipid microspheres (DEFINITY) injection 1.65 mg (has no administration in time range)   0.9 % sodium chloride infusion ( Intravenous New Bag 10/6/20 1420)   ipratropium-albuterol (DUONEB) nebulizer solution 1 ampule (1 ampule Inhalation Given 10/6/20 0000)   ampicillin-sulbactam (UNASYN) 3 g ivpb minibag (0 g Intravenous Stopped 10/6/20 1552)   ipratropium-albuterol (DUONEB) nebulizer solution 1 ampule (1 ampule Inhalation Given 10/5/20 1348)   cefTRIAXone (ROCEPHIN) 2 g in sterile water 20 mL IV syringe (2 g Intravenous Given 10/5/20 1528)   doxycycline (VIBRAMYCIN) 200 mg in dextrose 5 % 250 mL IVPB (0 mg Intravenous Stopped 10/5/20 1747)         Medical Decision Making:    Patient was found on floor by neighbors in his bathroom last evening. Patient presents respiratory distress. He was aggressively suctioned upon arrival.  Initially his O2 sat was 88% improved with supplemental oxygen and suctioning. Currently 96% on 3 L nasal cannula. Chest x-ray is concerning for right middle lobe pneumonia. Patient was given Rocephin and doxycycline for community-acquired pneumonia. He was given nebulizer treatments for wheezing. Patient admitted to telemetry for further evaluation and treatment for pneumonia. Patient is COVID-19 was negative. CRITICAL CARE:   30 MINUTES.           Please note that the withdrawal or failure to initiate urgent interventions for this patient would likely result in a life threatening deterioration or permanent disability. Accordingly this patient received the above mentioned time, excluding separately billable procedures. Counseling: The emergency provider has spoken with the patient and discussed todays results, in addition to providing specific details for the plan of care and counseling regarding the diagnosis and prognosis. Questions are answered at this time and they are agreeable with the plan.      --------------------------------- IMPRESSION AND DISPOSITION ---------------------------------    IMPRESSION  1. Pneumonia due to organism    2.  Fall, initial encounter        DISPOSITION  Disposition: Admit to telemetry  Patient condition is serious                Felisa Nazario DO  10/06/20 1800

## 2020-10-05 NOTE — PROGRESS NOTES
NTS suctioned for a moderate amount of sputum. Placed on 3L NC- SpO2 = 94% Re-addressed BiPAP use at this time with Dr. Katarzyna Walker. Hold for now.

## 2020-10-05 NOTE — ED NOTES
Bed:  HALL-07  Expected date:   Expected time:   Means of arrival:   Comments:  EMS     Lopez Reasons, RN  10/05/20 [id number]

## 2020-10-06 NOTE — PROGRESS NOTES
Spoke with patient regarding PEG tube placement. Patient seems to understand the need for tube feeds vs oral feeding. Will consult general surgery for PEG placement.

## 2020-10-06 NOTE — PROGRESS NOTES
[facility] Progress Note    Admitting Date and Time: 10/5/2020 12:24 PM  Admit Dx: Pneumonia [J18.9]  Pneumonia [J18.9]    Subjective:  Patient is being followed for Pneumonia [J18.9]  Pneumonia [J18.9]   Pt feels little better today. However he did have an issue with increased shortness of breath last night and he had to be suctioned. A repeat cxr showed worsening infiltrate. Due to concern for aspiration, a swallow eval was performed and the patient failed. He continues to require 4 L NC. Per RN: patient failed swallow eval.  Patient must be kept NPO. Will need to discuss PEG tube    ROS: denies fever, chills, cp, sob, n/v, HA unless stated above.       vitamin C  500 mg Oral Daily    calcium-vitamin D  1 tablet Oral BID    docusate sodium  100 mg Oral BID    magnesium oxide  400 mg Oral Daily    melatonin  1.5 mg Oral Nightly    multivitamin  1 tablet Oral Daily    vitamin D3  400 Units Oral Daily    sodium chloride flush  10 mL Intravenous 2 times per day    cefTRIAXone (ROCEPHIN) IV  2 g Intravenous Q24H    doxycycline (VIBRAMYCIN) IV  100 mg Intravenous Q12H    ipratropium-albuterol  1 ampule Inhalation Q4H WA     sodium chloride flush, 10 mL, PRN  acetaminophen, 650 mg, Q6H PRN    Or  acetaminophen, 650 mg, Q6H PRN  polyethylene glycol, 17 g, Daily PRN  promethazine, 12.5 mg, Q6H PRN    Or  ondansetron, 4 mg, Q6H PRN  perflutren lipid microspheres, 1.5 mL, ONCE PRN  ipratropium-albuterol, 1 ampule, Q4H PRN         Objective:    /60   Pulse 98   Temp 98.3 °F (36.8 °C) (Axillary)   Resp 16   Ht 5' 7\" (1.702 m)   Wt 101 lb 6.4 oz (46 kg)   SpO2 99%   BMI 15.88 kg/m²     General Appearance: alert and oriented to person, place and time and in no acute distress  Skin: warm and dry  Head: normocephalic and atraumatic  Eyes: pupils equal, round, and reactive to light, extraocular eye movements intact, conjunctivae normal  Neck: neck supple and non tender without mass episode  -  echocardiogram complete-awaiting read  -  cardiology consultation ordered  -  Cycle troponins- 0.03  -  PT/OT evaluation/treatment  -  Patient would benefit from placement in at least assisted living situation  -  Hold flomax for now (does not sound like it was helping his incontinence and could cause orthostatic hypotension. )  -  Orthostatics pending this morning    3. RLL Pneumonia  -  initially on rocephin and doxycycline/ changed to Unasyn     4. Severe Protein-Calorie Malnutrition  -  patient is now NPO due to failed swallow evaluation  -  Will need to consult surgery if patient is agreeable to PEG placement.     5. CAD with hx of MI and stent placement in 2012  -  Not on BB, aspirin, ARB or statins due to side effects  -  Cardiology consulted     6. Hyperlipidemia-    -  Not on statin due to hx of myalgias     7.  Urinary frequency and incontinence-    -  Previously on flomax; holding for now. -  Checked PSA-0.42     8. Acquired hypothyroidism  -  TSH is 0.026; it is unclear whether the patient was taking medication. Will add additional thyroid labs to the morning lab draw     9. Hx of combines systolic/diastolic heart failure  -  Last echocardiogram was in 2012- EF of 40-45% at that time. -  Will check echo and consult cardiology  -  No indication of fluid overload at this time.     10. Abnormal focal lytic lesion on prior CT head from 5/2020-  This has been discussed with the patient previously and patient declined any further work up. Will discuss this again with the patient.      11. Dehydration-careful hydration needed  -  IVF NS at 75 cc/hr           Code Status: full  DVT prophylaxis: pcds      NOTE: This report was transcribed using voice recognition software. Every effort was made to ensure accuracy; however, inadvertent computerized transcription errors may be present.   Electronically signed by Bala Valente MD on 10/6/2020 at 8:54 AM

## 2020-10-06 NOTE — CARE COORDINATION
Social Work:    Updated by  Rashel Antonio. Patient resides alone and makes his own decisions. Mr. Kaushik Lynne prefers to return home but will consider options after PT/OT evaluations. Social service met with Mr. Kaushik Lynne, explained social work role within the case management department, and discussed discharge planning. Social service advised Mr. Kaushik Lynne about his Medicare coverage for skilled rehab, and briefed him about skilled rehab benefits vs [facility] & DME options. Mr. Kaushik Lynne is receptive to recommendations and assistance with discharge planning. Social work to follow. .    Electronically signed by RADHA Craig on 10/6/2020 at 1:03 PM

## 2020-10-06 NOTE — CONSULTS
Inpatient Cardiology Consultation      Reason for Consult:  CHF with hx of possible syncope     Consulting Physician: Dr. Christina Ivy    Requesting Physician: Dr. Les Carpio     Date of Consultation: 10/6/2020    HISTORY OF PRESENT ILLNESS:   Please note that Mr. Fofana is a poor historian and HPI was obtained from patient as well as EMR    Mr. Deshawn Darden is a 80year old male who [facility] and follows with Dr. Reese Reyez. He has PMHx of CAD with BMS to LAD (2012), ischemic cardiomyopathy ( EF 40-45%), off GDMT due to hypotension. He presented to [facility] ED on 10/5/2020 after being found on the bathroom floor. He states that he was in the bathroom, sitting on the toilet and felt very weak with trouble getting off of the toilet. Upon rising he fell to the floor, denies loss of consciousness. He was found on the floor by his neighbors, remained on the ground for an unknown period of time. Upon arrival he was in respiratory distress upon arrival with SP02 88%. CXR concerning for right lower lobe pneumonia. COVID-19 negative. proBNP 5948. Twelve-lead EKG demonstrated sinus tachycardia, rate 110 with no acute ischemic changes. Troponin 0 0.02, 0.03, 0.03.TSH low 0.026. He was started on IV antibiotics and admitted to the hospital for further evaluation and treatment. He is currently laying back in bedside chair with head elevated, sleeping. He easily awakes, witnessed congested cough with thick sputum production. He denies any fevers or chills. He is fatigued and falls back asleep during interview questions. Please note: past medical records were reviewed per electronic medical record (EMR) - see detailed reports under Past Medical/ Surgical History.    Past Medical History:    Past Medical History:   Diagnosis Date    CAD (coronary artery disease)     Cardiomyopathy ([facility] [address]     CHF (congestive heart failure) (HCC)     DJD (degenerative joint disease) 3/10/2012    Hyperlipidemia     mg, 1.5 mg, Oral, Nightly  multivitamin 1 tablet, 1 tablet, Oral, Daily  vitamin D3 (CHOLECALCIFEROL) tablet 400 Units, 400 Units, Oral, Daily  sodium chloride flush 0.9 % injection 10 mL, 10 mL, Intravenous, 2 times per day  sodium chloride flush 0.9 % injection 10 mL, 10 mL, Intravenous, PRN  acetaminophen (TYLENOL) tablet 650 mg, 650 mg, Oral, Q6H PRN **OR** acetaminophen (TYLENOL) suppository 650 mg, 650 mg, Rectal, Q6H PRN  polyethylene glycol (GLYCOLAX) packet 17 g, 17 g, Oral, Daily PRN  promethazine (PHENERGAN) tablet 12.5 mg, 12.5 mg, Oral, Q6H PRN **OR** ondansetron (ZOFRAN) injection 4 mg, 4 mg, Intravenous, Q6H PRN  cefTRIAXone (ROCEPHIN) 2 g in sterile water 20 mL IV syringe, 2 g, Intravenous, Q24H  doxycycline (VIBRAMYCIN) 100 mg in dextrose 5 % 100 mL IVPB, 100 mg, Intravenous, Q12H  ipratropium-albuterol (DUONEB) nebulizer solution 1 ampule, 1 ampule, Inhalation, Q4H WA  perflutren lipid microspheres (DEFINITY) injection 1.65 mg, 1.5 mL, Intravenous, ONCE PRN  0.9 % sodium chloride infusion, , Intravenous, Continuous  ipratropium-albuterol (DUONEB) nebulizer solution 1 ampule, 1 ampule, Inhalation, Q4H PRN    Allergies:  Quinolones and Septra [bactrim]    Social History: Per EMR  Social History     Socioeconomic History    Marital status:       Spouse name: Not on file    Number of children: Not on file    Years of education: Not on file    Highest education level: Not on file   Occupational History    Not on file   Social Needs    Financial resource strain: Not on file    Food insecurity     Worry: Not on file     Inability: Not on file    Transportation needs     Medical: Not on file     Non-medical: Not on file   Tobacco Use    Smoking status: Never Smoker    Smokeless tobacco: Never Used   Substance and Sexual Activity    Alcohol use: No    Drug use: No    Sexual activity: Not on file   Lifestyle    Physical activity     Days per week: Not on file     Minutes per session: Not on file    Stress: Not on file   Relationships    Social connections     Talks on phone: Not on file     Gets together: Not on file     Attends Islam service: Not on file     Active member of club or organization: Not on file     Attends meetings of clubs or organizations: Not on file     Relationship status: Not on file    Intimate partner violence     Fear of current or ex partner: Not on file     Emotionally abused: Not on file     Physically abused: Not on file     Forced sexual activity: Not on file   Other Topics Concern    Not on file   Social History Narrative    Not on file       Family History: Limited in nature secondary to advanced age  History reviewed. No pertinent family history. REVIEW OF SYSTEMS:     · Constitutional:+++ fatigue, denies fevers, chills or night sweats  · Eyes: Denies visual changes or drainage  · ENT: Denies headaches or hearing loss. No mouth sores or sore throat. No epistaxis   · Cardiovascular: Denies chest pain, pressure or palpitations. No lower extremity swelling. · Respiratory: ++ TAM, cough. Denies orthopnea or PND. No hemoptysis   · Gastrointestinal: Denies hematemesis or anorexia. No hematochezia or melena    · Genitourinary: Denies urgency, dysuria or hematuria. · Musculoskeletal: ++ gait disturbance, weakness or joint complaints  · Integumentary: Denies rash, hives or pruritis   · Neurological: Denies dizziness, headaches or seizures. No numbness or tingling  · Psychiatric: Denies anxiety or depression. · Endocrine: Denies temperature intolerance. No recent weight change. · Hematologic/Lymphatic: Denies abnormal bruising or bleeding. No swollen lymph nodes    PHYSICAL EXAM:     /60   Pulse 98   Temp 98.3 °F (36.8 °C) (Axillary)   Resp 16   Ht 5' 7\" (1.702 m)   Wt 101 lb 6.4 oz (46 kg)   SpO2 99%   BMI 15.88 kg/m²      CONST:  Well developed, frail elderly  male who appears of stated age. Lethargic.   HEENT:   Head- Normocephalic, atraumatic   Eyes- Conjunctivae pink, anicteric  Throat- Oral mucosa pink and moist  Neck-  No stridor, trachea midline, no jugular venous distention. No carotid bruit. CHEST: Chest symmetrical and non-tender to palpation. No accessory muscle use or intercostal retractions  RESPIRATORY: Lung sounds -coarse/diminished   CARDIOVASCULAR:     Heart Inspection- shows no noted pulsations  Heart Palpation- no heaves or thrills; PMI is non-displaced   Heart Ausculation- Regular rate and rhythm, no murmur. No s3, s4 or rub   PV: No lower extremity edema. No varicosities. Pedal pulses palpable, no clubbing or cyanosis   ABDOMEN: Soft, non-tender to light palpation. Bowel sounds present. No palpable masses no organomegaly; no abdominal bruit  MS: Good muscle strength and tone. No atrophy or abnormal movements. : Deferred  SKIN: Warm and dry no statis dermatitis or ulcers   NEURO / PSYCH: Oriented to person, place and time. Speech clear and appropriate. Follows all commands. Pleasant affect       DATA:    ECG / Tele strips: please see HPI   Diagnostic:    CXR (10/5/2020)  Impression   1. Right lower lobe infiltrate. 2. Chronic scarring seen within the left lung base. 3. Emphysematous changes. CXR (10/6/2020)  FINDINGS:   Heart is mildly enlarged with bibasilar atelectatic changes appear worse.  No   pneumothorax.  No pleural effusions. Impression   Bibasilar atelectasis appears worsened.          Intake/Output Summary (Last 24 hours) at 10/6/2020 1008  Last data filed at 10/6/2020 0802  Gross per 24 hour   Intake 0 ml   Output --   Net 0 ml       Labs:     Recent Labs     10/05/20  1444 10/06/20  0430   WBC 9.6 9.2   HGB 13.5 12.6   HCT 40.0 37.8    165     BMP:   Recent Labs     10/05/20  1444 10/06/20  0430    136   K 4.0 4.0   CO2 22 26   BUN 29* 32*   CREATININE 0.6* 0.8   LABGLOM >60 >60   CALCIUM 9.5 9.7     TSH:   Recent Labs     10/05/20  1444   TSH 0.026*     proBNP:   Recent Labs 10/05/20  1444   PROBNP 5,948*     CARDIAC ENZYMES:  Recent Labs     10/05/20  1444 10/05/20  2000 10/06/20  0430   CKTOTAL 533*  --   --    TROPONINI 0.02 0.03 0.03     LIVER PROFILE:  Recent Labs     10/05/20  1444   AST 39   ALT 17   LABALBU 3.5       Electronically signed by SAHRA Howard CNP on 10/6/2020 at 10:08 AM     The above documentation has been prepared under my direction and personally reviewed by me in its entirety. I confirm that the note above accurately reflects all work, treatment, procedures, and medical decision making performed by me. The patient's history was independently obtained. The patient was independently examined. Electrocardiogram, prior and present cardiovascular assessment, and laboratory studies were reviewed. The patient is a 80-year-old white male known to [facility] with primary cardiovascular care provided by Janneth Bryson. He has a known history of coronary atherosclerosis with a previous anterior ST elevation myocardial infarction in 2012 with a bare-metal stent placed to the left anterior descending and an ischemic cardiomyopathy with mild to moderate left ventricular systolic dysfunction and a remote echocardiogram demonstrating an ejection fraction of 40 to 45%. He has been unable to tolerate optimal medical therapy due to hypotension. He is a poor historian and attempts to live independently with the assistance of a neighbor who brought him food on the night prior to his hospitalization. Apparently prior to eating the patient went to the bathroom and became weak while sitting on a commode with a subsequent fall and/or loss of consciousness and inability to arise from the floor.   From additional information provided by his neighbor at the time of assessment, he is extremely weak and requires a walker for assistance and upon returning the next day to take the patient on an errand approximately 18 hours following his most recent contact found him lying in his bathroom unable to stand. Apparently emergency medical personnel were summoned and he was transported to the emergency room where he was initially noted to be mildly hypoxic with a resting electrocardiogram obtained at the time of his emergency room evaluation reviewed at the time of assessment and demonstrating evidence of sinus tachycardia with evidence of an age undetermined anteroseptal infarction and nonspecific ST changes and a chest x-ray additionally reviewed suggestive of a right lower lobe pneumonia. Laboratory studies in the emergency room demonstrated an initial normal and subsequent equivocal troponins without the pattern suggestive of an acute coronary syndrome with an elevated initial total CPK of 535 ng per milliliter and a proBNP level of 5950 pg/mL in the absence of clinical manifestations of volume overload. He subsequently denies any additional symptomatology with no significant arrhythmias documented by review of telemetry. At the time of evaluation, medications and allergies were reviewed as well as that of his past medical history and review of systems as documented. On examination, he is elderly frail and debilitated and in no present distress. Repeated coughing is noted at the time of his assessment. He is hemodynamically stable with vital signs as documented and during hospitalization a low-grade fever was noted. Mucous membranes appear dry. Jugular venous pressure appears normal with no identified carotid bruits. Lung fields demonstrate coarse breath sounds and rales in his right lung base. Cardiac examination is notable for a regular rate and rhythm with no gallop rhythm or cardiac murmur. A benign abdominal examination is present no peripheral edema identified. Diagnostic Assessment and Plan:  On a clinical basis, the patient presents with an unwitnessed episode leading to him found on the ground following a prolonged period of immobility and difficulty assessing if the primary event was that of weakness and debilitation or that of syncope potentially of a vasodepressor mediated origin. Presently no additional cardiovascular abnormalities are present with no clinical evidence of volume overload in spite the elevation of his proBNP level. Presently, based on the extended duration since his most recent evaluation, an echocardiogram would be advisable to evaluate left ventricular systolic function guide consideration of potential attempted resumption of medical management of his ischemic cardiomyopathy particularly at that of low-dose beta-blocker therapy in the face of his tachycardia. A formal swallowing assessment is pending in light of his previous history of aspiration. Beyond the above testing, no additional cardiovascular assessment presently appears indicated and will not likely further alter prognosis. A social service assessment of the patient's ability to provide adequate care at home would additionally be of benefit. Thank you for allowing me to participate in your patient's care. Please feel free to contact me if you have any questions or concerns. Claire Garcia.  Rod Major, [facility]

## 2020-10-06 NOTE — PROGRESS NOTES
Physical Therapy    Facility/Department: [address] INTERNAL MEDICINE 2  Initial Assessment    NAME: Rom Seymour  : 7/3/1927  MRN: [id number]    Date of Service: 10/6/2020       REQUIRES PT FOLLOW UP: Yes       Patient Diagnosis(es): The primary encounter diagnosis was Pneumonia due to organism. A diagnosis of Fall, initial encounter was also pertinent to this visit. has a past medical history of CAD (coronary artery disease), Cardiomyopathy ([facility]), CHF (congestive heart failure) ([facility]), DJD (degenerative joint disease), Hyperlipidemia, Hypothyroid, Hypothyroid, and Myocardial infarction acute ([facility]). has a past surgical history that includes hernia repair; Tonsillectomy; and Coronary angioplasty with stent (2012). Evaluating Therapist: Kandis Rosario, PT     Referring Provider:  Dr. William Johnson #: [id number]   DIAGNOSIS:  PNA   PRECAUTIONS: falls, O2     Social:  Pt lives alone  in a  2  floor plan  With first floor set up    Prior to admission pt walked with  Ww. Initial Evaluation  Date: 10/6/2020 Treatment      Short Term/ Long Term   Goals   Was pt agreeable to Eval/treatment? yes      Does pt have pain?  head      Bed Mobility  Rolling:  Max assist   Supine to sit: max assist   Sit to supine:  NT   Scooting:  Max assist    min assist    Transfers Sit to stand: min assist   Stand to sit: min assist   Stand pivot: min assist    SBA   Ambulation     40 feet with  ww  with  Min/mod assist   100 feet with  ww  with CGA        Stair negotiation: ascended and descended NT    4  steps with  1  rail with  CGA/min assist    LE ROM  Decreased throughout, L foot drop      LE strength  2- to 3-/ 5      AM- PAC RAW score   14/ 24            Pt is alert and Oriented x 3, grossly      Balance:  Min assist in stand, high fall risk due to decreased balance, LE weakness, L foot drop   Endurance: decreased   Bed/Chair alarm:   Yes      ASSESSMENT  Pt displays functional ability as noted in the objective portion of this evaluation. Treatment/Education:     Mobility  as above. Pt with steppage gait due to L foot drop. Also ataxic gait with cues needed to remain within LAWANDA of ww. Pt reports he has a brace, but quit wearing it. Cues needed for safe and proper technique with all mobility . Safety concerns with pt living alone  based on current level of funciton    Pt educated on fall risk, hand placement with transfers       Patient response to education:   Pt verbalized understanding Pt demonstrated skill Pt requires further education in this area   x  needs cues/assist   x       Comments:  Pt left  In chair after session, with call light in reach. Rehab potential is Good for reaching above PT goals. Pts/ family goals   1. None stated     Patient and or family understand(s) diagnosis, prognosis, and plan of care. -  Yes ? PLAN  PT care will be provided in accordance with the objectives noted above. Whenever appropriate, clear delegation orders will be provided for nursing staff. Exercises and functional mobility practice will be used as well as appropriate assistive devices or modalities to obtain goals. Patient and family education will also be administered as needed. PLAN OF CARE:    Current Treatment Recommendations     [x] Strengthening     [] ROM   [x] Balance Training   [x] Endurance Training   [x] Transfer Training   [x] Gait Training   [x] Stair Training   [x] Positioning   [x] Safety and Education Training   [x] Patient/Caregiver Education   [] HEP  [] Other       Frequency of treatments will be 2-5x/week x  7-10 days. Time in: 0955   Time out: 1055       Evaluation Time includes thorough review of current medical information, gathering information on past medical history/social history and prior level of function, completion of standardized testing/informal observation of tasks, assessment of data and education on plan of care and goals.     CPT codes:  [x] Low Complexity PT evaluation 79473  [] Moderate Complexity PT evaluation 13052  [] High Complexity PT evaluation [id number]  [] PT Re-evaluation [id number]  [] Gait training 21177  minutes  [x] Therapeutic activities 17313 10 minutes  [] Therapeutic exercises 40141  minutes  [] Neuromuscular reeducation 16058  minutes       Caleb 18 number:  PT 8744

## 2020-10-06 NOTE — CONSULTS
GENERAL SURGERY  CONSULT NOTE  10/6/2020    Physician Consulted: Dr. Stephanie Crawford  Reason for Consult: PEG  Referring Physician: Dr. Ed Chairez    Chief Complaint   Patient presents with    Fall     1800 last night in bathroom, found by neighbors 1100 this am    Shoulder Pain     right      HPI  Christ Banuelos is a 80 y.o. male with PMH CAD, cardiomyopathy, who was admitted after an unwitnessed fall at home. Has had mechanical falls int he past and broke his hip in June. Noted to be cachectic. He is being treated for pneumonia, possible aspiration. He failed a bedside swallow evalaution with Speech Therapy. He has been trying to thicken any liquids at home but states it hasn't been working. He states he is very weak and feels that he won't get any better without nutrition. PSH hernia repair in EMR, he denies any abdominal surgeries    Past Medical History:   Diagnosis Date    CAD (coronary artery disease)     [facility]omy[facility]     [facility] failure) [facility])     DJD (degenerative joint disease) 3/10/2012    Hyperlipidemia     Hypothyroid     Hypothyroid     Myocardial infarction acute ([facility] 3/12/12    BMS to LAD       Past Surgical History:   Procedure Laterality Date    CORONARY ANGIOPLASTY WITH STENT PLACEMENT  03/09/2012    Dr Mellissa Hayden:  3.5x22 stent to mid LAD  EF=20%    HERNIA REPAIR      TONSILLECTOMY         Medications Prior to Admission:    Prior to Admission medications    Medication Sig Start Date End Date Taking?  Authorizing Provider   calcium-vitamin D (OSCAL-500) 500-200 MG-UNIT per tablet Take 1 tablet by mouth 2 times daily 6/18/20   Troy L Belcik, DO   docusate sodium (COLACE, DULCOLAX) 100 MG CAPS Take 100 mg by mouth 2 times daily 6/18/20   Troy L Belcik, DO   melatonin 3 MG TABS tablet Take 0.5 tablets by mouth nightly 6/18/20   Troy L Belcik, DO   Multiple Vitamin (MULTIVITAMIN) TABS tablet Take 1 tablet by mouth daily 6/19/20   Troy L Belcik, DO ALKPHOS 75   PROT 6.3*   LABALBU 3.5     No results for input(s): LACTATE in the last 72 hours. No results for input(s): INR, PTT in the last 72 hours. Invalid input(s): PT    RADIOLOGY  Ct Chest Wo Contrast    Result Date: 10/6/2020  EXAMINATION: CT OF THE CHEST WITHOUT CONTRAST 10/6/2020 1:53 pm TECHNIQUE: CT of the chest was performed without the administration of intravenous contrast. Multiplanar reformatted images are provided for review. Dose modulation, iterative reconstruction, and/or weight based adjustment of the mA/kV was utilized to reduce the radiation dose to as low as reasonably achievable. COMPARISON: 10/06/2020 HISTORY: ORDERING SYSTEM PROVIDED HISTORY: pneumonia; hypoxia TECHNOLOGIST PROVIDED HISTORY: Reason for exam:->pneumonia; hypoxia Initial exam FINDINGS: Mediastinum: The heart size is upper limits normal.  The aorta is tortuous to ectatic. There are no pathologically enlarged intrathoracic or axillary lymph nodes. Lungs/pleura: The central airways demonstrate layering debris within the right mainstem bronchus. Dense consolidation with air bronchograms both lower lobes representing pneumonia in the correct clinical setting. Subpleural atelectasis right upper lobe posterior segment. No lung cavitation. No suspicious lung nodule. Upper Abdomen: Measuring up to _____  there are nonobstructing bilateral renal calculi on the left _____  5.4 mm. Soft Tissues/Bones: No suspicious lytic or blastic osseous lesion. COPD with bilateral lower lobe consolidation representing pneumonia in the correct clinical setting. No endobronchial mass. Bilateral nephrolithiasis.      Xr Chest Portable    Result Date: 10/6/2020  EXAMINATION: ONE XRAY VIEW OF THE CHEST 10/5/2020 10:50 pm COMPARISON: 10/05/2020 and 1304 hours HISTORY: ORDERING SYSTEM PROVIDED HISTORY: Possible aspiration TECHNOLOGIST PROVIDED HISTORY: Reason for exam:->Possible aspiration FINDINGS: Heart is mildly enlarged with bibasilar atelectatic changes appear worse. No pneumothorax. No pleural effusions. Bibasilar atelectasis appears worsened. Xr Chest Portable    Result Date: 10/5/2020  EXAMINATION: ONE XRAY VIEW OF THE CHEST 10/5/2020 1:15 pm COMPARISON: 06/12/2020 HISTORY: ORDERING SYSTEM PROVIDED HISTORY: SOB TECHNOLOGIST PROVIDED HISTORY: Reason for exam:->SOB FINDINGS: There is an infiltrate seen within the right lower lobe. There is chronic scarring seen within the left lung base. Emphysematous changes are noted. There is no gross pulmonary nodule within the upper lobes. The heart is not enlarged. 1. Right lower lobe infiltrate. 2. Chronic scarring seen within the left lung base. 3. Emphysematous changes. ASSESSMENT:  80 y.o. male with malnutrition, dysphagia    PLAN:  PEG tomorrow.  Risks, benefits, alternatives discussed with patient who understands and agrees to proceed  NPO after midnight  At risk for refeeding syndrome once started on tube feeds    Electronically signed by Yenni Wood MD on 10/6/20 at 4:42 PM EDT

## 2020-10-06 NOTE — CARE COORDINATION
Introduced my self and provided explanation of CM role to patient. Patient is awake, alert, and aware of current diagnosis and treatment plan including O2 use, cardio consult, pt/ot evals, swallow study. Patient lives at home alone. He has 2 walkers and a cane. He is not active with any [facility] services. He has a daughter, Christian Garcia, who resdes in [address] [place] [address]. I spoke with her. She indicates she will only get involved with decision making if patient is not able to do so at all. Otherwise, Christian Garcia indicates decisions are up to patient. Patient acknowledges his desire to return home. He understands that a PT and OT eval are forthcoming and adds he will base discharge decisions on that. He is willing to consider a short term stay at a SNF if necessary but would not discuss it any further citing his desire to complete therapy evaluations. Likewise, he indicates her would consider [facility] if home going. Patient has neighbor, Vanna Mcardle, who assists alot; Vanna Mcardle will be bringing clothes for patient this date. More to develop, await pt/ot, cardio consult, echo, etc.    Explained ELOS of 48-72 hours; patient voiced understanding and agreement. Will follow along with  and assist with discharge planning as necessary. Idalia Duffy.  Lani, MSN, RN  [facility] Case Management  [phone]

## 2020-10-06 NOTE — PROGRESS NOTES
Occupational Therapy  OCCUPATIONAL THERAPY INITIAL EVALUATION      Date:10/6/2020  Patient Name: Anibal Jaquez  MRN: [id number]  : 7/3/1927  Room: [address] [place] [address]    Referring Provider: Shaina Powell MD    Evaluating OT: Dori Evans OTR/L [id number]    AM-PAC Daily Activity Raw Score:     Recommended Adaptive Equipment: TBD    Diagnosis: Pneumonia. Pt presents to ED after being found on the floor by his neighbor. Pertinent Medical History: CHF, CAD, DJD, cardiomyopathy   Precautions:  Falls, O2     Home Living: Pt lives alone in a 2 story home with 1st floor set up. Bathroom setup: sponge bathes, riser over commode     Prior Level of Function: Per pt report, questionable historian, Mod I with ADLs, neighbors assist with medications and groceries, Mod I with simple IADLs; completed functional mobility with [employer]. No home O2. Pain Level: pt verbalizes the back of his head is sore    Cognition: A&O: 3/4. Pt is oriented to self, place and temporal concepts. Disoriented to situation. Poor recall. Confusion during session. Problem solving:  poor   Judgement/safety:  poor     Functional Assessment:   Initial Eval Status  Date: 10/6/20 Treatment session:  Short Term Goals  Treatment frequency: 2-5x/wk PRN x1-3 wks     Feeding Min A  Set up   Grooming Min A  Set up   UB Dressing Mod A  Donning/doffing [facility] gown  Set up   LB Dressing Max A  Donning B socks  Min A    Bathing Mod A  Min A   Toileting Max A  Incontinent of urine upon standing at EOB  Total assist in lexii care  Min A   Bed Mobility  Supine to sit:  Max A     Functional Transfers STS: Min A  Initial posterior lean  Mod I   Functional Mobility Min to Mod A with [employer]  Household distance  Requires assist to manage [employer]  Occasional LOB  Wide base of support  Mod I during ADLs   Balance Sitting: fair/fair minus at EOB    Standing: poor plus at [employer]     Activity Tolerance Poor plus  O2 throughout session  standing tim x6-7 min with fair plus balance during self care tasks             Treatment: Patient educated on techniques for completion of ADL, safe functional transfers and functional mobility. Patient required cues for follow through with proper hand/foot placement, pacing, safety, attention and technique in bed mobility, functional transfers, functional mobility, UB dressing, toileting and LB dressing in preparation for maximum independence in all self care tasks.      Hand Dominance: Right [x]  Left []   Strength ROM Additional Info:    RUE  3+/5 grossly WFL grossly  Presents like ape hand deformity limiting functional use of hand for functional tasks poor  and FMC/dexterity noted during ADL tasks     LUE 3+/5 grossly WFL grossly  Presents like ape hand deformity limiting functional use of hand for functional tasks poor  and FMC/dexterity noted during ADL tasks         Hearing: [place]  Vision: WFL  Sensation:  No c/o numbness or tingling  Tone: WFL  Edema: none                            Long Term Goal (1-3 wks): Pt will maximize functional performance in all self care tasks/functional transfers with good follow through of all trained techniques for safe transition to next level of care    Eval Complexity: Low    Assessment of current deficits   Functional mobility [x]  ADLs [x] Strength [x]  Cognition []  Functional transfers  [x] IADLs [x] Safety Awareness [x]  Endurance [x]  Fine Motor Coordination [] Balance [x] Vision/perception [] Sensation []   Gross Motor Coordination [] ROM [] Delirium []                  Motor Control []    Plan of Care:   [x] ADL retraining/AE recommendations specific to weakness, fatigue, coordination deficits, balance deficits and h/o fall  [x] Energy Conservation Techniques/Strategies      [] Neuromuscular Re-Education      [x] Functional Transfer Training         [x] Functional Mobility Training          [] Cognitive Re-Training         [] Splinting/Positioning Needs           [x] Therapeutic Activity   [x]Therapeutic Exercise   [] Visual/Perceptual  [x] Delirium Prevention/Treatment   [x] Positioning to Improve Functional [place], Safety, and Skin Integrity   [x] Patient and/or Family Education to Increase Safety and Functional [place]   [] Other:     Rehab Potential: Good for established goals     Patient / Family Goal: To get home. Patient and/or family were instructed on functional diagnosis, prognosis/goals and OT plan of care. Pt verbalized questionable understanding. Upon arrival, patient supine in bed. At end of session, patient seated in armchair with call light and phone within reach, all lines and tubes intact. Pt would benefit from continued skilled OT to increase safety and independence with completion of ADL/IADL tasks for functional independence and quality of life.  Bed/chair alarm: ON    Low Evaluation  Time In: 955   Time Out: 1015      Evaluation time includes thorough review of current medical information, gathering information on past medical history/social history and prior level of function, completion of standardized testing/informal observation of tasks, assessment of data, and development of POC/Goals    Beth Solorzano OTR/L  [id number]

## 2020-10-06 NOTE — PROGRESS NOTES
SPEECH/LANGUAGE PATHOLOGY  CLINICAL ASSESSMENT OF SWALLOWING FUNCTION    PATIENT NAME:  Link Astudillo      :  7/3/1927      TODAY'S DATE:  10/6/2020  ROOM:  [address] [place] [address]    SUMMARY OF EVALUATION    Chart reviewed including most recent chest radiograph and swallowing hx discussed with pt. Video swallow completed at [facility] on 20 with recommendations for nectar thick liquids. Pt and neighbor report that pt was using thickener at home. DYSPHAGIA DIAGNOSIS:  Oropharyngeal dysphagia-overt s/s of aspiration observed with all consistencies presented       DIET RECOMMENDATIONS:  NPO (nothing by mouth including oral meds)      FEEDING RECOMMENDATIONS:       Assistance level:  Not applicable      Compensatory strategies recommended: Not applicable    THERAPY RECOMMENDATIONS:      Dysphagia therapy is recommended 3-5 times per week for LOS or when goals are met. Pt will complete oral motor strength/ coordination exercises to improve bolus prep/ control and mastication with  moderate verbal prompts . Pt will complete BOTR strength/ ROM exercises to reduce pharyngeal residuals and improve epiglottic inversion with  moderate verbal prompts. Pt will complete laryngeal strength/ ROM therapeutic exercises to improve airway protection for the least restrictive PO diet with  moderate verbal prompts   Pt will complete PO trials of upgraded diet textures with SLP only to determine the least restrictive PO diet to maintain adequate nutrition/hydration with no more than 1 overt s/s of pen/asp.    Repeat Bedside Swallow evaluation is recommended in 2-3 days and requires a physician order                PROCEDURE     Consistencies Administered During the Evaluation   Liquids: nectar thick liquid   Solids:  pureed foods      Method of Intake:   cup, spoon  Self fed, Fed by clinician      Position:   Seated, upright                  RESULTS     Oral Stage:         Delayed A-P transit due to: reduced lingual strength  and Oral residuals were noted :  throughout the oral cavity      Pharyngeal Stage:      Throat clearing present after presentation of all consistencies administered, Immediate wet cough was noted after presentation of all consistencies administered, Wet/gurgly vocal quality was noted after presentation of all consistencies administered, Multiple swallows were noted after presentation of all consistencies administered and Delayed initiation of the pharyngeal swallow is suspected                  The Speech Language Pathologist (SLP) completed education with the patient regarding results of evaluation. Explained that Speech Pathology intervention is warranted  at this time   Prognosis for improvements is fair -     This plan will be re-evaluated and revised in 1 week  if warranted. Patient stated goals: Agreed with above,   Treatment goals discussed with Patient   The Patient understand(s) the diagnosis, prognosis and plan of care         INTERVENTION/EDUCATION    Pt educated on above results and plan of care. Pt trained on compensatory strategies for safe swallow if oral diet continues with good outcome. Pt encouraged to engage in question/answer session. All questions answered and pt verbalized understanding of above. CPT code:  49513  bedside swallow eval, 64747 dysphagia therapy 15 mins      [x]The admitting diagnosis and active problem list, as listed below have been reviewed prior to initiation of this evaluation.      ADMITTING DIAGNOSIS: Pneumonia [J18.9]  Pneumonia [J18.9]     ACTIVE PROBLEM LIST:   Patient Active Problem List   Diagnosis    Acute transmural anterior wall MI ([address]    Hypothyroidism    DJD (degenerative joint disease)    Hyperlipidemia    Hypothyroid    CAD (coronary artery disease)    Cardiomyopathy ([address]    CHF (congestive heart failure) (HCC)    Myocardial infarction acute (HCC)    Syncope and collapse    Closed fracture of pelvis (HCC)    Moderate protein-calorie malnutrition ([facility]    Orthostatic hypotension    Closed right hip fracture, with routine healing, subsequent encounter    Traumatic brain injury ([facility]    Anemia    Bone lesion    Age-related physical debility    Pneumonia

## 2020-10-06 NOTE — PROGRESS NOTES
Date: 10/5/2020    Time: 10:21 PM    Patient Placed On BIPAP/CPAP/ Non-Invasive Ventilation? No    If no must comment. Facial area red/color change? No           If YES are Blister/Lesion present? No   If yes must notify nursing staff  BIPAP/CPAP skin barrier? No    Skin barrier type:n/a       Comments: pt is resting comfortably on 4L NC at this time.          Estephanie Gooden

## 2020-10-07 PROBLEM — E43 SEVERE PROTEIN-CALORIE MALNUTRITION (HCC): Chronic | Status: ACTIVE | Noted: 2020-01-01

## 2020-10-07 NOTE — CONSULTS
Pulmonary Consultation    Admit Date: 10/5/2020  Requesting Physician: Gary Lyons MD    Reason for consultation:  · Bibasilar pneumonia  HPI:  · Jigna Guzman is a 75-year-old white male brought to the emergency room by neighbors after being found down on the floor. Seen and evaluated emergency room, the patient was noted to be markedly cachectic. A chest radiograph was abnormal and a CT scan followed. The CT scan evidenced bibasilar infiltrates as seen below. · Since admission, the patient is undergone a swallowing study. It evidenced aspiration. In addition, the patient admits to profound weight loss over the past several years. His peak weight was about 150 pounds but his normal weight was about 135 pounds. His current weight is roughly 2/3 of that. · From pulmonary standpoint, the patient is a lifelong non-smoker with no occupational exposures to silica or asbestos. PMH:    Past Medical History:   Diagnosis Date    CAD (coronary artery disease)     [facility]     [facility]     DJD (degenerative joint disease) 3/10/2012    Hyperlipidemia     Hypothyroid     Hypothyroid     Myocardial infarction acute ([facility] 3/12/12    BMS to LAD     PSH:   Past Surgical History:   Procedure Laterality Date    CORONARY ANGIOPLASTY WITH STENT PLACEMENT  03/09/2012    Dr Delmer Martin:  3.5x22 stent to mid LAD  EF=20%    HERNIA REPAIR      TONSILLECTOMY         Review of Systems:   · Constitutional: As noted in the HPI. · Eyes: No visual changes or diplopia. No scleral icterus. · ENT: No headaches, hearing loss or vertigo. No nasal congestion, or sore throat. · Cardiovascular: No chest pain, dyspnea on exertion, or palpitations. · Respiratory: See above  · Gastrointestinal: No abdominal pain, nausea or emesis. No diarrhea or rectal bleeding or melena. No change in bowel habits.   Noted difficulty swallowing  · Genitourinary: No dysuria, urinary SpO2: 95 %  24HR PULSE OXIMETRY RANGE:  SpO2  Av.2 %  Min: 95 %  Max: 98 %    EXAM:  General: No distress. Alert. Marked cachexia  Eyes: PERRL. No sclera icterus. No conjunctival injection. ENT: No discharge. Pharynx clear. Neck: Trachea midline. Normal thyroid. No jvd, no hjr. Resp: No wheezing. No accessory muscle use. Bibasilar rales. No rhonchi. CV: Regular rate. Regular rhythm. No murmur No rub. Abd: Non-tender. Non-distended. No masses. No organmegaly. Normal bowel sounds. Skin: Warm and dry. No nodule on exposed extremities. No rash on exposed extremities. Lymph: No cervical LAD. No supraclavicular LAD. Ext: No joint deformity. No clubbing. No cyanosis. No edema  Neuro: Awake. Follows commands. Positive pupils/gag/corneals. Normal pain response. Lab Results:  CBC:   Recent Labs     10/05/20  1444 10/06/20  0430   WBC 9.6 9.2   HGB 13.5 12.6   HCT 40.0 37.8   MCV 99.8 100.5*    165       BMP:  Recent Labs     10/05/20  1444 10/06/20  0430    136   K 4.0 4.0    102   CO2 22 26   BUN 29* 32*   CREATININE 0.6* 0.8    ALB:3,BILIDIR:3,BILITOT:3,ALKPHOS:3)@    PT/INR: No results for input(s): PROTIME, INR in the last 72 hours. Cultures:  Sputum: not available  Blood: not available    ABG:   Recent Labs     10/05/20  1429   PH 7.413   PO2 54.1*   PCO2 34.0*   HCO3 21.2*   BE -2.6   O2SAT 89.4*   METHB 0.3   O2HB 88.1*   COHB 1.2   O2CON 17.3   HHB 10.4*   THB 14.0             Films:     CT CHEST WO CONTRAST   Final Result   COPD with bilateral lower lobe consolidation representing pneumonia in the   correct clinical setting. No endobronchial mass. Rim calcified right renal cyst.  Recommend six-month repeat imaging to assure   stability. Bilateral nephrolithiasis. XR CHEST PORTABLE   Final Result   Bibasilar atelectasis appears worsened. XR CHEST PORTABLE   Final Result   1. Right lower lobe infiltrate.    2. Chronic scarring seen within the left

## 2020-10-07 NOTE — PROGRESS NOTES
INPATIENT CARDIOLOGY FOLLOW-UP    Name: Demarco Avila    Age: 80 y.o. Date of Admission: 10/5/2020 12:24 PM    Date of Service: 10/7/2020    Primary Cardiologist: Dr Jose Lucio    Chief Complaint: Follow-up for ? syncope    Interim History:  Patient was seen this morning. Scheduled for PEG tube. Complains of some shortness of breath and difficulty swallowing. Echo reviewed, showed EF of 40%.     Review of Systems:   Negative except as described above    Problem List:  Patient Active Problem List   Diagnosis    Acute transmural anterior wall MI ([address]    Hypothyroidism    DJD (degenerative joint disease)    Hyperlipidemia    Hypothyroid    CAD (coronary artery disease)    Cardiomyopathy ([address]    CHF (congestive heart failure) (HCC)    Myocardial infarction acute (HCC)    Syncope and collapse    Closed fracture of pelvis (HCC)    Moderate protein-calorie malnutrition (HCC)    Orthostatic hypotension    Closed right hip fracture, with routine healing, subsequent encounter    [facility]    Anemia    Bone lesion    Age-related physical debility    Pneumonia due to organism    Severe protein-calorie malnutrition ([facility]       Current Medications:    Current Facility-Administered Medications:     carvedilol (COREG) tablet 3.125 mg, 3.125 mg, PEG Tube, BID WC, Juan Luis Richmond MD  Caro Her  [START ON 10/8/2020] aspirin chewable tablet 81 mg, 81 mg, PEG Tube, Daily, Juan Luis Richmond MD    metoprolol (LOPRESSOR) injection 5 mg, 5 mg, Intravenous, Q6H, Kirit Rogers MD, 5 mg at 10/07/20 1705    ampicillin-sulbactam (UNASYN) 3 g ivpb minibag, 3 g, Intravenous, Q6H, Cortez Fisher MD, Stopped at 10/07/20 1649    vitamin C (ASCORBIC ACID) tablet 500 mg, 500 mg, Oral, Daily, Cortez Fisher MD    calcium-vitamin D 500-200 MG-UNIT per tablet 1 tablet, 1 tablet, Oral, BID, Cortez Fisher MD, 1 tablet at 10/06/20 2135    docusate sodium (COLACE) capsule 100 mg, 100 mg, Oral, BID, Dariela Lopez MD, 100 mg at 10/06/20 2139    magnesium oxide (MAG-OX) tablet 400 mg, 400 mg, Oral, Daily, Dariela Lopez MD    melatonin tablet 1.5 mg, 1.5 mg, Oral, Nightly, Dariela Lopez MD, 1.5 mg at 10/06/20 2139    multivitamin 1 tablet, 1 tablet, Oral, Daily, Dariela Lopez MD    vitamin D3 (CHOLECALCIFEROL) tablet 400 Units, 400 Units, Oral, Daily, Dariela Lopez MD    sodium chloride flush 0.9 % injection 10 mL, 10 mL, Intravenous, 2 times per day, Dariela Lopez MD, 10 mL at 10/07/20 0847    sodium chloride flush 0.9 % injection 10 mL, 10 mL, Intravenous, PRN, Dariela Lopez MD    acetaminophen (TYLENOL) tablet 650 mg, 650 mg, Oral, Q6H PRN **OR** acetaminophen (TYLENOL) suppository 650 mg, 650 mg, Rectal, Q6H PRN, Dariela Lopez MD    polyethylene glycol (GLYCOLAX) packet 17 g, 17 g, Oral, Daily PRN, Dariela Lopez MD    promethazine (PHENERGAN) tablet 12.5 mg, 12.5 mg, Oral, Q6H PRN **OR** ondansetron (ZOFRAN) injection 4 mg, 4 mg, Intravenous, Q6H PRN, Dariela Lopez MD    ipratropium-albuterol (DUONEB) nebulizer solution 1 ampule, 1 ampule, Inhalation, Q4H WA, Dariela Lopez MD, 1 ampule at 10/07/20 1223    perflutren lipid microspheres (DEFINITY) injection 1.65 mg, 1.5 mL, Intravenous, ONCE PRN, Dariela Lopez MD    0.9 % sodium chloride infusion, , Intravenous, Continuous, Pawan Marshall MD, Last Rate: 50 mL/hr at 10/06/20 1420    ipratropium-albuterol (DUONEB) nebulizer solution 1 ampule, 1 ampule, Inhalation, Q4H PRN, Saeid Patel MD, 1 ampule at 10/06/20 0000    Physical Exam:  /84   Pulse 102   Temp 98 °F (36.7 °C) (Oral)   Resp 16   Ht 5' 7\" (1.702 m)   Wt 97 lb 1.6 oz (44 kg)   SpO2 99%   BMI 15.21 kg/m²   Wt Readings from Last 3 Encounters:   10/07/20 97 lb 1.6 oz (44 kg)   06/25/20 102 lb (46.3 kg)   06/03/20 113 lb 15.7 oz (51.7 kg)     Appearance: Cachectic and chronically ill-appearing elderly male  Skin: Intact, no rash  Head: VLDL  No results found for: CHOLHDLRATIO  Recent Labs     10/05/20  1444   PROBNP 5,948*       Cardiac Tests:    EKG: Sinus tachycardia 110 bpm    Telemetry: Sinus rhythm    Chest CT:   Impression:         COPD with bilateral lower lobe consolidation representing pneumonia in the   correct clinical setting.  No endobronchial mass. Rim calcified right renal cyst.  Recommend six-month repeat imaging to assure   stability. Bilateral nephrolithiasis. Echocardiogram:   TTE 10/6/20     Summary   Left ventricular internal dimensions were normal in diastole and systole. Mild asymmetric septal hypertrophy. Regional wall motion abnormality with severe hypokinesis of anteroseptal,   anterior, anterolateral and apical segments. Moderately reduced left ventricular systolic dysfunction. There is doppler evidence of stage I diastolic dysfunction. The left atrium is mildly dilated. Interatrial septum appears intact. Mild thickening of the mitral valve leaflets. Mild mitral annular calcification. Mild mitral regurgitation is present. The aortic valve appears mildly sclerotic. Mild aortic regurgitation is noted. Physiologic and/or trace tricuspid regurgitation. Stress test:      Cardiac catheterization: Acute MI (3/2012) BMS to LAD    ----------------------------------------------------------------------------------------------------------------------------------------------------------------  IMPRESSION:  1. Chronic HFrEF, appears compensated  2. Ischemic cardiomyopathy, EF 40%  3. History of anterior MI, status post bare-metal stent to LAD 2012  4. Aspiration pneumonia  5. Severe malnutrition  6.  Comorbid disease: Hyperlipidemia, PUD, hypothyroidism, DJD    RECOMMENDATIONS:     Initiate guideline therapy with carvedilol 3.125 mg twice daily via peg tube   Recommend resuming aspirin given history of CAD with bare-metal stent   Aggressive nutritional support   Risk factor modification   Will see as needed, please call with questions    Jessica Rai MD, [facility] Cardiology    NOTE: This report was transcribed using voice recognition software. Every effort was made to ensure accuracy; however, inadvertent computerized transcription errors may be present.

## 2020-10-07 NOTE — ANESTHESIA PRE PROCEDURE
Department of Anesthesiology  Preprocedure Note       Name:  Kashif Aguila   Age:  80 y.o.  :  7/3/1927                                          MRN:  [id number]         Date:  10/7/2020      Surgeon: Esperanza Cornejo):  Inge Prajapati MD    Procedure: Procedure(s):  EGD PEG TUBE PLACEMENT    Medications prior to admission:   Prior to Admission medications    Medication Sig Start Date End Date Taking? Authorizing Provider   calcium-vitamin D (OSCAL-500) 500-200 MG-UNIT per tablet Take 1 tablet by mouth 2 times daily 20   Saxton L Belcik, DO   docusate sodium (COLACE, DULCOLAX) 100 MG CAPS Take 100 mg by mouth 2 times daily 20   Saxton L Belcik, DO   melatonin 3 MG TABS tablet Take 0.5 tablets by mouth nightly 20   Saxton L Belcik, DO   Multiple Vitamin (MULTIVITAMIN) TABS tablet Take 1 tablet by mouth daily 20   Saxton L Beljoelk, DO   tamsulosin (FLOMAX) 0.4 MG capsule Take 1 capsule by mouth nightly 20   Saxton L Belcik, DO   Ascorbic Acid (VITAMIN C) 500 MG CAPS Take 500 mg by mouth daily. Historical Provider, MD   GRAPE SEED EXTRACT PO Take 150 mg by mouth daily. Historical Provider, MD   Magnesium 400 MG CAPS Take  by mouth daily. Historical Provider, MD   VITAMIN A PO Take 5,000 Units by mouth. Historical Provider, MD   vitamin D (ERGOCALCIFEROL) 400 UNITS CAPS Take 400 Units by mouth daily.       Historical Provider, MD       Current medications:    Current Facility-Administered Medications   Medication Dose Route Frequency Provider Last Rate Last Dose    carvedilol (COREG) tablet 3.125 mg  3.125 mg PEG Tube BID  Taras Trujillo MD        [START ON 10/8/2020] aspirin chewable tablet 81 mg  81 mg PEG Tube Daily Taras Trujillo MD        ampicillin-sulbactam (UNASYN) 3 g ivpb minibag  3 g Intravenous Q6H Gato De Jesus MD   Stopped at 10/07/20 0910    vitamin C (ASCORBIC ACID) tablet 500 mg  500 mg Oral Daily Gato De Jesus MD       [facility] calcium-vitamin D 500-200 MG-UNIT per tablet 1 tablet  1 tablet Oral BID Cam Dougherty MD   1 tablet at 10/06/20 2139    docusate sodium (COLACE) capsule 100 mg  100 mg Oral BID Cam Dougherty MD   100 mg at 10/06/20 2139    magnesium oxide (MAG-OX) tablet 400 mg  400 mg Oral Daily Cam Dougherty MD        melatonin tablet 1.5 mg  1.5 mg Oral Nightly Cam Dougherty MD   1.5 mg at 10/06/20 2139    multivitamin 1 tablet  1 tablet Oral Daily Cam Dougherty MD        vitamin D3 (CHOLECALCIFEROL) tablet 400 Units  400 Units Oral Daily Cam Dougherty MD        sodium chloride flush 0.9 % injection 10 mL  10 mL Intravenous 2 times per day Cam Dougherty MD   10 mL at 10/07/20 0847    sodium chloride flush 0.9 % injection 10 mL  10 mL Intravenous PRN Cam Dougherty MD        acetaminophen (TYLENOL) tablet 650 mg  650 mg Oral Q6H PRN Cam Dougherty MD        Or   [facility] acetaminophen (TYLENOL) suppository 650 mg  650 mg Rectal Q6H PRN Cam Dougherty MD        polyethylene glycol (GLYCOLAX) packet 17 g  17 g Oral Daily PRN Cam Dougherty MD        promethazine (PHENERGAN) tablet 12.5 mg  12.5 mg Oral Q6H PRN Cam Dougherty MD        Or    [facility] injection 4 mg  4 mg Intravenous Q6H PRN Cam Dougherty MD        ipratropium-albuterol (DUONEB) nebulizer solution 1 ampule  1 ampule Inhalation Q4H WA Cam Dougherty MD   1 ampule at 10/07/20 1223    perflutren lipid microspheres (DEFINITY) injection 1.65 mg  1.5 mL Intravenous ONCE PRN Cam Dougherty MD        0.9 % sodium chloride infusion   Intravenous Continuous Janna Ann MD 50 mL/hr at 10/06/20 1420      ipratropium-albuterol (DUONEB) nebulizer solution 1 ampule  1 ampule Inhalation Q4H PRN Elisabeth Camacho MD   1 ampule at 10/06/20 0000       Allergies:     Allergies   Allergen Reactions    Quinolones     Septra [Bactrim]        Problem List:    Patient Active Problem List   Diagnosis Code    Acute transmural anterior wall MI ([facility] I21.09    Hypothyroidism E03.9    DJD (degenerative joint disease) M19.90    Hyperlipidemia E78.5    Hypothyroid E03.9    CAD (coronary artery disease) I25.10    Cardiomyopathy (HCC) I42.9    CHF (congestive heart failure) (HCC) I50.9    Myocardial infarction acute ([facility]) I21.9    Syncope and collapse R55    Closed fracture of pelvis ([facility]) S32. 9XXA    Moderate protein-calorie malnutrition (HCC) E44.0    Orthostatic hypotension I95.1    Closed right hip fracture, with routine healing, subsequent encounter S72.001D    Traumatic brain injury ([facility] [address] S06. 9X9A    Anemia D64.9    Bone lesion M89.9    Age-related physical debility R54    Pneumonia J18.9    Severe protein-calorie malnutrition (HCC) E43       Past Medical History:        Diagnosis Date    CAD (coronary artery disease)     Cardiomyopathy ([facility] [address]     CHF (congestive heart failure) ([facility])     DJD (degenerative joint disease) 3/10/2012    Hyperlipidemia     Hypothyroid     Hypothyroid     [facility] 3/12[facility]12    BMS to LAD       Past Surgical History:        Procedure Laterality Date    CORONARY ANGIOPLASTY WITH STENT PLACEMENT  03/09/2012    Dr Mansoor Lowe:  3.5x22 stent to mid LAD  EF=20%    HERNIA REPAIR      TONSILLECTOMY         Social History:    Social History     Tobacco Use    Smoking status: Never Smoker    Smokeless tobacco: Never Used   Substance Use Topics    Alcohol use:  No                                Counseling given: Not Answered      Vital Signs (Current):   Vitals:    10/07/20 0320 10/07/20 0830 10/07/20 0909 10/07/20 1143   BP:  122/62     Pulse:  90     Resp:  18 16    Temp:  97.7 °F (36.5 °C)     TempSrc:  Oral     SpO2:  98% 95%    Weight: 97 lb 1.6 oz (44 kg)      Height:    5' 7\" (1.702 m)                                              BP Readings from Last 3 Encounters:   10/07/20 122/62   06/25/20 110/60   06/18/20 (!) 105/58       NPO Status: BMI:   Wt Readings from Last 3 Encounters:   10/07/20 97 lb 1.6 oz (44 kg)   06/25/20 102 lb (46.3 kg)   06/03/20 113 lb 15.7 oz (51.7 kg)     Body mass index is 15.21 kg/m². CBC:   Lab Results   Component Value Date    WBC 9.2 10/06/2020    RBC 3.76 10/06/2020    HGB 12.6 10/06/2020    HCT 37.8 10/06/2020    .5 10/06/2020    RDW 13.4 10/06/2020     10/06/2020       CMP:   Lab Results   Component Value Date     10/06/2020    K 4.0 10/06/2020     10/06/2020    CO2 26 10/06/2020    BUN 32 10/06/2020    CREATININE 0.8 10/06/2020    GFRAA >60 10/06/2020    LABGLOM >60 10/06/2020    GLUCOSE 154 10/06/2020    GLUCOSE 95 03/11/2012    PROT 6.3 10/05/2020    CALCIUM 9.7 10/06/2020    BILITOT 0.7 10/05/2020    ALKPHOS 75 10/05/2020    AST 39 10/05/2020    ALT 17 10/05/2020       POC Tests: No results for input(s): POCGLU, POCNA, POCK, POCCL, POCBUN, POCHEMO, POCHCT in the last 72 hours.     Coags:   Lab Results   Component Value Date    PROTIME 13.3 05/27/2020    PROTIME 11.7 03/09/2012    INR 1.1 05/27/2020    APTT 26.8 03/09/2012       HCG (If Applicable): No results found for: PREGTESTUR, PREGSERUM, HCG, HCGQUANT     ABGs: No results found for: PHART, PO2ART, WHY9ZRI, RLV6LSY, BEART, E8OHJAFI     Type & Screen (If Applicable):  No results found for: LABABO, LABRH    Drug/Infectious Status (If Applicable):  No results found for: HIV, HEPCAB    COVID-19 Screening (If Applicable):   Lab Results   Component Value Date    COVID19 Not Detected 10/05/2020         Anesthesia Evaluation  Patient summary reviewed no history of anesthetic complications:   Airway: Mallampati: II  TM distance: >3 FB   Neck ROM: full  Mouth opening: > = 3 FB Dental:          Pulmonary: breath sounds clear to auscultation  (+) pneumonia: unresolved,                            ROS comment: CT Chest 10/2020:  COPD with bilateral lower lobe consolidation representing pneumonia in the   correct clinical setting.  No endobronchial mass.       Rim calcified right renal cyst.  Recommend six-month repeat imaging to assure   stability.       Bilateral nephrolithiasis.            Cardiovascular:  Exercise tolerance: poor (<4 METS),   (+) past MI:, CAD:, CABG/stent (BMS to LAD):, CHF (LVEF 92%): systolic and diastolic, hyperlipidemia      ECG reviewed  Rhythm: regular  Rate: normal  Echocardiogram reviewed               ROS comment: Cardiomyopathy    Echo 10/2020:      Summary   Left ventricular internal dimensions were normal in diastole and systole. Mild asymmetric septal hypertrophy. Regional wall motion abnormality with severe hypokinesis of anteroseptal,   anterior, anterolateral and apical segments. Moderately reduced left ventricular systolic dysfunction. There is doppler evidence of stage I diastolic dysfunction. The left atrium is mildly dilated. Interatrial septum appears intact. Mild thickening of the mitral valve leaflets. Mild mitral annular calcification. Mild mitral regurgitation is present. The aortic valve appears mildly sclerotic. Mild aortic regurgitation is noted. Physiologic and/or trace tricuspid regurgitation. EKG 10/2020: Sinus tachycardia, anteroseptal infarct     Neuro/Psych:   Negative Neuro/Psych ROS              GI/Hepatic/Renal:            ROS comment: malnutrition. Endo/Other:    (+) hypothyroidism::., .                 Abdominal:           Vascular: negative vascular ROS. Per H&P 10/5/2020:  \"Of note, when discussing CODE STATUS with the patient, the patient does want chest compressions and intubation. He will be a full code. \"     Anesthesia Plan      MAC     ASA 4       Induction: intravenous. Anesthetic plan and risks discussed with patient.                       Shad Sorto MD   10/7/2020

## 2020-10-07 NOTE — CARE COORDINATION
Met with patient at bedside. Discussed discharge planning. Patient acknowledges need for rehab. Review of facilities completed. Patient selected [facility] and a referral was initiated to [facility] with same. Await her review and response. Of note, patient had negative Covid 10/5/20. He is scheduled for PEG placement today. Explained ELOS of 24-48 hours; patient voiced understanding and agreement. Will follow along with  and assist with discharge planning as necessary. Stefano Hwang.  Lani, MSN, RN  [facility] Case Management  [phone]

## 2020-10-07 NOTE — PROGRESS NOTES
[facility] Progress Note    Admitting Date and Time: 10/5/2020 12:24 PM  Admit Dx: Pneumonia [J18.9]  Pneumonia [J18.9]    Subjective:  Patient is being followed for Pneumonia [J18.9]  Pneumonia [J18.9]   Pt feels little better today. Due to concern for aspiration, a swallow eval was performed and the patient failed. He continues to require 4 L NC. Per RN: patient failed swallow eval.  Patient must be kept NPO. Will need to discuss PEG tube    ROS: denies fever, chills, cp, sob, n/v, HA unless stated above.       carvedilol  3.125 mg PEG Tube BID WC    [START ON 10/8/2020] aspirin  81 mg PEG Tube Daily    ampicillin-sulbactam  3 g Intravenous Q6H    vitamin C  500 mg Oral Daily    calcium-vitamin D  1 tablet Oral BID    docusate sodium  100 mg Oral BID    magnesium oxide  400 mg Oral Daily    melatonin  1.5 mg Oral Nightly    multivitamin  1 tablet Oral Daily    vitamin D3  400 Units Oral Daily    sodium chloride flush  10 mL Intravenous 2 times per day    ipratropium-albuterol  1 ampule Inhalation Q4H WA     sodium chloride flush, 10 mL, PRN  acetaminophen, 650 mg, Q6H PRN    Or  acetaminophen, 650 mg, Q6H PRN  polyethylene glycol, 17 g, Daily PRN  promethazine, 12.5 mg, Q6H PRN    Or  ondansetron, 4 mg, Q6H PRN  perflutren lipid microspheres, 1.5 mL, ONCE PRN  ipratropium-albuterol, 1 ampule, Q4H PRN         Objective:    /62   Pulse 90   Temp 97.7 °F (36.5 °C) (Oral)   Resp 16   Ht 5' 7\" (1.702 m)   Wt 97 lb 1.6 oz (44 kg)   SpO2 95%   BMI 15.21 kg/m²     General Appearance: alert and oriented to person, place and time and in no acute distress  Skin: warm and dry  Head: normocephalic and atraumatic  Eyes: pupils equal, round, and reactive to light, extraocular eye movements intact, conjunctivae normal  Neck: neck supple and non tender without mass   Pulmonary/Chest: clear to auscultation bilaterally- no wheezes, rales or rhonchi, reduced air movement in lung bases bilaterally  Cardiovascular: normal rate, normal S1 and S2 and no carotid bruits  Abdomen: soft, non-tender, non-distended, normal bowel sounds, no masses or organomegaly  Extremities: no cyanosis, no clubbing and no edema  Neurologic: no cranial nerve deficit and speech normal        Recent Labs     10/05/20  1444 10/06/20  0430    136   K 4.0 4.0    102   CO2 22 26   BUN 29* 32*   CREATININE 0.6* 0.8   GLUCOSE 109* 154*   CALCIUM 9.5 9.7       Recent Labs     10/05/20  1444 10/06/20  0430   WBC 9.6 9.2   RBC 4.01 3.76*   HGB 13.5 12.6   HCT 40.0 37.8   MCV 99.8 100.5*   MCH 33.7 33.5   MCHC 33.8 33.3   RDW 13.2 13.4    165   MPV 10.2 10.3       Radiology:   EXAMINATION:    ONE XRAY VIEW OF THE CHEST         10/5/2020 10:50 pm         COMPARISON:    10/05/2020 and 1304 hours         HISTORY:    ORDERING SYSTEM PROVIDED HISTORY: Possible aspiration    TECHNOLOGIST PROVIDED HISTORY:    Reason for exam:->Possible aspiration         FINDINGS:    Heart is mildly enlarged with bibasilar atelectatic changes appear worse.  No    pneumothorax.  No pleural effusions.              Impression    Bibasilar atelectasis appears worsened. Assessment:    Active Problems:    Pneumonia    Severe protein-calorie malnutrition ([address]  Resolved Problems:    * No resolved hospital problems. *      Plan:  1. Acute respiratory failure with hypoxia  -  Patient requiring O2 4L NC to keep O2 sats above 90%  -  CXR on admission showed RLL infiltrate; repeat last night showed bibasilar atelectasis (worening CXR)  -  Chest CT ordered-pending result  -  Patient is on rocephin and doxycycline; will change to unasyn  -  Check sputum culture  -  Consult pulmonology    2.   Mechanical Fall/high risk for falls/frequent falls  -  This is the third fall in 2020  -  Previous right hip fracture in June of this year  -  Possibly secondary to syncopal episode  -  echocardiogram complete-awaiting read  -  cardiology consultation ordered  -  Cycle troponins- 0.03  -  PT/OT evaluation/treatment  -  Patient would benefit from placement in at least assisted living situation  -  Hold flomax for now (does not sound like it was helping his incontinence and could cause orthostatic hypotension. )  -  Orthostatics pending this morning    3. RLL Pneumonia  -  initially on rocephin and doxycycline/ changed to Unasyn x 7 days     4. Severe Protein-Calorie Malnutrition  -  patient is now NPO due to failed swallow evaluation  -  PEG placed today     5. CAD with hx of MI and stent placement in 2012  -  Not on BB, aspirin, ARB or statins due to side effects  -  Cardiology consulted     6. Hyperlipidemia-    -  Not on statin due to hx of myalgias     7.  Urinary frequency and incontinence-    -  Previously on flomax; holding for now. -  Checked PSA-0.42     8. Acquired hypothyroidism  -  TSH is 0.026; it is unclear whether the patient was taking medication. Will add additional thyroid labs to the morning lab draw     9. Hx of combines systolic/diastolic heart failure  -  Last echocardiogram was in 2012- EF of 40-45% at that time. -  Will check echo and consult cardiology  -  No indication of fluid overload at this time.     10. Abnormal focal lytic lesion on prior CT head from 5/2020-  This has been discussed with the patient previously and patient declined any further work up. Will discuss this again with the patient.      11. Dehydration-careful hydration needed  -  IVF NS at 75 cc/hr           Code Status: full  DVT prophylaxis: pcds      NOTE: This report was transcribed using voice recognition software. Every effort was made to ensure accuracy; however, inadvertent computerized transcription errors may be present.   Electronically signed by Drew Tomlinson MD on 10/7/2020 at 12:58 PM

## 2020-10-07 NOTE — PROGRESS NOTES
Comprehensive Nutrition Assessment    Type and Reason for Visit:  Initial, Positive Nutrition Screen    Nutrition Recommendations/Plan: Continue NPO, Start Tube feeding when medically appropriate once access is obtained    TF recommendation for when needed:  Standard w/ Fiber @ 40 ml/hr + 1 protein modular daily  Will provide 960 ml tv, 1152 kcals, 53 gm pro, 775 ml free water (1256 kcals &79 gm pro w/ daily protein modular)  Regimen will meet 100% estimated nutrient needs    Nutrition Assessment:  Pt is nutritionally compromised d/t admit w/ PNA, noted severe malnutrition and dysphagia, s/p failed swallow eval w/ current NPO status pending PEG placement. Will provide TF recs for when medically appropriate and monitor.     Malnutrition Assessment:  Malnutrition Status:  Severe malnutrition    Context:  Chronic Illness     Findings of the 6 clinical characteristics of malnutrition:  Energy Intake:  7 - 75% or less estimated energy requirements for 1 month or longer  Weight Loss:  7 - Greater than 10% over 6 months     Body Fat Loss:  7 - Severe body fat loss Orbital, Triceps   Muscle Mass Loss:  7 - Severe muscle mass loss Temples (temporalis), Clavicles (pectoralis & deltoids)  Fluid Accumulation:  No significant fluid accumulation     Strength:  Not Performed    Estimated Daily Nutrient Needs:  Energy (kcal):  ; Weight Used for Energy Requirements:  Admission     Protein (g):  65-75; Weight Used for Protein Requirements:  Admission(1.5-1.8)        Fluid (ml/day):  ; Weight Used for Fluid Requirements:  Admission      Nutrition Related Findings:  A&Ox4, active BS, soft abd, cachectic, no edema, fluids WDL, s/p failed swallow eval      Wounds:  Skin Tears, Wound Care Consult Pending       Current Nutrition Therapies:    Diet NPO Effective Now Exceptions are: Ice Chips    Anthropometric Measures:  · Height: 5' 7\" (170.2 cm)  · Current Body Weight: 97 lb (44 kg)   · Admission Body Weight: 94 lb (42.6 kg)(10/5 actual)    · Usual Body Weight: 106 lb (48.1 kg)(5/2020 bed scale, per EMR)     · Ideal Body Weight: 148 lbs; % Ideal Body Weight 65.5 %   · BMI: 15.2  · BMI Categories: Underweight (BMI less than 18.5)       Nutrition Diagnosis:   · Severe malnutrition, In context of chronic illness related to swallowing difficulty as evidenced by poor intake prior to admission, weight loss greater than or equal to 10% in 6 months, severe loss of subcutaneous fat, severe muscle loss    Nutrition Interventions:   Food and/or Nutrient Delivery:  Continue NPO, Start Tube Feeding(When medically appropriate, TF recommendation: Standard w/ Fiber @ 40 ml/hr + 1 protein modular daily)  Nutrition Education/Counseling:  No recommendation at this time   Coordination of Nutrition Care:  Continued Inpatient Monitoring    Goals:  nutrition progression       Nutrition Monitoring and Evaluation:   Food/Nutrient Intake Outcomes:  Enteral Nutrition Intake/Tolerance  Physical Signs/Symptoms Outcomes:  Biochemical Data, Fluid Status or Edema, Nutrition Focused Physical Findings, Skin, Weight     Discharge Planning:     Too soon to determine     Electronically signed by Louann Guzman MS, RD, LD on 10/7/20 at 11:52 AM EDT    Contact: [phone]

## 2020-10-07 NOTE — OP NOTE
PEG Operative Note      Kenton Wang is an 80 y.o. male. Pre-op Diagnosis:   Failure to thrive    Post-op Diagnosis List:  Failure to thrive    Procedure(s):  EGD PEG TUBE PLACEMENT    Surgeon:  Surgeon(s):  Zo Camacho MD    Staff:   Perioperative Nurse: Onesimo Billingsley RN; Laquita Kothari RN    Anesthesia:  Monitor Anesthesia Care    Findings: Adequate placement of PEG at 2cm    EBL: minimal    Specimens:  None    Complications:  * No complications entered in OR log *    Disposition of Patient:  Tolerated procedure well    Surgical Course:  Patient is a 80 y.o. male who has failure to thrive and failed swallow. Long term enteral nutritional access is needed. Risks, Benefits, Alternatives were discussed with the patient. They agreed to undergo the procedure. The patient was brought to the operating room and placed supine. Adequate anesthesia was then induced. A timeout was performed, and the patient had received appropriate perioperative antibiotics and SCD's were on and functional.  Patient was prepped and draped in usual sterile fashion. An EGD was performed and no major mucosal abnormalities were noted in the esophagus, stomach, or proximal duodenum including on retroflexed view aside from some mild gastritis. With the stomach held in full insufflation, a bright light reflex was identified in the left upper abdomen without using the transillumination function of the endoscope. In addition, 1 to 1 ballotment was seen. The skin was infiltrated with local anesthetic and the needle and sheath were inserted through the abdominal wall into the stomach under direct visualization with it held in full insufflation, on the first attempt. A wire was threaded, grasped endoscopically, and withdrawn. A 20 [place] feeding tube was pulled back into position.   At 2 cm at the skin, the internal bumper was abutting but not tightly compressing the gastric mucosa and visually able to spin after reinsertion of the endoscope. It was secured with the external bumper, a tube nichols, and dressings. The scope was withdrawn and no evidence of trauma from the procedure noted. The patient tolerated the procedure well and was transferred to PACU in stable contition.      Rehana Roque MD  10/07/20  3:44 PM

## 2020-10-07 NOTE — PROGRESS NOTES
Physical Therapy  Facility/Department: [address] INTERNAL MEDICINE 2  Daily Treatment Note  NAME: Toni Hanna  : 7/3/1927  MRN: [id number]    Date of Service: 10/7/2020    Patient Diagnosis(es): The primary encounter diagnosis was Pneumonia due to organism. A diagnosis of Fall, initial encounter was also pertinent to this visit. has a past medical history of CAD (coronary artery disease), Cardiomyopathy ([facility] CHF (congestive heart failure) ([facility] [address] DJD (degenerative joint disease), Hyperlipidemia, Hypothyroid, Hypothyroid, and Myocardial infarction acute ([facility] [address] has a past surgical history that includes hernia repair; Tonsillectomy; and Coronary angioplasty with stent (2012). Evaluating Therapist: Maximiliano Cook, PT      Referring Provider:  Dr. Ana Canseco #: [id number]   DIAGNOSIS:  PNA   PRECAUTIONS: falls, O2      Social:  Pt lives alone  in a  2  floor plan  With first floor set up    Prior to admission pt walked with  Ww.        Initial Evaluation  Date: 10/6/2020 Treatment      Short Term/ Long Term   Goals   Was pt agreeable to Eval/treatment?   yes  yes      Does pt have pain?  head  No c/o pain      Bed Mobility  Rolling:  Max assist   Supine to sit: max assist   Sit to supine:  NT   Scooting:  Max assist   rolling mod assist  Supine to sit mod assist  Scooting to edge of bed mod assist  min assist    Transfers Sit to stand: min assist   Stand to sit: min assist   Stand pivot: min assist   sit <> stand mod assist  SBA   Ambulation     40 feet with  ww  with  Min/mod assist   40 feet with ww min to mod assist 100 feet with  ww  with CGA          Stair negotiation: ascended and descended NT     4  steps with  1  rail with  CGA/min assist    LE ROM  Decreased throughout, L foot drop        LE strength  2- to 3-/ 5        AM- PAC RAW score                Patient education  Pt was educated on transfer technique    Patient response to education:   Pt verbalized understanding Pt demonstrated skill Pt requires further education in this area   yes With cues and assist yes     Additional Comments/treatment: Pt instructed on importance of mobility and transfer technique. Pt ambulated in room with ww min to mod assist for balance. L drop foot noted with steppage gait to compensate    Pt was left in chair with call light left by patient. Time in: 1100  Time out: 1118    Total treatment time 18 minutes    Pt is making fair progress toward established Physical Therapy goals. Continue with physical therapy current plan of care.     Carmita PT [id number]      CPT codes:  [] Low Complexity PT evaluation 06907  [] Moderate Complexity PT evaluation 38967  [] High Complexity PT evaluation 25039  [] PT Re-evaluation 85080  [] Gait training 88973  minutes  [] Manual therapy 75330    minutes  [x] Therapeutic activities 99768 18   minutes  [] Therapeutic exercises 09178     minutes  [] Neuromuscular reeducation 98731     minutes

## 2020-10-07 NOTE — PROGRESS NOTES
Tolerance Poor plus  O2 throughout session Poor +   standing tim x6-7 min with fair plus balance during self care tasks       Comments:  Pt remained in chair at end of the session. Cues for safety throughout session. Education/treatment:  ADL retraining with facilitation of movement for self care. Therapeutic activity to address balance, strength, and endurance for ADL and transfers. Pt education of daily orientation, walker safety, and transfer safety. · Pt has made  progress towards set goals.        Time In: 11:02  Time Out: 11:20     Min Units   Therapeutic Ex 68305     Therapeutic Activities 99745 52 0   ADL/Self Care 42530 8    Orthotic Management 95669     Neuro Re-Ed 27272     Non-Billable Time     TOTAL TIMED TREATMENT 18 [address] [facility] THELMA/L [id number]

## 2020-10-07 NOTE — DISCHARGE INSTR - COC
Continuity of Care Form    Patient Name: Lotus Beaver   :  7/3/1927  MRN:  [id number]    Admit date:  10/5/2020  Discharge date:  ***    Code Status Order: Full Code   Advance Directives:   Advance Care Flowsheet Documentation       Date/Time Healthcare Directive Type of Healthcare Directive Copy in [address] Agent's Name Healthcare Agent's Phone Number    10/05/20 6566  No, patient does not have an advance directive for healthcare treatment -- -- -- -- --            Admitting Physician:  Missy Wright MD  PCP: Eddie Sweeney MD    Discharging Nurse: [facility] Unit/Room#: 5816/6150-G  Discharging Unit Phone Number: [phone]    Emergency Contact:   Extended Emergency Contact Information  Primary Emergency Contact: Mayda Thomas [address]15 Phone: [phone]  Relation: Other   needed? No  Secondary Emergency Contact: Umberto [place]  [place] Phone: [phone]  Relation: Child  Preferred language: English   needed?  No    Past Surgical History:  Past Surgical History:   Procedure Laterality Date    CORONARY ANGIOPLASTY WITH STENT PLACEMENT  2012    Dr Arita Spruce:  3.5x22 stent to mid LAD  EF=20%    HERNIA REPAIR      TONSILLECTOMY         Immunization History:   Immunization History   Administered Date(s) Administered    Influenza A ([id number]) Vaccine PF IM 2010    Influenza Virus Vaccine 2008    Influenza, High Dose (Fluzone 65 yrs and older) 10/01/2013    Influenza, Quadv, IM, PF (6 mo and older Fluzone, Flulaval, Fluarix, and 3 yrs and older Afluria) 10/28/2014    Tdap (Boostrix, Adacel) 2014       Active Problems:  Patient Active Problem List   Diagnosis Code    Acute transmural anterior wall MI ([address] I21.09    Hypothyroidism E03.9    DJD (degenerative joint disease) M19.90    Hyperlipidemia E78.5   Vick Spina Hypothyroid E03.9    CAD (coronary artery disease) I25.10    Cardiomyopathy ([address] I42.9    CHF (congestive heart failure) (HCC) I50.9    Myocardial infarction acute ([facility]) I21.9    Syncope and collapse R55    Closed fracture of pelvis ([facility]) S32. 9XXA    Moderate protein-calorie malnutrition ([facility]) E44.0    Orthostatic hypotension I95.1    Closed right hip fracture, with routine healing, subsequent encounter S72.001D    Traumatic brain injury ([address] S06. 9X9A    Anemia D64.9    Bone lesion M89.9    Age-related physical debility R54    Pneumonia J18.9       Isolation/Infection:   Isolation            No Isolation          Patient Infection Status       Infection Onset Added Last Indicated Last Indicated By Review Planned Expiration Resolved Resolved By    None active    Resolved    COVID-19 Rule Out 10/05/20 10/05/20 10/05/20 COVID-19 (Ordered)   10/05/20 Rule-Out Test Resulted            Nurse Assessment:  Last Vital Signs: /62   Pulse 90   Temp 97.7 °F (36.5 °C) (Oral)   Resp 16   Ht 5' 7\" (1.702 m)   Wt 97 lb 1.6 oz (44 kg)   SpO2 95%   BMI 15.21 kg/m²     Last documented pain score (0-10 scale): Pain Level: 0  Last Weight:   Wt Readings from Last 1 Encounters:   10/07/20 97 lb 1.6 oz (44 kg)     Mental Status:  oriented    IV Access:  - None    Nursing Mobility/ADLs:  Walking   Assisted  Transfer  Assisted  Bathing  Dependent  Dressing  Dependent  Toileting  Assisted  Feeding  Dependent  Med Admin  Dependent  Med Delivery   crushed    Wound Care Documentation and Therapy:        Elimination:  Continence:   · Bowel: No  · Bladder: Yes  Urinary Catheter: None   Colostomy/Ileostomy/Ileal Conduit: No       Date of Last BM: ***    Intake/Output Summary (Last 24 hours) at 10/7/2020 1047  Last data filed at 10/7/2020 0803  Gross per 24 hour   Intake 127 ml   Output 600 ml   Net -473 ml     I/O last 3 completed shifts:   In: 127 [I.V.:127]  Out: 450 [Urine:450]    Safety Concerns:     History of Falls (last 30 days)    Impairments/Disabilities:      [address] [facility]

## 2020-10-07 NOTE — ANESTHESIA POSTPROCEDURE EVALUATION
Department of Anesthesiology  Postprocedure Note    Patient: Anibal Jaquez  MRN: [id number]  YOB: 1927  Date of evaluation: 10/7/2020  Time:  4:45 PM     Procedure Summary     Date:  10/07/20 Room / Location:  [address] [place] [address]    Anesthesia Start:  5705 Anesthesia Stop:  6391    Procedure:  EGD PEG TUBE PLACEMENT (N/A ) Diagnosis:  (-)    Surgeon:  Willie Apley, MD Responsible Provider:  Raegan Aguilar MD    Anesthesia Type:  MAC ASA Status:  4          Anesthesia Type: MAC    Shelby Phase I:      Shelby Phase II: Shelby Score: 9    Last vitals: Reviewed and per EMR flowsheets.        Anesthesia Post Evaluation    Patient location during evaluation: PACU  Patient participation: complete - patient participated  Level of consciousness: awake and alert  Airway patency: patent  Nausea & Vomiting: no nausea and no vomiting  Complications: no  Cardiovascular status: hemodynamically stable  Respiratory status: acceptable  Hydration status: euvolemic

## 2020-10-08 NOTE — PROGRESS NOTES
SPEECH LANGUAGE PATHOLOGY  DAILY PROGRESS NOTE        PATIENT NAME:  Lotus Beaver      :  7/3/1927          TODAY'S DATE:  10/8/2020 ROOM:  [address] [place] [address]        SWALLOWING  Pt laying down in bed and asleep. When aroused, pt c/o of dry mouth and demonstrated difficulty speaking. SLP moistened pt's mouth with water and toothette. PO trials were not attempted due to pt's decreased alertness. DYSPHAGIA DIAGNOSIS:  Oropharyngeal dysphagia-overt s/s of aspiration observed with all consistencies presented                             DIET RECOMMENDATIONS:  NPO (nothing by mouth including oral meds)                  FEEDING RECOMMENDATIONS:                              Assistance level:  Not applicable                             Compensatory strategies recommended: Not applicable     THERAPY RECOMMENDATIONS:                  Dysphagia therapy is recommended 3-5 times per week for LOS or when goals are met. Pt will complete oral motor strength/ coordination exercises to improve bolus prep/ control and mastication with  moderate verbal prompts . Pt will complete BOTR strength/ ROM exercises to reduce pharyngeal residuals and improve epiglottic inversion with  moderate verbal prompts. Pt will complete laryngeal strength/ ROM therapeutic exercises to improve airway protection for the least restrictive PO diet with  moderate verbal prompts   Pt will complete PO trials of upgraded diet textures with SLP only to determine the least restrictive PO diet to maintain adequate nutrition/hydration with no more than 1 overt s/s of pen/asp. Repeat Bedside Swallow evaluation is recommended in 2-3 days and requires a physician order          Education/Intervention- Pt educated on results and POC. Questions answered. Pt completed OME and laryngeal exercises with fair ability due to decreased alertness. Pt required tactile stimulation to awaken after falling asleep multiple times. Will continue SP intervention as per previously established POC. Luis VALDOVINOS CF-SLP [id number]  Speech Language Pathologist          CPT code(s) 34661  dysphagia tx  72633  oral motor exercises (15 min)  Total minutes :  30 minutes

## 2020-10-08 NOTE — PROGRESS NOTES
Consult for head wounds. Patient sleeping  Multiple elevated brown skin lesion. Some dried blood from one area. No treatment needed. Lavinia Gregg.  Lazaro Nunez, CNS, Wound Care

## 2020-10-08 NOTE — PROGRESS NOTES
10/8/2020  12:43 PM      Nutrition Assessment     Type and Reason for Visit: Consult(TF Recommendations only-Provider will manage)    Nutrition Recommendations/Plan: Recommend start TF for nutrition support via new PEG:    TF RECOMMENDATION: Standard TF with Fiber (Jevity 1.2) to goal rate 40 ml/hr and Tube Feed Modular - Protein Modular once daily. Recommend water flushes 125 ml every 6 hrs  This will provide: 960 ml/d, 1252 christian, 79 g pro, 775 ml free water(1275 ml total free water TF+flushes)       Nutrition Assessment:  Pt s/p PEG 10/7 after failing clinical swallow eval and SLP rec NPO. Will recommend EN to meet needs and monitor tolerance    Malnutrition Assessment:  Malnutrition Status: Severe malnutrition    Estimated Daily Nutrient Needs:  Energy (kcal): ; Weight Used for Energy Requirements:  Admission     Protein (g): 65-75; Weight Used for Protein Requirements:  Admission(1.5-1.8)        Fluid (ml/day): ; Weight Used for Fluid Requirements:  Admission        Current Nutrition Therapies:    Diet NPO Effective Now Exceptions are: Ice Chips      Nutrition Interventions:   Food and/or Nutrient Delivery:  Continue NPO, Start Tube Feeding(When medically appropriate, TF recommendation: Standard w/ Fiber @ 40 ml/hr + 1 protein modular daily)  Nutrition Education/Counseling:  No recommendation at this time   Coordination of Nutrition Care:  Continued Inpatient Monitoring    Goals:  nutrition progression       Nutrition Monitoring and Evaluation:   Food/Nutrient Intake Outcomes:  Enteral Nutrition Intake/Tolerance  Physical Signs/Symptoms Outcomes:  Biochemical Data, Fluid Status or Edema, Nutrition Focused Physical Findings, Skin, Weight     Discharge Planning:     Too soon to determine     Electronically signed by Mary Corrales RD, CNSC, LD on 10/8/20 at 12:45 PM EDT    Contact: [phone]

## 2020-10-08 NOTE — PROGRESS NOTES
[facility] Hospitalist   Progress Note    Admitting Date and Time: 10/5/2020 12:24 PM  Admit Dx: Pneumonia [J18.9]  Pneumonia [J18.9]     Seen for follow on multiple problems as listed    Subjective:  Appears comfortable in no distress/pain. D/w nursing. Uneventful night. ROS: denies fever, chills, cp, sob, n/v, HA unless stated above.      carvedilol  3.125 mg PEG Tube BID WC    aspirin  81 mg PEG Tube Daily    metoprolol  5 mg Intravenous Q6H    ampicillin-sulbactam  3 g Intravenous Q6H    vitamin C  500 mg Oral Daily    calcium-vitamin D  1 tablet Oral BID    docusate sodium  100 mg Oral BID    magnesium oxide  400 mg Oral Daily    melatonin  1.5 mg Oral Nightly    multivitamin  1 tablet Oral Daily    vitamin D3  400 Units Oral Daily    sodium chloride flush  10 mL Intravenous 2 times per day    ipratropium-albuterol  1 ampule Inhalation Q4H WA     sodium chloride flush, 10 mL, PRN  acetaminophen, 650 mg, Q6H PRN    Or  acetaminophen, 650 mg, Q6H PRN  polyethylene glycol, 17 g, Daily PRN  promethazine, 12.5 mg, Q6H PRN    Or  ondansetron, 4 mg, Q6H PRN  perflutren lipid microspheres, 1.5 mL, ONCE PRN  ipratropium-albuterol, 1 ampule, Q4H PRN         Objective:    /69   Pulse 94   Temp 97.3 °F (36.3 °C) (Oral)   Resp 18   Ht 5' 7\" (1.702 m)   Wt 104 lb 3.2 oz (47.3 kg)   SpO2 98%   BMI 16.32 kg/m²   General Appearance: alert and oriented  , frail appearing , in no acute distress  Skin: warm and dry, no rash or erythema  Head: normocephalic and atraumatic  Eyes: pupils equal, round, and reactive to light, extraocular eye movements intact, conjunctivae normal  Neck: supple and non-tender without mass  Pulmonary/Chest: clear to auscultation bilaterally  Cardiovascular: normal rate, regular rhythm, normal S1 and S2  Abdomen: soft, non-tender, non-distended, normal bowel sounds, no masses or organomegaly  Extremities: no cyanosis, clubbing or edema  Musculoskeletal: normal range of motion  Neurologic:no cranial nerve deficit,  speech normal      Recent Labs     10/05/20  1444 10/06/20  0430    136   K 4.0 4.0    102   CO2 22 26   BUN 29* 32*   CREATININE 0.6* 0.8   GLUCOSE 109* 154*   CALCIUM 9.5 9.7       Recent Labs     10/05/20  1444 10/06/20  0430   WBC 9.6 9.2   RBC 4.01 3.76*   HGB 13.5 12.6   HCT 40.0 37.8   MCV 99.8 100.5*   MCH 33.7 33.5   MCHC 33.8 33.3   RDW 13.2 13.4    165   MPV 10.2 10.3     Labs and images reviewed       Radiology:   CT CHEST WO CONTRAST   Final Result   COPD with bilateral lower lobe consolidation representing pneumonia in the   correct clinical setting. No endobronchial mass. Rim calcified right renal cyst.  Recommend six-month repeat imaging to assure   stability. Bilateral nephrolithiasis. XR CHEST PORTABLE   Final Result   Bibasilar atelectasis appears worsened. XR CHEST PORTABLE   Final Result   1. Right lower lobe infiltrate. 2. Chronic scarring seen within the left lung base. 3. Emphysematous changes. Assessment:    Active Problems:    Pneumonia due to organism    Severe protein-calorie malnutrition ([address]  Resolved Problems:    * No resolved hospital problems. *      Plan:  Acute respiratory failure with hypoxia secondary to bi basilar pneumonia-on O2 4 L nasal cannula, continue to titrate O2 as possible. Bi basilar pneumonia with some fibrosis  As per pulm suggesting chronicity-on IV  Unasyn, pulmonary consulted and following. Chest x-ray and CT reviewed. Status post mechanical fall/frequent falls by history, previous right hip fracture in June of this year, possible near syncopal/syncopal prior to arrival.  Cardiology consulted and following. Echo reviewed. Take fall precautions. PT/OT/ following. Dehydration/positive orthostatics-continue IV fluids, monitor. Severe protein calorie malnutrition-now status post PEG, TF  To be started.     Coronary artery disease status post MI status post coronary stents/ chronic HFrEF appears well compensated  -not on beta-blockers, ARBS ,stains ,aspirin d/t SE , card following. As per Card on  Coreg ,asa started. HPL-not on statins due to myalgia    Urinary frequency and incontinence- has  positive orthostatics ,so flomax on hold. Continue to monitor and provide supportive,    Hypothyroidism -free T4 normal, outpatient follow-up with PCP, may be subclinical.    Abnormal focal lytic lesion on prior CT head 5/202 0-this is been discussed with patient previously and has declined further work-up.     Full code   On heparin for dvt prophy      Electronically signed by Goldie Dial MD on 10/8/2020 at 10:42 AM

## 2020-10-08 NOTE — PROGRESS NOTES
Inhalation Q4H WA       Diet:   Diet NPO Effective Now Exceptions are: Ice Chips     EXAM:  General: No distress. Eyes: PERRL. No sclera icterus. No conjunctival injection. ENT: No discharge. Pharynx clear. Neck: Trachea midline. Normal thyroid. Resp: No accessory muscle use. No rales. No wheezing. No rhonchi. CV: Regular rate. Regular rhythm. No murmur or rub. Abd: Non-tender. Non-distended. No masses. No organomegaly. Normal bowel sounds. Skin: Warm and dry. No nodule on exposed extremities. No rash on exposed extremities. Ext: No cyanosis, clubbing, edema  Lymph: No cervical LAD. No supraclavicular LAD. M/S: No cyanosis. No joint deformity. No clubbing. Neuro: Positive pupils/gag/corneals. Normal pain response. Results:  CBC:   Recent Labs     10/05/20  1444 10/06/20  0430   WBC 9.6 9.2   HGB 13.5 12.6   HCT 40.0 37.8   MCV 99.8 100.5*    165     BMP:   Recent Labs     10/05/20  1444 10/06/20  0430    136   K 4.0 4.0    102   CO2 22 26   BUN 29* 32*   CREATININE 0.6* 0.8     LIVER PROFILE:   Recent Labs     10/05/20  1444   AST 39   ALT 17   BILITOT 0.7   ALKPHOS 75     PT/INR: No results for input(s): PROTIME, INR in the last 72 hours. APTT: No results for input(s): APTT in the last 72 hours. Pathology:  1. N/A      Microbiology:  1. None    Recent ABG:   Recent Labs     10/05/20  1429   PH 7.413   PO2 54.1*   PCO2 34.0*   HCO3 21.2*   BE -2.6   O2SAT 89.4*   METHB 0.3   O2HB 88.1*   COHB 1.2   O2CON 17.3   HHB 10.4*   THB 14.0             Recent Films:  CT CHEST WO CONTRAST   Final Result   COPD with bilateral lower lobe consolidation representing pneumonia in the   correct clinical setting. No endobronchial mass. Rim calcified right renal cyst.  Recommend six-month repeat imaging to assure   stability. Bilateral nephrolithiasis. XR CHEST PORTABLE   Final Result   Bibasilar atelectasis appears worsened.          XR CHEST PORTABLE   Final Result 1. Right lower lobe infiltrate. 2. Chronic scarring seen within the left lung base. 3. Emphysematous changes.                    Assessment:  1. Bibasilar aspiration pneumonia with some fibrosis. This suggest some degree of chronicity  2. Dehydration: This is caused the patient's protein levels to appear normal.  He has very little muscle mass and a creatinine of 0.6 or 0.8 grossly overestimates its true value. 3. Profound malnutrition: Secondary to poor diet cause likely by an inability to eat. 4. Elevated proBNP: Unclear etiology        Plan:  1. Agree with Unasyn, a 7-day course should be enough. 2. Check urine studies: Apparently these were never sent. 3. Continue hydration  4. Continue aerosol treatments      Time at the bedside, reviewing labs and radiographs, reviewing updated notes and consultations, discussing with staff and family was more than 35 minutes. Please note that voice recognition technology was used in the preparation of this note and make therefore it may contain inadvertent transcription errors. If the patient is a COVID 19 isolation patient, the above physical exam reflects that of the examining physician for the day. Gó[place]z [place] M.D., F.C.C.P.     [facility], [address] [place] [address], [address] [place] [address]

## 2020-10-08 NOTE — PROGRESS NOTES
Date: 10/7/2020    Time: 10:29 PM    Patient Placed On BIPAP/CPAP/ Non-Invasive Ventilation? No    If no must comment. Facial area red/color change? No           If YES are Blister/Lesion present? No   If yes must notify nursing staff  BIPAP/CPAP skin barrier? No    Skin barrier type:n/a       Comments: pt is resting comfortably on 4L NC at this time.         Daniela Lance

## 2020-10-08 NOTE — PROGRESS NOTES
Physical Therapy  Facility/Department: [address] INTERNAL MEDICINE 2  Daily Treatment Note  NAME: Liz Brown  : 7/3/1927  MRN: [id number]    Date of Service: 10/8/2020    Patient Diagnosis(es): The primary encounter diagnosis was Pneumonia due to organism. A diagnosis of Fall, initial encounter was also pertinent to this visit. has a past medical history of CAD (coronary artery disease), Cardiomyopathy ([facility] CHF (congestive heart failure) ([facility] DJD (degenerative joint disease), Hyperlipidemia, Hypothyroid, Hypothyroid, and Myocardial infarction acute ([facility] has a past surgical history that includes hernia repair; Tonsillectomy; Coronary angioplasty with stent (2012); and Gastrostomy tube placement (N/A, 10/7/2020).      Evaluating Therapist: Herbert Santos PT      Referring Provider:  Dr. Jaylen Draper     Room #: 422   DIAGNOSIS:  PNA   PRECAUTIONS: falls, O2      Social:  Pt lives alone  in a  2  floor plan  With first floor set up    Prior to admission pt walked with  Ww.        Initial Evaluation  Date: 10/6/2020 Treatment     10/8/20 Short Term/ Long Term   Goals   Was pt agreeable to Eval/treatment?  yes  yes      Does pt have pain?  head  No c/o pain      Bed Mobility  Rolling:  Max assist   Supine to sit: max assist   Sit to supine:  NT   Scooting:  Max assist   rolling mod assist  Supine to sit mod assist  Sit to supine mod assist  Scooting to edge of bed mod assist  min assist    Transfers Sit to stand: min assist   Stand to sit: min assist   Stand pivot: min assist   Pt unable secondary to dizziness  SBA   Ambulation     40 feet with  ww  with  Min/mod assist    100 feet with  ww  with CGA          Stair negotiation: ascended and descended NT     4  steps with  1  rail with  CGA/min assist    LE ROM  Decreased throughout, L foot drop        LE strength  2- to 3-/ 5        AM- PAC RAW score   14/ 24   10/24          Patient education  Pt was educated on importance of mobility    Patient response to education:   Pt verbalized understanding Pt demonstrated skill Pt requires further education in this area   yes         Additional Comments/treatment: Pt lethargic, but agreeable to PT session. Per nursing pt orthostatic today. Upon sitting up edge of bed pt c/o dizziness. As pt was sitting edge of bed c/o dizziness getting worse. Unable to stand or transfer to chair at this time. Pt returned to supine at end of session    Pt was left in bed with call light left by patient. Time in: 0940  Time out: 0953    Total treatment time 13 minutes    Pt is making limited progress toward established Physical Therapy goals. Continue with physical therapy current plan of care.     Carmita PT [id number]      CPT codes:  [] Low Complexity PT evaluation 51930  [] Moderate Complexity PT evaluation 67357  [] High Complexity PT evaluation 31708  [] PT Re-evaluation 31220  [] Gait training 99971  minutes  [] Manual therapy 85748    minutes  [x] Therapeutic activities 45162 13   minutes  [] Therapeutic exercises 19979     minutes  [] Neuromuscular reeducation 08416     minutes

## 2020-10-08 NOTE — PROGRESS NOTES
GENERAL SURGERY  DAILY PROGRESS NOTE  10/8/2020  CC: weakness    Subjective:  ***    Objective:  /62   Pulse 89   Temp 97.3 °F (36.3 °C) (Oral)   Resp 18   Ht 5' 7\" (1.702 m)   Wt 97 lb 1.6 oz (44 kg)   SpO2 96%   BMI 15.21 kg/m²     GENERAL:  Laying in bed, awake, alert, cooperative, no apparent distress***  HEAD: Normocephalic, atraumatic***  EYES: No sclera icterus, pupils equal***  LUNGS:  No increased work of breathing***  CARDIOVASCULAR:  ***  ABDOMEN:  Soft, non-tender, non-distended***  EXTREMITIES: No edema or swelling***  SKIN: Warm and dry***    Assessment/Plan:  80 y.o. male PCM and failed MBS with speech therapy.  General surgery placed PEG tube 10/7    PEG looks great  Okay to start Tube feeds  Please call [facility] with any problems with PEG tube    Electronically signed by Marilynn Treviño MD on 10/8/2020 at 5:11 AM

## 2020-10-08 NOTE — CARE COORDINATION
Social Work/Discharge Planning:  Called liaison Karely from [facility] and confirmed facility can accept patient at discharge. Patient does not require a pre-cert. Patient had peg tube placement yesterday. Met with patient and informed him of acceptance at [facility] when medically stable. Will continue to follow.   Electronically signed by RADHA Perez on 10/8/2020 at 2:36 PM

## 2020-10-09 NOTE — PROGRESS NOTES
[facility] Hospitalist   Progress Note    Admitting Date and Time: 10/5/2020 12:24 PM  Admit Dx: Pneumonia [J18.9]  Pneumonia [J18.9]     Seen for follow on multiple problems as listed    Subjective:  Comfortable and in no distress , TF well  accepted but with residual , so was on hold and resumed later . Reglan added . No CP sob or abd pain. ROS: denies fever, chills, cp, sob, n/v, HA unless stated above.      heparin (porcine)  5,000 Units Subcutaneous Q12H    carvedilol  3.125 mg PEG Tube BID WC    aspirin  81 mg PEG Tube Daily    metoprolol  5 mg Intravenous Q6H    ampicillin-sulbactam  3 g Intravenous Q6H    vitamin C  500 mg Oral Daily    calcium-vitamin D  1 tablet Oral BID    docusate sodium  100 mg Oral BID    magnesium oxide  400 mg Oral Daily    melatonin  1.5 mg Oral Nightly    multivitamin  1 tablet Oral Daily    vitamin D3  400 Units Oral Daily    sodium chloride flush  10 mL Intravenous 2 times per day    ipratropium-albuterol  1 ampule Inhalation Q4H WA     sodium chloride flush, 10 mL, PRN  acetaminophen, 650 mg, Q6H PRN    Or  acetaminophen, 650 mg, Q6H PRN  polyethylene glycol, 17 g, Daily PRN  promethazine, 12.5 mg, Q6H PRN    Or  ondansetron, 4 mg, Q6H PRN  perflutren lipid microspheres, 1.5 mL, ONCE PRN  ipratropium-albuterol, 1 ampule, Q4H PRN         Objective:    /69   Pulse 65   Temp 97.3 °F (36.3 °C) (Axillary)   Resp 20   Ht 5' 7\" (1.702 m)   Wt 98 lb 9.6 oz (44.7 kg)   SpO2 95%   BMI 15.44 kg/m²   General Appearance: alert and oriented  , frail appearing , in no acute distress  Skin: warm and dry, no rash or erythema  Head: normocephalic and atraumatic  Eyes: pupils equal, round, and reactive to light, extraocular eye movements intact, conjunctivae normal  Neck: supple and non-tender without mass  Pulmonary/Chest: clear to auscultation bilaterally  Cardiovascular: normal rate, regular rhythm, normal S1 and S2  Abdomen: soft, non-tender, non-distended, normal bowel sounds, no masses or organomegaly, PEG site ok   Extremities: no cyanosis, clubbing or edema  Musculoskeletal: normal range of motion  Neurologic:no cranial nerve deficit,  speech normal      Recent Labs     10/09/20  0300      K 4.0   *   CO2 29   BUN 29*   CREATININE 0.6*   GLUCOSE 125*   CALCIUM 9.6       Recent Labs     10/09/20  0300   WBC 5.0   RBC 3.49*   HGB 11.7*   HCT 35.4*   .4*   MCH 33.5   MCHC 33.1   RDW 13.2      MPV 10.2     Labs and images reviewed       Radiology:   CT CHEST WO CONTRAST   Final Result   COPD with bilateral lower lobe consolidation representing pneumonia in the   correct clinical setting. No endobronchial mass. Rim calcified right renal cyst.  Recommend six-month repeat imaging to assure   stability. Bilateral nephrolithiasis. XR CHEST PORTABLE   Final Result   Bibasilar atelectasis appears worsened. XR CHEST PORTABLE   Final Result   1. Right lower lobe infiltrate. 2. Chronic scarring seen within the left lung base. 3. Emphysematous changes. Assessment:    Active Problems:    Pneumonia due to organism    Severe protein-calorie malnutrition ([address]  Resolved Problems:    * No resolved hospital problems. *      Plan:  Acute respiratory failure with hypoxia secondary to bi basilar pneumonia-on O2 2 L nasal cannula, continue to titrate O2 as possible. Bi basilar pneumonia with some fibrosis  As per pulm suggesting chronicity-on IV  Unasyn, pulmonary consulted and following. Chest x-ray and CT reviewed. Status post mechanical fall/frequent falls by history, previous right hip fracture in June of this year, possible near syncopal/syncopal prior to arrival.  Cardiology consulted and following. Echo reviewed. Take fall precautions. PT/OT/ following. Dehydration/positive orthostatics- improved with IVF , TF ,will observe off of IVF , since sodium up trending. Also  increase

## 2020-10-09 NOTE — CARE COORDINATION
Social Work discharge planning   Per Miranda Todd with [facility] they can accept pt over the weekend if discharged. Miranda Tyaskin advised that \"if he need a wheelchair transport, (they) contract with [facility] ([phone]). .. if ambulance [facility] ([phone])\". Miranda Todd with [facility] also advised that they would cover the cost of his ambulette wc Sadia Balls if needed. Electronically signed by Francesco Fisher on 10/9/2020 at 10:45 AM     Addendum-    Moe received call from charge RN asking Sw to TENTATIVELY set pt up for [facility] later today. Sw called [facility] ambulance [phone] Adia Small). Pt is set up with [facility] ambulance for 5p . IF not discharged, RN will need to notify [facility] please. Moe discussed with pt's RN Justine Malone. Moe called pt's primary contact in [place] [place] who said he is on his way to hospital to see pt now. Miranda Tyaskin with [facility] aware as well. Electronically signed by Francesco Fisher on 10/9/2020 at 1:19 PM     Addendum-    Per Jessica hamm RN, [facility] cancelled [facility] with [facility]ily [facility]. Per Miranda Todd with [facility], they can still accept pt over weekend if ready.   Electronically signed by Francesco Fisher on 10/9/2020 at 3:19 PM

## 2020-10-09 NOTE — PROGRESS NOTES
Pulmonary Progress Note    Admit Date: 10/5/2020  Hospital day                               PCP: Pepito Geiger MD    Chief Complaint (s):  Patient Active Problem List   Diagnosis    [facility]    Hypothyroidism    DJD (degenerative joint disease)    Hyperlipidemia    Hypothyroid    CAD (coronary artery disease)    Cardiomyopathy [facility]    CHF (congestive heart failure) (HCC)    [facility]    Syncope and collapse    Closed fracture of pelvis (HCC)    Moderate protein-calorie malnutrition (HCC)    Orthostatic hypotension    Closed right hip fracture, with routine healing, subsequent encounter    Traumatic brain injury ([facility]    Anemia    Bone lesion    Age-related physical debility    Pneumonia due to organism    Severe protein-calorie malnutrition ([facility]       Subjective:  · Being ready for discharge. No leukocytosis. Hemoglobin remained stable.       Vitals:  VITALS:  BP (!) 142/69   Pulse 77   Temp 97.1 °F (36.2 °C) (Axillary)   Resp 20   Ht 5' 7\" (1.702 m)   Wt 98 lb 9.6 oz (44.7 kg)   SpO2 98%   BMI 15.44 kg/m²     24HR INTAKE/OUTPUT:      Intake/Output Summary (Last 24 hours) at 10/9/2020 1352  Last data filed at 10/9/2020 0910  Gross per 24 hour   Intake 875 ml   Output 225 ml   Net 650 ml       24HR PULSE OXIMETRY RANGE:    SpO2  Av.7 %  Min: 98 %  Max: 99 %    Medications:  IV:   sodium chloride 50 mL/hr at 10/08/20 0508       Scheduled Meds:   heparin (porcine)  5,000 Units Subcutaneous Q12H    carvedilol  3.125 mg PEG Tube BID WC    aspirin  81 mg PEG Tube Daily    metoprolol  5 mg Intravenous Q6H    ampicillin-sulbactam  3 g Intravenous Q6H    vitamin C  500 mg Oral Daily    calcium-vitamin D  1 tablet Oral BID    docusate sodium  100 mg Oral BID    magnesium oxide  400 mg Oral Daily    melatonin  1.5 mg Oral Nightly    multivitamin  1 tablet Oral Daily    vitamin D3  400 Units Oral Daily  sodium chloride flush  10 mL Intravenous 2 times per day    ipratropium-albuterol  1 ampule Inhalation Q4H WA       Diet:   DIET TUBE FEED CONTINUOUS/CYCLIC NPO; STANDARD WITH FIBER; Continuous; 25; 40; 24     EXAM:  General: No distress. Eyes: PERRL. No sclera icterus. No conjunctival injection. ENT: No discharge. Pharynx clear. Neck: Trachea midline. Normal thyroid. Resp: No accessory muscle use. No rales. No wheezing. No rhonchi. CV: Regular rate. Regular rhythm. No murmur or rub. Abd: Non-tender. Non-distended. No masses. No organomegaly. Normal bowel sounds. PEG tube in place  Skin: Warm and dry. No nodule on exposed extremities. No rash on exposed extremities. Ext: No cyanosis, clubbing, edema  Lymph: No cervical LAD. No supraclavicular LAD. M/S: No cyanosis. No joint deformity. No clubbing. Neuro: Positive pupils/gag/corneals. Normal pain response. Results:  CBC:   Recent Labs     10/09/20  0300   WBC 5.0   HGB 11.7*   HCT 35.4*   .4*        BMP:   Recent Labs     10/09/20  0300      K 4.0   *   CO2 29   BUN 29*   CREATININE 0.6*     LIVER PROFILE:   Recent Labs     10/09/20  0300   AST 17   ALT 14   BILITOT 0.4   ALKPHOS 60     PT/INR: No results for input(s): PROTIME, INR in the last 72 hours. APTT: No results for input(s): APTT in the last 72 hours. Pathology:  1. N/A      Microbiology:  1. None    Recent ABG:   No results for input(s): PH, PO2, PCO2, HCO3, BE, O2SAT, METHB, O2HB, COHB, O2CON, HHB, THB in the last 72 hours. Recent Films:  CT CHEST WO CONTRAST   Final Result   COPD with bilateral lower lobe consolidation representing pneumonia in the   correct clinical setting. No endobronchial mass. Rim calcified right renal cyst.  Recommend six-month repeat imaging to assure   stability. Bilateral nephrolithiasis. XR CHEST PORTABLE   Final Result   Bibasilar atelectasis appears worsened.          XR CHEST PORTABLE   Final

## 2020-10-10 NOTE — PROGRESS NOTES
Nurse to nurse called to Eduardo,  [phone]. RN gave report to Leonela. Patient will be transported via ambulance service - [phone].  Electronically signed by Jeyson Saenz RN on 10/10/2020 at 3:23 PM

## 2020-10-10 NOTE — DISCHARGE SUMMARY
[facility] EMERGENCY SERVICE Physician Discharge Summary        [address] [address]  [phone]          Activity level: As tolerated    Diet: DIET TUBE FEED CONTINUOUS/CYCLIC NPO; STANDARD WITH FIBER; Continuous; 25; 40; 24    Dispo:SNF    On 2 L NC     Condition on discharge - stable     Patient ID:  Kashif Aguila  [id number]  14 y.o.  7/3/1927    Admit date: 10/5/2020    Discharge date and time:  10/10/2020  11:52 AM    Admission Diagnoses: Active Problems:    Pneumonia due to organism    Severe protein-calorie malnutrition ([facility]  Resolved Problems:    * No resolved hospital problems. *      Discharge Diagnoses: Active Problems:    Pneumonia due to organism    Severe protein-calorie malnutrition ([address]  Resolved Problems:    * No resolved hospital problems. *      Consults:  IP CONSULT TO CARDIOLOGY  IP CONSULT TO SOCIAL WORK  IP CONSULT TO PULMONOLOGY  IP CONSULT TO GENERAL SURGERY  IP CONSULT TO [facility] Course:   He is a 45-year-old male with past medical history of coronary artery disease status post coronary stent in 2012, chronic HFrEF, hyperlipidemia, hypothyroidism, degenerative joint disease, hearing loss, s/p right hip fracture in June of this year came to ER after being found by neighbors at home on the floor. Unclear how he fell and how long he was on the floor. He was living alone prior to arrival.  He has prior history of falls and this is his third fall this year. He has history of fall in June and broke his hip following that he did undergo rehab. He refused home health care as well as assisted living because of risk of COVID-19. In ER he appeared cachectic and dehydrated and work-up with acute respiratory failure with hypoxia secondary to bibasilar pneumonia. He was started on broad-spectrum IV antibiotics, O2 as needed and other supportive treatment. Pulmonary was consulted.   He had CT chest and it was noted that he had bibasilar pneumonia with some fibrosis and as per pulmonary likely aspiration pneumonia. Antibiotics were switched to IV Unasyn. He has history of frequent falls and previous right hip fracture, possible syncopal or near syncopal episode prior to arrival, and with his cardiac history and previous coronary stent cardiology was consulted and following during hospital stay. Echo during this admission shows LVEF 40% regional wall motion abnormality with severe hypokinesis of anteroseptal anterior and anterolateral and apical segments. With  severe dehydration , severe protein calorie malnutrition ,orthostatic hypotension, which improved somewhat with IV fluids, has had poor p.o. intake,and  as per speech therapist has severe  oropharyngeal dysphagia and aspiration risk, recommend NPO -with this PEG tube was addressed. He agreed to have PEG placed. Following this tube feeds were initiated and are accepted well. As per cardiology he  was started on Coreg and aspirin. He is not on statins for  hyperlipidemia secondary to myalgias. He is  discharged in stable status to skilled nursing facility for further rehab and care.   Other comorbid conditions were managed by continuing home medications.       Discharge Exam:  Vitals:    10/09/20 2200 10/10/20 0130 10/10/20 0631 10/10/20 0745   BP: (!) 150/80 (!) 146/75  (!) 149/61   Pulse: 70 60  67   Resp: 20 20  22   Temp: 97 °F (36.1 °C) 97 °F (36.1 °C)  97.7 °F (36.5 °C)   TempSrc: Axillary Axillary  Axillary   SpO2: 98% 97%  96%   Weight:   96 lb 4.8 oz (43.7 kg)    Height:           General Appearance: alert and oriented to person, place and time, frail-appearing, in no acute distress  Skin: warm and dry  Head: normocephalic and atraumatic  Eyes: pupils equal, round, and reactive to light, extraocular eye movements intact, conjunctivae normal  Neck: supple and non-tender without mass  Pulmonary/Chest: clear to auscultation (3) MG/3ML Soln nebulizer solution  Commonly known as:  DUONEB  Inhale 3 mLs into the lungs every 4 hours as needed for Shortness of Breath     metoclopramide 5 MG tablet  Commonly known as:  REGLAN  Take 1 tablet by mouth 3 times daily (before meals)         * This list has 2 medication(s) that are the same as other medications prescribed for you. Read the directions carefully, and ask your doctor or other care provider to review them with you. CONTINUE taking these medications    calcium-vitamin D 500-200 MG-UNIT per tablet  Commonly known as:  OSCAL-500  Take 1 tablet by mouth 2 times daily     docusate 100 MG Caps  Commonly known as:  COLACE, DULCOLAX  Take 100 mg by mouth 2 times daily     GRAPE SEED EXTRACT PO     Magnesium 400 MG Caps     melatonin 3 MG Tabs tablet  Take 0.5 tablets by mouth nightly     multivitamin Tabs tablet  Take 1 tablet by mouth daily     tamsulosin 0.4 MG capsule  Commonly known as:  FLOMAX  Take 1 capsule by mouth nightly     VITAMIN A PO     Vitamin C 500 MG Caps     vitamin D 400 units Caps  Commonly known as:  ERGOCALCIFEROL           Where to Get Your Medications      Information about where to get these medications is not yet available    Ask your nurse or doctor about these medications  · amoxicillin-clavulanate 500-125 MG per tablet  · aspirin 81 MG chewable tablet  · carvedilol 3.125 MG tablet  · ipratropium-albuterol 0.5-2.5 (3) MG/3ML Soln nebulizer solution  · ipratropium-albuterol 0.5-2.5 (3) MG/3ML Soln nebulizer solution  · metoclopramide 5 MG tablet           Note that more  than 30 minutes was spent in preparing discharge papers, discussing discharge with patient, medication review, etc.    Signed:  Electronically signed by Alicia Shirley MD on 10/10/2020 at 11:52 AM    NOTE: This report was transcribed using voice recognition software. Every effort was made to ensure accuracy; however, inadvertent computerized transcription errors may be present.

## 2020-10-10 NOTE — PROGRESS NOTES
Physician Progress Note      PATIENT:               Gurdeep Mireles  CSN #:                  [id number]  :                       7/3/1927  ADMIT DATE:       10/5/2020 12:24 PM  [address] DATE:        10/10/2020 3:58 PM  RESPONDING  PROVIDER #:        Yu Storm MD          QUERY TEXT:    Pt admitted with Pneumonia. Pt noted to have worsening shortness of breath. If possible, please document in the progress notes and discharge summary if   you are evaluating and/or treating any of the following: The medical record reflects the following:  Risk Factors: advanced age, frequent falls  Clinical Indicators: per surgery \". ..being treated for pneumonia, possible   aspiration. He failed a bedside swallow evalaution with Speech Therapy. He has   been trying to thicken any liquids at home but states it hasn't been working. He states he is very weak and feels that he won't get any better without   nutrition. [place] Reese Ethel Reese \" and per IM \". [place] [facility] CX[place] on admission showed RLL infiltrate; repeat   last night showed bibasilar atelectasis (worening CXR). .. RLL Pneumonia -    initially on rocephin and doxycycline/ changed to Unasyn? \"  Treatment: IV Unasyn, plan for PEG 10/7    Thank you,  April Tessa Dior RN, BSN, CDIS  Clinical Documentation Improvement  [email or web address]  Options provided:  -- Bacterial pneumonia  -- Aspiration pneumonia  -- Other - I will add my own diagnosis  -- Disagree - Not applicable / Not valid  -- Disagree - Clinically unable to determine / Unknown  -- Refer to Clinical Documentation Reviewer    PROVIDER RESPONSE TEXT:    This patient has aspiration pneumonia.     Query created by: Antoine Concepcion on 10/7/2020 8:11 AM      Electronically signed by:  Yu Storm MD 10/10/2020 6:41 PM

## 2020-11-18 NOTE — PROGRESS NOTES
11/17/2020    Emily Gonzales  7/3/1927    This resident is being seen today for skilled evaluation visit. 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 Ruma Sosa. It is of note to mention that he had a recent placement of a gastrostomy tube due to underlying dysphagia with severe protein calorie malnutrition. Harinder Galloway He is a 80 y.o. male resident who is being seen today for services and does admit that he has been what he described as weak, dizzy and tired. Currently at this time he denies any complaints of pain, any headaches, any sore throat, coughing, or shortness of breath. No nausea, vomiting, constipation or diarrhea. No fever, or chills. No recent falls or syncopal events. Objective     Vital Signs: Pressure 112/66 pulse 68 respirations 18 temperature 98 oxygen saturation 96% weight 95.8 pounds    Physical examination:Skin is essentially warm and dry. He does have some impairment of his scalp which is monitored accordingly with respect to her wound management nurse. HEENT unremarkable. Neck is supple. Heart regular rate and rhythm. No rubs, gallops or murmurs noted. Lungs are diminished with some degree of ongoing bibasilar rales. No evidence of rhonchi or wheezing noted. Abdomen is scaphoid but soft, supple and non-tender. Bowel sounds are noted x4 quadrants. No rigidity, guarding or rebound tenderness. Negative Christie's, negative McBurney's, negative Bernardo's. Peg tube was inspected and appears to be functioning normally extremities; no true pitting edema. Pulses are adequate. No clubbing  or no cyanosis noted. Neurologically he  is alert and oriented x3.   No evidence of paralysis or paresthesias noted but does have global weakness noted. Diagnoses and all orders for this visit:    Weakness    Severe protein-calorie malnutrition ([address]    Age-related physical debility    Other specified hypothyroidism    Coronary artery disease involving native coronary artery of native heart without angina pectoris    Plan: Plan of care was discussed with the healthcare team and meds and labs were reviewed. Most recent labs from November 16 with a potassium of 5.4 with recommendations at this time to repeat a BMP on Friday. Most recent  BUN and creatinine was 24 and 0.7 with a GFR of 105 with an H&H of 11.1 and 34.3. Staffing concerns are with respect to the fact that he does take several medications in addition to different vitamins. Recommendations today will include the downward titration of his Reglan to 5 mg twice a day as opposed to 3 times a day and we will monitor him in terms of any adverse effects. Furthermore I am going to discontinue his vitamin A along with his magnesium and maintain his multivitamin daily. In addition he will continue forth with prophylactic measures with respect to the COVID-19 in terms of vitamin C with zinc, although testing today did prove to be negative. He will otherwise maintain his PT and OT as clinically indicated and I will track his intakes and monitor his weights and behaviors and see him routinely and as needed with further orders forthcoming. DERRICK ANDREW, APRN - CNP      *Note was creating using voice recognition software.   The document was reviewed however grammatical errors may exist.

## 2020-12-03 NOTE — PROGRESS NOTES
12/3/2020    Alex Seip  7/3/1927    This resident is being seen today for skilled evaluation visit. 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 Bethany Kilpatrick. It is of note to mention that he had a recent placement of a gastrostomy tube due to underlying dysphagia with severe protein calorie malnutrition and has been learning how to self bolus. He is a 80 y.o. male resident who is being seen today for skilled services recent acute concerns regarding a right lung base infiltrate whereby he was placed on Augmentin therapies twice a day for 10 days. In addition, he was under assessment for significant edema to the bilateral lower extremities and was furthermore placed on  diuretic management. Currently at this time he denies any complaints of pain, any headaches, any sore throat, coughing, or shortness of breath. No nausea, vomiting, constipation or diarrhea. No fever, or chills. No recent falls or syncopal events. Objective     Vital Signs: Pressure 110/72 pulse 78 respirations 18 temperature 98 oxygen saturation 94% weight 104.3pounds    Physical examination:Skin is essentially warm and dry. He does have some impairment of his scalp which is monitored accordingly with respect to her wound management nurse. HEENT unremarkable. Neck is supple. Heart regular rate and rhythm. No rubs, gallops or murmurs noted. Lungs are diminished with some degree of ongoing bibasilar rales. No evidence of rhonchi or wheezing noted. Abdomen is scaphoid but soft, supple and non-tender. Bowel sounds are noted x4 quadrants. No rigidity, guarding or rebound tenderness.   Negative Christie's, negative McBurney's, negative Bernardo's. Peg tube was inspected and appears to be functioning normally. Extremities; he has 3+ pitting edema to the bilateral lower extremitities. Pulses are diminished. No clubbing  or no cyanosis noted. Neurologically he  is alert and oriented x3. No evidence of paralysis or paresthesias noted but does have global weakness noted. Diagnoses and all orders for this visit:    Pneumonia of right lower lobe due to infectious organism    Lower extremity edema    Severe protein-calorie malnutrition ([address]    Coronary artery disease involving native coronary artery of native heart without angina pectoris    Other specified hypothyroidism    Age-related physical debility    Plan: Plan of care was discussed with the healthcare team and meds and labs were reviewed. Most recent labs from November 16 with a potassium of 5.4 with recommendations at this time to repeat a BMP on Friday. Most recent  BUN and creatinine was 24 and 0.7 with a GFR of 105. I am going to maintain the proper treatment in terms of his Augmentin regarding his right lung base. In addition, I am going to upward titrate his diuretic management. I will increase his Lasix to 40 mg daily and add Zaroxolyn 2.5 mg every Monday Wednesday and Friday for the course of 2 weeks. In addition I will place him on potassium supplementation of 20 mEq daily and check a BMP on Monday. In addition, he does need to elevate his bilateral lower extremities throughout the day. He will otherwise maintain his PT and OT as clinically indicated and I will track his intakes and monitor his weights and behaviors and see him routinely and as needed with further orders forthcoming. DERRICK ANDREW, APRN - CNP      *Note was creating using voice recognition software.   The document was reviewed however grammatical errors may exist.

## 2020-12-22 PROBLEM — U07.1 COVID-19 VIRUS DETECTED: Status: ACTIVE | Noted: 2020-01-01

## 2020-12-22 NOTE — LETTER
Whats most important for you to know is that your Medicare rights and benefits wont change because your health care provider is participating in [address]. Medicare will keep covering all of your medically necessary services. Even though Medicare will pay your doctor in a different way under [employer], how much you have to pay wont change. Health care providers and suppliers who are enrolled in Medicare will submit their Medicare claims like they always have. Youll have all the same Medicare rights and protections, including the right to choose which hospital, doctor, or other health care provider you see. If you dont want to get care from a health care provider whos participating in [address] [place] [address], then youll have to choose a different health care provider whos not participating in the Model. How can I give feedback about my health care? Medicare might ask you to take a voluntary survey about the services and care you received from [address] [place] [address] during your hospital stay or outpatient procedure and for a specific period of time afterwards. You can decide whether you want to take the voluntary survey, but if you do, itll help Medicare make [employer] and the care of other Medicare patients better. If you have concerns or complaints about your care, you can:   · Talk to your doctor or health care provider. · Contact your Beneficiary and Family Centered Care Quality Improvement   Organization [facility]). You can get your [employer]s phone number  at  Medicare.gov/contacts or by calling 1-800-MEDICARE. TTY users can call  2-697.138.8690. Where can I learn more about [employer]? Learn more about [employer] at https://innovation.cms.gov/initiatives/bpci-advanced/:  · A list of all the hospitals and physician group practices in the country participating in [address]. · All of the inpatient and outpatient Clinical Episodes that are currently included under [employer]. A Clinical Episode is a grouping of medical conditions or diagnoses that are included in the [address] [facility].

## 2020-12-23 NOTE — PROGRESS NOTES
Speech Language Pathology  SPEECH LANGUAGE PATHOLOGY  DAILY PROGRESS NOTE        PATIENT NAME:  Cong Nino      :  7/3/1927          TODAY'S DATE:  2020 ROOM:  65/200-A    Speech Pathologist (SLP) completed education with the patient/family regarding type of swallowing impairment. Reviewed current solid/liquid consistency diet recommendations and discussed compensatory strategies to ensure safe PO intake. Reviewed aspiration precautions. Encouraged patient and/or family to engage SLP in unstructured Q&A session relative to identified deficit areas; indicated understanding of all information provided via satisfactory verbal response. Pt seen for CBSE, resting in bed on NC, asleep but easily awakened. RN reported pt has PEG and receives nocturnal TF at [facility]. She states pt is anxious to resume PO intake. Pt demonstrated a congested nonproductive cough prior to administration of PO trials. SLP performed oral care and used oral suctioning to remove pharyngeal secretions. Pt minimally successful at clearing wet gurgly vocal quality. Pt demonstrated a wet cough to all administered trials. SLP suspects aspiration; however, baseline congestion makes it difficult to r/o aspiration at bedside. A video swallow study is recommended to formally evaluate pharyngeal swallow function and requires a physician order. See eval for complete details. Selina Gonzales M.Ed. CCC-SLP  SP [id number]       CPT code(s) 71270  dysphagia tx   Total minutes :  10 minutes

## 2020-12-23 NOTE — CONSULTS
Comprehensive Nutrition Assessment    Type and Reason for Visit:  Initial, Consult(TF ordering/management)    Nutrition Recommendations/Plan: Continue NPO, pending SLP eval. Modify current TF to continuous feedings - please consult for alternative TF recs if PO diet ordered. TF recommendation:  1.5 Calorie w/ Fiber @ 45 ml/hr  Will provide 1080 ml TV, 1620 kcals, 69 gm pro, 821 ml free water  Regimen will meet 100% est nutrient needs    Nutrition Assessment:  Pt admit w/ COVID-19+, noted h/o severe malnutrition s/p PEG placement. Pt noted w/ TF + PO diet PTA at [facility]. Will provide updated TF recs. Pending SLP eval.    Malnutrition Assessment:  Malnutrition Status: At risk for malnutrition (Comment)    Context:  Chronic Illness     Findings of the 6 clinical characteristics of malnutrition:  Energy Intake:  7 - 75% or less estimated energy requirements for 1 month or longer  Weight Loss:  Unable to assess     Body Fat Loss:  Unable to assess(2/2 COVID-19 isolation)     Muscle Mass Loss:  Unable to assess    Fluid Accumulation:  No significant fluid accumulation     Strength:  Not Performed    Estimated Daily Nutrient Needs:  Energy (kcal):  ; Weight Used for Energy Requirements:  Current     Protein (g):  65-80; Weight Used for Protein Requirements:  Current(1.5-1.8)        Fluid (ml/day):  7973-5454; Method Used for Fluid Requirements:  1 ml/kcal      Nutrition Related Findings:  oriented to person, PEG, active BS, soft abd, +2 edema, fluids WDL      Wounds:  None       Current Nutrition Therapies:    Diet NPO Effective Now  Diet Tube Feed Bolus With Diet  Current Tube Feeding (TF) Orders:  · Feeding Route: PEG  · Formula: 1.5 Calorie with Fiber  · Schedule:  Bolus  · Goal TF & Flush Orders Provides: 1200 ml TV, 1800 kcals, 77 gm pro, 912 ml free water    Anthropometric Measures:  · Height: 5' 7\" (170.2 cm)  · Current Body Weight: 96 lb (43.5 kg)(12/22)   · Usual Body Weight: 104 lb (47.2 kg)(5/2020 bed scale, per EMR)     · Ideal Body Weight: 148 lbs; % Ideal Body Weight 64.9 %   · BMI: 15  · BMI Categories: Underweight (BMI less than 22) age over 72       Nutrition Diagnosis:   · Inadequate oral intake related to impaired respiratory function as evidenced by NPO or clear liquid status due to medical condition, nutrition support - enteral nutrition    Nutrition Interventions:   Food and/or Nutrient Delivery:  Continue NPO, Modify Tube Feeding(1.5 Calorie w/ Fiber @ 45 ml/hr continuous)  Nutrition Education/Counseling:  Education not indicated   Coordination of Nutrition Care:  Continue to monitor while inpatient    Goals:  tolerance to EN at goal rate       Nutrition Monitoring and Evaluation:   Food/Nutrient Intake Outcomes:  Diet Advancement/Tolerance, Enteral Nutrition Intake/Tolerance  Physical Signs/Symptoms Outcomes:  Biochemical Data, Chewing or Swallowing, GI Status, Fluid Status or Edema, Nutrition Focused Physical Findings, Skin, Weight     Discharge Planning:    Enteral Nutrition     Electronically signed by Abimbola Artis, MS, RD, LD on 12/23/20 at 3:02 PM EST    Contact: [phone]

## 2020-12-23 NOTE — H&P
[facility] History and Physical      CHIEF COMPLAINT:  hypoxia    History of Present Illness: This patient is a 26-year-old white male with a past medical history significant for coronary artery disease status post stent placement in 2012, hypothyroidism, hyperlipidemia, degenerative joint disease, combined systolic and diastolic heart failure, cardiomyopathy, hearing loss, and a right hip fracture in June of this year. Currently resides in a nursing home. Reportedly found slumped over with a pulse ox in 70s therefore sent to the ED on nonrebreather for further evaluation. Placed on 4LNC with adequate oxygen saturation. Tested positive for covid, no record to indicate recent infection or test at the nursing home. When seen he was awake, follows some commands but appears weak, [place]. Mumbled his name but other answers were legible. Informant(s) for H&P:chart review    REVIEW OF SYSTEMS:  Unable to obtain as patient does not answer questions clearly    PMH:  Past Medical History:   Diagnosis Date    CAD (coronary artery disease)     [facility]     [facility]     DJD (degenerative joint disease) 3/10/2012    Hyperlipidemia     Hypothyroid     Hypothyroid     Myocardial infarction acute ([facility] 3/12/12    BMS to LAD       Surgical History:  Past Surgical History:   Procedure Laterality Date    CORONARY ANGIOPLASTY WITH STENT PLACEMENT  03/09/2012    Dr Romel Garcia:  3.5x22 stent to mid LAD  EF=20%    GASTROSTOMY TUBE PLACEMENT N/A 10/7/2020    EGD PEG TUBE PLACEMENT performed by Durel Gitelman, MD at [address] [place] [address]         Medications Prior to Admission:    Prior to Admission medications    Medication Sig Start Date End Date Taking?  Authorizing Provider   carvedilol (COREG) 3.125 MG tablet 1 tablet by PEG Tube route 2 times daily (with meals) 12/22/20   SAHRA Martin - CNP   furosemide (LASIX) 20 MG tablet 2 tablets by PEG Tube route daily 12/22/20   SAHRA Sánchez CNP   potassium chloride (KLOR-CON M) 20 MEQ TBCR extended release tablet Take 1 tablet by mouth daily Take Via PEG Tube 12/22/20   SAHRA Sánchez CNP   metoclopramide (REGLAN) 5 MG tablet 1 tablet by PEG Tube route 2 times daily (before meals) 12/22/20   SAHRA Sánchez CNP   tamsulosin (FLOMAX) 0.4 MG capsule Take 1 capsule by mouth nightly Take via PEG tube 12/22/20   SAHRA Sánchez CNP   aspirin 81 MG chewable tablet 1 tablet by PEG Tube route daily 10/11/20   Lex Mckinney MD   ipratropium-albuterol (DUONEB) 0.5-2.5 (3) MG/3ML SOLN nebulizer solution Inhale 3 mLs into the lungs 4 times daily 10/10/20   Lex Mckinney MD   ipratropium-albuterol (DUONEB) 0.5-2.5 (3) MG/3ML SOLN nebulizer solution Inhale 3 mLs into the lungs every 4 hours as needed for Shortness of Breath 10/10/20   Lex Mckinney MD   calcium-vitamin D ([place]) 500-200 MG-UNIT per tablet Take 1 tablet by mouth 2 times daily 6/18/20   Paul Araujo,    docusate sodium (COLACE, DULCOLAX) 100 MG CAPS Take 100 mg by mouth 2 times daily 6/18/20   Paul Araujo DO   melatonin 3 MG TABS tablet Take 0.5 tablets by mouth nightly 6/18/20   Paul Araujo DO   Multiple Vitamin (MULTIVITAMIN) TABS tablet Take 1 tablet by mouth daily 6/19/20   Paul Araujo DO   Ascorbic Acid (VITAMIN C) 500 MG CAPS Take 500 mg by mouth daily. Historical Provider, MD   GRAPE SEED EXTRACT PO Take 150 mg by mouth daily. Historical Provider, MD   Magnesium 400 MG CAPS Take  by mouth daily. Historical Provider, MD   VITAMIN A PO Take 5,000 Units by mouth. Historical Provider, MD   vitamin D (ERGOCALCIFEROL) 400 UNITS CAPS Take 400 Units by mouth daily. Historical Provider, MD       Allergies:    Quinolones and Septra [bactrim]    Social History:    reports that he has never smoked.  He has never used smokeless tobacco. He reports that he does not drink alcohol or use drugs. Family History:   Unable to obtain as patient does not answer questions clearly      PHYSICAL EXAM:  Vitals:  /61   Pulse 97   Temp 97.5 °F (36.4 °C) (Oral)   Resp 20   Ht 5' 7\" (1.702 m)   Wt 96 lb (43.5 kg)   SpO2 98%   BMI 15.04 kg/m²   General Appearance: alert, follows commands, mumbles some answers  Skin: warm and dry, turgor is diminished  Head: normocephalic and atraumatic  Eyes: pupils equal, round, and reactive to light, extraocular eye movements intact, conjunctivae normal  Neck: neck supple and non tender without mass   Pulmonary/Chest: clear to auscultation bilaterally- no wheezes, rales or rhonchi, normal air movement, no respiratory distress  Cardiovascular: normal rate, normal S1 and S2 and 2 out of 6 systolic ejection murmur  Abdomen: PEG tube with no evidence of infection, appears to have some tenderness  Extremities: no cyanosis, no clubbing and no edema  Neurologic: Weak, does not appear to have focal deficits        LABS:  Recent Labs     12/22/20 2005      K 4.0   CL 98   CO2 28   BUN 32*   CREATININE 1.2   GLUCOSE 114*   CALCIUM 9.1       Recent Labs     12/22/20 2005   WBC 6.9   RBC 3.46*   HGB 11.6*   HCT 35.7*   .2*   MCH 33.5   MCHC 32.5   RDW 14.5      MPV 9.9       No results for input(s): POCGLU in the last 72 hours. Radiology:   CT HEAD WO CONTRAST   Final Result   1. No acute intracranial abnormality. 2. Small soft tissue densities in the right frontal and left parietal scalp,   which were not seen on the prior study. Clinical correlation is needed. 3. Bilateral mastoid effusions. XR CHEST PORTABLE   Final Result   Bibasilar pulmonary opacities, of which the right basilar infiltrate is   stable while the left basilar infiltrate is somewhat less prominent than was   seen on 10/05/2020.           EKG: twi in lateral leads not present in prior ekgs    ASSESSMENT:    COVID-19 pneumonia  Acute respiratory failure with hypoxia  Acute kidney injury  T wave inversions on EKG  Atherosclerotic heart disease status post prior stent  Hypothyroidism  Combined systolic and diastolic heart failure: Not fluid overloaded  Oropharyngeal dysphagia status post PEG placement      PLAN:  COVID 19 pneumonia with hypoxia  - check inflammatory markers  -check procalcitonin, hold abx for now. -start ascorbic acid, zinc, vitamin D  -dexamethasone 6 mg daily  -referral to pharmacy for remdesivir  -wean down supplemental oxygen as tolerated  -check ddimer    Acute kidney injury  -appears dehydrated. ivf resuscitation and trend. TWI:  -trend troponin, repeat ekg.   -recent echo showed wma. Has prior ashd    Oropharyngeal dysphagia s/p peg  -appears dehydrated, unclear if he is receiving tf   -due to ttp will check axr  -resume tf if above is unrevealing    Hypothyroidism: resume synthroid    Code Status: full based on recent   DVT prophylaxis: lovenox      NOTE: This report was transcribed using voice recognition software. Every effort was made to ensure accuracy; however, inadvertent computerized transcription errors may be present.   Electronically signed by Trini Guallpa MD on 12/22/2020 at 10:58 PM

## 2020-12-23 NOTE — PROGRESS NOTES
Patient unable to do database. Patient oriented to self only and will only answer his name when asked questions.

## 2020-12-23 NOTE — CARE COORDINATION
Social Work discharge planning   SW lvm for [place] Sabiha with [facility] to check pt's bed hold status. Await her call back.   Electronically signed by Huang Leung on 12/23/2020 at 11:24 AM

## 2020-12-23 NOTE — CONSULTS
Pulmonary Consultation    Admit Date: 12/22/2020  Requesting Physician: Anabell Cueva MD    Reason for consultation:  · Covid 19 pneumonia  HPI:  · Mr. Bay Rosenbaum is well-known to us from a recent admission for aspiration pneumonia. He was discharged on antimicrobials the fear of an involved free water deficit. He presents now Covid positive with his x-ray actually looking better. · This p.m., the patient arouses easily but has sonorous respirations with rhonchi throughout his chest.  He is unable to clear the secretions. Nursing relates that he was up and around in his room earlier today. He gets bolus tube feedings at night. · On 5 L per nasal cannula, the patient is able to [place] maintain a saturation of 97%. Arterial blood gases show a PO2 of 90 within normal PCO2. Chest radiograph is reviewed as well showing a stable right basilar infiltrate with a (less prominent left basilar infiltrate. Previous CT scans are reviewed. PMH:    Past Medical History:   Diagnosis Date    CAD (coronary artery disease)     Cardiomy[facility]     [facility]est[facility]     DJD (degenerative joint disease) 3/10/2012    Hyperlipidemia     Hypothyroid     Hypothyroid     Myocardial infarction acute ([address] 3/12/12    BMS to LAD     PSH:   Past Surgical History:   Procedure Laterality Date    CORONARY ANGIOPLASTY WITH STENT PLACEMENT  03/09/2012    Dr Sarah Guy:  3.5x22 stent to mid LAD  EF=20%    GASTROSTOMY TUBE PLACEMENT N/A 10/7/2020    EGD PEG TUBE PLACEMENT performed by Sammie Vega MD at [address] [place] [address]         Review of Systems:    Unobtainable due to patient factors.       Social History:  · Alcohol:  No  · Tobacco:   No  · Employment:  no silica or asbestos exposure  · Family:  No family history of lung disease    Medications:   sodium chloride 75 mL/hr at 12/23/20 0646      albuterol-ipratropium  1 puff Inhalation 4x daily    heparin (porcine)  5,000 Units Subcutaneous Q8H    aspirin  81 mg PEG Tube Daily    carvedilol  3.125 mg PEG Tube BID WC    metoclopramide  5 mg PEG Tube BID AC    multivitamin  1 tablet Oral Daily    tamsulosin  0.4 mg Oral Nightly    sodium chloride flush  10 mL Intravenous 2 times per day    dexamethasone  6 mg Intravenous Daily    Vitamin D  1,000 Units Oral Daily    vitamin C  1,000 mg Oral BID    zinc sulfate  50 mg Oral BID    [START ON 2020] remdesivir IVPB  100 mg Intravenous Q24H       Vitals:  Tmax:  VITALS:  BP (!) 94/53   Pulse 82   Temp 98.8 °F (37.1 °C) (Oral)   Resp 18   Ht 5' 7\" (1.702 m)   Wt 96 lb (43.5 kg)   SpO2 97%   BMI 15.04 kg/m²   24HR INTAKE/OUTPUT:      Intake/Output Summary (Last 24 hours) at 2020 1601  Last data filed at 2020 0646  Gross per 24 hour   Intake 662 ml   Output --   Net 662 ml     CURRENT PULSE OXIMETRY:  SpO2: 97 %  24HR PULSE OXIMETRY RANGE:  SpO2  Av.8 %  Min: 95 %  Max: 98 %    EXAM:  General: No distress. Alert. Eyes: PERRL. No sclera icterus. No conjunctival injection. ENT: No discharge. Pharynx clear. Neck: Trachea midline. Normal thyroid. No jvd, no hjr. Resp: No wheezing. No accessory muscle use. No rales. Diffuse rhonchi. CV: Regular rate. Regular rhythm. No murmur No rub. Abd: Non-tender. Non-distended. No masses. No organmegaly. Normal bowel sounds. Skin: Warm and dry. No nodule on exposed extremities. No rash on exposed extremities. Lymph: No cervical LAD. No supraclavicular LAD. Ext: No joint deformity. No clubbing. No cyanosis. No edema  Neuro: Awake. Follows commands. Positive pupils/gag/corneals. Normal pain response.      Lab Results:  CBC:   Recent Labs     20  1030   WBC 6.9 6.1   HGB 11.6* 11.3*   HCT 35.7* 35.3*   .2* 104.1*    164       BMP:  Recent Labs     20  1030    137   K 4.0 4.0   CL 98 101   CO2 28 26   BUN 32* 29*   CREATININE 1.2 0.8    ALB:3,BILIDIR:3,BILITOT:3,ALKPHOS:3)@    PT/INR: No results for input(s): PROTIME, INR in the last 72 hours. Cultures:  Sputum: not available  Blood: not available    ABG:   Recent Labs     12/22/20 2030   PH 7.395   PO2 90.0   PCO2 41.3   HCO3 24.7   BE -0.2   O2SAT 96.8   METHB 0.0   O2HB 96.3   COHB 0.5   O2CON 15.9   HHB 3.2   THB 11.7             Films:     XR ABDOMEN (KUB) (SINGLE AP VIEW)   Final Result   No new abnormal findings. Indwelling gastrostomy is noted. CT HEAD WO CONTRAST   Final Result   1. No acute intracranial abnormality. 2. Small soft tissue densities in the right frontal and left parietal scalp,   which were not seen on the prior study. Clinical correlation is needed. 3. Bilateral mastoid effusions. XR CHEST PORTABLE   Final Result   Bibasilar pulmonary opacities, of which the right basilar infiltrate is   stable while the left basilar infiltrate is somewhat less prominent than was   seen on 10/05/2020. Shaina Leon Assessment:  1. Current aspiration pneumonia with now superimposed COVID-19 pneumonia. Despite the above, oxygenation is adequate. 2. Dehydration      Plan:  1. Continue Decadron and remdesivir  2. Continue tube feedings  3. Continue cautious hydration      Thanks for letting us see this patient in consultation. Total time in reviewing the previous admissions and records, reviewing the current x-rays, labs, and discussing with clinical staff including nursing and physicians, exceeded 50 minutes. Please contact us with any questions. Office [phone] or after hours through [employer], x [phone]. Please note that voice recognition technology was used (while wearing a Covid mandated mask) in the preparation of this note and make therefore it may contain inadvertent transcription errors. If the patient is a COVID 19 isolation patient, the above physical exam reflects that of the examining physician for the day.     Frances Franklin M.D.,

## 2020-12-23 NOTE — CARE COORDINATION
Spoke with pt's daughter ,George Burton [phone]. States that prior to admission /transfer to [place]; pt was apparently nearing discharge and was being set up with [place] [facility] for [address]. Pt lives alone and wife . Cannot recall agency. She would be agreeable for return to [place]. CM/SW await call from [facility] [place]. Will follow. Joselyn Jo. Addendum; received call back from mateo at [place]; pt was originally set up for home 02 through [employer] ([phone]; [facility] care with [facility] ([phone][facility] [address] [facility] [address] ([phone] )and [facility] for home tf's (pt has peg)(hilario [phone]). Plan for now is to return to [place] under medicare; no precert needed. Joselyn Jo.

## 2020-12-23 NOTE — PROGRESS NOTES
[facility] Hospitalist   Progress Note    Admitting Date and Time: 12/22/2020  7:07 PM  Admit Dx: [id number] virus detected [U07.1]     Seen for follow on covid pneumonia, acute hypoxic respiratory failure    Subjective:  Appears  very weak and lethargic, mumbles little bit, cannot provide details. Discussed with bedside nurse. ROS: denies fever, chills, cp, sob, n/v, HA unless stated above.      albuterol-ipratropium  1 puff Inhalation 4x daily    enoxaparin  0.5 mg/kg Subcutaneous BID    aspirin  81 mg PEG Tube Daily    carvedilol  3.125 mg PEG Tube BID WC    metoclopramide  5 mg PEG Tube BID AC    multivitamin  1 tablet Oral Daily    tamsulosin  0.4 mg Oral Nightly    sodium chloride flush  10 mL Intravenous 2 times per day    dexamethasone  6 mg Intravenous Daily    Vitamin D  1,000 Units Oral Daily    vitamin C  1,000 mg Oral BID    zinc sulfate  50 mg Oral BID    [START ON 12/24/2020] remdesivir IVPB  100 mg Intravenous Q24H         sodium chloride flush, 10 mL, PRN      polyethylene glycol, 17 g, Daily PRN      acetaminophen, 650 mg, Q6H PRN    Or      acetaminophen, 650 mg, Q6H PRN      sodium chloride, 30 mL, PRN         Objective:    BP (!) 94/53   Pulse 82   Temp 98.8 °F (37.1 °C) (Oral)   Resp 18   Ht 5' 7\" (1.702 m)   Wt 96 lb (43.5 kg)   SpO2 97%   BMI 15.04 kg/m²   General Appearance: alert, lethargic ,dehydrated ,weak ,mumbles , can not provide details ,underweight,, in no overt distress  Skin: warm and dry  Head: normocephalic and atraumatic  Eyes: pupils equal, round, and reactive to light, extraocular eye movements intact, conjunctivae normal  Neck: supple and non-tender without mass  Pulmonary/Chest: clear to auscultation bilaterally with few diffuse ronchii  Cardiovascular: normal rate, regular rhythm, normal S1 and A9,7/5 systolic murmur  Abdomen: soft, non-tender, non-distended, normal bowel sounds, no masses or organomegaly,PEG in place  Extremities: no cyanosis, clubbing or edema  Musculoskeletal: normal range of motion  Neurologic: weak ,lwthargic , mumbles , can not provide details , moves all extremities, no gross focal deficits noted. Recent Labs     12/22/20 2005      K 4.0   CL 98   CO2 28   BUN 32*   CREATININE 1.2   GLUCOSE 114*   CALCIUM 9.1       Recent Labs     12/22/20 2005   WBC 6.9   RBC 3.46*   HGB 11.6*   HCT 35.7*   .2*   MCH 33.5   MCHC 32.5   RDW 14.5      MPV 9.9       Labs and images reviewed      Radiology:   XR ABDOMEN (KUB) (SINGLE AP VIEW)   Final Result   No new abnormal findings. Indwelling gastrostomy is noted. CT HEAD WO CONTRAST   Final Result   1. No acute intracranial abnormality. 2. Small soft tissue densities in the right frontal and left parietal scalp,   which were not seen on the prior study. Clinical correlation is needed. 3. Bilateral mastoid effusions. XR CHEST PORTABLE   Final Result   Bibasilar pulmonary opacities, of which the right basilar infiltrate is   stable while the left basilar infiltrate is somewhat less prominent than was   seen on 10/05/2020. Assessment:    Active Problems:    COVID-19 virus detected  Resolved Problems:    * No resolved hospital problems. *      Plan:   Acute hypoxic respiratory failure secondary to Covid pneumonia-on O2 as needed, currently requiring 5 L nasal cannula, on vitamin C, vitamin D, zinc, dexamethasone, remdesivir, Lovenox as per protocol. Has elevated D-dimer. Continue to follow inflammatory markers. Wean O2 as tolerated. Pulmonary consulted    Hypotension-likely sec to dehydration , infection ,discussed with bedside nurse, will add parameters to hold Coreg,lasix/K on hold  Discussed with bedside nurse, will continue to follow. If needed will give bolus. Has extensive cardiac history-so gentle IV fluids would be better. Will follow with nursing.     Mild DEBORAH/dehydration-appears clinically dehydrated, lethargic and weak. Continue IV fluids as ordered. CAD /combined systolic& diastolic CHF ,HPL - on BB  ,asa  as per home . Parameters to hold coreg added. Lasix on hold sec to above . Will reinstate when possible. Oropharyngeal dysphagia status post PEG-continue t feeds. Hypothyroidism - on supplements as per home. On lovenox for dvt prophy -has elevated d dimer so as per covid protocol    I have d/w dtr Marcihuy MELENDEZ and she prefers [facility]    As per pharmacy / clinical pharmacist , though has elevated d dimer with his creatinine clearance - sc heparin will be better for dvt prophy . Switched to heparin.     Electronically signed by Ed Rose MD on 12/23/2020 at 9:29 AM

## 2020-12-23 NOTE — PROGRESS NOTES
Jordan Wood,    Your patient is on a medication that requires a renal dose adjustment. Renal Function Assessment:    Date Body Weight IBW Adj. Body Weight SCr CrCl Dialysis status   12/23/2020 43.5 kg 66.1 kg   1.2 24 ml/min         Pharmacy has renally dose-adjusted the following medication(s):    Date Medication Original Dosing Regimen New Dosing Regimen   12/23/2020 Enoxaparin 40 mg daily 30 mg daily           These changes were made per protocol according to the [facility] Renal Function-Based Dose Adjustments Policy    *Please note this dose may need readjusted if your patient's renal function significantly improves. Please contact pharmacy with any questions regarding these changes.

## 2020-12-23 NOTE — ED NOTES
Bed: 12  Expected date:   Expected time:   Means of arrival:   Comments:  ems     Kaykay Bernstein RN  12/22/20 1907

## 2020-12-23 NOTE — ED PROVIDER NOTES
96 lb (43.5 kg)         · General Appearance/Constitutional:  Lethargic  · HEENT:  NC/NT. PERRLA. Airway patent. · Neck:  Normal ROM. Supple. · Respiratoty:  Breath sounds: reduced bilaterally. Lung sounds: rhonchi- right base and left base. · CV:  Regular rate and rhythm, normal heart sounds, without pathological murmurs, ectopy, gallops, or rubs. .  · GI:  Soft, nontender, good bowel sounds. No firm or pulsatile mass. +PEG tube  · Integument:  Normal turgor. Warm, dry, without visible rash. · Extremities/Lymphatics:  Edema:  3+ Bilateral lower extremity(s). · Neurological:  lethargic.   Moves all extremities    Glascow Coma Scale:  Best Eye Response 3 - Opens eyes to loud noise or command   Best Verbal Response 2 - Moans, makes unintelligible sounds   Best Motor Response 4 - Moves part of body but does not remove noxious stimulus   Total Score 9         Lab / Imaging Results   (All laboratory and radiology results have been personally reviewed by myself)  Labs:  Results for orders placed or performed during the hospital encounter of 12/22/20   CBC Auto Differential   Result Value Ref Range    WBC 6.9 4.5 - 11.5 E9/L    RBC 3.46 (L) 3.80 - 5.80 E12/L    Hemoglobin 11.6 (L) 12.5 - 16.5 g/dL    Hematocrit 35.7 (L) 37.0 - 54.0 %    .2 (H) 80.0 - 99.9 fL    MCH 33.5 26.0 - 35.0 pg    MCHC 32.5 32.0 - 34.5 %    RDW 14.5 11.5 - 15.0 fL    Platelets 610 376 - 309 E9/L    MPV 9.9 7.0 - 12.0 fL    Neutrophils % 91.2 (H) 43.0 - 80.0 %    Lymphocytes % 4.4 (L) 20.0 - 42.0 %    Monocytes % 4.4 2.0 - 12.0 %    Eosinophils % 0.0 0.0 - 6.0 %    Basophils % 0.0 0.0 - 2.0 %    Neutrophils Absolute 6.28 1.80 - 7.30 E9/L    Lymphocytes Absolute 0.28 (L) 1.50 - 4.00 E9/L    Monocytes Absolute 0.28 0.10 - 0.95 E9/L    Eosinophils Absolute 0.00 (L) 0.05 - 0.50 E9/L    Basophils Absolute 0.00 0.00 - 0.20 E9/L    nRBC 0.9 /100 WBC    Polychromasia 1+    Comprehensive Metabolic Panel   Result Value Ref Range    Sodium Clinical correlation is needed. 3. Bilateral mastoid effusions. XR CHEST PORTABLE   Final Result   Bibasilar pulmonary opacities, of which the right basilar infiltrate is   stable while the left basilar infiltrate is somewhat less prominent than was   seen on 10/05/2020. XR ABDOMEN (KUB) (SINGLE AP VIEW)    (Results Pending)     EKG #1:  Interpreted by emergency department physician unless otherwise noted. Time:  19:31    Rate: 97 bpm  Rhythm: Sinus rhythm. Interpretation: Normal sinus rhythm and with nonspecific T wave changes. Comparison: Today's ECG is unchanged from previous tracings.     ED Course / Medical Decision Making     Medications   aspirin chewable tablet 81 mg (has no administration in time range)   carvedilol (COREG) tablet 3.125 mg (has no administration in time range)   ipratropium-albuterol (DUONEB) nebulizer solution 3 mL (has no administration in time range)   metoclopramide (REGLAN) tablet 5 mg (has no administration in time range)   multivitamin 1 tablet (has no administration in time range)   tamsulosin (FLOMAX) capsule 0.4 mg (has no administration in time range)   0.9 % sodium chloride infusion (has no administration in time range)   sodium chloride flush 0.9 % injection 10 mL (has no administration in time range)   sodium chloride flush 0.9 % injection 10 mL (has no administration in time range)   enoxaparin (LOVENOX) injection 40 mg (has no administration in time range)   polyethylene glycol (GLYCOLAX) packet 17 g (has no administration in time range)   acetaminophen (TYLENOL) tablet 650 mg (has no administration in time range)     Or   acetaminophen (TYLENOL) suppository 650 mg (has no administration in time range)   dexamethasone (DECADRON) injection 6 mg (has no administration in time range)   Vitamin D (CHOLECALCIFEROL) tablet 1,000 Units (has no administration in time range)   ascorbic acid (VITAMIN C) tablet 1,000 mg (has no administration in time range)   zinc sulfate (ZINCATE) capsule 50 mg (has no administration in time range)     Re-Evaluations:  12/22/20      Patients condition remains unchanged. Consultations:             IP CONSULT TO PHARMACY    Procedures:   none    MDM: Patient presents to the ED from the nursing home for evaluation of altered mental status and hypoxia. He was placed on 4 L nasal cannula and is saturating well. He is lethargic on physical exam.  CT head showed no acute abnormality. Patient was positive for COVID-19 infection. He was accepted by [facility] hospitalists for further management. Plan of Care/Counseling:  I reviewed today's visit with the patient in addition to providing specific details for the plan of care and counseling regarding the diagnosis and prognosis. Questions are answered at this time and are agreeable with the plan. Assessment      1. COVID-19 virus detected    2. Hypoxia      This patient's ED course included: a personal history and physicial examination  This patient has remained hemodynamically stable during their ED course. Plan   Admission Full to Telemetry Unit. Patient condition is fair. New Medications     Current Discharge Medication List        Electronically signed by Claribel Barney DO   DD: 12/22/20  **This report was transcribed using voice recognition software. Every effort was made to ensure accuracy; however, inadvertent computerized transcription errors may be present.   END OF PROVIDER NOTE        Claribel Barney DO  12/22/20 1841

## 2020-12-23 NOTE — DISCHARGE INSTR - COC
Continuity of Care Form    Patient Name: Ayad Kruse   :  7/3/1927  MRN:  [id number]    Admit date:  2020  Discharge date:  20    Code Status Order: DNR-CCA   Advance Directives:     Admitting Physician:  Alexx Erazo MD  PCP: Mariya Wagn MD    Discharging Nurse: LIZZIE RED Unit/Room#: 65/200-A  Discharging Unit Phone Number: [phone]    Emergency Contact:   Extended Emergency Contact Information  Primary Emergency Contact: Dallie Cockayne, [address] Phone: [phone]  Relation: Other   needed? No  Secondary Emergency Contact: Cyrus Isabel  Mobile Phone: [phone]  Relation: Child  Preferred language: English   needed? No    Past Surgical History:  Past Surgical History:   Procedure Laterality Date    CORONARY ANGIOPLASTY WITH STENT PLACEMENT  2012    Dr Stephanie Mayo:  3.5x22 stent to mid LAD  EF=20%    GASTROSTOMY TUBE PLACEMENT N/A 10/7/2020    EGD PEG TUBE PLACEMENT performed by Navneet Willis MD at [address] [place] [address]         Immunization History:   Immunization History   Administered Date(s) Administered    Influenza A (A1I3-24) Vaccine PF IM 2010    Influenza Virus Vaccine 2008    Influenza, High Dose (Fluzone 65 yrs and older) 10/01/2013    Influenza, Quadv, IM, PF (6 mo and older Fluzone, Flulaval, Fluarix, and 3 yrs and older Afluria) 10/28/2014    Tdap (Boostrix, Adacel) 2014       Active Problems:  Patient Active Problem List   Diagnosis Code    Acute transmural anterior wall MI ([address] I21.09    DJD (degenerative joint disease) M19.90    Hyperlipidemia E78.5   Buren Neer Hypothyroid E03.9    CAD (coronary artery disease) I25.10    Cardiomyopathy ([address] I42.9    CHF (congestive heart failure) (HCC) I50.9    Myocardial infarction acute ([facility] I21.9    Syncope and collapse R55    Closed fracture of pelvis ([address] S32. 9XXA    Moderate protein-calorie malnutrition ([address] E44.0    Orthostatic hypotension I95.1    Closed right hip fracture, with routine healing, subsequent encounter S72.001D    Traumatic brain injury ([facility] S06. 9X9A    Anemia D64.9    Bone lesion M89.9    Age-related physical debility R54    Pneumonia due to organism J18.9    Severe protein-calorie malnutrition ([facility] E43    COVID-19 virus detected U07.1       Isolation/Infection:   Isolation          Droplet Plus        Patient Infection Status     Infection Onset Added Last Indicated Last Indicated By Review Planned Expiration Resolved Resolved By    COVID-19 12/22/20 12/22/20 12/22/20 COVID-19 12/29/20 01/05/21      Resolved    COVID-19 Rule Out 12/22/20 12/22/20 12/22/20 COVID-19 (Ordered)   12/22/20 Rule-Out Test Resulted    COVID-19 Rule Out 10/05/20 10/05/20 10/05/20 COVID-19 (Ordered)   10/05/20 Rule-Out Test Resulted          Nurse Assessment:  Last Vital Signs: BP (!) 94/53   Pulse 82   Temp 98.8 °F (37.1 °C) (Oral)   Resp 18   Ht 5' 7\" (1.702 m)   Wt 96 lb (43.5 kg)   SpO2 97%   BMI 15.04 kg/m²     Last documented pain score (0-10 scale): Pain Level: 0  Last Weight:   Wt Readings from Last 1 Encounters:   12/22/20 96 lb (43.5 kg)     Mental Status:  oriented, alert, coherent, logical, thought processes intact and able to concentrate and follow conversation    IV Access:  - None    Nursing Mobility/ADLs:  Walking   Assisted  Transfer  Assisted  Bathing  Assisted  Dressing  Assisted  Toileting  Assisted  Feeding  Dependent  Med Admin  Dependent  Med Delivery   peg    Wound Care Documentation and Therapy:        Elimination:  Continence:   · Bowel: No  · Bladder: No  Urinary Catheter: None   Colostomy/Ileostomy/Ileal Conduit: No       Date of Last BM: 12/27/20    Intake/Output Summary (Last 24 hours) at 12/23/2020 1125  Last data filed at 12/23/2020 0646  Gross per 24 hour   Intake 662 ml   Output --   Net 662 ml     I/O last 3 completed shifts:   In: 662 [I.V.:412; IV Piggyback:250]  Out: -     Safety Concerns: At Risk for Falls    Impairments/Disabilities:      Hearing    Nutrition Therapy:  Current Nutrition Therapy:   - Tube Feedings:  Standard with fiber    Routes of Feeding: Gastrostomy Tube  Liquids: No Liquids  Daily Fluid Restriction: no  Last Modified Barium Swallow with Video (Video Swallowing Test): done on 12/28/2020    Treatments at the Time of Hospital Discharge:   Respiratory Treatments: inhaler  Oxygen Therapy:  is not on home oxygen therapy. Ventilator:    - No ventilator support    Rehab Therapies: Physical Therapy and Occupational Therapy  Weight Bearing Status/Restrictions: No weight bearing restirctions  Other Medical Equipment (for information only, NOT a DME order):  walker  Other Treatments: none    Patient's personal belongings (please select all that are sent with patient):  watch    RN SIGNATURE:  Electronically signed by Abby Zabala RN on 12/31/20 at 5:54 PM EST    CASE MANAGEMENT/SOCIAL WORK SECTION    Inpatient Status Date: 12/22/20    Readmission Risk Assessment Score:  Readmission Risk              Risk of Unplanned Readmission:        24           Discharging to Facility/ Agency   · Name: Joana Sánchez  [address] [place] [address]         Phone: [phone]       Fax: [phone]          Dialysis Facility (if applicable)   · Name:  · Address:  · Dialysis Schedule:  · Phone:  · Fax:    / signature: Electronically signed by Danial Andre on 12/23/20 at 11:25 AM EST    PHYSICIAN SECTION    Prognosis: Guarded    Condition at Discharge: Stable    Rehab Potential (if transferring to Rehab): Guarded    Recommended Labs or Other Treatments After Discharge: ***    Physician Certification: I certify the above information and transfer of Iva Calle  is necessary for the continuing treatment of the diagnosis listed and that he requires [facility] for greater 30 days.      Update Admission H&P: No change in H&P    PHYSICIAN SIGNATURE:  Electronically signed by Ana Meyers MD on 12/31/2020 at 4:59 PM

## 2020-12-23 NOTE — PROGRESS NOTES
According to paperwork from [facility], patient is on a mechanical soft texture, regular consistency diet. Ground meats moistened with gravy or broth appropriate to food. Resident prefers pink sugar substitute.     He receives Jevity 1.5 \"brick\" pack at bedtime flushed with 60ml water pre and post.  Every day and night, flush TF with 30mkl H20 with meds

## 2020-12-23 NOTE — TELEPHONE ENCOUNTER
Patient currently admitted but homecare calling wondering if you will sign orders for his tube feeding and if you will follow ?

## 2020-12-23 NOTE — PROGRESS NOTES
Speech Language Pathology  SPEECH/LANGUAGE PATHOLOGY  CLINICAL ASSESSMENT OF SWALLOWING FUNCTION    PATIENT NAME:  Mariam Quiroz      :  7/3/1927      TODAY'S DATE:  2020  ROOM:  30/0530-A    SUMMARY OF EVALUATION     DYSPHAGIA DIAGNOSIS:  Marked pharyngeal dysphagia       DIET RECOMMENDATIONS:  NPO (nothing by mouth including oral meds) has existing PEG for nocturnal feeds     FEEDING RECOMMENDATIONS:     Assistance level:  Not applicable      Compensatory strategies recommended: Not applicable    THERAPY RECOMMENDATIONS:      Dysphagia therapy is recommended 3-5 times per week for LOS or when goals are met. Pt will complete BOTR strength/ ROM exercises to reduce pharyngeal residuals and improve epiglottic inversion with  moderate verbal prompts. Pt will complete laryngeal strength/ ROM therapeutic exercises to improve airway protection for the least restrictive PO diet with  minimal verbal prompts    A Video Swallow Study (MBSS) is recommended and requires a physician order                 PROCEDURE     Consistencies Administered During the Evaluation   Liquids: thin liquid, nectar thick liquid, honey thick liquid and pudding thick liquid   Solids:  Not administered      Method of Intake:   cup, straw, spoon  Fed by clinician      Position:   Seated, upright                  RESULTS     Oral Stage:          The oral stage of swallowing was within functional limits for presented trials      Pharyngeal Stage:      Immediate wet cough was noted after presentation of thin liquid, nectar consistency liquid, honey consistency liquid and pudding consistency liquid, Wet respirations were noted after presentation of thin liquid, nectar consistency liquid, honey consistency liquid and pudding consistency liquid and Wet/gurgly vocal quality was noted after presentation of thin liquid, nectar consistency liquid, honey consistency liquid and pudding consistency liquid                  The Speech Language Pathologist (SLP) completed education with the patient regarding results of evaluation. Explained that Speech Pathology intervention is warranted  at this time   Prognosis for improvements is fair     This plan will be re-evaluated and revised in 1 week  if warranted. Patient stated goals: Agreed with above,   Treatment goals discussed with Patient   The Patient did not demonstrate understanding of the diagnosis, prognosis and plan of care       CPT code:  24853  bedside swallow eval      [x]The admitting diagnosis and active problem list, as listed below have been reviewed prior to initiation of this evaluation. ADMITTING DIAGNOSIS: COVID-19 virus detected [U07.1]     ACTIVE PROBLEM LIST:   Patient Active Problem List   Diagnosis    Acute transmural anterior wall MI ([address]    DJD (degenerative joint disease)    Hyperlipidemia    Hypothyroid    CAD (coronary artery disease)    Cardiomyopathy ([address]    CHF (congestive heart failure) (HCC)    Myocardial infarction acute (HCC)    Syncope and collapse    Closed fracture of pelvis (HCC)    Moderate protein-calorie malnutrition (HCC)    Orthostatic hypotension    Closed right hip fracture, with routine healing, subsequent encounter    Traumatic brain injury ([address]    Anemia    Bone lesion    Age-related physical debility    Pneumonia due to organism    Severe protein-calorie malnutrition ([address]    COVID-19 virus detected     Jc MONTAGUEEd. [address] [id number]

## 2020-12-23 NOTE — CARE COORDINATION
Pt admitted from [facility]; + covid 12/22; RDV day #1; currently on 5lnc. Tatum Samuel from [facility] contacted re; bed status at facility;await call back. Melisa Villela.

## 2020-12-24 NOTE — PROGRESS NOTES
Pulmonary Progress Note    Admit Date: 2020  Hospital day                               PCP: Elroy Lin MD    Chief Complaint (s):  Patient Active Problem List   Diagnosis    [facility]    DJD (degenerative joint disease)    Hyperlipidemia    Hypothyroid    CAD (coronary artery disease)    [facility]    CHF [facility]congestive heart failure) ([facility]    Myocardial infarction acute ([facility]    Syncope and collapse    Closed fracture of pelvis ([facility]    Moderate protein-calorie malnutrition (HCC)    Orthostatic hypotension    Closed right hip fracture, with routine healing, subsequent encounter    Traumatic brain injury ([facility]    Anemia    Bone lesion    Age-related physical debility    Pneumonia due to organism    Severe protein-calorie malnutrition ([facility]    COVID-19 virus detected       Subjective:  · Arouses easily, no commands. Still with sonorous respirations.       Vitals:  VITALS:  BP (!) 100/47   Pulse 89   Temp 101 °F (38.3 °C) (Oral)   Resp 18   Ht 5' 7\" (1.702 m)   Wt 96 lb (43.5 kg)   SpO2 94%   BMI 15.04 kg/m²     24HR INTAKE/OUTPUT:      Intake/Output Summary (Last 24 hours) at 2020 1406  Last data filed at 2020 4045  Gross per 24 hour   Intake 900 ml   Output --   Net 900 ml       24HR PULSE OXIMETRY RANGE:    SpO2  Av.5 %  Min: 93 %  Max: 94 %    Medications:  IV:   sodium chloride 75 mL/hr at 20 1146       Scheduled Meds:   albuterol-ipratropium  1 puff Inhalation 4x daily    heparin (porcine)  5,000 Units Subcutaneous Q8H    aspirin  81 mg PEG Tube Daily    carvedilol  3.125 mg PEG Tube BID WC    metoclopramide  5 mg PEG Tube BID AC    multivitamin  1 tablet Oral Daily    tamsulosin  0.4 mg Oral Nightly    sodium chloride flush  10 mL Intravenous 2 times per day    dexamethasone  6 mg Intravenous Daily    Vitamin D  1,000 Units Oral Daily    vitamin C  1,000 mg Oral BID    zinc sulfate 50 mg Oral BID    remdesivir IVPB  100 mg Intravenous Q24H       Diet:   DIET TUBE FEED CONTINUOUS/CYCLIC NPO; 1.5 Calorie with Fiber; Gastrostomy; Continuous; 20; 45; 24     EXAM:  General: No distress. Alert. Eyes: PERRL. No sclera icterus. No conjunctival injection. ENT: No discharge. Pharynx clear. Neck: Trachea midline. Normal thyroid. Resp: No accessory muscle use. No rales. No wheezing. Scattered rhonchi. CV: Regular rate. Regular rhythm. No murmur or rub. Abd: Non-tender. Non-distended. No masses. No organomegaly. Normal bowel sounds. Skin: Warm and dry. No nodule on exposed extremities. No rash on exposed extremities. Ext: No cyanosis, clubbing, edema  Lymph: No cervical LAD. No supraclavicular LAD. M/S: No cyanosis. No joint deformity. No clubbing. Neuro: Awake. Follows commands. Positive pupils/gag/corneals. Normal pain response. Results:  CBC:   Recent Labs     12/22/20 2005 12/23/20  1030 12/24/20  1215   WBC 6.9 6.1 5.1   HGB 11.6* 11.3* 10.7*   HCT 35.7* 35.3* 32.3*   .2* 104.1* 101.9*    164 133     BMP:   Recent Labs     12/22/20 2005 12/23/20  1030 12/24/20  1215    137 136   K 4.0 4.0 3.6   CL 98 101 103   CO2 28 26 28   BUN 32* 29* 31*   CREATININE 1.2 0.8 0.8     LIVER PROFILE:   Recent Labs     12/22/20 2005   AST 22   ALT 8   BILITOT 0.3   ALKPHOS 74     PT/INR: No results for input(s): PROTIME, INR in the last 72 hours. APTT: No results for input(s): APTT in the last 72 hours. Pathology:  1. N/A      Microbiology:  1. None    Recent ABG:   Recent Labs     12/22/20 2030   PH 7.395   PO2 90.0   PCO2 41.3   HCO3 24.7   BE -0.2   O2SAT 96.8   METHB 0.0   O2HB 96.3   COHB 0.5   O2CON 15.9   HHB 3.2   THB 11.7             Recent Films:  XR ABDOMEN (KUB) (SINGLE AP VIEW)   Final Result   No new abnormal findings. Indwelling gastrostomy is noted. CT HEAD WO CONTRAST   Final Result   1. No acute intracranial abnormality.    2. Small soft tissue densities in the right frontal and left parietal scalp,   which were not seen on the prior study. Clinical correlation is needed. 3. Bilateral mastoid effusions. XR CHEST PORTABLE   Final Result   Bibasilar pulmonary opacities, of which the right basilar infiltrate is   stable while the left basilar infiltrate is somewhat less prominent than was   seen on 10/05/2020.                   Assessment:  1. Current aspiration pneumonia with now superimposed COVID-19 pneumonia. Despite the above, oxygenation is adequate. 2. Dehydration        Plan:  1. Continue Decadron and remdesivir  2. Continue tube feedings  3. Continue cautious hydration, unfortunately, no urine output is charted.         Time at the bedside, reviewing labs and radiographs, reviewing updated notes and consultations, discussing with staff and family was more than 35   minutes. Please note that voice recognition technology was used in the preparation of this note and make therefore it may contain inadvertent transcription errors. If the patient is a COVID 19 isolation patient, the above physical exam reflects that of the examining physician for the day. Terry Egnland M.D., F.C.C.P.     [facility] [address], [address] [place] [address]

## 2020-12-24 NOTE — CARE COORDINATION
+ covid ; RDV day #2; plan is for return to [facility]; no precert if appropriate; await pt/ot jan-Nery Diallo RN,CM.

## 2020-12-24 NOTE — PROGRESS NOTES
Physical Therapy    Facility/Department: [address] MED SURG/TELE  Initial Assessment    NAME: Janiya Clifton  : 7/3/1927  MRN: [id number]    Date of Service: 2020      Attending Provider:  Gini Biswas MD    Evaluating PT:  Cain Luciano P.T. Room #:  0983/5471-D  Diagnosis:  COVID 19+  Pertinent PMHx/PSHx:  PEG tube  Precautions:  Falls, HOB >30°, bed/chair alarm     SUBJECTIVE:    Pt lives alone in a 2 story home with 1+1+1 stairs and no rail to enter. His bed and bath are on the first floor. Pt reports he has not been home in a long time and came in from rehab. There he ambulated with a ww. OBJECTIVE:   Initial Evaluation  Date: 20 Treatment Short Term/ Long Term   Goals   Was pt agreeable to Eval/treatment? yes     Does pt have pain? No c/o pain     Bed Mobility  Rolling: MIN A  Supine to sit: MIN A  Sit to supine: MIN A  Scooting: MIN A  supervision   Transfers Sit to stand: MOD A  Stand to sit: MOD A  Stand pivot: NA  SBA   Ambulation   NA, pt felt too weak to walk this date. 100 feet with ww SBA   Stair negotiation: ascended and descended NA  4 steps with 1 rail SBA   AM-PAC 6 Clicks 49/45       BLE ROM is WFL. BLE strength is grossly 3-/5 to 4-/5. Edema:  None noted  Balance: sitting is EOB was SBA and standing with no AD is MOD A  Endurance: fair-    ASSESSMENT:    Comments:  Pt was found in bed and bed pad was saturated in urine. Pt was agreeable to sit on EOB which he did for 5 min working on posture and core strength. Upon standing he c/o fatigue and weakness and was unable to walk. While standing up with MOD A his bed pad was changed. Pt c/o fatigue and asked to lie back in bed. Pt was left supine in bed with call light left by patient. Chair/bed alarm: bed alarm was re-activated. Pt's/ family goals   1. To go home. Patient and or family understand(s) diagnosis, prognosis, and plan of care.     PLAN OF CARE:    Current Treatment

## 2020-12-24 NOTE — PROGRESS NOTES
Speech Language Pathology      NAME:  Shereen Yañez  :  7/3/1927  DATE: 2020  ROOM:  0530/0530-A    Pt sleeping soundly at this time. Will cont POC and complete dysphagia management as able.     COVID-19 virus detected [U07.1]

## 2020-12-25 NOTE — PROGRESS NOTES
[facility] Progress Note    Admitting Date and Time: 12/22/2020  7:07 PM  Admit Dx: ADPXN-21 virus detected [U07.1]    Subjective:  Patient is being followed for COVID-19 virus detected [U07.1]   Pt feels weak. He is very concerned about whether his medicare insurance is paying for his stay here. Per RN: PEG site had some drainage. She cleaned it well and there was a small amount of bleeding at the sight. ROS: denies fever, chills, cp, sob, n/v, HA unless stated above.       albuterol-ipratropium  1 puff Inhalation 4x daily    heparin (porcine)  5,000 Units Subcutaneous Q8H    aspirin  81 mg PEG Tube Daily    carvedilol  3.125 mg PEG Tube BID WC    metoclopramide  5 mg PEG Tube BID AC    multivitamin  1 tablet Oral Daily    tamsulosin  0.4 mg Oral Nightly    sodium chloride flush  10 mL Intravenous 2 times per day    dexamethasone  6 mg Intravenous Daily    Vitamin D  1,000 Units Oral Daily    vitamin C  1,000 mg Oral BID    zinc sulfate  50 mg Oral BID    remdesivir IVPB  100 mg Intravenous Q24H         sodium chloride flush, 10 mL, PRN      polyethylene glycol, 17 g, Daily PRN      acetaminophen, 650 mg, Q6H PRN    Or      acetaminophen, 650 mg, Q6H PRN      sodium chloride, 30 mL, PRN         Objective:    BP (!) 90/54   Pulse 80   Temp 98.6 °F (37 °C) (Oral)   Resp 18   Ht 5' 7\" (1.702 m)   Wt 113 lb (51.3 kg)   SpO2 96%   BMI 17.70 kg/m²     General Appearance: alert and oriented to person, place and time and in no acute distress  Skin: warm and dry  Head: normocephalic and atraumatic  Eyes: pupils equal, round, and reactive to light, extraocular eye movements intact, conjunctivae normal  Neck: neck supple and non tender without mass   Pulmonary/Chest: clear to auscultation bilaterally- no wheezes, rales or rhonchi, normal air movement, no respiratory distress  Cardiovascular: normal rate, normal S1 and S2 and no carotid bruits  Abdomen: soft, non-tender,

## 2020-12-25 NOTE — PROGRESS NOTES
Pulmonary Progress Note    Admit Date: 2020  Hospital day                               PCP: Gillis Leventhal, MD    Chief Complaint (s):  Patient Active Problem List   Diagnosis    [facility]    DJD (degenerative joint disease)    Hyperlipidemia    Hypothyroid    CAD (coronary artery disease)    Cardiomyopathy ([facility]    [facility]    My[facility] infarction acute ([facility]    Syncope and collapse    Closed fracture of pelvis [facility]    Moderate protein-calorie malnutrition (HCC)    Orthostatic hypotension    Closed right hip fracture, with routine healing, subsequent encounter    Traumatic brain injury ([facility]    Anemia    Bone lesion    Age-related physical debility    Pneumonia due to organism    Severe protein-calorie malnutrition ([facility]    COVID-19 virus detected       Subjective:  · Potential improvement in mentation today with the patient being awake and alert and concerned about his pain his bills. Oropharynx remains dry suggesting some degree of ongoing dehydration. BUN to creatinine ratio is about 50-1.       Vitals:  VITALS:  BP (!) 108/56   Pulse 76   Temp 97.8 °F (36.6 °C) (Axillary)   Resp 24   Ht 5' 7\" (1.702 m)   Wt 113 lb (51.3 kg)   SpO2 98%   BMI 17.70 kg/m²     24HR INTAKE/OUTPUT:      Intake/Output Summary (Last 24 hours) at 2020 1344  Last data filed at 2020 1015  Gross per 24 hour   Intake 4296 ml   Output --   Net 4296 ml       24HR PULSE OXIMETRY RANGE:    SpO2  Av %  Min: 96 %  Max: 98 %    Medications:  IV:      Scheduled Meds:   albuterol-ipratropium  1 puff Inhalation 4x daily    heparin (porcine)  5,000 Units Subcutaneous Q8H    aspirin  81 mg PEG Tube Daily    carvedilol  3.125 mg PEG Tube BID WC    metoclopramide  5 mg PEG Tube BID AC    multivitamin  1 tablet Oral Daily    tamsulosin  0.4 mg Oral Nightly    sodium chloride flush  10 mL Intravenous 2 times per day    dexamethasone  6 mg Intravenous Daily    Vitamin D  1,000 Units Oral Daily    vitamin C  1,000 mg Oral BID    zinc sulfate  50 mg Oral BID    remdesivir IVPB  100 mg Intravenous Q24H       Diet:   DIET TUBE FEED CONTINUOUS/CYCLIC NPO; 1.5 Calorie with Fiber; Gastrostomy; Continuous; 20; 45; 24     EXAM:  General: No distress. Alert. Eyes: PERRL. No sclera icterus. No conjunctival injection. ENT: No discharge. Pharynx clear. Neck: Trachea midline. Normal thyroid. Resp: No accessory muscle use. No rales. No wheezing. Scattered rhonchi. CV: Regular rate. Regular rhythm. No murmur or rub. Abd: Non-tender. Non-distended. No masses. No organomegaly. Normal bowel sounds. Skin: Warm and dry. No nodule on exposed extremities. No rash on exposed extremities. Ext: No cyanosis, clubbing, edema  Lymph: No cervical LAD. No supraclavicular LAD. M/S: No cyanosis. No joint deformity. No clubbing. Neuro: Awake. Follows commands. Positive pupils/gag/corneals. Normal pain response. Results:  CBC:   Recent Labs     12/23/20  1030 12/24/20  1215 12/25/20  0550   WBC 6.1 5.1 2.8*   HGB 11.3* 10.7* 9.5*   HCT 35.3* 32.3* 28.9*   .1* 101.9* 102.5*    133 121*     BMP:   Recent Labs     12/23/20  1030 12/24/20  1215 12/25/20  0550    136 137   K 4.0 3.6 4.1    103 107   CO2 26 28 28   BUN 29* 31* 28*   CREATININE 0.8 0.8 0.6*     LIVER PROFILE:   Recent Labs     12/22/20 2005   AST 22   ALT 8   BILITOT 0.3   ALKPHOS 74     PT/INR: No results for input(s): PROTIME, INR in the last 72 hours. APTT: No results for input(s): APTT in the last 72 hours. Pathology:  1. N/A      Microbiology:  1. None    Recent ABG:   Recent Labs     12/22/20 2030   PH 7.395   PO2 90.0   PCO2 41.3   HCO3 24.7   BE -0.2   O2SAT 96.8   METHB 0.0   O2HB 96.3   COHB 0.5   O2CON 15.9   HHB 3.2   THB 11.7             Recent Films:  XR ABDOMEN (KUB) (SINGLE AP VIEW)   Final Result   No new abnormal findings. Indwelling gastrostomy is noted. CT HEAD WO CONTRAST   Final Result   1. No acute intracranial abnormality. 2. Small soft tissue densities in the right frontal and left parietal scalp,   which were not seen on the prior study. Clinical correlation is needed. 3. Bilateral mastoid effusions. XR CHEST PORTABLE   Final Result   Bibasilar pulmonary opacities, of which the right basilar infiltrate is   stable while the left basilar infiltrate is somewhat less prominent than was   seen on 10/05/2020.                   Assessment:  1. Current aspiration pneumonia with now superimposed COVID-19 pneumonia. Despite the above, oxygenation is adequate. 2. Dehydration        Plan:  1. Continue Decadron and remdesivir  2. Continue tube feedings  3. Continue cautious hydration, unfortunately, no urine output is charted. 4. Continue to hold diuretics.         Time at the bedside, reviewing labs and radiographs, reviewing updated notes and consultations, discussing with staff and family was more than 35   minutes. Please note that voice recognition technology was used in the preparation of this note and make therefore it may contain inadvertent transcription errors. If the patient is a COVID 19 isolation patient, the above physical exam reflects that of the examining physician for the day. Bonilla Garrett M.D., F.C.C.P.     [facility] [address] [facility], [address] [place] [address], [address] [place] [address]

## 2020-12-26 NOTE — PROGRESS NOTES
[facility] [address] [facility]  [address] [facility], [address], [facility]      Pulmonary Progress Note      SUBJECTIVE:  On RA, claims ok with breathing but not easy to understand, not very clear with speech but appear to be appropriate with conversation    OBJECTIVE    Medications    Continuous Infusions:    Scheduled Meds:   albuterol-ipratropium  1 puff Inhalation 4x daily    heparin (porcine)  5,000 Units Subcutaneous Q8H    aspirin  81 mg PEG Tube Daily    carvedilol  3.125 mg PEG Tube BID WC    metoclopramide  5 mg PEG Tube BID AC    multivitamin  1 tablet Oral Daily    tamsulosin  0.4 mg Oral Nightly    sodium chloride flush  10 mL Intravenous 2 times per day    dexamethasone  6 mg Intravenous Daily    Vitamin D  1,000 Units Oral Daily    vitamin C  1,000 mg Oral BID    zinc sulfate  50 mg Oral BID    remdesivir IVPB  100 mg Intravenous Q24H       PRN Meds:sodium chloride flush, polyethylene glycol, acetaminophen **OR** acetaminophen, sodium chloride    Physical    VITALS:  BP (!) 115/55   Pulse 70   Temp 98.1 °F (36.7 °C) (Axillary)   Resp 18   Ht 5' 7\" (1.702 m)   Wt 113 lb (51.3 kg)   SpO2 96%   BMI 17.70 kg/m²     24HR INTAKE/OUTPUT:    No intake or output data in the 24 hours ending 20 1736    24HR PULSE OXIMETRY RANGE:    SpO2  Av.5 %  Min: 95 %  Max: 96 %    General appearance: alert, appears stated age and cooperative  Lungs: rhonchi bibasilar  Heart: regular rate and rhythm, S1, S2 normal, no murmur, click, rub or gallop  Abdomen: (+) PEG  Extremities: extremities normal, atraumatic, no cyanosis or edema  Neurologic: Mental status: awake, conversant, not easy to understand though appears appropriate with conversation, moving extremities    Data    CBC:   Recent Labs     20  1215 20  0550 20  0815   WBC 5.1 2.8* 3.2*   HGB 10.7* 9.5* 10.3*   HCT 32.3* 28.9* 31.6*   .9* 102.5* 103.6*

## 2020-12-26 NOTE — PROGRESS NOTES
[facility] Progress Note    Admitting Date and Time: 12/22/2020  7:07 PM  Admit Dx: [id number] virus detected [U07.1]    Subjective:  Patient is being followed for COVID-19 virus detected [U07.1]   Pt feels better today than yesterday. He still feels weak. No new complaints. Per RN: urinary incontinence  ROS: denies fever, chills, cp, sob, n/v, HA unless stated above.       albuterol-ipratropium  1 puff Inhalation 4x daily    heparin (porcine)  5,000 Units Subcutaneous Q8H    aspirin  81 mg PEG Tube Daily    carvedilol  3.125 mg PEG Tube BID WC    metoclopramide  5 mg PEG Tube BID AC    multivitamin  1 tablet Oral Daily    tamsulosin  0.4 mg Oral Nightly    sodium chloride flush  10 mL Intravenous 2 times per day    dexamethasone  6 mg Intravenous Daily    Vitamin D  1,000 Units Oral Daily    vitamin C  1,000 mg Oral BID    zinc sulfate  50 mg Oral BID    remdesivir IVPB  100 mg Intravenous Q24H         sodium chloride flush, 10 mL, PRN      polyethylene glycol, 17 g, Daily PRN      acetaminophen, 650 mg, Q6H PRN    Or      acetaminophen, 650 mg, Q6H PRN      sodium chloride, 30 mL, PRN         Objective:    BP (!) 115/55   Pulse 70   Temp 98.1 °F (36.7 °C) (Axillary)   Resp 18   Ht 5' 7\" (1.702 m)   Wt 113 lb (51.3 kg)   SpO2 96%   BMI 17.70 kg/m²     General Appearance: alert and oriented to person, place and time and in no acute distress  Skin: warm and dry  Head: normocephalic and atraumatic  Eyes: pupils equal, round, and reactive to light, extraocular eye movements intact, conjunctivae normal  Neck: neck supple and non tender without mass   Pulmonary/Chest: clear to auscultation bilaterally- no wheezes, rales or rhonchi, normal air movement, no respiratory distress  Cardiovascular: normal rate, normal S1 and S2 and no carotid bruits  Abdomen: soft, non-tender, non-distended, normal bowel sounds, no masses or organomegaly  Extremities: no cyanosis, no clubbing and ++ edema bilateral feet  Neurologic: no cranial nerve deficit and speech normal        Recent Labs     12/23/20  1030 12/24/20  1215 12/25/20  0550    136 137   K 4.0 3.6 4.1    103 107   CO2 26 28 28   BUN 29* 31* 28*   CREATININE 0.8 0.8 0.6*   GLUCOSE 84 161* 126*   CALCIUM 8.7 8.5* 8.3*       Recent Labs     12/23/20  1030 12/24/20  1215 12/25/20  0550   WBC 6.1 5.1 2.8*   RBC 3.39* 3.17* 2.82*   HGB 11.3* 10.7* 9.5*   HCT 35.3* 32.3* 28.9*   .1* 101.9* 102.5*   MCH 33.3 33.8 33.7   MCHC 32.0 33.1 32.9   RDW 14.4 14.1 14.1    133 121*   MPV 10.1 10.1 10.3           Assessment:    Active Problems:    COVID-19 virus detected  Resolved Problems:    * No resolved hospital problems. *      Plan:  1. Acute hypoxic respiratory failure 2/2 aspiration pneumonia and COVID 19 pneumonia  -  pulm consulted  -  Remdesivir, dexamethasone, vitamins, O2 supplementation  -  Treat fever with tylenol    2. Hypotension 2/2 dehydration, infection  -  Hold parameters on meds  -  Continue to monitor (better this am)  -  IVF discontinued as patient is up to full PEG feeds, getting edematous in the extremities  -  Consider midodrine if needed. 3.  CAD/combined systolic/diastolic CHF, HPL  -  On BB, aspirin  -  Lasix on hold secondary to dehydration; will likely need to restart soon    4. Oropharyngeal dysphagia-S/P PEG, continue tube feeds    5. Acquired hypothyroidism-continue synthroid    6. Mild DEBORAH/dehydration-  creatinine improving  -  Continue to monitor  -  IVF d/c'd  -  Patient is incontinent of urine; avoiding garrett catheter to prevent UTI    7. Anemia-chronic with worsening  -  Possibly due to recent IVF/dilution as well as infection  -  Will monitor        NOTE: This report was transcribed using voice recognition software. Every effort was made to ensure accuracy; however, inadvertent computerized transcription errors may be present.   Electronically signed by Lesley Peterson MD on 12/26/2020 at 9:01 AM

## 2020-12-27 NOTE — CONSULTS
Consultation      Patient's Name/Date of Birth: Ayad Spotted / 7/3/1927    Date: December 27, 2020     PCP: Mariya Wang MD     Chief Complaint:    Malfunctioning PEG tube    History of Present Illness: The patient is a 26-year-old white male who had a break in his PEG tube. I was asked to see the patient regarding the PEG tube.       Past Medical History:   Diagnosis Date    CAD (coronary artery disease)     Cardiomyopathy ([facility]     [facility]     DJD (degenerative joint disease) 3/10/2012    Hyperlipidemia     Hypothyroid     Hypothyroid     Myocardial infarction acute ([facility] 3/12/12    BMS to LAD      Past Surgical History:   Procedure Laterality Date    CORONARY ANGIOPLASTY WITH STENT PLACEMENT  03/09/2012    Dr Stephanie Mayo:  3.5x22 stent to mid LAD  EF=20%    GASTROSTOMY TUBE PLACEMENT N/A 10/7/2020    EGD PEG TUBE PLACEMENT performed by Navneet Willis MD at [address] [place] [address]        Allergies: Quinolones and Septra [bactrim]     Current Facility-Administered Medications   Medication Dose Route Frequency Provider Last Rate Last Admin    albuterol-ipratropium (COMBIVENT RESPIMAT)  MCG/ACT inhaler 1 puff  1 puff Inhalation 4x daily Michele Willis MD   1 puff at 12/27/20 0902    heparin (porcine) injection 5,000 Units  5,000 Units Subcutaneous Q8H Guille Hooper MD   5,000 Units at 12/27/20 0615    aspirin chewable tablet 81 mg  81 mg PEG Tube Daily Michele Willis MD   81 mg at 12/27/20 0902    carvedilol (COREG) tablet 3.125 mg  3.125 mg PEG Tube BID WC Guille Hooper MD   3.125 mg at 12/27/20 0902    metoclopramide (REGLAN) tablet 5 mg  5 mg PEG Tube BID AC Michele Willis MD   5 mg at 12/27/20 0615    multivitamin 1 tablet  1 tablet Oral Daily Michele Willis MD   1 tablet at 12/27/20 0902    tamsulosin (FLOMAX) capsule 0.4 mg  0.4 mg Oral Nightly Michele Willis MD   0.4 mg at 12/26/20 6488    sodium chloride flush 0.9 % injection 10 mL  10 mL Intravenous 2 times per day Sally Cuevas MD   10 mL at 12/27/20 0906    sodium chloride flush 0.9 % injection 10 mL  10 mL Intravenous PRN Michele Willis MD        polyethylene glycol (GLYCOLAX) packet 17 g  17 g Oral Daily PRN Sally Cuevas MD        acetaminophen (TYLENOL) tablet 650 mg  650 mg Oral Q6H PRN Michele Willis MD   650 mg at 12/24/20 1146    Or    acetaminophen (TYLENOL) suppository 650 mg  650 mg Rectal Q6H PRN Michele Willis MD        dexamethasone (DECADRON) injection 6 mg  6 mg Intravenous Daily Michele Willis MD   6 mg at 12/27/20 0599    Vitamin D (CHOLECALCIFEROL) tablet 1,000 Units  1,000 Units Oral Daily Michele Willis MD   1,000 Units at 12/27/20 0902    ascorbic acid (VITAMIN C) tablet 1,000 mg  1,000 mg Oral BID Michele Willis MD   1,000 mg at 12/27/20 0902    zinc sulfate (ZINCATE) capsule 50 mg  50 mg Oral BID Michele Willis MD   50 mg at 12/27/20 0902    remdesivir 100 mg in sodium chloride 0.9 % 250 mL IVPB  100 mg Intravenous Q24H Michele Willis MD   Stopped at 12/26/20 2245    0.9 % sodium chloride bolus  30 mL Intravenous PRN Michele Willis MD           Social History     Tobacco Use    Smoking status: Never Smoker    Smokeless tobacco: Never Used   Substance Use Topics    Alcohol use: No        Labs:  Lab Results   Component Value Date    WBC 3.2 (L) 12/26/2020    HCT 31.6 (L) 12/26/2020    HGB 10.3 (L) 12/26/2020     12/26/2020      Lab Results   Component Value Date     12/26/2020    K 4.0 12/26/2020     12/26/2020    CO2 28 12/26/2020    BUN 29 (H) 12/26/2020    CREATININE 0.5 (L) 12/26/2020    GLUCOSE 133 (H) 12/26/2020     Lab Results   Component Value Date    AST 22 12/22/2020    ALT 8 12/22/2020    ALKPHOS 74 12/22/2020    PROT 6.8 12/22/2020        Review of Systems:    Other than stated above in the HPI is negative      Physical exam: /73   Pulse 84   Temp 96.9 °F (36.1 °C) (Axillary) Resp 18   Ht 5' 7\" (1.702 m)   Wt 113 lb (51.3 kg)   SpO2 93%   BMI 17.70 kg/m²     General appearance: no acute distress  On examination: The PEG tube is functioning well. The patient does have some minimal erythema around the PEG tube site. Assessment:    PEG tube malfunction  Now functioning well    Plan:    The nursing staff was able to cut the PEG tube and reinsert the adapter.     Jaky Ken MD 12/27/2020 at 10:10 AM

## 2020-12-27 NOTE — PROGRESS NOTES
Patient found with a broken peg tube. Informed Dr. Mague Lovell, tube feed stopped at this point. New orders for general surgery.

## 2020-12-27 NOTE — PROGRESS NOTES
Spoke with Dr. Angela Singh new orders to cut peg tube and replace extension. Successful TF is up and running.

## 2020-12-27 NOTE — PROGRESS NOTES
bruits  Abdomen: soft, non-tender, non-distended, normal bowel sounds, no masses or organomegaly  Extremities: no cyanosis, no clubbing and ++ edema bilateral feet  Neurologic: no cranial nerve deficit and speech normal        Recent Labs     12/24/20  1215 12/25/20  0550 12/26/20  0815    137 137   K 3.6 4.1 4.0    107 104   CO2 28 28 28   BUN 31* 28* 29*   CREATININE 0.8 0.6* 0.5*   GLUCOSE 161* 126* 133*   CALCIUM 8.5* 8.3* 8.7       Recent Labs     12/24/20  1215 12/25/20  0550 12/26/20  0815   WBC 5.1 2.8* 3.2*   RBC 3.17* 2.82* 3.05*   HGB 10.7* 9.5* 10.3*   HCT 32.3* 28.9* 31.6*   .9* 102.5* 103.6*   MCH 33.8 33.7 33.8   MCHC 33.1 32.9 32.6   RDW 14.1 14.1 13.9    121* 131   MPV 10.1 10.3 10.9           Assessment:    Active Problems:    COVID-19 virus detected  Resolved Problems:    * No resolved hospital problems. *      Plan:  1. Acute hypoxic respiratory failure 2/2 aspiration pneumonia and COVID 19 pneumonia  -  pulm consulted  -  Remdesivir, dexamethasone, vitamins, O2 supplementation  -  Treat fever with tylenol  -  Worsening cough; concern for some fluid overload  -  CXR pending  -  robitussion  -  20 mg Lasix IV x 1 today, then PO lasix daily starting tomorrow. -  Will also start unasyn and obtain blood cultures     2. Hypotension 2/2 dehydration, infection-improved  -  Hold parameters on meds  -  Continue to monitor (better this am)  -  IVF discontinued as patient is up to full PEG feeds, getting edematous in the extremities  -  Consider midodrine if needed. 3.  CAD/combined systolic/diastolic CHF, HPL  -  On BB, aspirin  -  Lasix on hold secondary to dehydration; will likely need to restart soon    4. Oropharyngeal dysphagia-S/P PEG, continue tube feeds    5. Acquired hypothyroidism-continue synthroid    6.   Mild DEBORAH/dehydration-  creatinine improving  -  Continue to monitor  -  IVF d/c'd  -  Patient is incontinent of urine; avoiding garrett catheter to prevent UTI    7. Anemia-chronic with worsening  -  Possibly due to recent IVF/dilution as well as infection  -  Will monitor        NOTE: This report was transcribed using voice recognition software. Every effort was made to ensure accuracy; however, inadvertent computerized transcription errors may be present.   Electronically signed by Tuan Valencia MD on 12/27/2020 at 11:03 AM

## 2020-12-27 NOTE — PROGRESS NOTES
[facility] [address]  [address] [facility], [address], [address] [facility]      Pulmonary Progress Note      SUBJECTIVE:  On RA, sounding ronchorous with breathing, coughing but not much sputum production (?) weak cough or swallowing it, slight problem with PEG tube but currently working ok.     OBJECTIVE    Medications    Continuous Infusions:    Scheduled Meds:   furosemide  20 mg Intravenous Once    [START ON 2020] furosemide  40 mg Oral Daily    albuterol-ipratropium  1 puff Inhalation 4x daily    heparin (porcine)  5,000 Units Subcutaneous Q8H    aspirin  81 mg PEG Tube Daily    carvedilol  3.125 mg PEG Tube BID WC    metoclopramide  5 mg PEG Tube BID AC    multivitamin  1 tablet Oral Daily    tamsulosin  0.4 mg Oral Nightly    sodium chloride flush  10 mL Intravenous 2 times per day    dexamethasone  6 mg Intravenous Daily    Vitamin D  1,000 Units Oral Daily    vitamin C  1,000 mg Oral BID    zinc sulfate  50 mg Oral BID    remdesivir IVPB  100 mg Intravenous Q24H       PRN Meds:guaiFENesin-dextromethorphan, mineral oil-hydrophilic petrolatum, sodium chloride flush, polyethylene glycol, acetaminophen **OR** acetaminophen, sodium chloride    Physical    VITALS:  /73   Pulse 84   Temp 96.9 °F (36.1 °C) (Axillary)   Resp 18   Ht 5' 7\" (1.702 m)   Wt 113 lb (51.3 kg)   SpO2 93%   BMI 17.70 kg/m²     24HR INTAKE/OUTPUT:      Intake/Output Summary (Last 24 hours) at 2020 1051  Last data filed at 2020 2150  Gross per 24 hour   Intake 60 ml   Output --   Net 60 ml       24HR PULSE OXIMETRY RANGE:    SpO2  Av.5 %  Min: 92 %  Max: 93 %    General appearance: alert, appears stated age and cooperative  Lungs: rhonchi bibasilar  Heart: regular rate and rhythm, S1, S2 normal, no murmur, click, rub or gallop  Abdomen: (+) PEG  Extremities: extremities normal, atraumatic, no cyanosis or edema  Neurologic: Mental status: awake, conversant, not easy to understand though appears appropriate with conversation, moving extremities    Data    CBC:   Recent Labs     12/24/20  1215 12/25/20  0550 12/26/20  0815   WBC 5.1 2.8* 3.2*   HGB 10.7* 9.5* 10.3*   HCT 32.3* 28.9* 31.6*   .9* 102.5* 103.6*    121* 131       BMP:  Recent Labs     12/24/20  1215 12/25/20  0550 12/26/20  0815    137 137   K 3.6 4.1 4.0    107 104   CO2 28 28 28   BUN 31* 28* 29*   CREATININE 0.8 0.6* 0.5*    ALB:3,BILIDIR:3,BILITOT:3,ALKPHOS:3)@    PT/INR: No results for input(s): PROTIME, INR in the last 72 hours. ABG:   No results for input(s): PH, PO2, PCO2, HCO3, BE, O2SAT, METHB, O2HB, COHB, O2CON, HHB, THB in the last 72 hours. Radiology/Other tests reviewed: none    Assessment:     Active Problems:    COVID-19 virus detected  Resolved Problems:    * No resolved hospital problems. *      Plan:       1. Cont with decadron and remdesivir  2. CXR today  3. Cont with MDI, observe respiratory function, unclear if able to use properly  4. Suctioning as needed, NTS if necessary  5. On RA, watch oxygen saturation  6. Watch fluid balance, diurese as needed      Time at the bedside, reviewing labs and radiographs, reviewing notes and consultations, discussing with staff and family was more than 35 minutes. Thanks for letting us see this patient in consultation. Please contact us with any questions. Office [phone] or after hours through [employer], [phone].

## 2020-12-28 NOTE — PROGRESS NOTES
Oral BID       Diet:   DIET TUBE FEED CONTINUOUS/CYCLIC NPO; 1.5 Calorie with Fiber; Gastrostomy; Continuous; 20; 45; 24     EXAM:  General: No distress. Alert. Eyes: PERRL. No sclera icterus. No conjunctival injection. ENT: No discharge. Pharynx clear. Neck: Trachea midline. Normal thyroid. Resp: No accessory muscle use. No rales. No wheezing. Scattered rhonchi. CV: Regular rate. Regular rhythm. No murmur or rub. Abd: Non-tender. Non-distended. No masses. No organomegaly. Normal bowel sounds. Skin: Warm and dry. No nodule on exposed extremities. No rash on exposed extremities. Ext: No cyanosis, clubbing, edema  Lymph: No cervical LAD. No supraclavicular LAD. M/S: No cyanosis. No joint deformity. No clubbing. Neuro: Awake. Follows commands. Positive pupils/gag/corneals. Normal pain response. Results:  CBC:   Recent Labs     12/26/20  0815 12/27/20  1350   WBC 3.2* 4.5   HGB 10.3* 13.3   HCT 31.6* 40.3   .6* 100.5*    168     BMP:   Recent Labs     12/26/20  0815 12/27/20  1350    135   K 4.0 4.1    98   CO2 28 29   BUN 29* 32*   CREATININE 0.5* 0.7     LIVER PROFILE:   No results for input(s): AST, ALT, LIPASE, BILIDIR, BILITOT, ALKPHOS in the last 72 hours. Invalid input(s): AMYLASE,  ALB  PT/INR: No results for input(s): PROTIME, INR in the last 72 hours. APTT: No results for input(s): APTT in the last 72 hours. Pathology:  1. N/A      Microbiology:  1. None    Recent ABG:   No results for input(s): PH, PO2, PCO2, HCO3, BE, O2SAT, METHB, O2HB, COHB, O2CON, HHB, THB in the last 72 hours. Recent Films:  XR CHEST PORTABLE   Final Result   Lower lobe pulmonary infiltrates compatible with the diagnosis of pneumonia      XR ABDOMEN (KUB) (SINGLE AP VIEW)   Final Result   No new abnormal findings. Indwelling gastrostomy is noted. CT HEAD WO CONTRAST   Final Result   1. No acute intracranial abnormality.    2. Small soft tissue densities in the right frontal and left parietal scalp,   which were not seen on the prior study. Clinical correlation is needed. 3. Bilateral mastoid effusions. XR CHEST PORTABLE   Final Result   Bibasilar pulmonary opacities, of which the right basilar infiltrate is   stable while the left basilar infiltrate is somewhat less prominent than was   seen on 10/05/2020.                   Assessment:  1. Current aspiration pneumonia with now superimposed COVID-19 pneumonia. Despite the above, oxygenation is adequate. 2. Dehydration        Plan:  1. Continue Decadron and remdesivir  2. Continue tube feedings  3. Continue cautious hydration, unfortunately, no urine output is charted. 4. Continue to hold diuretics.         Time at the bedside, reviewing labs and radiographs, reviewing updated notes and consultations, discussing with staff and family was more than 35 minutes. Please note that voice recognition technology was used in the preparation of this note and make therefore it may contain inadvertent transcription errors. If the patient is a COVID 19 isolation patient, the above physical exam reflects that of the examining physician for the day. Christian Kumra M.D., F.C.C.P.     [facility] [address] [facility], [address] [facility], [address] [place] [address]

## 2020-12-28 NOTE — PROGRESS NOTES
[facility] Progress Note    Admitting Date and Time: 12/22/2020  7:07 PM  Admit Dx: [id number]-09 virus detected [U07.1]    Subjective:  Patient is being followed for COVID-19 virus detected [U07.1]   Pt is resting in bed when viewed through window. Has mild cough still. Patient bring treated for COVID, but was also found to have  Lower lobe infiltrates as well. Blood cultures pending. Per RN:  VSS, O2 sats are around 90-92 % RA      ROS: denies fever, chills, cp, sob, n/v, HA unless stated above.  furosemide  40 mg Oral Daily    ampicillin-sulbactam  3 g Intravenous Q6H    albuterol-ipratropium  1 puff Inhalation 4x daily    heparin (porcine)  5,000 Units Subcutaneous Q8H    aspirin  81 mg PEG Tube Daily    carvedilol  3.125 mg PEG Tube BID WC    metoclopramide  5 mg PEG Tube BID AC    multivitamin  1 tablet Oral Daily    tamsulosin  0.4 mg Oral Nightly    sodium chloride flush  10 mL Intravenous 2 times per day    dexamethasone  6 mg Intravenous Daily    Vitamin D  1,000 Units Oral Daily    vitamin C  1,000 mg Oral BID    zinc sulfate  50 mg Oral BID         guaiFENesin-dextromethorphan, 5 mL, Q4H PRN      white petrolatum, , BID PRN      sodium chloride flush, 10 mL, PRN      polyethylene glycol, 17 g, Daily PRN      acetaminophen, 650 mg, Q6H PRN    Or      acetaminophen, 650 mg, Q6H PRN      sodium chloride, 30 mL, PRN         Objective:    BP (!) 146/66   Pulse 76   Temp 98 °F (36.7 °C) (Axillary)   Resp 20   Ht 5' 7\" (1.702 m)   Wt 113 lb (51.3 kg)   SpO2 90%   BMI 17.70 kg/m²     Due to the patient's COVID-19-positive status, to reduce exposure, contamination, and in an effort to conserve PPE, this patient was evaluated through a combination of review of the medical record, nursing assessments, other physicians' exams and assessments, as well as telemedicine interaction.       Recent Labs     12/26/20  0815 12/27/20  1350    135   K 4.0 4.1    98 CO2 28 29   BUN 29* 32*   CREATININE 0.5* 0.7   GLUCOSE 133* 165*   CALCIUM 8.7 9.3       Recent Labs     12/26/20  0815 12/27/20  1350   WBC 3.2* 4.5   RBC 3.05* 4.01   HGB 10.3* 13.3   HCT 31.6* 40.3   .6* 100.5*   MCH 33.8 33.2   MCHC 32.6 33.0   RDW 13.9 13.7    168   MPV 10.9 11.1           Assessment:    Active Problems:    COVID-19 virus detected  Resolved Problems:    * No resolved hospital problems. *      Plan:  1. Acute hypoxic respiratory failure 2/2 aspiration pneumonia and COVID 19 pneumonia  -  pulm consulted  -  Remdesivir, dexamethasone, vitamins, O2 supplementation  -  Treat fever with tylenol  -  Worsening cough; concern for some fluid overload  -  CXR pending  -  robitussion  -  20 mg Lasix IV x 1 today, then PO lasix daily starting tomorrow. -  Started unasyn and obtained blood cultures     2. Hypotension 2/2 dehydration, infection-improved  -  Hold parameters on meds  -  Continue to monitor (better this am)  -  IVF discontinued as patient is up to full PEG feeds, getting edematous in the extremities    3. CAD/combined systolic/diastolic CHF, HPL  -  On BB, aspirin  -  Lasix originally on hold secondary to dehydration; restarted Lasix on 12/27/20    4. Oropharyngeal dysphagia-S/P PEG, continue tube feeds    5. Acquired hypothyroidism-continue synthroid    6. Mild DEBORAH/dehydration-  creatinine improving  -  Continue to monitor  -  IVF d/c'd  -  Patient is incontinent of urine; avoiding garrett catheter to prevent UTI    7. Anemia-chronic with worsening  -  Possibly due to recent IVF/dilution as well as infection  -  Will monitor        NOTE: This report was transcribed using voice recognition software. Every effort was made to ensure accuracy; however, inadvertent computerized transcription errors may be present.   Electronically signed by Carolina Avalos MD on 12/28/2020 at 3:35 PM

## 2020-12-28 NOTE — PROGRESS NOTES
SPEECH LANGUAGE PATHOLOGY  DAILY PROGRESS NOTE        PATIENT NAME:  Susan Marsh      :  7/3/1927          TODAY'S DATE:  2020 ROOM:  0530/0530-A    Pt seen for dysphagia management. Woke to SLP voice, answered questions and asked questions appropriately. Open mouth breathing/ posture at rest with audible breathing noted. SLP presented coated tsp of puree texture to Pt. Spoon rested on labial surface with deep pressure with attempts to form strip/ seal. Pt also made no attempts to move tongue blade or protrude to labial surface to clear residuals. Recommend Cont NPO at this time. Please re-consult SLP as appropriate/warranted for reassessment or oropharyngeal swallow function.        CPT code(s) 27357  dysphagia tx  Total minutes :  10 minutes

## 2020-12-28 NOTE — CARE COORDINATION
Chart reviewed- Covid Positive 12/22. Pt admitted from [facility]. Confirmed with pts daughter that discharge plan will be for patient to transition to [facility] when medically stable. Spoke to Dianelys [place] at [facility]- she confirmed that pt is not a bed hold but that they can accept pt back to [facility] when medically stable.

## 2020-12-29 NOTE — PROGRESS NOTES
[facility] Progress Note    Admitting Date and Time: 12/22/2020  7:07 PM  Admit Dx: [id number] virus detected [U07.1]    Subjective:  Patient is being followed for COVID-19 virus detected [U07.1]   Pt is resting in bed. He answers questions appropriately, but is very hard of hearing. He is refusing any oxygen. Has mild cough still, but better than 2 days ago. Patient bring treated for COVID, but was also found to have  Lower lobe infiltrates as well. Blood cultures pending. Per RN:  VSS, O2 sats are around 90-92 % RA      ROS: denies fever, chills, cp, sob, n/v, HA unless stated above.       furosemide  40 mg Oral Daily    ampicillin-sulbactam  3 g Intravenous Q6H    albuterol-ipratropium  1 puff Inhalation 4x daily    heparin (porcine)  5,000 Units Subcutaneous Q8H    aspirin  81 mg PEG Tube Daily    carvedilol  3.125 mg PEG Tube BID WC    metoclopramide  5 mg PEG Tube BID AC    multivitamin  1 tablet Oral Daily    tamsulosin  0.4 mg Oral Nightly    sodium chloride flush  10 mL Intravenous 2 times per day    dexamethasone  6 mg Intravenous Daily    Vitamin D  1,000 Units Oral Daily    vitamin C  1,000 mg Oral BID    zinc sulfate  50 mg Oral BID         guaiFENesin-dextromethorphan, 5 mL, Q4H PRN      white petrolatum, , BID PRN      sodium chloride flush, 10 mL, PRN      polyethylene glycol, 17 g, Daily PRN      acetaminophen, 650 mg, Q6H PRN    Or      acetaminophen, 650 mg, Q6H PRN      sodium chloride, 30 mL, PRN         Objective:    /60   Pulse 81   Temp 98.1 °F (36.7 °C) (Axillary)   Resp 18   Ht 5' 7\" (1.702 m)   Wt 113 lb (51.3 kg)   SpO2 93%   BMI 17.70 kg/m²     General Appearance: alert and oriented to person, place and time and in no acute distress  Skin: warm and dry  Head: normocephalic and atraumatic  Eyes: pupils equal, round, and reactive to light, extraocular eye movements intact, conjunctivae normal  Neck: neck supple and non tender without mass   Pulmonary/Chest: bibasilar ronchi  Cardiovascular: normal rate, normal S1 and S2 and no carotid bruits  Abdomen: soft, non-tender, non-distended, normal bowel sounds, no masses or organomegaly  Extremities: no cyanosis, no clubbing and + edema bilateral feet  Neurologic: no cranial nerve deficit and speech normal       Recent Labs     12/27/20  1350      K 4.1   CL 98   CO2 29   BUN 32*   CREATININE 0.7   GLUCOSE 165*   CALCIUM 9.3       Recent Labs     12/27/20  1350   WBC 4.5   RBC 4.01   HGB 13.3   HCT 40.3   .5*   MCH 33.2   MCHC 33.0   RDW 13.7      MPV 11.1           Assessment:    Active Problems:    COVID-19 virus detected  Resolved Problems:    * No resolved hospital problems. *      Plan:  1. Acute hypoxic respiratory failure 2/2 aspiration pneumonia and COVID 19 pneumonia  -  pulm consulted  -  Remdesivir, dexamethasone, vitamins, O2 supplementation  -  Treat fever with tylenol  -  Worsening cough  -  CXR with bilateral lower lobe infiltrates  -  robitussion  -  20 mg Lasix IV x 1 today, then PO lasix daily starting tomorrow. -  Started unasyn and obtained blood cultures     2. Hypotension 2/2 dehydration, infection-improved  -  Hold parameters on meds  -  Continue to monitor (better this am)  -  IVF discontinued as patient is up to full PEG feeds  -  Significant improvement in LE edema since resuming lasix    3. CAD/combined systolic/diastolic CHF, HPL  -  On BB, aspirin  -  Lasix originally on hold secondary to dehydration; restarted Lasix on 12/27/20 due to worsening hypoxia and significant LE edema    4. Oropharyngeal dysphagia-S/P PEG, continue tube feeds    5. Acquired hypothyroidism-continue synthroid    6. Mild DEBORAH/dehydration-  creatinine improving  -  Continue to monitor  -  IVF d/c'd  -  Patient is incontinent of urine; avoiding garrett catheter to prevent UTI    7.   Anemia-chronic with worsening  -  Possibly due to recent IVF/dilution as well as infection  -  Will monitor        NOTE: This report was transcribed using voice recognition software. Every effort was made to ensure accuracy; however, inadvertent computerized transcription errors may be present.   Electronically signed by Ben Gamez MD on 12/29/2020 at 1:36 PM

## 2020-12-29 NOTE — PROGRESS NOTES
Comprehensive Nutrition Assessment    Type and Reason for Visit:  Initial, Consult(TF ordering/management)    Nutrition Recommendations/Plan: Continue NPO per SLP Rec's. Recommend to Modify Current TF/Flush Orders to meet estimated needs. TF Recommendations:  1.5 Calorie w/ Fiber (Jevity 1.5) @ 50 ml/hr Continuous x 24 hrs/d to provide 1200 ml TV, 1800 kcals, 77 gm Pro, 912 ml water. 175 ml flush q 6 hrs to provide 1612 ml total water (TF+Flush). Please consult for recommendations if Bolus feeds preferred/desired by pt. Nutrition Assessment:  Pt improving from a nutritional stand-point AEB pt tolerating EN at goal rate w/ no noted complaints at this time per EMR review. 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 Malnutrition Assessment:  Malnutrition Status: At risk for malnutrition (Comment)    Context:  Chronic Illness     Findings of the 6 clinical characteristics of malnutrition:  Energy Intake:  7 - 75% or less estimated energy requirements for 1 month or longer  Weight Loss:  Unable to assess     Body Fat Loss:  Unable to assess(2/2 COVID-19 isolation)     Muscle Mass Loss:  Unable to assess    Fluid Accumulation:  No significant fluid accumulation     Strength:  Not Performed    Estimated Daily Nutrient Needs:  Energy (kcal):  3196-1160(30-35 kcals/kg per ABW); Weight Used for Energy Requirements:  Admission     Protein (g):  75-90(1.5-1.8 gm/kg per ABW); Weight Used for Protein Requirements:  Admission        Fluid (ml/day):  1319-6391; Method Used for Fluid Requirements:  1 ml/kcal      Nutrition Related Findings:  A&Ox3, Confused, poor dentition, Abd/BS WDL, +PEG, +1 BLE edema, +I/O's(BSE - Marked Phary.  Dys w/ SLP rec for NPO 12/23; PEG replacement 2/2 cracked tube 12/26)      Wounds:  None       Current Nutrition Therapies:    Current Tube Feeding (TF) Orders:  · Feeding Route: PEG  · Formula: 1.5 Calorie with Fiber  · Schedule: Continuous  · Additives/Modulars: (none)  · Water Flushes: No flush orders noted at this time  · Current TF & Flush Orders Provides: Same as Goal  · Goal TF & Flush Orders Provides: 1620 kcals, 69 gm Pro, 821 ml total water. Anthropometric Measures:  · Height: 5' 7\" (170.2 cm)  · Current Body Weight: 113 lb (51.3 kg)(bed 12/25)   · Admission Body Weight: 113 lb (51.3 kg)(bed 12/25)    · Usual Body Weight: 104 lb (47.2 kg)(5/2020 bed scale, per EMR)     · Ideal Body Weight: 148 lbs; % Ideal Body Weight 64.9 %   · BMI: 17.7  · Adjusted Body Weight:  ; No Adjustment   · Adjusted BMI:      · BMI Categories: Underweight (BMI less than 22) age over 72       Nutrition Diagnosis:   · Inadequate oral intake related to cognitive or neurological impairment(2/2 h/o TBI/Dysphagia as well as COVID19+) as evidenced by NPO or clear liquid status due to medical condition, nutrition support - enteral nutrition, poor intake prior to admission, swallow study results      Nutrition Interventions:   Food and/or Nutrient Delivery:  Continue NPO, Modify Tube Feeding(Continue NPO per SLP Rec's. Recommend to Modify Current TF/Flush Orders to meet estimated needs. TF Rec:  1.5 Calorie w/ Fiber (Jevity 1.5) @ 50 ml/hr Continuous x 24 hrs/d.)  Nutrition Education/Counseling:  No recommendation at this time   Coordination of Nutrition Care:  Continue to monitor while inpatient    Goals:  Pt to tolerate EN at goal rate.        Nutrition Monitoring and Evaluation:   Behavioral-Environmental Outcomes:  None Identified   Food/Nutrient Intake Outcomes:  Enteral Nutrition Intake/Tolerance, Diet Advancement/Tolerance  Physical Signs/Symptoms Outcomes:  Biochemical Data, Chewing or Swallowing, GI Status, Fluid Status or Edema, Nutrition Focused Physical Findings, Skin, Weight     Discharge Planning:    Enteral Nutrition     Electronically signed by Brigido Almeida RD, LD on 12/29/20 at 1:26 PM EST    Contact: ext [phone] [facility]

## 2020-12-29 NOTE — PROGRESS NOTES
Pulmonary Progress Note    Admit Date: 2020  Hospital day                               PCP: Rosaura Olivera MD    Chief Complaint (s):  Patient Active Problem List   Diagnosis    [facility]    DJD (degenerative joint disease)    Hyperlipidemia    Hypothyroid    CAD (coronary artery disease)    [facility]    CHF (congestive heart failure) ([facility]    Myocardial infarction acute ([facility]    Syncope and collapse    Closed fracture of pelvis ([facility]    Moderate protein-calorie malnutrition (HCC)    Orthostatic hypotension    Closed right hip fracture, with routine healing, subsequent encounter    Traumatic brain injury ([facility]    Anemia    Bone lesion    Age-related physical debility    Pneumonia due to organism    Severe protein[facility]calorie malnutrition [facility]    COVID-19 virus detected       Subjective:  · Again, arouses but still has a deep and moist cough. This is despite several days of antibiotics. Obviously, the patient continues to aspirate. Lack of nebulized solutions is no doubt contributing to his poor pulmonary toilet.       Vitals:  VITALS:  /60   Pulse 81   Temp 98.1 °F (36.7 °C) (Axillary)   Resp 18   Ht 5' 7\" (1.702 m)   Wt 113 lb (51.3 kg)   SpO2 93%   BMI 17.70 kg/m²     24HR INTAKE/OUTPUT:      Intake/Output Summary (Last 24 hours) at 2020 1507  Last data filed at 2020 9358  Gross per 24 hour   Intake 1238 ml   Output --   Net 1238 ml       24HR PULSE OXIMETRY RANGE:    SpO2  Av %  Min: 87 %  Max: 93 %    Medications:  IV:      Scheduled Meds:   [START ON 2020] furosemide  20 mg Oral Daily    ampicillin-sulbactam  3 g Intravenous Q6H    albuterol-ipratropium  1 puff Inhalation 4x daily    heparin (porcine)  5,000 Units Subcutaneous Q8H    aspirin  81 mg PEG Tube Daily    carvedilol  3.125 mg PEG Tube BID WC    metoclopramide  5 mg PEG Tube BID AC    multivitamin  1 tablet Oral Daily    tamsulosin  0.4 mg Oral Nightly    sodium chloride flush  10 mL Intravenous 2 times per day    dexamethasone  6 mg Intravenous Daily    Vitamin D  1,000 Units Oral Daily    vitamin C  1,000 mg Oral BID    zinc sulfate  50 mg Oral BID       Diet:   DIET TUBE FEED CONTINUOUS/CYCLIC NPO; 1.5 Calorie with Fiber; Gastrostomy; Continuous; 20; 45; 24     EXAM:  General: No distress. Wakes up easily. Eyes: PERRL. No sclera icterus. No conjunctival injection. ENT: No discharge. Pharynx clear. Neck: Trachea midline. Normal thyroid. Resp: No accessory muscle use. No rales. No wheezing. Scattered rhonchi. CV: Regular rate. Regular rhythm. No murmur or rub. Abd: Non-tender. Non-distended. No masses. No organomegaly. Normal bowel sounds. Skin: Warm and dry. No nodule on exposed extremities. No rash on exposed extremities. Ext: No cyanosis, clubbing, edema  Lymph: No cervical LAD. No supraclavicular LAD. M/S: No cyanosis. No joint deformity. No clubbing. Neuro: Awake. Follows commands. Positive pupils/gag/corneals. Normal pain response. Results:  CBC:   Recent Labs     12/27/20  1350   WBC 4.5   HGB 13.3   HCT 40.3   .5*        BMP:   Recent Labs     12/27/20  1350      K 4.1   CL 98   CO2 29   BUN 32*   CREATININE 0.7     LIVER PROFILE:   No results for input(s): AST, ALT, LIPASE, BILIDIR, BILITOT, ALKPHOS in the last 72 hours. Invalid input(s): AMYLASE,  ALB  PT/INR: No results for input(s): PROTIME, INR in the last 72 hours. APTT: No results for input(s): APTT in the last 72 hours. Pathology:  1. N/A      Microbiology:  1. None    Recent ABG:   No results for input(s): PH, PO2, PCO2, HCO3, BE, O2SAT, METHB, O2HB, COHB, O2CON, HHB, THB in the last 72 hours. Recent Films:  XR CHEST PORTABLE   Final Result   Lower lobe pulmonary infiltrates compatible with the diagnosis of pneumonia      XR ABDOMEN (KUB) (SINGLE AP VIEW)   Final Result   No new abnormal findings. Indwelling gastrostomy is noted. CT HEAD WO CONTRAST   Final Result   1. No acute intracranial abnormality. 2. Small soft tissue densities in the right frontal and left parietal scalp,   which were not seen on the prior study. Clinical correlation is needed. 3. Bilateral mastoid effusions. XR CHEST PORTABLE   Final Result   Bibasilar pulmonary opacities, of which the right basilar infiltrate is   stable while the left basilar infiltrate is somewhat less prominent than was   seen on 10/05/2020.                   Assessment:  1. Current aspiration pneumonia with now superimposed COVID-19 pneumonia. Despite the above, oxygenation is adequate. 2. Dehydration        Plan:  1. Continue Decadron through day 10.  2. Continue tube feedings  3. Diuretic per primary service        Time at the bedside, reviewing labs and radiographs, reviewing updated notes and consultations, discussing with staff and family was more than 35 minutes. Please note that voice recognition technology was used in the preparation of this note and make therefore it may contain inadvertent transcription errors. If the patient is a COVID 19 isolation patient, the above physical exam reflects that of the examining physician for the day. Bacilio Crawford M.D., F.C.C.P.     [facility], [address] [facility], [address] [place] [address]

## 2020-12-29 NOTE — PLAN OF CARE
Problem: Inadequate oral food/beverage intake (NI-2.1)  Goal: Food and/or Nutrient Delivery  Continue NPO per SLP Rec's. Recommend to Modify Current TF/Flush Orders to meet estimated needs. TF Recommendations:  1.5 Calorie w/ Fiber (Jevity 1.5) @ 50 ml/hr Continuous x 24 hrs/d to provide 1200 ml TV, 1800 kcals, 77 gm Pro, 912 ml water. 175 ml flush q 6 hrs to provide 1612 ml total water (TF+Flush). Description: Individualized approach for food/nutrient provision.   Outcome: Met This Shift

## 2020-12-29 NOTE — PROGRESS NOTES
Wound / ostomy dept consulted for this Pt admitted with COVID 19. The Pt has a Peg tube which has a fungal rash round th site. Buttocks is moist , slightly denuded. Recommend Aloe Vesta topically and SOS precautions. Heels are intact.

## 2020-12-30 NOTE — CARE COORDINATION
RDV completed; 02 5lnc; iv unasyn; poor cough effort; plan is for pt to return to [place] at discharge. Will follow Christian Musa.

## 2020-12-30 NOTE — PROGRESS NOTES
[facility] Progress Note    Admitting Date and Time: 12/22/2020  7:07 PM  Admit Dx: [id number] virus detected [U07.1]    Subjective:  Patient is being followed for COVID-19 virus detected [U07.1]   Pt is resting in bed. He is now on 2 L NC  Per RN:  VSS, O2 sats are around 90-93% 2 LNC      ROS: denies fever, chills, cp, sob, n/v, HA unless stated above.  [Held by provider] furosemide  20 mg Oral Daily    miconazole nitrate   Topical BID    ampicillin-sulbactam  3 g Intravenous Q6H    albuterol-ipratropium  1 puff Inhalation 4x daily    heparin (porcine)  5,000 Units Subcutaneous Q8H    aspirin  81 mg PEG Tube Daily    carvedilol  3.125 mg PEG Tube BID WC    metoclopramide  5 mg PEG Tube BID AC    multivitamin  1 tablet Oral Daily    tamsulosin  0.4 mg Oral Nightly    sodium chloride flush  10 mL Intravenous 2 times per day    dexamethasone  6 mg Intravenous Daily    Vitamin D  1,000 Units Oral Daily    vitamin C  1,000 mg Oral BID    zinc sulfate  50 mg Oral BID         guaiFENesin-dextromethorphan, 5 mL, Q4H PRN      white petrolatum, , BID PRN      sodium chloride flush, 10 mL, PRN      polyethylene glycol, 17 g, Daily PRN      acetaminophen, 650 mg, Q6H PRN    Or      acetaminophen, 650 mg, Q6H PRN      sodium chloride, 30 mL, PRN         Objective:    BP (!) 97/51   Pulse 68   Temp 97.7 °F (36.5 °C) (Axillary)   Resp 18   Ht 5' 7\" (1.702 m)   Wt 113 lb (51.3 kg)   SpO2 93%   BMI 17.70 kg/m²     Due to the patient's COVID-19-positive status, to reduce exposure, contamination, and in an effort to conserve PPE, this patient was evaluated through a combination of review of the medical record, nursing assessments, other physicians' exams and assessments, as well as telemedicine interaction.       Recent Labs     12/30/20  1025      K 3.9      CO2 30*   BUN 36*   CREATININE 0.7   GLUCOSE 152*   CALCIUM 8.8       Recent Labs     12/30/20  1025   WBC 6.3 RBC 3.42*   HGB 11.2*   HCT 34.7*   .5*   MCH 32.7   MCHC 32.3   RDW 14.0      MPV 10.3           Assessment:    Active Problems:    COVID-19 virus detected  Resolved Problems:    * No resolved hospital problems. *      Plan:  1. Acute hypoxic respiratory failure 2/2 aspiration pneumonia and COVID 19 pneumonia  -  pulm consulted  -  Remdesivir completed, dexamethasone, vitamins, O2 supplementation  -  Treat fevers with tylenol  -  Worsening cough  -  CXR with bilateral lower lobe infiltrates  -  robitussion  -  Lasix on hold again  -  Started unasyn and obtained blood cultures     2. Hypotension 2/2 dehydration, infection  -  Hold parameters on meds  -  Continue to monitor  -  IVF discontinued as patient is up to full PEG feeds  -  Significant improvement in LE edema since resuming lasix; will hold diuretics again and monitor edema    3. CAD/combined systolic/diastolic CHF, HPL  -  On BB, aspirin  -  Lasix originally on hold secondary to dehydration; restarted Lasix on 12/27/20 due to worsening hypoxia and significant LE edema; discontinued again 12/30/20 due to low BP    4. Oropharyngeal dysphagia-S/P PEG, continue tube feeds    5. Acquired hypothyroidism-continue synthroid    6. Mild DEBORAH/dehydration-  creatinine improving  -  Continue to monitor  -  IVF d/c'd  -  Patient is incontinent of urine; avoiding garrett catheter to prevent UTI    7. Anemia-chronic with worsening  -  Possibly due to recent IVF/dilution as well as infection  -  Will monitor        NOTE: This report was transcribed using voice recognition software. Every effort was made to ensure accuracy; however, inadvertent computerized transcription errors may be present.   Electronically signed by Angeline Malcolm MD on 12/30/2020 at 3:07 PM

## 2020-12-30 NOTE — PROGRESS NOTES
Pulmonary Progress Note    Admit Date: 2020  Hospital day                               PCP: Mirza Martinez MD    Chief Complaint (s):  Patient Active Problem List   Diagnosis    [facility]    DJD (degenerative joint disease)    Hyperlipidemia    Hypothyroid    CAD (coronary artery disease)    [facility]omy[facility]    CHF (congestive heart failure) ([facility]    Myocardial infarction acute [facility]    Syncope and collapse    Closed fracture of pelvis [facility]    Moderate protein-calorie malnutrition (HCC)    Orthostatic hypotension    Closed right hip fracture, with routine healing, subsequent encounter    Traumatic brain injury ([facility]    Anemia    Bone lesion    Age-related physical debility    Pneumonia due to organism    Severe protein-calorie malnutrition ([facility]    COVID-19 virus detected       Subjective:  · Far more awake and alert today. Breathing appears easy. Only a few rhonchi today.       Vitals:  VITALS:  BP (!) 97/51   Pulse 68   Temp 97.7 °F (36.5 °C) (Axillary)   Resp 18   Ht 5' 7\" (1.702 m)   Wt 113 lb (51.3 kg)   SpO2 93%   BMI 17.70 kg/m²     24HR INTAKE/OUTPUT:    No intake or output data in the 24 hours ending 20 1524    24HR PULSE OXIMETRY RANGE:    SpO2  Av %  Min: 93 %  Max: 93 %    Medications:  IV:      Scheduled Meds:   [Held by provider] furosemide  20 mg Oral Daily    miconazole nitrate   Topical BID    ampicillin-sulbactam  3 g Intravenous Q6H    albuterol-ipratropium  1 puff Inhalation 4x daily    heparin (porcine)  5,000 Units Subcutaneous Q8H    aspirin  81 mg PEG Tube Daily    carvedilol  3.125 mg PEG Tube BID WC    metoclopramide  5 mg PEG Tube BID AC    multivitamin  1 tablet Oral Daily    tamsulosin  0.4 mg Oral Nightly    sodium chloride flush  10 mL Intravenous 2 times per day    dexamethasone  6 mg Intravenous Daily    Vitamin D  1,000 Units Oral Daily    vitamin C  1,000 mg Oral BID correlation is needed. 3. Bilateral mastoid effusions. XR CHEST PORTABLE   Final Result   Bibasilar pulmonary opacities, of which the right basilar infiltrate is   stable while the left basilar infiltrate is somewhat less prominent than was   seen on 10/05/2020.                   Assessment:  1. Current aspiration pneumonia with now superimposed COVID-19 pneumonia. Despite the above, oxygenation is adequate. 2. Dehydration        Plan:  1. Continue Decadron through day 10.  2. Continue tube feedings  3. Diuretic per primary service  4. Discharge soon. Time at the bedside, reviewing labs and radiographs, reviewing updated notes and consultations, discussing with staff and family was more than 35 minutes. Please note that voice recognition technology was used in the preparation of this note and make therefore it may contain inadvertent transcription errors. If the patient is a COVID 19 isolation patient, the above physical exam reflects that of the examining physician for the day. Frances Franklin M.D., F.C.C.P.     [facility] [address] [facility], [address] [place] [address], [address] [place] [address]

## 2020-12-30 NOTE — PROGRESS NOTES
Occupational Therapy  OCCUPATIONAL THERAPY INITIAL EVALUATION      Date:2020  Patient Name: Shereen Yañez  MRN: [id number]  : 7/3/1927  Room: [address] [place] [address]A    Referring Provider: Hanny Villasenor MD    Evaluating OT: Justine Wang OTR/L [id number]    AM-PAC Daily Activity Raw Score: 10/24    Recommended Adaptive Equipment:  TBD     Diagnosis: COVID virus 19 detected    Pertinent Medical History: PEG tube, CHF, DJD,    Precautions:  Falls, HOB >30, alarm , DROPLET PLUS, [place], O2     Social:    Patient admitted from [place] [address]. Pain Level: no pain ;   Cognition: alert, oriented  and minimal conversation. [place]       Functional Assessment:   Initial Eval Status  Date:  Treatment Status  Date: STGs = LTGs  Time frame: 7-10 days   Feeding PEG     Grooming Max A   Min A   UB Dressing Max A    Patient demonstrates UE AROM to participate in ADL - requires increase assist d/t decrease strength, endurance  Mod A   LB Dressing Max A   Mod A    Bathing MaxA/dependent      Toileting Dependent   Incontinence      Bed Mobility  Max A  Long sitting up in bed      Functional Transfers Remained in bed   Min A   Functional Mobility NT   Min A with fair + tolerance    Balance Sitting:     Static:  Mod  A- Long sitting   Standing: NT   CGA  with good tolerance    Activity Tolerance Fair - with light activity   Good with ADL activity    Visual/  Perceptual Glasses:  None by bedside                    Strength ROM Additional Info:    RUE         Hearing: [place]  Sensation:  No c/o numbness or tingling       Comments: Upon arrival patient lying in bed . At end of session, patient returned to bed  with call light and phone within reach, all lines and tubes intact. ALARM  ON       Overall patient demonstrated  decreased independence and safety during completion of ADL/functional transfer/mobility tasks.   Pt would benefit from continued skilled OT to increase safety and independence with completion of ADL/IADL tasks for functional independence and quality of life. Assessment of current deficits   Functional mobility [x]  ADLs [x] Strength [x]  Cognition [x]  Functional transfers  [x] IADLs [x] Safety Awareness [x]  Endurance [x]  Fine Motor Coordination [] Balance [x] Vision/perception [] Sensation []   Gross Motor Coordination [] ROM [] Delirium []                  Motor Control []       Plan of Care: 1-3 days/week for 1-2 weeks PRN   [x]ADL retraining/adapted techniques and AE recommendations to increase functional independence within precautions                    [x]Energy conservation techniques to improve tolerance for selfcare routine   [x]Functional transfer/mobility training/DME recommendations for increased independence, safety and fall prevention         [x]Patient/family education to increase safety and functional independence             [x]Environmental modifications for safe mobility and completion of ADLs                             []Cognitive retraining ex's to improve problem solving skills & safe participation in ADLs/IADLs     []Sensory re-education techniques to improve extremity awareness, maintain skin integrity and improve hand function                             []Visual/Perceptual retraining  to improve body awareness and safety during transfers and ADLs  []Splinting/positioning needs to maintain joint/skin integrity and prevent contractures  [x]Therapeutic activity to improve functional performance during ADLs. [x]Therapeutic exercise to improve tolerance and functional strength for ADLs   [x]Balance retraining/tolerance tasks for facilitation of postural control with dynamic challenges during ADLs .   []Neuromuscular re-education: facilitation of righting/equilibrium reactions, midline orientation, scapular stability/mobility, Normalization muscle     tone and facilitation active functional movement/Attention                         []Delirium prevention/treatment [x]Positioning to improve functional independence  []Other:       Rehab Potential:  Good for established goals     Patient / Family Goal: none stated       Patient and/or family were instructed on functional diagnosis, prognosis/goals and OT plan of care. Demonstrated poor  understanding. Eval Complexity: Low      Time In:1500  Time Out: 1515        Min Units   OT Eval Low 97165  x 1   OT Eval Medium 70723      OT Eval High 81073       OT Re-Eval [id number]       Therapeutic Ex 42863       Therapeutic Activities 70246       ADL/Self Care 35956       Orthotic Management 45596       Neuro Re-Ed 53315       Non-Billable Time          Evaluation Time includes thorough review of current medical information, gathering information on past medical history/social history and prior level of function, completion of standardized testing/informal observation of tasks, assessment of data and education on plan of care and goals.             Florina Landaverde OTR/L [id number]

## 2020-12-31 NOTE — CARE COORDINATION
Social Work / Discharge Planning : [facility] [phone] to be picked up at 8:oo pending discharge  SW left VM with [facility] from [place] and spoke to first contact Nat Curry. Patient RN aware. SW to follow.  Electronically signed by RADHA Nieto on 12/31/20 at 12:33 PM EST

## 2020-12-31 NOTE — PROGRESS NOTES
Pulmonary Progress Note    Admit Date: 2020  Hospital day                               PCP: Lev Thomason MD    Chief Complaint (s):  Patient Active Problem List   Diagnosis    [facility] anterior wall [facility]    DJD (degenerative joint disease)    Hyperlipidemia    Hypothyroid    CAD (coronary artery disease)    Cardiomyopathy ([facility]    CHF (congestive heart failure) ([facility]    Myocardial infarction acute ([facility]    Syncope and collapse    Closed fracture of pelvis ([facility]    Moderate protein-calorie malnutrition (HCC)    Orthostatic hypotension    Closed right hip fracture, with routine healing, subsequent encounter    Traumatic brain injury ([facility]    Anemia    Bone lesion    Age-related physical debility    Pneumonia due to organism    Severe protein-calorie malnutrition ([facility]    COVID-19 virus detected       Subjective:  · Alejandrina Bonilla is awake and alert today asking where his pants are. This is the most animated he has been.       Vitals:  VITALS:  /68   Pulse 80   Temp 97.8 °F (36.6 °C) (Axillary)   Resp 16   Ht 5' 7\" (1.702 m)   Wt 113 lb (51.3 kg)   SpO2 95%   BMI 17.70 kg/m²     24HR INTAKE/OUTPUT:    No intake or output data in the 24 hours ending 20 1618    24HR PULSE OXIMETRY RANGE:    SpO2  Av.7 %  Min: 92 %  Max: 95 %    Medications:  IV:      Scheduled Meds:   amoxicillin-clavulanate  600 mg Oral 2 times per day    [Held by provider] furosemide  20 mg Oral Daily    miconazole nitrate   Topical BID    albuterol-ipratropium  1 puff Inhalation 4x daily    heparin (porcine)  5,000 Units Subcutaneous Q8H    aspirin  81 mg PEG Tube Daily    carvedilol  3.125 mg PEG Tube BID WC    metoclopramide  5 mg PEG Tube BID AC    multivitamin  1 tablet Oral Daily    tamsulosin  0.4 mg Oral Nightly    sodium chloride flush  10 mL Intravenous 2 times per day    dexamethasone  6 mg Intravenous Daily    Vitamin D  1,000 Units Oral Daily    vitamin C  1,000 mg Oral BID    zinc sulfate  50 mg Oral BID       Diet:   DIET TUBE FEED CONTINUOUS/CYCLIC NPO; 1.5 Calorie with Fiber; Gastrostomy; Continuous; 20; 45; 24     EXAM:  General: No distress. Wakes up easily. Eyes: PERRL. No sclera icterus. No conjunctival injection. ENT: No discharge. Pharynx clear. Neck: Trachea midline. Normal thyroid. Resp: No accessory muscle use. No rales. No wheezing. No rhonchi. CV: Regular rate. Regular rhythm. No murmur or rub. Abd: Non-tender. Non-distended. No masses. No organomegaly. Normal bowel sounds. Skin: Warm and dry. No nodule on exposed extremities. No rash on exposed extremities. Ext: No cyanosis, clubbing, edema  Lymph: No cervical LAD. No supraclavicular LAD. M/S: No cyanosis. No joint deformity. No clubbing. Neuro: Awake. Follows commands. Positive pupils/gag/corneals. Normal pain response. Results:  CBC:   Recent Labs     12/30/20  1025   WBC 6.3   HGB 11.2*   HCT 34.7*   .5*        BMP:   Recent Labs     12/30/20  1025      K 3.9      CO2 30*   BUN 36*   CREATININE 0.7     LIVER PROFILE:   Recent Labs     12/30/20  1025   AST 22   ALT 25   BILITOT 0.3   ALKPHOS 75     PT/INR: No results for input(s): PROTIME, INR in the last 72 hours. APTT: No results for input(s): APTT in the last 72 hours. Pathology:  1. N/A      Microbiology:  1. None    Recent ABG:   No results for input(s): PH, PO2, PCO2, HCO3, BE, O2SAT, METHB, O2HB, COHB, O2CON, HHB, THB in the last 72 hours. Recent Films:  XR CHEST PORTABLE   Final Result   Lower lobe pulmonary infiltrates compatible with the diagnosis of pneumonia      XR ABDOMEN (KUB) (SINGLE AP VIEW)   Final Result   No new abnormal findings. Indwelling gastrostomy is noted. CT HEAD WO CONTRAST   Final Result   1. No acute intracranial abnormality.    2. Small soft tissue densities in the right frontal and left parietal scalp,   which were not seen on the prior study. Clinical correlation is needed. 3. Bilateral mastoid effusions. XR CHEST PORTABLE   Final Result   Bibasilar pulmonary opacities, of which the right basilar infiltrate is   stable while the left basilar infiltrate is somewhat less prominent than was   seen on 10/05/2020.                   Assessment:  1. Current aspiration pneumonia with now superimposed COVID-19 pneumonia. Despite the above, oxygenation is adequate. 2. Dehydration        Plan:  1. Continue tube feedings  2. Diuretic per primary service  3. Discharge        Time at the bedside, reviewing labs and radiographs, reviewing updated notes and consultations, discussing with staff and family was more than 35 minutes. Please note that voice recognition technology was used in the preparation of this note and make therefore it may contain inadvertent transcription errors. If the patient is a COVID 19 isolation patient, the above physical exam reflects that of the examining physician for the day. Blanche Adames M.D., F.C.C.P.     [facility] [address] [facility], [address], [address], [facility] [place]

## 2020-12-31 NOTE — DISCHARGE SUMMARY
[facility] Physician Discharge Summary       CM [address] [address]  [address] [facility], [address], [address]  [facility] [phone]    Schedule an appointment as soon as possible for a visit in 2 weeks  [place], [facility] [address] [place] [address]      As needed- Surgery      Activity level: As tolerated     Dispo:[facility]: [place]     Condition on discharge: Stable     Patient ID:  Cong President  [id number]  22 y.o.  7/3/1927    Admit date: 12/22/2020    Discharge date and time:  12/31/2020  12:26 PM    Admission Diagnoses: Active Problems:    COVID-19 virus detected  Resolved Problems:    * No resolved hospital problems. *      Discharge Diagnoses: Active Problems:    COVID-19 virus detected  Resolved Problems:    * No resolved hospital problems. *      Consults:  IP CONSULT TO PHARMACY  IP CONSULT TO PULMONOLOGY  IP CONSULT TO DIETITIAN  IP CONSULT TO IV TEAM  IP CONSULT TO GENERAL SURGERY    Procedures: none    Hospital Course:   Patient Cong President is a 80 y.o. presented with COVID-19 virus detected [U07.1]   Patient presented to the ER from [facility] after he was found slumped over in john with hypoxia-pulse ox 70s. He was treated for;    1. Acute respiratory failure with hypoxia related to + COVID 19/ aspiration pneumonia: pt sent in from [facility]- he was found slumped in the chair with a pulse ox of 70s and sent to ER for evaluation. Pulmonology following- appreciate input. Currently on RA. Monitor pulse ox. S/p antivirals. Continue steroids antibiotics.      2. Pneumonia: viral with superimposed aspiration component: s/p remdesivir. Continue vitamin supplementation. CXR with bilateral lower lobe infiltrates. Initially started on IV Unasyn- switched to augmentin via peg for dc. Discussed with pharmacy/ attending.      3.  Dysphagia with h/o peg: continue TF. Surgery was consulted as there was a malfunction/ break in tubing. Nursing was able to adjust/ cut tubing.      4. DEBORAH/ dehydration: resolved     5. Hypotension: parameters on bp meds     6. CAD/ combined systolic/ diastolic CHF: lasix was initially held on admission. Appears pt had lower ext swelling and lasix resumed. Held last 2 days. Continue to holed 3 days and reasses    7. Acute on chronic anemia: ? Dilutional component: monitor. H & H stable.        Patient continued to improve. He was then discharged back to [facility] with the following medications, instructions, and follow up. Discharge Exam:  General Appearance: alert and oriented to person, place and time and in no acute distress- very [place]  Skin: warm and dry  Head: normocephalic and atraumatic  Neck: neck supple and non tender without mass   Pulmonary/Chest: diminished throughout to auscultation   Cardiovascular: normal rate, normal S1 and S2 and no carotid bruits  Abdomen: soft, non-tender, non-distended, normal bowel sounds,   Extremities: no cyanosis, no clubbing and no edema  Neurologic: speech normal     No intake/output data recorded. No intake/output data recorded. LABS:  Recent Labs     12/30/20  1025      K 3.9      CO2 30*   BUN 36*   CREATININE 0.7   GLUCOSE 152*   CALCIUM 8.8       Recent Labs     12/30/20  1025   WBC 6.3   RBC 3.42*   HGB 11.2*   HCT 34.7*   .5*   MCH 32.7   MCHC 32.3   RDW 14.0      MPV 10.3       No results for input(s): POCGLU in the last 72 hours. Imaging:  Xr Abdomen (kub) (single Ap View)    Result Date: 12/23/2020  EXAMINATION: ONE SUPINE XRAY VIEW(S) OF THE ABDOMEN 12/23/2020 5:50 am COMPARISON: None. HISTORY: ORDERING SYSTEM PROVIDED HISTORY: abd distention TECHNOLOGIST PROVIDED HISTORY: Reason for exam:->abd distention FINDINGS: Indwelling gastrostomy tube. No free air, bowel wall pneumatosis or dilated bowel. No new abnormal findings.     No new abnormal findings. Indwelling gastrostomy is noted. Ct Head Wo Contrast    Result Date: 12/22/2020  EXAMINATION: CT OF THE HEAD WITHOUT CONTRAST  12/22/2020 8:59 pm TECHNIQUE: CT of the head was performed without the administration of intravenous contrast. Dose modulation, iterative reconstruction, and/or weight based adjustment of the mA/kV was utilized to reduce the radiation dose to as low as reasonably achievable. COMPARISON: CT head without contrast, 05/27/2020 HISTORY: ORDERING SYSTEM PROVIDED HISTORY: AMS TECHNOLOGIST PROVIDED HISTORY: Reason for exam:->AMS Has a \"code stroke\" or \"stroke alert\" been called? ->No FINDINGS: BRAIN/VENTRICLES: No mass effect, edema or hemorrhage is seen. Mild-to-moderate volume loss is seen in the brain with severe chronic microvascular ischemic changes. No hydrocephalus or extra-axial fluid is seen. ORBITS: The visualized portion of the orbits demonstrate no acute abnormality. SINUSES: The paranasal sinuses are clear. The mastoids are opacified. SOFT TISSUES/SKULL:  The calvarium is intact without fracture or focal lesion. Small soft tissue nodular opacities along the high right frontal (2.7 x 1.5 x 1.7 cm) and left parietal (2.0 x 0.7 x 2.0 cm) 1scalp of unclear etiology. They were not seen on the prior CT. 1.  No acute intracranial abnormality. 2. Small soft tissue densities in the right frontal and left parietal scalp, which were not seen on the prior study. Clinical correlation is needed. 3. Bilateral mastoid effusions. Xr Chest Portable    Result Date: 12/22/2020  EXAMINATION: ONE XRAY VIEW OF THE CHEST 12/22/2020 7:45 pm COMPARISON: 10/05/2020 HISTORY: ORDERING SYSTEM PROVIDED HISTORY: chest pain TECHNOLOGIST PROVIDED HISTORY: Reason for exam:->chest pain FINDINGS: Compared to the previous chest radiograph from 10/06/2020, the right basilar opacity is stable, while the left basilar opacity is decreased. The upper lungs are clear.   The cardiomediastinal contour is unremarkable. No pneumothorax or pleural effusion is seen. The bones are demineralized. No fracture is seen. Severe degenerative changes seen in the right glenohumeral joint. Bibasilar pulmonary opacities, of which the right basilar infiltrate is stable while the left basilar infiltrate is somewhat less prominent than was seen on 10/05/2020. Patient Instructions:      Medication List      START taking these medications    amoxicillin-clavulanate 600-42.9 MG/5ML suspension  Commonly known as: AUGMENTIN-ES  5 mLs by Per G Tube route every 12 hours for 5 days     dexamethasone 6 MG tablet  Commonly known as: DECADRON  1 tablet by PEG Tube route daily (with breakfast) for 1 day  Start taking on: January 1, 2021     guaiFENesin-dextromethorphan 100-10 MG/5ML syrup  Commonly known as: ROBITUSSIN DM  Take 5 mLs by mouth every 4 hours as needed for Cough     heparin (porcine) 49843 UNIT/ML injection  Inject 0.5 mLs into the skin every 8 hours Re evalute after 21 days. + COVID  Start taking on: January 11, 2021     miconazole nitrate 2 % Oint  Apply topically 2 times daily     white petrolatum Oint ointment  Apply topically 2 times daily as needed (dry skin) Apply to peg tube site        CHANGE how you take these medications    furosemide 20 MG tablet  Commonly known as: LASIX  Take 1 tablet by mouth daily Hold 3 days. Check BMP and resume if labs/ bp ok. Start taking on: January 3, 2021  What changed:   · how much to take  · how to take this  · additional instructions  · These instructions start on January 3, 2021. If you are unsure what to do until then, ask your doctor or other care provider.         CONTINUE taking these medications    aspirin 81 MG chewable tablet  1 tablet by PEG Tube route daily     calcium-vitamin D 500-200 MG-UNIT per tablet  Commonly known as: OSCAL-500  Take 1 tablet by mouth 2 times daily     carvedilol 3.125 MG tablet  Commonly known as: COREG  1 tablet by PEG Tube route 2 times

## 2021-01-01 ENCOUNTER — OUTSIDE SERVICES (OUTPATIENT)
Dept: FAMILY MEDICINE CLINIC | Age: 86
End: 2021-01-01
Payer: MEDICARE

## 2021-01-01 ENCOUNTER — APPOINTMENT (OUTPATIENT)
Dept: GENERAL RADIOLOGY | Age: 86
DRG: 871 | End: 2021-01-01
Payer: MEDICARE

## 2021-01-01 ENCOUNTER — HOSPITAL ENCOUNTER (INPATIENT)
Age: 86
LOS: 7 days | Discharge: NURSING, REHAB OR LONG-TERM CARE | DRG: 871 | End: 2021-01-21
Attending: EMERGENCY MEDICINE | Admitting: INTERNAL MEDICINE
Payer: MEDICARE

## 2021-01-01 VITALS
OXYGEN SATURATION: 95 % | HEIGHT: 65 IN | DIASTOLIC BLOOD PRESSURE: 61 MMHG | BODY MASS INDEX: 16.89 KG/M2 | HEART RATE: 70 BPM | TEMPERATURE: 96.5 F | SYSTOLIC BLOOD PRESSURE: 91 MMHG | WEIGHT: 101.38 LBS | RESPIRATION RATE: 16 BRPM

## 2021-01-01 DIAGNOSIS — R91.8 RIGHT LOWER LOBE PULMONARY INFILTRATE: ICD-10-CM

## 2021-01-01 DIAGNOSIS — I25.10 CORONARY ARTERY DISEASE INVOLVING NATIVE CORONARY ARTERY OF NATIVE HEART WITHOUT ANGINA PECTORIS: ICD-10-CM

## 2021-01-01 DIAGNOSIS — E78.5 HYPERLIPIDEMIA, UNSPECIFIED HYPERLIPIDEMIA TYPE: ICD-10-CM

## 2021-01-01 DIAGNOSIS — R54 AGE-RELATED PHYSICAL DEBILITY: ICD-10-CM

## 2021-01-01 DIAGNOSIS — J96.00 ACUTE RESPIRATORY FAILURE DUE TO COVID-19 (HCC): ICD-10-CM

## 2021-01-01 DIAGNOSIS — R41.89 UNRESPONSIVE EPISODE: Primary | ICD-10-CM

## 2021-01-01 DIAGNOSIS — E43 SEVERE PROTEIN-CALORIE MALNUTRITION (HCC): ICD-10-CM

## 2021-01-01 DIAGNOSIS — R05.9 COUGH: ICD-10-CM

## 2021-01-01 DIAGNOSIS — Z97.8 FOLEY CATHETER IN PLACE: ICD-10-CM

## 2021-01-01 DIAGNOSIS — E03.8 OTHER SPECIFIED HYPOTHYROIDISM: ICD-10-CM

## 2021-01-01 DIAGNOSIS — I50.9 CONGESTIVE HEART FAILURE, UNSPECIFIED HF CHRONICITY, UNSPECIFIED HEART FAILURE TYPE (HCC): ICD-10-CM

## 2021-01-01 DIAGNOSIS — R53.1 WEAKNESS: Primary | ICD-10-CM

## 2021-01-01 DIAGNOSIS — U07.1 ACUTE RESPIRATORY FAILURE DUE TO COVID-19 (HCC): ICD-10-CM

## 2021-01-01 DIAGNOSIS — J96.01 ACUTE RESPIRATORY FAILURE WITH HYPOXIA (HCC): ICD-10-CM

## 2021-01-01 DIAGNOSIS — R91.8 LEFT PULMONARY INFILTRATE ON CXR: Primary | ICD-10-CM

## 2021-01-01 DIAGNOSIS — E86.0 DEHYDRATION: Primary | ICD-10-CM

## 2021-01-01 DIAGNOSIS — H61.22 HEARING LOSS DUE TO CERUMEN IMPACTION, LEFT: Primary | ICD-10-CM

## 2021-01-01 DIAGNOSIS — R60.0 LOWER EXTREMITY EDEMA: ICD-10-CM

## 2021-01-01 DIAGNOSIS — R31.9 HEMATURIA, UNSPECIFIED TYPE: ICD-10-CM

## 2021-01-01 DIAGNOSIS — R13.10 DYSPHAGIA, UNSPECIFIED TYPE: ICD-10-CM

## 2021-01-01 DIAGNOSIS — R91.8 RIGHT LOWER LOBE PULMONARY INFILTRATE: Primary | ICD-10-CM

## 2021-01-01 DIAGNOSIS — N13.9 OBSTRUCTIVE UROPATHY: Primary | ICD-10-CM

## 2021-01-01 DIAGNOSIS — I82.411 ACUTE DEEP VEIN THROMBOSIS (DVT) OF FEMORAL VEIN OF RIGHT LOWER EXTREMITY (HCC): ICD-10-CM

## 2021-01-01 LAB
AADO2: 589.1 MMHG
ACINETOBACTER BAUMANNII BY PCR: NOT DETECTED
ALBUMIN SERPL-MCNC: 2.2 G/DL (ref 3.5–5.2)
ALBUMIN SERPL-MCNC: 2.3 G/DL (ref 3.5–5.2)
ALBUMIN SERPL-MCNC: 2.3 G/DL (ref 3.5–5.2)
ALBUMIN SERPL-MCNC: 2.8 G/DL (ref 3.5–5.2)
ALP BLD-CCNC: 60 U/L (ref 40–129)
ALP BLD-CCNC: 68 U/L (ref 40–129)
ALP BLD-CCNC: 73 U/L (ref 40–129)
ALP BLD-CCNC: 95 U/L (ref 40–129)
ALT SERPL-CCNC: 15 U/L (ref 0–40)
ALT SERPL-CCNC: 16 U/L (ref 0–40)
ALT SERPL-CCNC: 22 U/L (ref 0–40)
ALT SERPL-CCNC: 28 U/L (ref 0–40)
ANION GAP SERPL CALCULATED.3IONS-SCNC: 4 MMOL/L (ref 7–16)
ANION GAP SERPL CALCULATED.3IONS-SCNC: 5 MMOL/L (ref 7–16)
ANION GAP SERPL CALCULATED.3IONS-SCNC: 6 MMOL/L (ref 7–16)
ANION GAP SERPL CALCULATED.3IONS-SCNC: 8 MMOL/L (ref 7–16)
ANION GAP SERPL CALCULATED.3IONS-SCNC: 8 MMOL/L (ref 7–16)
ANISOCYTOSIS: ABNORMAL
APTT: 21.8 SEC (ref 24.5–35.1)
AST SERPL-CCNC: 15 U/L (ref 0–39)
AST SERPL-CCNC: 16 U/L (ref 0–39)
AST SERPL-CCNC: 17 U/L (ref 0–39)
AST SERPL-CCNC: 22 U/L (ref 0–39)
ATYPICAL LYMPHOCYTE RELATIVE PERCENT: 1.8 % (ref 0–4)
B.E.: 3.3 MMOL/L (ref -3–3)
BACTERIA: NORMAL /HPF
BASOPHILS ABSOLUTE: 0 E9/L (ref 0–0.2)
BASOPHILS ABSOLUTE: 0.02 E9/L (ref 0–0.2)
BASOPHILS RELATIVE PERCENT: 0 % (ref 0–2)
BASOPHILS RELATIVE PERCENT: 0.2 % (ref 0–2)
BILIRUB SERPL-MCNC: 0.4 MG/DL (ref 0–1.2)
BILIRUB SERPL-MCNC: 0.5 MG/DL (ref 0–1.2)
BILIRUB SERPL-MCNC: 0.6 MG/DL (ref 0–1.2)
BILIRUB SERPL-MCNC: 0.7 MG/DL (ref 0–1.2)
BILIRUBIN URINE: NEGATIVE
BLOOD CULTURE, ROUTINE: ABNORMAL
BLOOD CULTURE, ROUTINE: ABNORMAL
BLOOD CULTURE, ROUTINE: NORMAL
BLOOD CULTURE, ROUTINE: NORMAL
BLOOD, URINE: ABNORMAL
BOTTLE TYPE: ABNORMAL
BUN BLDV-MCNC: 16 MG/DL (ref 8–23)
BUN BLDV-MCNC: 17 MG/DL (ref 8–23)
BUN BLDV-MCNC: 19 MG/DL (ref 8–23)
BUN BLDV-MCNC: 27 MG/DL (ref 8–23)
BUN BLDV-MCNC: 35 MG/DL (ref 8–23)
BUN BLDV-MCNC: 47 MG/DL (ref 8–23)
BUN BLDV-MCNC: 54 MG/DL (ref 8–23)
BUN BLDV-MCNC: 57 MG/DL (ref 8–23)
BUN BLDV-MCNC: 65 MG/DL (ref 8–23)
BUN BLDV-MCNC: 77 MG/DL (ref 8–23)
C-REACTIVE PROTEIN: 9.2 MG/DL (ref 0–0.4)
CALCIUM SERPL-MCNC: 8.4 MG/DL (ref 8.6–10.2)
CALCIUM SERPL-MCNC: 8.4 MG/DL (ref 8.6–10.2)
CALCIUM SERPL-MCNC: 8.5 MG/DL (ref 8.6–10.2)
CALCIUM SERPL-MCNC: 8.5 MG/DL (ref 8.6–10.2)
CALCIUM SERPL-MCNC: 8.6 MG/DL (ref 8.6–10.2)
CALCIUM SERPL-MCNC: 8.6 MG/DL (ref 8.6–10.2)
CALCIUM SERPL-MCNC: 8.8 MG/DL (ref 8.6–10.2)
CALCIUM SERPL-MCNC: 9.1 MG/DL (ref 8.6–10.2)
CALCIUM SERPL-MCNC: 9.2 MG/DL (ref 8.6–10.2)
CALCIUM SERPL-MCNC: 9.3 MG/DL (ref 8.6–10.2)
CANDIDA ALBICANS BY PCR: NOT DETECTED
CANDIDA GLABRATA BY PCR: NOT DETECTED
CANDIDA KRUSEI BY PCR: NOT DETECTED
CANDIDA PARAPSILOSIS BY PCR: NOT DETECTED
CANDIDA TROPICALIS BY PCR: NOT DETECTED
CHLORIDE BLD-SCNC: 106 MMOL/L (ref 98–107)
CHLORIDE BLD-SCNC: 107 MMOL/L (ref 98–107)
CHLORIDE BLD-SCNC: 108 MMOL/L (ref 98–107)
CHLORIDE BLD-SCNC: 110 MMOL/L (ref 98–107)
CHLORIDE BLD-SCNC: 114 MMOL/L (ref 98–107)
CHLORIDE BLD-SCNC: 119 MMOL/L (ref 98–107)
CHLORIDE BLD-SCNC: 120 MMOL/L (ref 98–107)
CHLORIDE BLD-SCNC: 124 MMOL/L (ref 98–107)
CHLORIDE URINE RANDOM: <20 MMOL/L
CLARITY: CLEAR
CO2: 24 MMOL/L (ref 22–29)
CO2: 25 MMOL/L (ref 22–29)
CO2: 25 MMOL/L (ref 22–29)
CO2: 26 MMOL/L (ref 22–29)
CO2: 28 MMOL/L (ref 22–29)
CO2: 30 MMOL/L (ref 22–29)
COHB: 1 % (ref 0–1.5)
COLOR: YELLOW
CREAT SERPL-MCNC: 0.7 MG/DL (ref 0.7–1.2)
CREAT SERPL-MCNC: 0.8 MG/DL (ref 0.7–1.2)
CREAT SERPL-MCNC: 0.8 MG/DL (ref 0.7–1.2)
CREAT SERPL-MCNC: 0.9 MG/DL (ref 0.7–1.2)
CREAT SERPL-MCNC: 1 MG/DL (ref 0.7–1.2)
CREAT SERPL-MCNC: 1.1 MG/DL (ref 0.7–1.2)
CREAT SERPL-MCNC: 1.2 MG/DL (ref 0.7–1.2)
CREAT SERPL-MCNC: 1.5 MG/DL (ref 0.7–1.2)
CREATININE URINE: 66 MG/DL (ref 40–278)
CRITICAL: ABNORMAL
CULTURE, BLOOD 2: ABNORMAL
CULTURE, BLOOD 2: NORMAL
CULTURE, BLOOD 2: NORMAL
CULTURE, RESPIRATORY: ABNORMAL
CULTURE, RESPIRATORY: ABNORMAL
DATE ANALYZED: ABNORMAL
DATE OF COLLECTION: ABNORMAL
EKG ATRIAL RATE: 129 BPM
EKG P AXIS: 80 DEGREES
EKG P-R INTERVAL: 120 MS
EKG Q-T INTERVAL: 292 MS
EKG QRS DURATION: 72 MS
EKG QTC CALCULATION (BAZETT): 427 MS
EKG R AXIS: 81 DEGREES
EKG T AXIS: 73 DEGREES
EKG VENTRICULAR RATE: 129 BPM
ENTEROBACTER CLOACAE COMPLEX BY PCR: NOT DETECTED
ENTEROBACTERALES BY PCR: NOT DETECTED
ENTEROCOCCUS BY PCR: NOT DETECTED
EOSINOPHILS ABSOLUTE: 0 E9/L (ref 0.05–0.5)
EOSINOPHILS ABSOLUTE: 0.01 E9/L (ref 0.05–0.5)
EOSINOPHILS ABSOLUTE: 0.13 E9/L (ref 0.05–0.5)
EOSINOPHILS ABSOLUTE: 0.17 E9/L (ref 0.05–0.5)
EOSINOPHILS RELATIVE PERCENT: 0 % (ref 0–6)
EOSINOPHILS RELATIVE PERCENT: 0.1 % (ref 0–6)
EOSINOPHILS RELATIVE PERCENT: 0.9 % (ref 0–6)
EOSINOPHILS RELATIVE PERCENT: 2.6 % (ref 0–6)
ESCHERICHIA COLI BY PCR: NOT DETECTED
FERRITIN: 916 NG/ML
FIO2: 100 %
FOLATE: 17.5 NG/ML (ref 4.8–24.2)
GFR AFRICAN AMERICAN: 53
GFR AFRICAN AMERICAN: >60
GFR NON-AFRICAN AMERICAN: 44 ML/MIN/1.73
GFR NON-AFRICAN AMERICAN: 56 ML/MIN/1.73
GFR NON-AFRICAN AMERICAN: >60 ML/MIN/1.73
GLUCOSE BLD-MCNC: 102 MG/DL (ref 74–99)
GLUCOSE BLD-MCNC: 109 MG/DL (ref 74–99)
GLUCOSE BLD-MCNC: 111 MG/DL (ref 74–99)
GLUCOSE BLD-MCNC: 112 MG/DL (ref 74–99)
GLUCOSE BLD-MCNC: 114 MG/DL (ref 74–99)
GLUCOSE BLD-MCNC: 142 MG/DL (ref 74–99)
GLUCOSE BLD-MCNC: 300 MG/DL (ref 74–99)
GLUCOSE BLD-MCNC: 83 MG/DL (ref 74–99)
GLUCOSE BLD-MCNC: 86 MG/DL (ref 74–99)
GLUCOSE BLD-MCNC: 88 MG/DL (ref 74–99)
GLUCOSE URINE: NEGATIVE MG/DL
GRAM STAIN ORDERABLE: NORMAL
HAEMOPHILUS INFLUENZAE BY PCR: NOT DETECTED
HCO3: 26.4 MMOL/L (ref 22–26)
HCT VFR BLD CALC: 27.7 % (ref 37–54)
HCT VFR BLD CALC: 32.1 % (ref 37–54)
HCT VFR BLD CALC: 32.2 % (ref 37–54)
HCT VFR BLD CALC: 32.6 % (ref 37–54)
HCT VFR BLD CALC: 38.5 % (ref 37–54)
HCT VFR BLD CALC: 47.4 % (ref 37–54)
HEMOGLOBIN: 10 G/DL (ref 12.5–16.5)
HEMOGLOBIN: 10.1 G/DL (ref 12.5–16.5)
HEMOGLOBIN: 11.3 G/DL (ref 12.5–16.5)
HEMOGLOBIN: 14.5 G/DL (ref 12.5–16.5)
HEMOGLOBIN: 8.5 G/DL (ref 12.5–16.5)
HEMOGLOBIN: 9.4 G/DL (ref 12.5–16.5)
HHB: 6.4 % (ref 0–5)
IMMATURE GRANULOCYTES #: 0.06 E9/L
IMMATURE GRANULOCYTES #: 0.12 E9/L
IMMATURE GRANULOCYTES %: 0.8 % (ref 0–5)
IMMATURE GRANULOCYTES %: 1 % (ref 0–5)
INR BLD: 1.3
IRON SATURATION: 42 % (ref 20–55)
IRON: 51 MCG/DL (ref 59–158)
KETONES, URINE: NEGATIVE MG/DL
KLEBSIELLA OXYTOCA BY PCR: NOT DETECTED
KLEBSIELLA PNEUMONIAE GROUP BY PCR: NOT DETECTED
LAB: ABNORMAL
LACTIC ACID, SEPSIS: 2.9 MMOL/L (ref 0.5–1.9)
LACTIC ACID, SEPSIS: 3.2 MMOL/L (ref 0.5–1.9)
LACTIC ACID: 2.2 MMOL/L (ref 0.5–2.2)
LEUKOCYTE ESTERASE, URINE: NEGATIVE
LISTERIA MONOCYTOGENES BY PCR: NOT DETECTED
LYMPHOCYTES ABSOLUTE: 0.66 E9/L (ref 1.5–4)
LYMPHOCYTES ABSOLUTE: 1.13 E9/L (ref 1.5–4)
LYMPHOCYTES ABSOLUTE: 1.16 E9/L (ref 1.5–4)
LYMPHOCYTES ABSOLUTE: 1.95 E9/L (ref 1.5–4)
LYMPHOCYTES RELATIVE PERCENT: 29.6 % (ref 20–42)
LYMPHOCYTES RELATIVE PERCENT: 6.2 % (ref 20–42)
LYMPHOCYTES RELATIVE PERCENT: 8.4 % (ref 20–42)
LYMPHOCYTES RELATIVE PERCENT: 9.4 % (ref 20–42)
Lab: ABNORMAL
MCH RBC QN AUTO: 32.7 PG (ref 26–35)
MCH RBC QN AUTO: 32.8 PG (ref 26–35)
MCH RBC QN AUTO: 32.9 PG (ref 26–35)
MCH RBC QN AUTO: 33.2 PG (ref 26–35)
MCH RBC QN AUTO: 33.8 PG (ref 26–35)
MCH RBC QN AUTO: 34.1 PG (ref 26–35)
MCHC RBC AUTO-ENTMCNC: 29.3 % (ref 32–34.5)
MCHC RBC AUTO-ENTMCNC: 29.4 % (ref 32–34.5)
MCHC RBC AUTO-ENTMCNC: 30.6 % (ref 32–34.5)
MCHC RBC AUTO-ENTMCNC: 30.7 % (ref 32–34.5)
MCHC RBC AUTO-ENTMCNC: 30.7 % (ref 32–34.5)
MCHC RBC AUTO-ENTMCNC: 31.4 % (ref 32–34.5)
MCV RBC AUTO: 104.2 FL (ref 80–99.9)
MCV RBC AUTO: 107.4 FL (ref 80–99.9)
MCV RBC AUTO: 110.5 FL (ref 80–99.9)
MCV RBC AUTO: 111.3 FL (ref 80–99.9)
MCV RBC AUTO: 111.8 FL (ref 80–99.9)
MCV RBC AUTO: 113.2 FL (ref 80–99.9)
METAMYELOCYTES RELATIVE PERCENT: 1.7 % (ref 0–1)
METHB: 0.1 % (ref 0–1.5)
METHICILLIN RESISTANCE MECA/C  BY PCR: DETECTED
MODE: ABNORMAL
MONOCYTES ABSOLUTE: 0.13 E9/L (ref 0.1–0.95)
MONOCYTES ABSOLUTE: 0.14 E9/L (ref 0.1–0.95)
MONOCYTES ABSOLUTE: 0.37 E9/L (ref 0.1–0.95)
MONOCYTES ABSOLUTE: 0.37 E9/L (ref 0.1–0.95)
MONOCYTES RELATIVE PERCENT: 0.9 % (ref 2–12)
MONOCYTES RELATIVE PERCENT: 1.7 % (ref 2–12)
MONOCYTES RELATIVE PERCENT: 3 % (ref 2–12)
MONOCYTES RELATIVE PERCENT: 4.7 % (ref 2–12)
MYELOCYTE PERCENT: 2.6 % (ref 0–0)
NEISSERIA MENINGITIDIS BY PCR: NOT DETECTED
NEUTROPHILS ABSOLUTE: 10.64 E9/L (ref 1.8–7.3)
NEUTROPHILS ABSOLUTE: 12.69 E9/L (ref 1.8–7.3)
NEUTROPHILS ABSOLUTE: 4.29 E9/L (ref 1.8–7.3)
NEUTROPHILS ABSOLUTE: 6.73 E9/L (ref 1.8–7.3)
NEUTROPHILS RELATIVE PERCENT: 61.8 % (ref 43–80)
NEUTROPHILS RELATIVE PERCENT: 86 % (ref 43–80)
NEUTROPHILS RELATIVE PERCENT: 86.4 % (ref 43–80)
NEUTROPHILS RELATIVE PERCENT: 90.2 % (ref 43–80)
NITRITE, URINE: NEGATIVE
NUCLEATED RED BLOOD CELLS: 0 /100 WBC
NUCLEATED RED BLOOD CELLS: 0 /100 WBC
O2 CONTENT: 20 ML/DL
O2 SATURATION: 93.5 % (ref 92–98.5)
O2HB: 92.5 % (ref 94–97)
OPERATOR ID: 166
ORDER NUMBER: ABNORMAL
ORGANISM: ABNORMAL
OVALOCYTES: ABNORMAL
PATIENT TEMP: 37 C
PCO2: 35.4 MMHG (ref 35–45)
PDW BLD-RTO: 13.7 FL (ref 11.5–15)
PDW BLD-RTO: 14.5 FL (ref 11.5–15)
PDW BLD-RTO: 14.8 FL (ref 11.5–15)
PDW BLD-RTO: 15 FL (ref 11.5–15)
PDW BLD-RTO: 15.5 FL (ref 11.5–15)
PDW BLD-RTO: 15.5 FL (ref 11.5–15)
PEEP/CPAP: 10 CMH2O
PFO2: 0.63 MMHG/%
PH BLOOD GAS: 7.49 (ref 7.35–7.45)
PH UA: 6.5 (ref 5–9)
PLATELET # BLD: 144 E9/L (ref 130–450)
PLATELET # BLD: 144 E9/L (ref 130–450)
PLATELET # BLD: 153 E9/L (ref 130–450)
PLATELET # BLD: 186 E9/L (ref 130–450)
PLATELET # BLD: 213 E9/L (ref 130–450)
PLATELET # BLD: 240 E9/L (ref 130–450)
PMV BLD AUTO: 10.8 FL (ref 7–12)
PMV BLD AUTO: 11.8 FL (ref 7–12)
PMV BLD AUTO: 12.2 FL (ref 7–12)
PMV BLD AUTO: 12.4 FL (ref 7–12)
PO2: 63.5 MMHG (ref 75–100)
POIKILOCYTES: ABNORMAL
POLYCHROMASIA: ABNORMAL
POTASSIUM REFLEX MAGNESIUM: 4.6 MMOL/L (ref 3.5–5)
POTASSIUM REFLEX MAGNESIUM: 5 MMOL/L (ref 3.5–5)
POTASSIUM SERPL-SCNC: 3.6 MMOL/L (ref 3.5–5)
POTASSIUM SERPL-SCNC: 3.7 MMOL/L (ref 3.5–5)
POTASSIUM SERPL-SCNC: 3.8 MMOL/L (ref 3.5–5)
POTASSIUM SERPL-SCNC: 3.9 MMOL/L (ref 3.5–5)
POTASSIUM SERPL-SCNC: 4 MMOL/L (ref 3.5–5)
POTASSIUM SERPL-SCNC: 4.4 MMOL/L (ref 3.5–5)
POTASSIUM, UR: 27.8 MMOL/L
PROTEIN UA: 30 MG/DL
PROTEUS SPECIES BY PCR: NOT DETECTED
PROTHROMBIN TIME: 14.6 SEC (ref 9.3–12.4)
PSEUDOMONAS AERUGINOSA BY PCR: NOT DETECTED
RBC # BLD: 2.58 E12/L (ref 3.8–5.8)
RBC # BLD: 2.87 E12/L (ref 3.8–5.8)
RBC # BLD: 2.93 E12/L (ref 3.8–5.8)
RBC # BLD: 3.09 E12/L (ref 3.8–5.8)
RBC # BLD: 3.4 E12/L (ref 3.8–5.8)
RBC # BLD: 4.29 E12/L (ref 3.8–5.8)
RBC UA: >20 /HPF (ref 0–2)
RI(T): 928 %
RR MECHANICAL: 14 B/MIN
SARS-COV-2, NAAT: DETECTED
SCHISTOCYTES: ABNORMAL
SEDIMENTATION RATE, ERYTHROCYTE: 102 MM/HR (ref 0–15)
SERRATIA MARCESCENS BY PCR: NOT DETECTED
SMEAR, RESPIRATORY: ABNORMAL
SODIUM BLD-SCNC: 137 MMOL/L (ref 132–146)
SODIUM BLD-SCNC: 139 MMOL/L (ref 132–146)
SODIUM BLD-SCNC: 139 MMOL/L (ref 132–146)
SODIUM BLD-SCNC: 140 MMOL/L (ref 132–146)
SODIUM BLD-SCNC: 148 MMOL/L (ref 132–146)
SODIUM BLD-SCNC: 149 MMOL/L (ref 132–146)
SODIUM BLD-SCNC: 153 MMOL/L (ref 132–146)
SODIUM BLD-SCNC: 153 MMOL/L (ref 132–146)
SODIUM BLD-SCNC: 156 MMOL/L (ref 132–146)
SODIUM BLD-SCNC: 158 MMOL/L (ref 132–146)
SODIUM URINE: 47 MMOL/L
SOURCE OF BLOOD CULTURE: ABNORMAL
SOURCE, BLOOD GAS: ABNORMAL
SPECIFIC GRAVITY UA: 1.01 (ref 1–1.03)
STAPHYLOCOCCUS AUREUS BY PCR: DETECTED
STAPHYLOCOCCUS SPECIES BY PCR: DETECTED
STREPTOCOCCUS AGALACTIAE BY PCR: NOT DETECTED
STREPTOCOCCUS PNEUMONIAE BY PCR: NOT DETECTED
STREPTOCOCCUS PYOGENES  BY PCR: NOT DETECTED
STREPTOCOCCUS SPECIES BY PCR: NOT DETECTED
THB: 15.4 G/DL (ref 11.5–16.5)
TIME ANALYZED: 1054
TOTAL IRON BINDING CAPACITY: 121 MCG/DL (ref 250–450)
TOTAL PROTEIN: 5.1 G/DL (ref 6.4–8.3)
TOTAL PROTEIN: 5.2 G/DL (ref 6.4–8.3)
TOTAL PROTEIN: 5.7 G/DL (ref 6.4–8.3)
TOTAL PROTEIN: 6.1 G/DL (ref 6.4–8.3)
URINE CULTURE, ROUTINE: NORMAL
UROBILINOGEN, URINE: 0.2 E.U./DL
VITAMIN B-12: 1309 PG/ML (ref 211–946)
VT MECHANICAL: 450 ML
WBC # BLD: 12.3 E9/L (ref 4.5–11.5)
WBC # BLD: 14.1 E9/L (ref 4.5–11.5)
WBC # BLD: 5.5 E9/L (ref 4.5–11.5)
WBC # BLD: 6.5 E9/L (ref 4.5–11.5)
WBC # BLD: 7.8 E9/L (ref 4.5–11.5)
WBC # BLD: 9.1 E9/L (ref 4.5–11.5)
WBC UA: NORMAL /HPF (ref 0–5)

## 2021-01-01 PROCEDURE — 80048 BASIC METABOLIC PNL TOTAL CA: CPT

## 2021-01-01 PROCEDURE — 87205 SMEAR GRAM STAIN: CPT

## 2021-01-01 PROCEDURE — 85025 COMPLETE CBC W/AUTO DIFF WBC: CPT

## 2021-01-01 PROCEDURE — 6360000002 HC RX W HCPCS: Performed by: INTERNAL MEDICINE

## 2021-01-01 PROCEDURE — 99309 SBSQ NF CARE MODERATE MDM 30: CPT | Performed by: NURSE PRACTITIONER

## 2021-01-01 PROCEDURE — 71045 X-RAY EXAM CHEST 1 VIEW: CPT

## 2021-01-01 PROCEDURE — 2700000000 HC OXYGEN THERAPY PER DAY

## 2021-01-01 PROCEDURE — 82436 ASSAY OF URINE CHLORIDE: CPT

## 2021-01-01 PROCEDURE — 2580000003 HC RX 258: Performed by: INTERNAL MEDICINE

## 2021-01-01 PROCEDURE — 6370000000 HC RX 637 (ALT 250 FOR IP): Performed by: SPECIALIST

## 2021-01-01 PROCEDURE — 2060000000 HC ICU INTERMEDIATE R&B

## 2021-01-01 PROCEDURE — 82746 ASSAY OF FOLIC ACID SERUM: CPT

## 2021-01-01 PROCEDURE — 80053 COMPREHEN METABOLIC PANEL: CPT

## 2021-01-01 PROCEDURE — 99232 SBSQ HOSP IP/OBS MODERATE 35: CPT | Performed by: INTERNAL MEDICINE

## 2021-01-01 PROCEDURE — 87186 SC STD MICRODIL/AGAR DIL: CPT

## 2021-01-01 PROCEDURE — 85651 RBC SED RATE NONAUTOMATED: CPT

## 2021-01-01 PROCEDURE — 83540 ASSAY OF IRON: CPT

## 2021-01-01 PROCEDURE — 94660 CPAP INITIATION&MGMT: CPT

## 2021-01-01 PROCEDURE — 85610 PROTHROMBIN TIME: CPT

## 2021-01-01 PROCEDURE — 82570 ASSAY OF URINE CREATININE: CPT

## 2021-01-01 PROCEDURE — 36415 COLL VENOUS BLD VENIPUNCTURE: CPT

## 2021-01-01 PROCEDURE — 6370000000 HC RX 637 (ALT 250 FOR IP): Performed by: INTERNAL MEDICINE

## 2021-01-01 PROCEDURE — 99239 HOSP IP/OBS DSCHRG MGMT >30: CPT | Performed by: FAMILY MEDICINE

## 2021-01-01 PROCEDURE — 85730 THROMBOPLASTIN TIME PARTIAL: CPT

## 2021-01-01 PROCEDURE — 2580000003 HC RX 258: Performed by: EMERGENCY MEDICINE

## 2021-01-01 PROCEDURE — 96374 THER/PROPH/DIAG INJ IV PUSH: CPT

## 2021-01-01 PROCEDURE — 93010 ELECTROCARDIOGRAM REPORT: CPT | Performed by: INTERNAL MEDICINE

## 2021-01-01 PROCEDURE — 83550 IRON BINDING TEST: CPT

## 2021-01-01 PROCEDURE — 87088 URINE BACTERIA CULTURE: CPT

## 2021-01-01 PROCEDURE — 83605 ASSAY OF LACTIC ACID: CPT

## 2021-01-01 PROCEDURE — 85027 COMPLETE CBC AUTOMATED: CPT

## 2021-01-01 PROCEDURE — 99221 1ST HOSP IP/OBS SF/LOW 40: CPT | Performed by: STUDENT IN AN ORGANIZED HEALTH CARE EDUCATION/TRAINING PROGRAM

## 2021-01-01 PROCEDURE — 81001 URINALYSIS AUTO W/SCOPE: CPT

## 2021-01-01 PROCEDURE — 84133 ASSAY OF URINE POTASSIUM: CPT

## 2021-01-01 PROCEDURE — 2580000003 HC RX 258: Performed by: NURSE PRACTITIONER

## 2021-01-01 PROCEDURE — APPSS30 APP SPLIT SHARED TIME 16-30 MINUTES: Performed by: NURSE PRACTITIONER

## 2021-01-01 PROCEDURE — 6360000002 HC RX W HCPCS: Performed by: EMERGENCY MEDICINE

## 2021-01-01 PROCEDURE — 84300 ASSAY OF URINE SODIUM: CPT

## 2021-01-01 PROCEDURE — 87206 SMEAR FLUORESCENT/ACID STAI: CPT

## 2021-01-01 PROCEDURE — 87040 BLOOD CULTURE FOR BACTERIA: CPT

## 2021-01-01 PROCEDURE — 87150 DNA/RNA AMPLIFIED PROBE: CPT

## 2021-01-01 PROCEDURE — 51702 INSERT TEMP BLADDER CATH: CPT

## 2021-01-01 PROCEDURE — 82728 ASSAY OF FERRITIN: CPT

## 2021-01-01 PROCEDURE — 99285 EMERGENCY DEPT VISIT HI MDM: CPT

## 2021-01-01 PROCEDURE — U0002 COVID-19 LAB TEST NON-CDC: HCPCS

## 2021-01-01 PROCEDURE — 87070 CULTURE OTHR SPECIMN AEROBIC: CPT

## 2021-01-01 PROCEDURE — 96361 HYDRATE IV INFUSION ADD-ON: CPT

## 2021-01-01 PROCEDURE — 82607 VITAMIN B-12: CPT

## 2021-01-01 PROCEDURE — 99223 1ST HOSP IP/OBS HIGH 75: CPT | Performed by: INTERNAL MEDICINE

## 2021-01-01 PROCEDURE — 82805 BLOOD GASES W/O2 SATURATION: CPT

## 2021-01-01 PROCEDURE — 86140 C-REACTIVE PROTEIN: CPT

## 2021-01-01 PROCEDURE — 93005 ELECTROCARDIOGRAM TRACING: CPT | Performed by: EMERGENCY MEDICINE

## 2021-01-01 RX ORDER — ASPIRIN 81 MG/1
81 TABLET, CHEWABLE ORAL DAILY
Status: DISCONTINUED | OUTPATIENT
Start: 2021-01-01 | End: 2021-01-01 | Stop reason: HOSPADM

## 2021-01-01 RX ORDER — TAMSULOSIN HYDROCHLORIDE 0.4 MG/1
0.4 CAPSULE ORAL NIGHTLY
Qty: 30 CAPSULE | Refills: 3 | Status: SHIPPED | OUTPATIENT
Start: 2021-01-01

## 2021-01-01 RX ORDER — IPRATROPIUM BROMIDE AND ALBUTEROL SULFATE 2.5; .5 MG/3ML; MG/3ML
3 SOLUTION RESPIRATORY (INHALATION) 4 TIMES DAILY
Status: DISCONTINUED | OUTPATIENT
Start: 2021-01-01 | End: 2021-01-01 | Stop reason: CLARIF

## 2021-01-01 RX ORDER — TAMSULOSIN HYDROCHLORIDE 0.4 MG/1
0.4 CAPSULE ORAL NIGHTLY
Status: DISCONTINUED | OUTPATIENT
Start: 2021-01-01 | End: 2021-01-01 | Stop reason: HOSPADM

## 2021-01-01 RX ORDER — ACETAMINOPHEN 650 MG/1
650 SUPPOSITORY RECTAL EVERY 6 HOURS PRN
Status: DISCONTINUED | OUTPATIENT
Start: 2021-01-01 | End: 2021-01-01 | Stop reason: HOSPADM

## 2021-01-01 RX ORDER — DEXAMETHASONE SODIUM PHOSPHATE 4 MG/ML
4 INJECTION, SOLUTION INTRA-ARTICULAR; INTRALESIONAL; INTRAMUSCULAR; INTRAVENOUS; SOFT TISSUE DAILY
Status: COMPLETED | OUTPATIENT
Start: 2021-01-01 | End: 2021-01-01

## 2021-01-01 RX ORDER — DEXAMETHASONE SODIUM PHOSPHATE 4 MG/ML
6 INJECTION, SOLUTION INTRA-ARTICULAR; INTRALESIONAL; INTRAMUSCULAR; INTRAVENOUS; SOFT TISSUE DAILY
Status: DISCONTINUED | OUTPATIENT
Start: 2021-01-01 | End: 2021-01-01

## 2021-01-01 RX ORDER — SODIUM CHLORIDE 0.9 % (FLUSH) 0.9 %
10 SYRINGE (ML) INJECTION PRN
Status: DISCONTINUED | OUTPATIENT
Start: 2021-01-01 | End: 2021-01-01 | Stop reason: HOSPADM

## 2021-01-01 RX ORDER — OMEGA-3S/DHA/EPA/FISH OIL/D3 300MG-1000
400 CAPSULE ORAL DAILY
Status: DISCONTINUED | OUTPATIENT
Start: 2021-01-01 | End: 2021-01-01 | Stop reason: HOSPADM

## 2021-01-01 RX ORDER — LINEZOLID 100 MG/5ML
600 SUSPENSION ORAL EVERY 12 HOURS SCHEDULED
Qty: 600 ML | Refills: 0 | Status: SHIPPED | OUTPATIENT
Start: 2021-01-01 | End: 2021-01-01

## 2021-01-01 RX ORDER — OYSTER SHELL CALCIUM WITH VITAMIN D 500; 200 MG/1; [IU]/1
1 TABLET, FILM COATED ORAL 2 TIMES DAILY
Status: DISCONTINUED | OUTPATIENT
Start: 2021-01-01 | End: 2021-01-01 | Stop reason: HOSPADM

## 2021-01-01 RX ORDER — DEXAMETHASONE SODIUM PHOSPHATE 10 MG/ML
6 INJECTION, SOLUTION INTRAMUSCULAR; INTRAVENOUS ONCE
Status: COMPLETED | OUTPATIENT
Start: 2021-01-01 | End: 2021-01-01

## 2021-01-01 RX ORDER — CARVEDILOL 3.12 MG/1
3.12 TABLET ORAL 2 TIMES DAILY WITH MEALS
Status: DISCONTINUED | OUTPATIENT
Start: 2021-01-01 | End: 2021-01-01 | Stop reason: HOSPADM

## 2021-01-01 RX ORDER — DEXTROSE AND SODIUM CHLORIDE 5; .45 G/100ML; G/100ML
INJECTION, SOLUTION INTRAVENOUS CONTINUOUS
Status: DISCONTINUED | OUTPATIENT
Start: 2021-01-01 | End: 2021-01-01

## 2021-01-01 RX ORDER — ONDANSETRON 2 MG/ML
4 INJECTION INTRAMUSCULAR; INTRAVENOUS EVERY 6 HOURS PRN
Status: DISCONTINUED | OUTPATIENT
Start: 2021-01-01 | End: 2021-01-01 | Stop reason: HOSPADM

## 2021-01-01 RX ORDER — ASCORBIC ACID 500 MG
500 TABLET ORAL DAILY
Status: DISCONTINUED | OUTPATIENT
Start: 2021-01-01 | End: 2021-01-01 | Stop reason: HOSPADM

## 2021-01-01 RX ORDER — IPRATROPIUM BROMIDE AND ALBUTEROL SULFATE 2.5; .5 MG/3ML; MG/3ML
3 SOLUTION RESPIRATORY (INHALATION) EVERY 4 HOURS PRN
Status: DISCONTINUED | OUTPATIENT
Start: 2021-01-01 | End: 2021-01-01 | Stop reason: CLARIF

## 2021-01-01 RX ORDER — METOCLOPRAMIDE HYDROCHLORIDE 5 MG/5ML
5 SOLUTION ORAL
Status: DISCONTINUED | OUTPATIENT
Start: 2021-01-01 | End: 2021-01-01 | Stop reason: HOSPADM

## 2021-01-01 RX ORDER — ACETAMINOPHEN 325 MG/1
650 TABLET ORAL EVERY 6 HOURS PRN
Status: DISCONTINUED | OUTPATIENT
Start: 2021-01-01 | End: 2021-01-01 | Stop reason: HOSPADM

## 2021-01-01 RX ORDER — PROMETHAZINE HYDROCHLORIDE 25 MG/1
12.5 TABLET ORAL EVERY 6 HOURS PRN
Status: DISCONTINUED | OUTPATIENT
Start: 2021-01-01 | End: 2021-01-01 | Stop reason: HOSPADM

## 2021-01-01 RX ORDER — LANOLIN ALCOHOL/MO/W.PET/CERES
50 CREAM (GRAM) TOPICAL DAILY
Status: DISCONTINUED | OUTPATIENT
Start: 2021-01-01 | End: 2021-01-01 | Stop reason: HOSPADM

## 2021-01-01 RX ORDER — DEXAMETHASONE SODIUM PHOSPHATE 10 MG/ML
6 INJECTION, SOLUTION INTRAMUSCULAR; INTRAVENOUS EVERY 24 HOURS
Status: DISCONTINUED | OUTPATIENT
Start: 2021-01-01 | End: 2021-01-01 | Stop reason: SDUPTHER

## 2021-01-01 RX ORDER — SODIUM CHLORIDE 0.9 % (FLUSH) 0.9 %
10 SYRINGE (ML) INJECTION EVERY 12 HOURS SCHEDULED
Status: DISCONTINUED | OUTPATIENT
Start: 2021-01-01 | End: 2021-01-01 | Stop reason: HOSPADM

## 2021-01-01 RX ORDER — SODIUM CHLORIDE 9 MG/ML
INJECTION, SOLUTION INTRAVENOUS EVERY 8 HOURS
Status: DISCONTINUED | OUTPATIENT
Start: 2021-01-01 | End: 2021-01-01

## 2021-01-01 RX ORDER — HEPARIN SODIUM 10000 [USP'U]/ML
5000 INJECTION, SOLUTION INTRAVENOUS; SUBCUTANEOUS EVERY 8 HOURS
Status: DISCONTINUED | OUTPATIENT
Start: 2021-01-01 | End: 2021-01-01 | Stop reason: HOSPADM

## 2021-01-01 RX ORDER — DEXTROSE MONOHYDRATE 50 MG/ML
INJECTION, SOLUTION INTRAVENOUS CONTINUOUS
Status: DISCONTINUED | OUTPATIENT
Start: 2021-01-01 | End: 2021-01-01

## 2021-01-01 RX ORDER — MULTIVITAMIN WITH IRON
1 TABLET ORAL DAILY
Status: DISCONTINUED | OUTPATIENT
Start: 2021-01-01 | End: 2021-01-01 | Stop reason: HOSPADM

## 2021-01-01 RX ORDER — PYRIDOXINE HCL (VITAMIN B6) 50 MG
50 TABLET ORAL DAILY
Qty: 30 TABLET | Refills: 3 | Status: SHIPPED | OUTPATIENT
Start: 2021-01-01

## 2021-01-01 RX ORDER — POLYETHYLENE GLYCOL 3350 17 G/17G
17 POWDER, FOR SOLUTION ORAL DAILY PRN
Status: DISCONTINUED | OUTPATIENT
Start: 2021-01-01 | End: 2021-01-01 | Stop reason: HOSPADM

## 2021-01-01 RX ORDER — 0.9 % SODIUM CHLORIDE 0.9 %
1000 INTRAVENOUS SOLUTION INTRAVENOUS ONCE
Status: COMPLETED | OUTPATIENT
Start: 2021-01-01 | End: 2021-01-01

## 2021-01-01 RX ORDER — MIDODRINE HYDROCHLORIDE 5 MG/1
2.5 TABLET ORAL 2 TIMES DAILY WITH MEALS
Status: DISCONTINUED | OUTPATIENT
Start: 2021-01-01 | End: 2021-01-01 | Stop reason: HOSPADM

## 2021-01-01 RX ORDER — GUAIFENESIN AND DEXTROMETHORPHAN HYDROBROMIDE 100; 10 MG/5ML; MG/5ML
5 SOLUTION ORAL 4 TIMES DAILY
COMMUNITY

## 2021-01-01 RX ORDER — MIDODRINE HYDROCHLORIDE 2.5 MG/1
2.5 TABLET ORAL 2 TIMES DAILY WITH MEALS
Qty: 90 TABLET | Refills: 3 | Status: SHIPPED | OUTPATIENT
Start: 2021-01-01

## 2021-01-01 RX ORDER — LINEZOLID 100 MG/5ML
600 SUSPENSION ORAL EVERY 12 HOURS SCHEDULED
Status: DISCONTINUED | OUTPATIENT
Start: 2021-01-01 | End: 2021-01-01 | Stop reason: HOSPADM

## 2021-01-01 RX ADMIN — IPRATROPIUM BROMIDE AND ALBUTEROL 1 PUFF: 20; 100 SPRAY, METERED RESPIRATORY (INHALATION) at 12:10

## 2021-01-01 RX ADMIN — MULTIVITAMIN TABLET 1 TABLET: TABLET at 08:53

## 2021-01-01 RX ADMIN — MULTIVITAMIN TABLET 1 TABLET: TABLET at 14:40

## 2021-01-01 RX ADMIN — SODIUM CHLORIDE: 9 INJECTION, SOLUTION INTRAVENOUS at 00:30

## 2021-01-01 RX ADMIN — PYRIDOXINE HCL TAB 50 MG 50 MG: 50 TAB at 08:53

## 2021-01-01 RX ADMIN — SODIUM CHLORIDE, PRESERVATIVE FREE 10 ML: 5 INJECTION INTRAVENOUS at 20:48

## 2021-01-01 RX ADMIN — MULTIVITAMIN TABLET 1 TABLET: TABLET at 10:11

## 2021-01-01 RX ADMIN — METOCLOPRAMIDE HYDROCHLORIDE 5 MG: 5 SOLUTION ORAL at 06:34

## 2021-01-01 RX ADMIN — MICONAZOLE NITRATE: 2 OINTMENT TOPICAL at 12:34

## 2021-01-01 RX ADMIN — HEPARIN SODIUM 5000 UNITS: 10000 INJECTION INTRAVENOUS; SUBCUTANEOUS at 21:45

## 2021-01-01 RX ADMIN — CARVEDILOL 3.12 MG: 3.12 TABLET, FILM COATED ORAL at 10:30

## 2021-01-01 RX ADMIN — HEPARIN SODIUM 5000 UNITS: 10000 INJECTION INTRAVENOUS; SUBCUTANEOUS at 10:50

## 2021-01-01 RX ADMIN — IPRATROPIUM BROMIDE AND ALBUTEROL 1 PUFF: 20; 100 SPRAY, METERED RESPIRATORY (INHALATION) at 08:42

## 2021-01-01 RX ADMIN — Medication 10 ML: at 08:53

## 2021-01-01 RX ADMIN — CHOLECALCIFEROL TAB 10 MCG (400 UNIT) 400 UNITS: 10 TAB at 12:33

## 2021-01-01 RX ADMIN — CALCIUM CARBONATE-VITAMIN D TAB 500 MG-200 UNIT 1 TABLET: 500-200 TAB at 20:07

## 2021-01-01 RX ADMIN — Medication 500 MG: at 11:29

## 2021-01-01 RX ADMIN — IPRATROPIUM BROMIDE AND ALBUTEROL 1 PUFF: 20; 100 SPRAY, METERED RESPIRATORY (INHALATION) at 13:19

## 2021-01-01 RX ADMIN — CALCIUM CARBONATE-VITAMIN D TAB 500 MG-200 UNIT 1 TABLET: 500-200 TAB at 08:49

## 2021-01-01 RX ADMIN — IPRATROPIUM BROMIDE AND ALBUTEROL 1 PUFF: 20; 100 SPRAY, METERED RESPIRATORY (INHALATION) at 08:25

## 2021-01-01 RX ADMIN — IPRATROPIUM BROMIDE AND ALBUTEROL 1 PUFF: 20; 100 SPRAY, METERED RESPIRATORY (INHALATION) at 17:34

## 2021-01-01 RX ADMIN — PIPERACILLIN AND TAZOBACTAM 3.38 G: 3; .375 INJECTION, POWDER, LYOPHILIZED, FOR SOLUTION INTRAVENOUS at 04:25

## 2021-01-01 RX ADMIN — Medication 500 MG: at 14:40

## 2021-01-01 RX ADMIN — CALCIUM CARBONATE-VITAMIN D TAB 500 MG-200 UNIT 1 TABLET: 500-200 TAB at 11:28

## 2021-01-01 RX ADMIN — METOCLOPRAMIDE HYDROCHLORIDE 5 MG: 5 SOLUTION ORAL at 14:40

## 2021-01-01 RX ADMIN — MIDODRINE HYDROCHLORIDE 2.5 MG: 5 TABLET ORAL at 17:30

## 2021-01-01 RX ADMIN — CHOLECALCIFEROL TAB 10 MCG (400 UNIT) 400 UNITS: 10 TAB at 10:30

## 2021-01-01 RX ADMIN — LINEZOLID 600 MG: 100 SUSPENSION ORAL at 11:29

## 2021-01-01 RX ADMIN — Medication 10 ML: at 22:33

## 2021-01-01 RX ADMIN — CALCIUM CARBONATE-VITAMIN D TAB 500 MG-200 UNIT 1 TABLET: 500-200 TAB at 21:22

## 2021-01-01 RX ADMIN — METOCLOPRAMIDE HYDROCHLORIDE 5 MG: 5 SOLUTION ORAL at 17:45

## 2021-01-01 RX ADMIN — DEXTROSE 3.38 G: 50 INJECTION, SOLUTION INTRAVENOUS at 13:08

## 2021-01-01 RX ADMIN — Medication 500 MG: at 08:49

## 2021-01-01 RX ADMIN — HEPARIN SODIUM 5000 UNITS: 10000 INJECTION INTRAVENOUS; SUBCUTANEOUS at 15:00

## 2021-01-01 RX ADMIN — MICONAZOLE NITRATE: 2 OINTMENT TOPICAL at 11:29

## 2021-01-01 RX ADMIN — SODIUM CHLORIDE: 9 INJECTION, SOLUTION INTRAVENOUS at 17:20

## 2021-01-01 RX ADMIN — HEPARIN SODIUM 5000 UNITS: 10000 INJECTION INTRAVENOUS; SUBCUTANEOUS at 05:15

## 2021-01-01 RX ADMIN — MIDODRINE HYDROCHLORIDE 2.5 MG: 5 TABLET ORAL at 08:49

## 2021-01-01 RX ADMIN — CHOLECALCIFEROL TAB 10 MCG (400 UNIT) 400 UNITS: 10 TAB at 11:28

## 2021-01-01 RX ADMIN — PYRIDOXINE HCL TAB 50 MG 50 MG: 50 TAB at 11:29

## 2021-01-01 RX ADMIN — TAMSULOSIN HYDROCHLORIDE 0.4 MG: 0.4 CAPSULE ORAL at 21:20

## 2021-01-01 RX ADMIN — MULTIVITAMIN TABLET 1 TABLET: TABLET at 11:28

## 2021-01-01 RX ADMIN — IPRATROPIUM BROMIDE AND ALBUTEROL 1 PUFF: 20; 100 SPRAY, METERED RESPIRATORY (INHALATION) at 08:56

## 2021-01-01 RX ADMIN — ASPIRIN 81 MG 81 MG: 81 TABLET ORAL at 14:40

## 2021-01-01 RX ADMIN — HEPARIN SODIUM 5000 UNITS: 10000 INJECTION INTRAVENOUS; SUBCUTANEOUS at 06:22

## 2021-01-01 RX ADMIN — ASPIRIN 81 MG 81 MG: 81 TABLET ORAL at 11:28

## 2021-01-01 RX ADMIN — CARVEDILOL 3.12 MG: 3.12 TABLET, FILM COATED ORAL at 10:11

## 2021-01-01 RX ADMIN — IPRATROPIUM BROMIDE AND ALBUTEROL 1 PUFF: 20; 100 SPRAY, METERED RESPIRATORY (INHALATION) at 21:20

## 2021-01-01 RX ADMIN — SODIUM CHLORIDE: 9 INJECTION, SOLUTION INTRAVENOUS at 08:35

## 2021-01-01 RX ADMIN — MICONAZOLE NITRATE: 2 OINTMENT TOPICAL at 08:54

## 2021-01-01 RX ADMIN — SODIUM CHLORIDE: 9 INJECTION, SOLUTION INTRAVENOUS at 10:16

## 2021-01-01 RX ADMIN — HEPARIN SODIUM 5000 UNITS: 10000 INJECTION INTRAVENOUS; SUBCUTANEOUS at 20:49

## 2021-01-01 RX ADMIN — MIDODRINE HYDROCHLORIDE 2.5 MG: 5 TABLET ORAL at 12:33

## 2021-01-01 RX ADMIN — ACETAMINOPHEN 650 MG: 325 TABLET ORAL at 12:34

## 2021-01-01 RX ADMIN — MICONAZOLE NITRATE: 2 OINTMENT TOPICAL at 20:23

## 2021-01-01 RX ADMIN — HEPARIN SODIUM 5000 UNITS: 10000 INJECTION INTRAVENOUS; SUBCUTANEOUS at 04:28

## 2021-01-01 RX ADMIN — MICONAZOLE NITRATE: 2 OINTMENT TOPICAL at 21:20

## 2021-01-01 RX ADMIN — TAMSULOSIN HYDROCHLORIDE 0.4 MG: 0.4 CAPSULE ORAL at 22:33

## 2021-01-01 RX ADMIN — MICONAZOLE NITRATE: 2 OINTMENT TOPICAL at 21:10

## 2021-01-01 RX ADMIN — IPRATROPIUM BROMIDE AND ALBUTEROL 1 PUFF: 20; 100 SPRAY, METERED RESPIRATORY (INHALATION) at 11:30

## 2021-01-01 RX ADMIN — Medication 10 ML: at 11:30

## 2021-01-01 RX ADMIN — CARVEDILOL 3.12 MG: 3.12 TABLET, FILM COATED ORAL at 18:42

## 2021-01-01 RX ADMIN — HEPARIN SODIUM 5000 UNITS: 10000 INJECTION INTRAVENOUS; SUBCUTANEOUS at 04:48

## 2021-01-01 RX ADMIN — CARVEDILOL 3.12 MG: 3.12 TABLET, FILM COATED ORAL at 17:23

## 2021-01-01 RX ADMIN — METOCLOPRAMIDE HYDROCHLORIDE 5 MG: 5 SOLUTION ORAL at 17:23

## 2021-01-01 RX ADMIN — MIDODRINE HYDROCHLORIDE 2.5 MG: 5 TABLET ORAL at 18:20

## 2021-01-01 RX ADMIN — LINEZOLID 600 MG: 100 SUSPENSION ORAL at 10:30

## 2021-01-01 RX ADMIN — LINEZOLID 600 MG: 100 SUSPENSION ORAL at 08:50

## 2021-01-01 RX ADMIN — METOCLOPRAMIDE HYDROCHLORIDE 5 MG: 5 SOLUTION ORAL at 05:23

## 2021-01-01 RX ADMIN — HEPARIN SODIUM 5000 UNITS: 10000 INJECTION INTRAVENOUS; SUBCUTANEOUS at 22:22

## 2021-01-01 RX ADMIN — PYRIDOXINE HCL TAB 50 MG 50 MG: 50 TAB at 10:14

## 2021-01-01 RX ADMIN — IPRATROPIUM BROMIDE AND ALBUTEROL 1 PUFF: 20; 100 SPRAY, METERED RESPIRATORY (INHALATION) at 10:40

## 2021-01-01 RX ADMIN — Medication 500 MG: at 12:33

## 2021-01-01 RX ADMIN — METOCLOPRAMIDE HYDROCHLORIDE 5 MG: 5 SOLUTION ORAL at 04:27

## 2021-01-01 RX ADMIN — SODIUM CHLORIDE 1000 ML: 9 INJECTION, SOLUTION INTRAVENOUS at 11:32

## 2021-01-01 RX ADMIN — IPRATROPIUM BROMIDE AND ALBUTEROL 1 PUFF: 20; 100 SPRAY, METERED RESPIRATORY (INHALATION) at 17:15

## 2021-01-01 RX ADMIN — DEXAMETHASONE SODIUM PHOSPHATE 4 MG: 4 INJECTION, SOLUTION INTRA-ARTICULAR; INTRALESIONAL; INTRAMUSCULAR; INTRAVENOUS; SOFT TISSUE at 14:30

## 2021-01-01 RX ADMIN — SODIUM CHLORIDE, PRESERVATIVE FREE 10 ML: 5 INJECTION INTRAVENOUS at 04:48

## 2021-01-01 RX ADMIN — HEPARIN SODIUM 5000 UNITS: 10000 INJECTION INTRAVENOUS; SUBCUTANEOUS at 21:00

## 2021-01-01 RX ADMIN — SODIUM CHLORIDE, PRESERVATIVE FREE 10 ML: 5 INJECTION INTRAVENOUS at 20:25

## 2021-01-01 RX ADMIN — ASPIRIN 81 MG 81 MG: 81 TABLET ORAL at 10:30

## 2021-01-01 RX ADMIN — SODIUM CHLORIDE, PRESERVATIVE FREE 10 ML: 5 INJECTION INTRAVENOUS at 08:53

## 2021-01-01 RX ADMIN — MICONAZOLE NITRATE: 2 OINTMENT TOPICAL at 20:05

## 2021-01-01 RX ADMIN — MIDODRINE HYDROCHLORIDE 2.5 MG: 5 TABLET ORAL at 18:42

## 2021-01-01 RX ADMIN — HEPARIN SODIUM 5000 UNITS: 10000 INJECTION INTRAVENOUS; SUBCUTANEOUS at 21:41

## 2021-01-01 RX ADMIN — LINEZOLID 600 MG: 100 SUSPENSION ORAL at 22:32

## 2021-01-01 RX ADMIN — Medication 10 ML: at 06:24

## 2021-01-01 RX ADMIN — PIPERACILLIN AND TAZOBACTAM 3.38 G: 3; .375 INJECTION, POWDER, LYOPHILIZED, FOR SOLUTION INTRAVENOUS at 04:02

## 2021-01-01 RX ADMIN — MULTIVITAMIN TABLET 1 TABLET: TABLET at 10:30

## 2021-01-01 RX ADMIN — CHOLECALCIFEROL TAB 10 MCG (400 UNIT) 400 UNITS: 10 TAB at 11:10

## 2021-01-01 RX ADMIN — DEXTROSE MONOHYDRATE: 50 INJECTION, SOLUTION INTRAVENOUS at 11:07

## 2021-01-01 RX ADMIN — TAMSULOSIN HYDROCHLORIDE 0.4 MG: 0.4 CAPSULE ORAL at 20:26

## 2021-01-01 RX ADMIN — METOCLOPRAMIDE HYDROCHLORIDE 5 MG: 5 SOLUTION ORAL at 16:41

## 2021-01-01 RX ADMIN — IPRATROPIUM BROMIDE AND ALBUTEROL 1 PUFF: 20; 100 SPRAY, METERED RESPIRATORY (INHALATION) at 17:32

## 2021-01-01 RX ADMIN — LINEZOLID 600 MG: 100 SUSPENSION ORAL at 21:51

## 2021-01-01 RX ADMIN — METOCLOPRAMIDE HYDROCHLORIDE 5 MG: 5 SOLUTION ORAL at 05:38

## 2021-01-01 RX ADMIN — METOCLOPRAMIDE HYDROCHLORIDE 5 MG: 5 SOLUTION ORAL at 16:33

## 2021-01-01 RX ADMIN — SODIUM CHLORIDE, PRESERVATIVE FREE 10 ML: 5 INJECTION INTRAVENOUS at 21:10

## 2021-01-01 RX ADMIN — Medication 500 MG: at 10:30

## 2021-01-01 RX ADMIN — IPRATROPIUM BROMIDE AND ALBUTEROL 1 PUFF: 20; 100 SPRAY, METERED RESPIRATORY (INHALATION) at 10:12

## 2021-01-01 RX ADMIN — METOCLOPRAMIDE HYDROCHLORIDE 5 MG: 5 SOLUTION ORAL at 18:20

## 2021-01-01 RX ADMIN — CALCIUM CARBONATE-VITAMIN D TAB 500 MG-200 UNIT 1 TABLET: 500-200 TAB at 21:10

## 2021-01-01 RX ADMIN — CALCIUM CARBONATE-VITAMIN D TAB 500 MG-200 UNIT 1 TABLET: 500-200 TAB at 12:33

## 2021-01-01 RX ADMIN — HEPARIN SODIUM 5000 UNITS: 10000 INJECTION INTRAVENOUS; SUBCUTANEOUS at 12:37

## 2021-01-01 RX ADMIN — TAMSULOSIN HYDROCHLORIDE 0.4 MG: 0.4 CAPSULE ORAL at 20:50

## 2021-01-01 RX ADMIN — MULTIVITAMIN TABLET 1 TABLET: TABLET at 12:34

## 2021-01-01 RX ADMIN — DEXTROSE AND SODIUM CHLORIDE: 5; 450 INJECTION, SOLUTION INTRAVENOUS at 21:44

## 2021-01-01 RX ADMIN — Medication 400 MG: at 10:11

## 2021-01-01 RX ADMIN — Medication 500 MG: at 09:00

## 2021-01-01 RX ADMIN — PYRIDOXINE HCL TAB 50 MG 50 MG: 50 TAB at 12:34

## 2021-01-01 RX ADMIN — MULTIVITAMIN TABLET 1 TABLET: TABLET at 08:00

## 2021-01-01 RX ADMIN — CALCIUM CARBONATE-VITAMIN D TAB 500 MG-200 UNIT 1 TABLET: 500-200 TAB at 09:00

## 2021-01-01 RX ADMIN — MIDODRINE HYDROCHLORIDE 2.5 MG: 5 TABLET ORAL at 17:23

## 2021-01-01 RX ADMIN — CALCIUM CARBONATE-VITAMIN D TAB 500 MG-200 UNIT 1 TABLET: 500-200 TAB at 14:40

## 2021-01-01 RX ADMIN — CALCIUM CARBONATE-VITAMIN D TAB 500 MG-200 UNIT 1 TABLET: 500-200 TAB at 10:13

## 2021-01-01 RX ADMIN — CALCIUM CARBONATE-VITAMIN D TAB 500 MG-200 UNIT 1 TABLET: 500-200 TAB at 20:25

## 2021-01-01 RX ADMIN — TAMSULOSIN HYDROCHLORIDE 0.4 MG: 0.4 CAPSULE ORAL at 21:09

## 2021-01-01 RX ADMIN — SODIUM CHLORIDE: 9 INJECTION, SOLUTION INTRAVENOUS at 17:15

## 2021-01-01 RX ADMIN — HEPARIN SODIUM 5000 UNITS: 10000 INJECTION INTRAVENOUS; SUBCUTANEOUS at 13:22

## 2021-01-01 RX ADMIN — HEPARIN SODIUM 5000 UNITS: 10000 INJECTION INTRAVENOUS; SUBCUTANEOUS at 21:12

## 2021-01-01 RX ADMIN — HEPARIN SODIUM 5000 UNITS: 10000 INJECTION INTRAVENOUS; SUBCUTANEOUS at 06:33

## 2021-01-01 RX ADMIN — CARVEDILOL 3.12 MG: 3.12 TABLET, FILM COATED ORAL at 14:40

## 2021-01-01 RX ADMIN — SODIUM CHLORIDE, PRESERVATIVE FREE 10 ML: 5 INJECTION INTRAVENOUS at 11:28

## 2021-01-01 RX ADMIN — SODIUM CHLORIDE, PRESERVATIVE FREE 10 ML: 5 INJECTION INTRAVENOUS at 12:34

## 2021-01-01 RX ADMIN — PYRIDOXINE HCL TAB 50 MG 50 MG: 50 TAB at 21:21

## 2021-01-01 RX ADMIN — HEPARIN SODIUM 5000 UNITS: 10000 INJECTION INTRAVENOUS; SUBCUTANEOUS at 22:33

## 2021-01-01 RX ADMIN — PIPERACILLIN AND TAZOBACTAM 3.38 G: 3; .375 INJECTION, POWDER, LYOPHILIZED, FOR SOLUTION INTRAVENOUS at 14:40

## 2021-01-01 RX ADMIN — SODIUM CHLORIDE 1000 ML: 9 INJECTION, SOLUTION INTRAVENOUS at 16:33

## 2021-01-01 RX ADMIN — Medication 400 MG: at 10:30

## 2021-01-01 RX ADMIN — CALCIUM CARBONATE-VITAMIN D TAB 500 MG-200 UNIT 1 TABLET: 500-200 TAB at 10:30

## 2021-01-01 RX ADMIN — CALCIUM CARBONATE-VITAMIN D TAB 500 MG-200 UNIT 1 TABLET: 500-200 TAB at 22:33

## 2021-01-01 RX ADMIN — ASPIRIN 81 MG 81 MG: 81 TABLET ORAL at 12:34

## 2021-01-01 RX ADMIN — Medication 10 ML: at 12:45

## 2021-01-01 RX ADMIN — METOCLOPRAMIDE HYDROCHLORIDE 5 MG: 5 SOLUTION ORAL at 04:48

## 2021-01-01 RX ADMIN — MIDODRINE HYDROCHLORIDE 2.5 MG: 5 TABLET ORAL at 11:28

## 2021-01-01 RX ADMIN — HEPARIN SODIUM 5000 UNITS: 10000 INJECTION INTRAVENOUS; SUBCUTANEOUS at 10:45

## 2021-01-01 RX ADMIN — IPRATROPIUM BROMIDE AND ALBUTEROL 1 PUFF: 20; 100 SPRAY, METERED RESPIRATORY (INHALATION) at 22:33

## 2021-01-01 RX ADMIN — Medication 500 MG: at 10:11

## 2021-01-01 RX ADMIN — LINEZOLID 600 MG: 100 SUSPENSION ORAL at 20:24

## 2021-01-01 RX ADMIN — HEPARIN SODIUM 5000 UNITS: 10000 INJECTION INTRAVENOUS; SUBCUTANEOUS at 14:01

## 2021-01-01 RX ADMIN — LINEZOLID 600 MG: 100 SUSPENSION ORAL at 21:19

## 2021-01-01 RX ADMIN — CARVEDILOL 3.12 MG: 3.12 TABLET, FILM COATED ORAL at 16:33

## 2021-01-01 RX ADMIN — DEXTROSE MONOHYDRATE: 50 INJECTION, SOLUTION INTRAVENOUS at 10:19

## 2021-01-01 RX ADMIN — Medication 400 MG: at 14:40

## 2021-01-01 RX ADMIN — CHOLECALCIFEROL TAB 10 MCG (400 UNIT) 400 UNITS: 10 TAB at 14:40

## 2021-01-01 RX ADMIN — Medication 10 ML: at 20:27

## 2021-01-01 RX ADMIN — Medication 10 ML: at 20:08

## 2021-01-01 RX ADMIN — SODIUM CHLORIDE, PRESERVATIVE FREE 10 ML: 5 INJECTION INTRAVENOUS at 20:06

## 2021-01-01 RX ADMIN — Medication 400 MG: at 12:33

## 2021-01-01 RX ADMIN — LINEZOLID 600 MG: 100 SUSPENSION ORAL at 10:12

## 2021-01-01 RX ADMIN — HEPARIN SODIUM 5000 UNITS: 10000 INJECTION INTRAVENOUS; SUBCUTANEOUS at 13:57

## 2021-01-01 RX ADMIN — CHOLECALCIFEROL TAB 10 MCG (400 UNIT) 400 UNITS: 10 TAB at 08:49

## 2021-01-01 RX ADMIN — MICONAZOLE NITRATE: 2 OINTMENT TOPICAL at 22:32

## 2021-01-01 RX ADMIN — ASPIRIN 81 MG 81 MG: 81 TABLET ORAL at 09:06

## 2021-01-01 RX ADMIN — SODIUM CHLORIDE, PRESERVATIVE FREE 10 ML: 5 INJECTION INTRAVENOUS at 21:22

## 2021-01-01 RX ADMIN — DEXTROSE MONOHYDRATE: 50 INJECTION, SOLUTION INTRAVENOUS at 20:18

## 2021-01-01 RX ADMIN — ACETAMINOPHEN 650 MG: 325 TABLET ORAL at 10:11

## 2021-01-01 RX ADMIN — DEXAMETHASONE SODIUM PHOSPHATE 6 MG: 4 INJECTION, SOLUTION INTRAMUSCULAR; INTRAVENOUS at 10:15

## 2021-01-01 RX ADMIN — DEXAMETHASONE SODIUM PHOSPHATE 6 MG: 10 INJECTION, SOLUTION INTRAMUSCULAR; INTRAVENOUS at 15:04

## 2021-01-01 RX ADMIN — MICONAZOLE NITRATE: 2 OINTMENT TOPICAL at 08:00

## 2021-01-01 RX ADMIN — Medication 400 MG: at 11:28

## 2021-01-01 RX ADMIN — CALCIUM CARBONATE-VITAMIN D TAB 500 MG-200 UNIT 1 TABLET: 500-200 TAB at 20:49

## 2021-01-01 RX ADMIN — Medication 400 MG: at 08:45

## 2021-01-01 RX ADMIN — ASPIRIN 81 MG 81 MG: 81 TABLET ORAL at 10:11

## 2021-01-01 RX ADMIN — METOCLOPRAMIDE HYDROCHLORIDE 5 MG: 5 SOLUTION ORAL at 18:42

## 2021-01-01 RX ADMIN — IPRATROPIUM BROMIDE AND ALBUTEROL 1 PUFF: 20; 100 SPRAY, METERED RESPIRATORY (INHALATION) at 20:24

## 2021-01-01 RX ADMIN — HEPARIN SODIUM 5000 UNITS: 10000 INJECTION INTRAVENOUS; SUBCUTANEOUS at 04:16

## 2021-01-01 RX ADMIN — HEPARIN SODIUM 5000 UNITS: 10000 INJECTION INTRAVENOUS; SUBCUTANEOUS at 04:00

## 2021-01-01 RX ADMIN — Medication 400 MG: at 11:08

## 2021-01-01 RX ADMIN — ASPIRIN 81 MG 81 MG: 81 TABLET ORAL at 08:49

## 2021-01-01 RX ADMIN — IPRATROPIUM BROMIDE AND ALBUTEROL 1 PUFF: 20; 100 SPRAY, METERED RESPIRATORY (INHALATION) at 17:21

## 2021-01-01 RX ADMIN — MICONAZOLE NITRATE: 2 OINTMENT TOPICAL at 10:13

## 2021-01-01 RX ADMIN — PIPERACILLIN SODIUM AND TAZOBACTAM SODIUM 3.38 G: 3; .375 INJECTION, POWDER, LYOPHILIZED, FOR SOLUTION INTRAVENOUS at 16:33

## 2021-01-01 RX ADMIN — DEXAMETHASONE SODIUM PHOSPHATE 4 MG: 4 INJECTION, SOLUTION INTRA-ARTICULAR; INTRALESIONAL; INTRAMUSCULAR; INTRAVENOUS; SOFT TISSUE at 11:07

## 2021-01-01 RX ADMIN — IPRATROPIUM BROMIDE AND ALBUTEROL 1 PUFF: 20; 100 SPRAY, METERED RESPIRATORY (INHALATION) at 16:33

## 2021-01-01 RX ADMIN — CARVEDILOL 3.12 MG: 3.12 TABLET, FILM COATED ORAL at 21:11

## 2021-01-01 RX ADMIN — MICONAZOLE NITRATE: 2 OINTMENT TOPICAL at 20:51

## 2021-01-01 RX ADMIN — IPRATROPIUM BROMIDE AND ALBUTEROL 1 PUFF: 20; 100 SPRAY, METERED RESPIRATORY (INHALATION) at 20:08

## 2021-01-01 RX ADMIN — CHOLECALCIFEROL TAB 10 MCG (400 UNIT) 400 UNITS: 10 TAB at 10:11

## 2021-01-01 RX ADMIN — TAMSULOSIN HYDROCHLORIDE 0.4 MG: 0.4 CAPSULE ORAL at 20:07

## 2021-01-01 RX ADMIN — LINEZOLID 600 MG: 100 SUSPENSION ORAL at 20:50

## 2021-01-01 SDOH — HEALTH STABILITY: MENTAL HEALTH: HOW OFTEN DO YOU HAVE A DRINK CONTAINING ALCOHOL?: NEVER

## 2021-01-01 ASSESSMENT — PAIN SCALES - PAIN ASSESSMENT IN ADVANCED DEMENTIA (PAINAD)
CONSOLABILITY: 0
FACIALEXPRESSION: 0
BODYLANGUAGE: 1
TOTALSCORE: 0
BREATHING: 0
CONSOLABILITY: 0
CONSOLABILITY: 0
BREATHING: 0
TOTALSCORE: 0
CONSOLABILITY: 1
BREATHING: 0
TOTALSCORE: 0
FACIALEXPRESSION: 0
NEGVOCALIZATION: 0
FACIALEXPRESSION: 0
BODYLANGUAGE: 0
TOTALSCORE: 0
NEGVOCALIZATION: 0
BREATHING: 0
BODYLANGUAGE: 0
NEGVOCALIZATION: 0
NEGVOCALIZATION: 0
FACIALEXPRESSION: 0
BODYLANGUAGE: 0
BODYLANGUAGE: 0
TOTALSCORE: 1
BODYLANGUAGE: 0
FACIALEXPRESSION: 0
FACIALEXPRESSION: 0
NEGVOCALIZATION: 0
NEGVOCALIZATION: 0
CONSOLABILITY: 0
BODYLANGUAGE: 0
BREATHING: 1
NEGVOCALIZATION: 0
FACIALEXPRESSION: 1
NEGVOCALIZATION: 0

## 2021-01-01 ASSESSMENT — PAIN SCALES - GENERAL
PAINLEVEL_OUTOF10: 0
PAINLEVEL_OUTOF10: 4

## 2021-01-01 ASSESSMENT — ENCOUNTER SYMPTOMS: SHORTNESS OF BREATH: 1

## 2021-01-14 PROBLEM — J96.01 ACUTE HYPOXEMIC RESPIRATORY FAILURE DUE TO COVID-19 (HCC): Status: ACTIVE | Noted: 2021-01-01

## 2021-01-14 PROBLEM — U07.1 ACUTE HYPOXEMIC RESPIRATORY FAILURE DUE TO COVID-19 (HCC): Status: ACTIVE | Noted: 2021-01-01

## 2021-01-14 NOTE — ED PROVIDER NOTES
This patient resident of [facility]. On 12/22/2020, this patient tested positive for COVID-19. This morning, he was notably short of breath and it was noted that he had tube feed coming out of his mouth and apparently may have aspirated. [facility] was summoned and found him quite hypoxic. They brought him here poorly responsive. However, he would respond to loud verbal stimuli. To several people, he stated that he did not want to be on a ventilator. He is accompanied by DNR CCA paperwork. The history is provided by the patient. Shortness of Breath  Severity:  Severe  Onset quality:  Unable to specify  Timing:  Unable to specify  Progression:  Unable to specify       Review of Systems   Unable to perform ROS: Patient nonverbal   Respiratory: Positive for shortness of breath. Physical Exam  Vitals signs and nursing note reviewed. Constitutional:       Appearance: He is well-developed. He is cachectic. He is ill-appearing. HENT:      Head: Normocephalic and atraumatic. Eyes:      Pupils: Pupils are equal, round, and reactive to light. Neck:      Musculoskeletal: Normal range of motion and neck supple. Cardiovascular:      Rate and Rhythm: Regular rhythm. Tachycardia present. Heart sounds: Normal heart sounds. No murmur. Pulmonary:      Effort: Pulmonary effort is normal. Tachypnea present. No respiratory distress. Breath sounds: Decreased breath sounds and wheezing present. No rales. Abdominal:      General: Bowel sounds are normal.      Palpations: Abdomen is soft. Tenderness: There is no abdominal tenderness. There is no guarding or rebound. Musculoskeletal:      Right lower leg: No edema. Left lower leg: No edema. Skin:     General: Skin is warm and dry. Neurological:      Mental Status: He is alert. GCS: GCS eye subscore is 4. GCS motor subscore is 6. Cranial Nerves: No cranial nerve deficit.       Coordination: Coordination normal.      Comments: Patient is nonverbal but, he tries to [facility] answers and shakes his head yes or no. He does seem to understand when we discussed intubation. He stated that he does not want that done. This is been observed by myself and other staff members. He is also accompanied by DNR CCA paperwork       Procedures     MDM     EKG: This EKG is signed and interpreted by me. Rate: 129  Rhythm: Sinus  Interpretation: no acute changes, non-specific EKG and sinus tachycardia  Comparison: stable as compared to patient's most recent EKG of 12/22/20         --------------------------------------------- PAST HISTORY ---------------------------------------------  Past Medical History:  has a past medical history of CAD (coronary artery disease), Cardiomyopathy ([facility] [address] CHF (congestive heart failure) ([facility] [address] DJD (degenerative joint disease), Hyperlipidemia, Hypothyroid, Hypothyroid, and Myocardial infarction acute ([facility] [address] Past Surgical History:  has a past surgical history that includes hernia repair; Tonsillectomy; Coronary angioplasty with stent (03/09/2012); and Gastrostomy tube placement (N/A, 10/7/2020). Social History:  reports that he has never smoked. He has never used smokeless tobacco. He reports that he does not drink alcohol or use drugs. Family History: Family history is unknown by patient. The patients home medications have been reviewed.     Allergies: Quinolones and Septra [bactrim]    -------------------------------------------------- RESULTS -------------------------------------------------    Lab  Results for orders placed or performed during the hospital encounter of 01/14/21   Culture, Blood 1    Specimen: Blood   Result Value Ref Range    Blood Culture, Routine (A)      24 Hours no growth  Gram stain performed from blood culture bottle media  Gram positive cocci in clusters  Previously positive blood culture called     Culture, Blood 2    Specimen: Blood   Result Value Ref Range Culture, Blood 2 (A)      Gram stain performed from blood culture bottle media  Gram positive cocci in clusters      Organism Staphylococcus aureus (A)     Culture, Blood 2 Sensitivity to follow    Urine culture    Specimen: Urine, indwelling catheter   Result Value Ref Range    Urine Culture, Routine Growth not present, incubation continues    Lactate, Sepsis   Result Value Ref Range    Lactic Acid, Sepsis 3.2 (H) 0.5 - 1.9 mmol/L   CBC auto differential   Result Value Ref Range    WBC 14.1 (H) 4.5 - 11.5 E9/L    RBC 4.29 3.80 - 5.80 E12/L    Hemoglobin 14.5 12.5 - 16.5 g/dL    Hematocrit 47.4 37.0 - 54.0 %    .5 (H) 80.0 - 99.9 fL    MCH 33.8 26.0 - 35.0 pg    MCHC 30.6 (L) 32.0 - 34.5 %    RDW 15.5 (H) 11.5 - 15.0 fL    Platelets 283 705 - 255 E9/L    MPV 12.4 (H) 7.0 - 12.0 fL    Neutrophils % 90.2 (H) 43.0 - 80.0 %    Lymphocytes % 6.2 (L) 20.0 - 42.0 %    Monocytes % 0.9 (L) 2.0 - 12.0 %    Eosinophils % 0.9 0.0 - 6.0 %    Basophils % 0.0 0.0 - 2.0 %    Neutrophils Absolute 12.69 (H) 1.80 - 7.30 E9/L    Lymphocytes Absolute 1.13 (L) 1.50 - 4.00 E9/L    Monocytes Absolute 0.14 0.10 - 0.95 E9/L    Eosinophils Absolute 0.13 0.05 - 0.50 E9/L    Basophils Absolute 0.00 0.00 - 0.20 E9/L    Atypical Lymphocytes Relative 1.8 0.0 - 4.0 %    nRBC 0.0 /100 WBC    Anisocytosis 1+     Polychromasia 1+    Urinalysis   Result Value Ref Range    Color, UA Yellow Straw/Yellow    Clarity, UA Clear Clear    Glucose, Ur Negative Negative mg/dL    Bilirubin Urine Negative Negative    Ketones, Urine Negative Negative mg/dL    Specific Gravity, UA 1.010 1.005 - 1.030    Blood, Urine LARGE (A) Negative    pH, UA 6.5 5.0 - 9.0    Protein, UA 30 (A) Negative mg/dL    Urobilinogen, Urine 0.2 <2.0 E.U./dL    Nitrite, Urine Negative Negative    Leukocyte Esterase, Urine Negative Negative   APTT   Result Value Ref Range    aPTT 21.8 (L) 24.5 - 35.1 sec   Protime-INR   Result Value Ref Range    Protime 14.6 (H) 9.3 - 12.4 sec INR 1.3    Comprehensive Metabolic Panel w/ Reflex to MG   Result Value Ref Range    Sodium 158 (H) 132 - 146 mmol/L    Potassium reflex Magnesium 5.0 3.5 - 5.0 mmol/L    Chloride 120 (H) 98 - 107 mmol/L    CO2 30 (H) 22 - 29 mmol/L    Anion Gap 8 7 - 16 mmol/L    Glucose 142 (H) 74 - 99 mg/dL    BUN 57 (H) 8 - 23 mg/dL    CREATININE 1.2 0.7 - 1.2 mg/dL    GFR Non-African American 56 >=60 mL/min/1.73    GFR African American >60     Calcium 9.3 8.6 - 10.2 mg/dL    Total Protein 6.1 (L) 6.4 - 8.3 g/dL    Alb 2.8 (L) 3.5 - 5.2 g/dL    Total Bilirubin 0.7 0.0 - 1.2 mg/dL    Alkaline Phosphatase 95 40 - 129 U/L    ALT 28 0 - 40 U/L    AST 22 0 - 39 U/L   Blood Gas, Arterial   Result Value Ref Range    Date Analyzed [id number]     Time Analyzed 5133     Source: Blood Arterial     pH, Blood Gas 7.490 (H) 7.350 - 7.450    PCO2 35.4 35.0 - 45.0 mmHg    PO2 63.5 (L) 75.0 - 100.0 mmHg    HCO3 26.4 (H) 22.0 - 26.0 mmol/L    B.E. 3.3 (H) -3.0 - 3.0 mmol/L    O2 Sat 93.5 92.0 - 98.5 %    PO2/FIO2 0.63 mmHg/%    AaDO2 589.1 mmHg    RI(T) 928 %    O2Hb 92.5 (L) 94.0 - 97.0 %    COHb 1.0 0.0 - 1.5 %    MetHb 0.1 0.0 - 1.5 %    O2 Content 20.0 mL/dL    HHb 6.4 (H) 0.0 - 5.0 %    tHb (est) 15.4 11.5 - 16.5 g/dL    Mode avaps     FIO2 100.0 %    Rr Mechanical 14.0 b/min    Vt Mechanical 450.0 mL    Peep/Cpap 10.0 cmH2O    Date Of Collection      Time Collected      Pt Temp 37.0 C     ID [id number]     Lab [id number]     Critical(s) Notified .  No Critical Values    COVID-19   Result Value Ref Range    SARS-CoV-2, NAAT DETECTED (A) Not Detected   Microscopic Urinalysis   Result Value Ref Range    WBC, UA 0-1 0 - 5 /HPF    RBC, UA >20 0 - 2 /HPF    Bacteria, UA NONE SEEN None Seen /HPF   Comprehensive Metabolic Panel w/ Reflex to MG   Result Value Ref Range    Sodium 153 (H) 132 - 146 mmol/L    Potassium reflex Magnesium 4.6 3.5 - 5.0 mmol/L    Chloride 120 (H) 98 - 107 mmol/L    CO2 25 22 - 29 mmol/L    Anion Gap 8 7 - 16 mmol/L    Glucose Serratia marcescens by PCR Not Detected Not Detected    Streptococcus pneumoniae by PCR Not Detected Not Detected    Streptococcus pyogenes  by PCR Not Detected Not Detected    Acinetobacter baumannii by PCR Not Detected Not Detected    Candida albicans by PCR Not Detected Not Detected    Candida glabrata by PCR Not Detected Not Detected    Candida krusei by PCR Not Detected Not Detected    Candida parapsilosis by PCR Not Detected Not Detected    Candida tropicalis by PCR Not Detected Not Detected     Enterobacteriaceae by PCR Not Detected Not Detected    Enterococcus by PCR Not Detected Not Detected    Haemophilus Influenzae by PCR Not Detected Not Detected    Listeria monocytogenes by PCR Not Detected Not Detected    Neisseria meningitidis by PCR Not Detected Not Detected    Pseudomonas aeruginosa by PCR Not Detected Not Detected    Staphylococcus species by PCR DETECTED (AA) Not Detected    Streptococcus species by PCR Not Detected Not Detected    Methicillin Resistance mecA/C  by PCR DETECTED (AA) Not Detected   CBC   Result Value Ref Range    WBC 9.1 4.5 - 11.5 E9/L    RBC 2.93 (L) 3.80 - 5.80 E12/L    Hemoglobin 10.0 (L) 12.5 - 16.5 g/dL    Hematocrit 32.6 (L) 37.0 - 54.0 %    .3 (H) 80.0 - 99.9 fL    MCH 34.1 26.0 - 35.0 pg    MCHC 30.7 (L) 32.0 - 34.5 %    RDW 15.0 11.5 - 15.0 fL    Platelets 667 597 - 353 E9/L    MPV 12.2 (H) 7.0 - 12.0 fL   Comprehensive metabolic panel   Result Value Ref Range    Sodium 156 (H) 132 - 146 mmol/L    Potassium 3.7 3.5 - 5.0 mmol/L    Chloride 124 (H) 98 - 107 mmol/L    CO2 26 22 - 29 mmol/L    Anion Gap 6 (L) 7 - 16 mmol/L    Glucose 111 (H) 74 - 99 mg/dL    BUN 65 (H) 8 - 23 mg/dL    CREATININE 1.1 0.7 - 1.2 mg/dL    GFR Non-African American >60 >=60 mL/min/1.73    GFR African American >60     Calcium 8.6 8.6 - 10.2 mg/dL    Total Protein 5.1 (L) 6.4 - 8.3 g/dL    Alb 2.2 (L) 3.5 - 5.2 g/dL    Total Bilirubin 0.4 0.0 - 1.2 mg/dL    Alkaline Phosphatase 60 40 - 129 U/L    ALT 16 0 - 40 U/L    AST 16 0 - 39 U/L   Lactic acid, plasma   Result Value Ref Range    Lactic Acid 2.2 0.5 - 2.2 mmol/L   EKG 12 lead   Result Value Ref Range    Ventricular Rate 129 BPM    Atrial Rate 129 BPM    P-R Interval 120 ms    QRS Duration 72 ms    Q-T Interval 292 ms    QTc Calculation (Bazett) 427 ms    P Axis 80 degrees    R Axis 81 degrees    T Axis 73 degrees       Radiology  XR CHEST PORTABLE   Final Result   Right lower lobe infiltrate, likely reflecting infectious pneumonia with   consideration to viral etiology. Left lower lobe atelectasis and/or   infiltrate            ------------------------- NURSING NOTES AND VITALS REVIEWED ---------------------------  Date / Time Roomed:  1/14/2021  9:52 AM  ED Bed Assignment:  [id number]    The nursing notes within the ED encounter and vital signs as below have been reviewed. Patient Vitals for the past 24 hrs:   BP Temp Temp src Pulse Resp SpO2 Weight   01/16/21 0918 (!) 84/50 97.1 °F (36.2 °C) Axillary 81 20 94 % 98 lb 3.2 oz (44.5 kg)   01/16/21 0600 -- -- -- -- -- -- 94 lb 9.6 oz (42.9 kg)   01/16/21 0415 (!) 94/48 96.7 °F (35.9 °C) Axillary 86 22 96 % --   01/15/21 2100 (!) 135/98 96.4 °F (35.8 °C) Axillary 87 24 100 % --   01/15/21 1710 -- -- -- -- -- 100 % --   01/15/21 1708 -- -- -- -- -- 91 % --   01/15/21 1445 -- -- -- -- -- 100 % --   01/15/21 1415 110/70 98.7 °F (37.1 °C) Oral 100 22 100 % --       Oxygen Saturation Interpretation: Normal      ------------------------------------------ PROGRESS NOTES ------------------------------------------  Re-evaluation(s):  Time: 1510. Patients symptoms show no change  Repeat physical examination is not changed    Time: 1640. Patients symptoms are improving  Repeat physical examination is improved    I have spoken with the patient and discussed todays results, in addition to providing specific details for the plan of care and counseling regarding the diagnosis and prognosis.   Their questions are answered at this time and they are agreeable with the plan.      --------------------------------- ADDITIONAL PROVIDER NOTES ---------------------------------  Consultations:  Spoke with Dr. Jackie Jimenez,  They will admit this patient. This patient's ED course included: a personal history and physicial examination, multiple bedside re-evaluations, IV medications, cardiac monitoring and continuous pulse oximetry    This patient has remained hemodynamically stable during their ED course. Please note that the withdrawal or failure to initiate urgent interventions for this patient would likely result in a life threatening deterioration or permanent disability. Accordingly this patient received 44 minutes of critical care time, excluding separately billable procedures. Clinical Impression  1. Dehydration    2. Acute respiratory failure due to COVID-19 [facility])          Disposition  Patient's disposition: Admit  Patient's condition is serious.           Silver Cruz DO  01/16/21 1258

## 2021-01-14 NOTE — LETTER
Beneficiary Notification Letter  BPCI Advanced     Your Doctor or [address],    We wanted to let you know that your health care provider, [address] [place] [address] Coni, has volunteered to take part in our [facility] for [facility] & Medicaid Services (CMS) Bundled Payments for [facility] (BPCI Advanced). This doesnt change your Medicare rights or benefits and you dont need to do anything. What are bundled payments? A bundled payment combines, or bundles together, payments that Medicare makes to your health care providers for the many different kinds of medical services you might get in a specific time period. In BPCI Advanced, this time period could include a hospital inpatient stay or outpatient procedure, plus 90 days. Why would Medicare bundle payments? Bundled payments are thought of as a value-based way to pay because health care providers are responsible for both the quality and cost of medical care they give. This is a relatively new way of paying health care providers compared to thefee-for-service way Medicare has traditionally paid, where providers are paid separately for each service they provide. Bundled payments encourage these providers to work together to provide better, more coordinated care during your hospital stay, or outpatient procedure, and through your recovery. What does BPCI Advance mean for me? Youre more likely to get even better care when hospitals, doctors, and other health care providers work together. In BPCI Advanced, hospitals, doctors, and other health care providers may be rewarded for providing better, more coordinated health care. Medicare will watch BPCI Advanced participants closely to make sure that you and other patients keep getting efficient, high quality care. What do I need to know about BPCI Advanced? Whats most important for you to know is that your Medicare rights and benefits wont change because your health care provider is participating in [address]. Medicare will keep covering all of your medically necessary services. Even though Medicare will pay your doctor in a different way under BPCI Advanced, how much you have to pay wont change. Health care providers and suppliers who are enrolled in Medicare will submit their Medicare claims like they always have. Youll have all the same Medicare rights and protections, including the right to choose which hospital, doctor, or other health care provider you see. If you dont want to get care from a health care provider whos participating in [address], then youll have to choose a different health care provider whos not participating in the Model. How can I give feedback about my health care? Medicare might ask you to take a voluntary survey about the services and care you received from [address] [place] [address] during your hospital stay or outpatient procedure and for a specific period of time afterwards. You can decide whether you want to take the voluntary survey, but if you do, itll help Medicare make BPCI Advanced and the care of other Medicare patients better. If you have concerns or complaints about your care, you can:   · Talk to your doctor or health care provider. · Contact your Beneficiary and Family Centered Care Quality Improvement   Organization [facility]). You can get your [facility]s phone number  at  Medicare.gov/contacts or by calling 1-800-MEDICARE. TTY users can call  7-631.800.4663. Where can I learn more about BPCI Advanced? Learn more about BPCI Advanced at https://innovation.cms.gov/initiatives/bpci-advanced/:  · A list of all the hospitals and physician group practices in the country participating in [address]. · All of the inpatient and outpatient Clinical Episodes that are currently included under BPCI Advanced. A Clinical Episode is a grouping of medical conditions or diagnoses that are included in the [address] [facility].

## 2021-01-14 NOTE — H&P
[place] [address] [facility]   History and Physical      CHIEF COMPLAINT: Short of breath and hypoxia following aspiration. History of Present Illness:  He is a 70-year-old male with multiple medical problems as listed below, was recently admitted on 12/22/2020 with COVID-19 pneumonia and was discharged to [place]. He completed remdesivir course. He was discharged on dexamethasone. He was also treated with IV Unasyn for aspiration pneumonia. He was sent to ER for evaluation because he was noted to have tube feeds coming out of his mouth, he aspirated and also was hypoxic, poorly responsive. He is hard of hearing-as per ER documentation he responds to loud stimuli. He was started on IV Zosyn, nebulized treatments, placed on BiPAP. He was referred for admission for aspiration pneumonia. His repeat Covid is positive. He is unable to provide any further details. IV Zosyn is continued. I have discussed with his daughter and he is DNR CCA. As per nursing home records-he was DNR CCA. REVIEW OF SYSTEMS:  no fevers, chills, cp, n/v, ha, vision/hearing changes, wt changes, hot/cold flashes, other open skin lesions, diarrhea, constipation, dysuria/hematuria unless noted in HPI. Complete ROS performed with the patient and is otherwise negative.       PMH:  Past Medical History:   Diagnosis Date    CAD (coronary artery disease)     [facility]     [facility] failure[facility]     DJD (degenerative joint disease) 3/10/2012    Hyperlipidemia     Hypothyroid     Hypothyroid     Myocardial infarction acute ([facility] 3/12/12    BMS to LAD       Surgical History:  Past Surgical History:   Procedure Laterality Date    CORONARY ANGIOPLASTY WITH STENT PLACEMENT  03/09/2012    Dr Kingsley Fong:  3.5x22 stent to mid LAD  EF=20%    GASTROSTOMY TUBE PLACEMENT N/A 10/7/2020    EGD PEG TUBE PLACEMENT performed by Sterling Tucker MD at [address] [place] [address] Medications Prior to Admission:    Prior to Admission medications    Medication Sig Start Date End Date Taking? Authorizing Provider   white petrolatum OINT ointment Apply topically 2 times daily as needed (dry skin) Apply to peg tube site 12/31/20   Riri Pop, APN   miconazole nitrate 2 % OINT Apply topically 2 times daily 12/31/20   Riri Pop, APN   furosemide (LASIX) 20 MG tablet Take 1 tablet by mouth daily Hold 3 days. Check BMP and resume if labs/ bp ok. 1/3/21   Riri Pop, APN   Heparin Sodium, Porcine, (HEPARIN, PORCINE,) 73065 UNIT/ML injection Inject 0.5 mLs into the skin every 8 hours Re evalute after 21 days. + COVID 1/11/21   Riri Pop, APN   carvedilol (COREG) 3.125 MG tablet 1 tablet by PEG Tube route 2 times daily (with meals) 12/22/20   SAHRA Warren - CNP   metoclopramide (REGLAN) 5 MG tablet 1 tablet by PEG Tube route 2 times daily (before meals) 12/22/20   Cheng Maradiaga APRN - CNP   tamsulosin (FLOMAX) 0.4 MG capsule Take 1 capsule by mouth nightly Take via PEG tube 12/22/20   SAHRA Warren - CNP   aspirin 81 MG chewable tablet 1 tablet by PEG Tube route daily 10/11/20   Arnoldo Taylor MD   ipratropium-albuterol (DUONEB) 0.5-2.5 (3) MG/3ML SOLN nebulizer solution Inhale 3 mLs into the lungs 4 times daily 10/10/20   Arnoldo Taylor MD   ipratropium-albuterol (DUONEB) 0.5-2.5 (3) MG/3ML SOLN nebulizer solution Inhale 3 mLs into the lungs every 4 hours as needed for Shortness of Breath 10/10/20   Arnoldo Taylor MD   calcium-vitamin D (Lerry Schuyler) 500-200 MG-UNIT per tablet Take 1 tablet by mouth 2 times daily 6/18/20   Paul Araujo DO   Multiple Vitamin (MULTIVITAMIN) TABS tablet Take 1 tablet by mouth daily 6/19/20   Paul Araujo DO   Ascorbic Acid (VITAMIN C) 500 MG CAPS Take 500 mg by mouth daily. Historical Provider, MD   GRAPE SEED EXTRACT PO Take 150 mg by mouth daily.       Historical Provider, MD Magnesium 400 MG CAPS Take  by mouth daily. Historical Provider, MD   VITAMIN A PO Take 5,000 Units by mouth. Historical Provider, MD   vitamin D (ERGOCALCIFEROL) 400 UNITS CAPS Take 400 Units by mouth daily. Historical Provider, MD       Allergies:    Quinolones and Septra [bactrim]    Social History:    reports that he has never smoked. He has never used smokeless tobacco. He reports that he does not drink alcohol or use drugs. Family History:   History reviewed. No pertinent family history. PHYSICAL EXAM:  Vitals:  BP 97/73   Pulse 114   Temp 98.1 °F (36.7 °C) (Axillary)   Resp 16   Ht 5' 5\" (1.651 m)   Wt 90 lb (40.8 kg)   SpO2 92%   BMI 14.98 kg/m²   General Appearance: alert, lethargic , dehydrated , weak , mumbles , cannot provide details, frail appearing, underweight   Skin: warm and dry  Head: normocephalic and atraumatic  Eyes: pupils equal, round, and reactive to light, extraocular eye movements intact, conjunctivae normal  Neck: supple and non-tender without mass  Pulmonary/Chest:   Cardiovascular: normal rate, regular rhythm, normal S1 and S2, 2/6 Systolic murmur  Abdomen: soft, non-tender, non-distended, normal bowel sounds, no masses or organomegaly, PEG in place  Extremities: no cyanosis, clubbing   Musculoskeletal: normal range of motion  Neurologic: Weak, lethargic, mumbles, cannot provide details, moves all extremities, no focal deficits noted. LABS:  Recent Labs     01/14/21  1200   *   K 5.0   *   CO2 30*   BUN 57*   CREATININE 1.2   GLUCOSE 142*   CALCIUM 9.3       Recent Labs     01/14/21  1200   WBC 14.1*   RBC 4.29   HGB 14.5   HCT 47.4   .5*   MCH 33.8   MCHC 30.6*   RDW 15.5*      MPV 12.4*       No results for input(s): POCGLU in the last 72 hours. Radiology: Xr Chest Portable    Result Date: 1/14/2021  EXAMINATION: ONE XRAY VIEW OF THE CHEST 1/14/2021 10:30 am COMPARISON: December 27, 2020.  HISTORY: ORDERING SYSTEM PROVIDED HISTORY: SOB TECHNOLOGIST PROVIDED HISTORY: Reason for exam:->SOB FINDINGS: The cardiac silhouette is within normal limits in size. The aorta is uncoiled, stable. There is right medial lower lobe focal consolidation. There is left lower lobe streaky and patchy airspace opacities that may reflect atelectasis and/or infiltrate. There is no visualized pleural effusion or pneumothorax. The pulmonary vessels are within normal limits. Right lower lobe infiltrate, likely reflecting infectious pneumonia with consideration to viral etiology. Left lower lobe atelectasis and/or infiltrate      Labs and images reviewed     ASSESSMENT:      Active Problems:    Acute hypoxemic respiratory failure due to COVID-19 [facility])  Resolved Problems:    * No resolved hospital problems. *      PLAN:  Acute hypoxic respiratory failure secondary to aspiration pneumonia-on BiPAP at this time, pulmonary is consulted . Aspiration pneumonia-on IV Zosyn, nebulized treatments, continue other supportive treatment. SLP consulted for swallow eval. Hold tube feeds for now. Hypernatremia/dehydration -we will give gentle IV fluids D5 one half at 75 an hour . monitor .     CAD /combined systolic& diastolic CHF ,HPL - on BB  ,asa  as per home . Parameters to hold coreg added. Lasix on hold sec borderline BP. Will reinstate when possible.     Oropharyngeal dysphagia status post PEG-hold tube feeds, SLP consulted.     Hypothyroidism - on supplements as per home.     Recent Covid pneumonia and hypoxia though hypoxia likely secondary to aspiration-we will continue dexamethasone as started from ER      Code Status: [address] , KIKO have d/w dtr Marylou Conception.   DVT prophylaxis: sc heparin      Electronically signed by Elena Palm MD on 1/14/2021 at 5:20 PM      NOTE: This report was transcribed using voice recognition software.  Every effort was made to ensure accuracy; however, inadvertent computerized transcription errors may be

## 2021-01-14 NOTE — ED NOTES
Pt daughter updated on pt status. As a last resort and if possibility of being temporary, it is okay to intubate. Please have  Call prior to intubation if necessary.       Lorie Rogers RN  01/14/21 4033

## 2021-01-14 NOTE — ED NOTES
PAIENT REPOSITIONED IN BD AT THIS TIME. RESPONDS TO PAIN.  RESTING QUIETLY     Moraima Santiago RN  01/14/21 3526

## 2021-01-15 PROBLEM — E44.0 MODERATE PROTEIN-CALORIE MALNUTRITION (HCC): Chronic | Status: RESOLVED | Noted: 2020-01-01 | Resolved: 2021-01-01

## 2021-01-15 PROBLEM — E43 SEVERE PROTEIN-CALORIE MALNUTRITION (HCC): Chronic | Status: ACTIVE | Noted: 2021-01-01

## 2021-01-15 NOTE — DISCHARGE INSTR - COC
Continuity of Care Form    Patient Name: Christina Hernandez   :  7/3/1927  MRN:  [id number]    Admit date:  2021  Discharge date:  21    Code Status Order: DNR-CCA   Advance Directives:   [facility] Documentation     Date/Time Healthcare Directive Type of Healthcare Directive Copy in [facility] [address] Agent's Name Healthcare Agent's Phone Number    21  No, patient does not have an advance directive for healthcare treatment -- -- -- -- --          Admitting Physician:  Rudi Galindo MD  PCP: Gillis Leventhal, MD    Discharging Nurse: 21  [address] Unit/Room#: 2476/8368-Q  Discharging Unit Phone Number: [phone]    Emergency Contact:   Extended Emergency Contact Information  Primary Emergency Contact: Mayda [address] Phone: [phone]  Mobile Phone: [phone]  Relation: Other   needed? No  Secondary Emergency Contact: [address] Phone: [phone]  Mobile Phone: [phone]  Relation: Child  Preferred language: English   needed?  No    Past Surgical History:  Past Surgical History:   Procedure Laterality Date    CORONARY ANGIOPLASTY WITH STENT PLACEMENT  2012    Dr Dakota Walton:  3.5x22 stent to mid LAD  EF=20%    GASTROSTOMY TUBE PLACEMENT N/A 10/7/2020    EGD PEG TUBE PLACEMENT performed by Juan Manuel Santamaria MD at [address] [place] [address]         Immunization History:   Immunization History   Administered Date(s) Administered    Influenza A ([id number]) Vaccine PF IM 2010    Influenza Virus Vaccine 2008    Influenza, High Dose (Fluzone 65 yrs and older) 10/01/2013    Influenza, Quadv, IM, PF (6 mo and older Fluzone, Flulaval, Fluarix, and 3 yrs and older Afluria) 10/28/2014    Tdap (Boostrix, Adacel) 2014       Active Problems:  Patient Active Problem List   Diagnosis Code    Acute transmural anterior wall MI ([facility] I21.09    DJD (degenerative joint disease) M19.90    Hyperlipidemia E78.5    Hypothyroid E03.9    CAD (coronary artery disease) I25.10    Cardiomyopathy [facility] I42.9    CHF (congestive heart failure) ([facility]) I50.9    Myocardial infarction acute ([facility]) I21.9    Syncope and collapse R55    Closed fracture of pelvis ([facility]) S32. 9XXA    Orthostatic hypotension I95.1    Closed right hip fracture, with routine healing, subsequent encounter S72.001D    Traumatic brain injury ([facility] S06. 9X9A    Anemia D64.9    Bone lesion M89.9    Age-related physical debility R54    Pneumonia due to organism J18.9    Severe protein-calorie malnutrition ([facility] E43    COVID-19 virus detected U07.1    Acute respiratory failure with hypoxia ([facility]) J96.01    Pneumonia due to COVID-19 virus U07.1, J12.82    Hypotension due to hypovolemia I95.89, E86.1    Acute hypoxemic respiratory failure due to COVID-19 ([facility]) U07.1, J96.01       Isolation/Infection:   Isolation          Droplet Plus        Patient Infection Status     Infection Onset Added Last Indicated Last Indicated By Review Planned Expiration Resolved Resolved By    COVID-19 21 COVID-19 21      Resolved    COVID-19 Rule Out 21 COVID-19 (Ordered)   21 Rule-Out Test Resulted    COVID-19 20 COVID-19   21     COVID-19 Rule Out 20 COVID-19 (Ordered)   20 Rule-Out Test Resulted    COVID-19 Rule Out 10/05/20 10/05/20 10/05/20 COVID-19 (Ordered)   10/05/20 Rule-Out Test Resulted          Nurse Assessment:  Last Vital Signs: /74   Pulse 97   Temp 96.9 °F (36.1 °C) (Axillary)   Resp 28   Ht 5' 5\" (1.651 m)   Wt 96 lb (43.5 kg)   SpO2 100%   BMI 15.98 kg/m²     Last documented pain score (0-10 scale):    Last Weight:   Wt Readings from Last 1 Encounters:   01/15/21 96 lb (43.5 kg)     Mental Status:  oriented, alert and to self and place, not time and situation    IV Access:  - None    Nursing Mobility/ADLs:  Walking   Dependent  Transfer  Dependent  Bathing  Dependent  Dressing  Dependent  Toileting  Dependent  Feeding  Dependent  Med Admin  Dependent  Med Delivery   crushed and prefers mixed with apple sauce    Wound Care Documentation and Therapy:        Elimination:  Continence:   · Bowel: No  · Bladder: No  Urinary Catheter: Insertion Date: 1/14/21   Colostomy/Ileostomy/Ileal Conduit: No       Date of Last BM: 1/21/21    Intake/Output Summary (Last 24 hours) at 1/15/2021 1207  Last data filed at 1/15/2021 0402  Gross per 24 hour   Intake --   Output 600 ml   Net -600 ml     I/O last 3 completed shifts:  In: -   Out: 600 [Urine:600]    Safety Concerns:     Aspiration Risk    Impairments/Disabilities:      Contractures - in the fingers    Nutrition Therapy:  Current Nutrition Therapy:   - Tube Feedings:  Standard with fiber    Routes of Feeding: Gastrostomy Tube  Liquids: Thin Liquids  Daily Fluid Restriction: no  Last Modified Barium Swallow with Video (Video Swallowing Test): done on 1/15/21/***    Treatments at the Time of Hospital Discharge:   Respiratory Treatments: albuterol-ipratropium  Oxygen Therapy:  is not on home oxygen therapy.   Ventilator:    - No ventilator support    Rehab Therapies: Physical Therapy and Occupational Therapy  Weight Bearing Status/Restrictions: No weight bearing restirctions  Other Medical Equipment (for information only, NOT a DME order):  hospital bed  Other Treatments: ***    Patient's personal belongings (please select all that are sent with patient):  None    RN SIGNATURE:  Electronically signed by John Paul Fu RN on 1/21/21 at 7:11 PM EST    CASE MANAGEMENT/SOCIAL WORK SECTION    Inpatient Status Date: ***    Readmission Risk Assessment Score:  Readmission Risk              Risk of Unplanned Readmission:        38           Discharging to Facility/ Agency   · Name: [facility]  · [address] [place] [id number]  · Phone:[phone]  · Fax:[phone]    Dialysis Facility (if applicable)   · Name:  · Address:  · Dialysis Schedule:  · Phone:  · Fax:    / signature: Electronically signed by RADHA Henry on 1/15/21 at 12:08 PM EST    PHYSICIAN SECTION    Prognosis: {Prognosis:1969797393}    Condition at Discharge: Stable    Rehab Potential (if transferring to Rehab): {Prognosis:5945119996}    Recommended Labs or Other Treatments After Discharge: ***    Physician Certification: I certify the above information and transfer of Yady Chang  is necessary for the continuing treatment of the diagnosis listed and that he requires [facility] for greater 30 days.      Update Admission H&P: {CHP DME Changes in JCLBJ:[id number]}    PHYSICIAN SIGNATURE:  {Esignature:[id number]}

## 2021-01-15 NOTE — PROGRESS NOTES
Arrival time: 2230    Patient admitted to room 624 from ED. Transferred from [facility] to bed   with 2 assist.  Unable to assess orientation. Does not answer questions. Following simple commands. Responsive to touch and painful stimuli. [place] catheter intact. Peg tube noted, dressing to site changed. Area around insertion site noted to bed red. PIV x 2 noted to L AC and FA. D5 1/2 NS infusing. Oriented to room, white board, call light, and hourly rounding.

## 2021-01-15 NOTE — PLAN OF CARE
Problem: Falls - Risk of:  Goal: Will remain free from falls  Description: Will remain free from falls  1/15/2021 0635 by Fani Soria RN  Outcome: Met This Shift  1/14/2021 2028 by Penny Zaidi RN  Outcome: Met This Shift  Goal: Absence of physical injury  Description: Absence of physical injury  1/15/2021 0635 by Fani Soria RN  Outcome: Met This Shift  1/14/2021 2028 by Penny Zaidi RN  Outcome: Met This Shift     Problem: Skin Integrity:  Goal: Will show no infection signs and symptoms  Description: Will show no infection signs and symptoms  1/15/2021 0635 by Fani Soria RN  Outcome: Not Met This Shift  1/14/2021 2028 by Penny Zaidi RN  Outcome: Ongoing  Goal: Absence of new skin breakdown  Description: Absence of new skin breakdown  1/14/2021 2028 by Penny Zaidi RN  Outcome: Met This Shift     Problem: PROTECTIVE PRECAUTIONS  Goal: Isolation precautions  Description: Isolation precautions  1/14/2021 2028 by Penny Zaidi RN  Outcome: Met This Shift

## 2021-01-15 NOTE — CONSULTS
[facility]  [address], [address]  [facility], [facility]    Pulmonary Consultation      Reason for Consult:  sob    Requesting Physician:  Rina Martins MD    CHIEF COMPLAINT:  sob    History Obtained From:  electronic medical record    HISTORY OF PRESENT ILLNESS:                The patient is a 80 y.o. male with significant past medical history of CHF and COVID who presents with increased sob and hypoxia. Was recently in hospital for [place] s/p remdesivir and dexamethasone, discharged on 12/31, (?) doing ok but readmitted yesterday with hypoxia and tube feed coming out of his mouth. Usually hard of hearing, started on antibiotics for aspiration pneumonia, and started on NIV. Currently on NIV 18-28/10, DY=678, Fio2=50%, RRtot=23. Awake, non-verbal, moving extremities, looking tachypneic, no cough during examination.     Past Medical History:        Diagnosis Date    CAD (coronary artery disease)     [facility]     [facility]     DJD (degenerative joint disease) 3/10/2012    Hyperlipidemia     Hypothyroid     Hypothyroid     Myocardial infarction acute ([facility] 3/12/12    BMS to LAD       Past Surgical History:        Procedure Laterality Date    CORONARY ANGIOPLASTY WITH STENT PLACEMENT  03/09/2012    Dr Estephania Casillas:  3.5x22 stent to mid LAD  EF=20%    GASTROSTOMY TUBE PLACEMENT N/A 10/7/2020    EGD PEG TUBE PLACEMENT performed by Katherine Mayer MD at [address] [place] [address]         Current Medications:    Current Facility-Administered Medications: ascorbic acid (VITAMIN C) tablet 500 mg, 500 mg, Oral, Daily  aspirin chewable tablet 81 mg, 81 mg, PEG Tube, Daily  calcium-vitamin D (OSCAL-500) 500-200 MG-UNIT per tablet 1 tablet, 1 tablet, Oral, BID  carvedilol (COREG) tablet 3.125 mg, 3.125 mg, PEG Tube, BID WC  heparin (porcine) injection 5,000 Units, 5,000 Units, Subcutaneous, Q8H  magnesium oxide (MAG-OX) tablet 400 mg, 400 mg, Oral, Daily  metoclopramide (REGLAN) 10 MG/10ML solution 5 mg, 5 mg, PEG Tube, BID AC  miconazole nitrate 2 % ointment, , Topical, BID  multivitamin 1 tablet, 1 tablet, Oral, Daily  tamsulosin (FLOMAX) capsule 0.4 mg, 0.4 mg, Oral, Nightly  vitamin D3 (CHOLECALCIFEROL) tablet 400 Units, 400 Units, Oral, Daily  sodium chloride flush 0.9 % injection 10 mL, 10 mL, Intravenous, 2 times per day  sodium chloride flush 0.9 % injection 10 mL, 10 mL, Intravenous, PRN  promethazine (PHENERGAN) tablet 12.5 mg, 12.5 mg, Oral, Q6H PRN **OR** ondansetron (ZOFRAN) injection 4 mg, 4 mg, Intravenous, Q6H PRN  polyethylene glycol (GLYCOLAX) packet 17 g, 17 g, Oral, Daily PRN  acetaminophen (TYLENOL) tablet 650 mg, 650 mg, Oral, Q6H PRN **OR** acetaminophen (TYLENOL) suppository 650 mg, 650 mg, Rectal, Q6H PRN  piperacillin-tazobactam (ZOSYN) 3.375 g in dextrose 5 % 50 mL IVPB extended infusion (mini-bag), 3.375 g, Intravenous, Q8H  dextrose 5 % and 0.45 % sodium chloride infusion, , Intravenous, Continuous  dexamethasone (DECADRON) injection 6 mg, 6 mg, Intravenous, Daily  0.9 % sodium chloride infusion, , Intravenous, Q8H  albuterol-ipratropium (COMBIVENT RESPIMAT)  MCG/ACT inhaler 1 puff, 1 puff, Inhalation, 4x daily  albuterol-ipratropium (COMBIVENT RESPIMAT)  MCG/ACT inhaler 1 puff, 1 puff, Inhalation, Q4H PRN    Allergies:  Quinolones and Septra [bactrim]    Social History:    TOBACCO:   reports that he has never smoked.  He has never used smokeless tobacco.    Family History:       Family history unknown: Yes       REVIEW OF SYSTEMS:    Review of systems not obtained due to patient factors - mental status    PHYSICAL EXAM:      Vitals:    /73   Pulse 106   Temp 96.5 °F (35.8 °C) (Axillary)   Resp 26   Ht 5' 5\" (1.651 m)   Wt 96 lb (43.5 kg)   SpO2 100%   BMI 15.98 kg/m²     EYES:  Lids and lashes normal, pupils equal, round and reactive to light, extra ocular muscles intact, sclera clear, conjunctiva normal  ENT:  Normocephalic, without obvious abnormality, atraumatic, sinuses nontender on palpation, external ears without lesions, oral pharynx with moist mucus membranes, tonsils without erythema or exudates, gums normal and good dentition. NECK:  Supple, symmetrical, trachea midline, no adenopathy, thyroid symmetric, not enlarged and no tenderness, skin normal  LUNGS:  Bilateral ronchi  CARDIOVASCULAR:  Normal apical impulse, regular rate and rhythm, normal S1 and S2, no S3 or S4, and no murmur noted  ABDOMEN:  (+) PEG  MUSCULOSKELETAL:  There is no redness, warmth, or swelling of the joints. NEUROLOGIC:  Awake, non-verbal, hard of hearing, moving extremities    DATA:    CBC:   Recent Labs     01/14/21  1200 01/15/21  0420   WBC 14.1* 12.3*   HGB 14.5 11.3*   HCT 47.4 38.5   .5* 113.2*    186       BMP:  Recent Labs     01/14/21  1200 01/15/21  0420   * 153*   K 5.0 4.6   * 120*   CO2 30* 25   BUN 57* 77*   CREATININE 1.2 1.5*    ALB:3,BILIDIR:3,BILITOT:3,ALKPHOS:3)@    PT/INR:   Recent Labs     01/14/21  1200   PROTIME 14.6*   INR 1.3       ABG:   Recent Labs     01/14/21  1054   PH 7.490*   PO2 63.5*   PCO2 35.4   HCO3 26.4*   BE 3.3*   O2SAT 93.5   METHB 0.1   O2HB 92.5*   COHB 1.0   O2CON 20.0   HHB 6.4*   THB 15.4     FiO2 : 50 %       Radiology Review:  CXR reviewed with increased patchy opacity right lower lung field    IMPRESSION/RECOMMENDATIONS:      Pneumonia from aspiration  Hx of COVID  Hypoxia  CHF      1. Cont with antibiotics  2. Cont with mdi, observe respiratory function  3. Cont with NIV, taper down to HFNC when more stable, check secretions and saturation  4. Steroid taper, hx of COVID, current issue is aspiration  5. PT/OT        Time at the bedside, reviewing labs and radiographs, reviewing notes and consultations, discussing with staff and family was more than 50 minutes.     Thanks for letting us see this patient in consultation. Please contact us with any questions. Office [phone] or after hours through [facility], x [phone].

## 2021-01-15 NOTE — PROGRESS NOTES
01/14/21 2220   NIV Type   Mode AVAPS   Mask Type Full face mask   Mask Size Medium   Settings/Measurements   CPAP/EPAP 10 cmH2O   IPAP Min 18 cmH2O   IPAP Max 28 cmH2O   Vt Ordered 450 mL   Rate Ordered 14   Resp 30   FiO2  50 %   Vt Exhaled 469 mL   Minute Volume 16 Liters   Mask Leak (lpm) 45 lpm   Comfort Level Fair   Using Accessory Muscles No   SpO2 99

## 2021-01-15 NOTE — PLAN OF CARE
Problem: Malnutrition  (NI-5.2)  Goal: Food and/or Nutrient Delivery  Continue NPO. Await MBS/SLP Eval.  Start EN once med appropriate. TF Recommendations:  1.5 Calorie w/ Fiber (Jevity 1.5) @ 45 ml/hr (Goal) Continuous x 24 hrs/d to provide 1080 ml TV, 1620 kcals, 69 gm pro, 821 ml water. 120 ml flush q 6 hrs= 1301 ml total water. Description: Individualized approach for food/nutrient provision.   Outcome: Met This Shift

## 2021-01-15 NOTE — CARE COORDINATION
Social work / Discharge Planning:       [address]. Patient is from [facility]. No precert needed. N-17 and ambulance form completed.    Electronically signed by RADHA Carvalho on 1/15/2021 at 12:06 PM

## 2021-01-15 NOTE — PROGRESS NOTES
01/14/21 2220   Oxygen Therapy/Pulse Ox   O2 Therapy Oxygen   O2 Device PAP (positive airway pressure)   FiO2  50 %   Resp 30   SpO2 99 %  (right ear)

## 2021-01-15 NOTE — PROGRESS NOTES
Comprehensive Nutrition Assessment    Type and Reason for Visit:  Initial, Positive Nutrition Screen    Nutrition Recommendations/Plan: Continue NPO. Await MBS/SLP Eval.  Start EN once med appropriate. TF Recommendations:  1.5 Calorie w/ Fiber (Jevity 1.5) @ 45 ml/hr (Goal) Continuous x 24 hrs/d to provide 1080 ml TV, 1620 kcals, 69 gm pro, 821 ml water. 120 ml flush q 6 hrs to provide 1301 ml total water (TF+Flush). Highly recommend to start at low rate (~20 ml/hr) and increase slowly as tolerated to goal.  Please consult if significant intolerance occurs. Nutrition Assessment:  Pt adm w/ SOB s/p suspected aspiration of TF per EMR review. 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 Will provide TF recommendations to meet estimated needs. Malnutrition Assessment:  Malnutrition Status:  Severe malnutrition    Context:  Chronic Illness     Findings of the 6 clinical characteristics of malnutrition:  Energy Intake:  7 - 75% or less estimated energy requirements for 1 month or longer  Weight Loss:  7 - Greater than 10% over 6 months(~15% wt loss x ~7 months per EMR)     Body Fat Loss:  Unable to assess     Muscle Mass Loss:  Unable to assess    Fluid Accumulation:  No significant fluid accumulation     Strength:  Not Performed    Estimated Daily Nutrient Needs:  Energy (kcal):  7186-6272(30-35 kcals/kg per CBW); Weight Used for Energy Requirements:  Current     Protein (g):  65-80(1.5-1.8 gm/kg per CBW);  Weight Used for Protein Requirements:  Current        Fluid (ml/day):  3831-0516; Method Used for Fluid Requirements:  1 ml/kcal      Nutrition Related Findings:  AMS, non-verbal, poor dentition, Abd/BS WDL, +PEG, no edema, I/O's WNL(SLP eval pending, however noted BSE w/ rec for NPO/MBS on 12/23/20)      Wounds:  None       Current Nutrition Therapies:    Diet NPO Effective Now    Anthropometric Measures:  · Height: 5' 5\" (165.1 cm)  · Current Body Weight: 96 lb (43.5 kg)(bed 1/15)   · Admission Body Weight: 96 lb (43.5 kg)(bed 1/14)    · Usual Body Weight: 113 lb (51.3 kg)(actual 6/3/20 per EMR, although CHF hx noted w/ possible fluid shifts, pt w/ suspected actual wt loss, ~15% x ~7 months per EMR)     · Ideal Body Weight: 136 lbs; % Ideal Body Weight     · BMI: 16  · Adjusted Body Weight:  ; No Adjustment   · Adjusted BMI:      · BMI Categories: Underweight (BMI less than 22) age over 72       Nutrition Diagnosis:   · Severe malnutrition, In context of chronic illness related to cognitive or neurological impairment(2/2 TBI hx w/ Dysphagia and COVID19+) as evidenced by poor intake prior to admission, weight loss, weight loss greater than or equal to 10% in 6 months      Nutrition Interventions:   Food and/or Nutrient Delivery:  Continue NPO(Continue NPO. Await MBS/SLP Eval.  Start EN once med appropriate. TF Rec:  1.5 Calorie w/ Fiber (Jevity 1.5) @ 45 ml/hr (Goal) Continuous x 24 hrs/d to provide 1080 ml TV, 1620 kcals, 69 gm pro, 821 ml water. 120 ml flush q 6 hrs= 1301 ml total water.)  Nutrition Education/Counseling:  No recommendation at this time   Coordination of Nutrition Care:  Continue to monitor while inpatient    Goals:  Nutrition Progression. Nutrition Monitoring and Evaluation:   Behavioral-Environmental Outcomes:  None Identified   Food/Nutrient Intake Outcomes:  Diet Advancement/Tolerance, Enteral Nutrition Intake/Tolerance  Physical Signs/Symptoms Outcomes:  Biochemical Data, Chewing or Swallowing, GI Status, Nausea or Vomiting, Fluid Status or Edema, Nutrition Focused Physical Findings, Skin, Weight     Discharge Planning:     Too soon to determine     Electronically signed by Swetha Bush RD, LD on 1/15/21 at 11:41 AM EST    Contact: ext [phone]

## 2021-01-15 NOTE — PROGRESS NOTES
[address] [place], [place] [address]ist   Progress Note    Admitting Date and Time: 1/14/2021  9:52 AM  Admit Dx: Acute hypoxemic respiratory failure due to COVID-19 ([facility]) [U07.1, J96.01]    Subjective:    Pt distress. Patient continues on continuous BiPAP. SPO2 100%. Will wean to HFNC tolerated. Remains n.p.o. at this time. Speech therapy for evaluation states patient is on continuous BiPAP will revisit once patient respiratory function improves. Per RN: Denies n.p.o. at this time and unable to receive oral medication. ROS: denies fever, chills, cp, sob, n/v, HA unless stated above.      [START ON 1/16/2021] dexamethasone  4 mg Intravenous Daily    piperacillin-tazobactam  3.375 g Intravenous Q12H    ascorbic acid  500 mg Oral Daily    aspirin  81 mg PEG Tube Daily    calcium-vitamin D  1 tablet Oral BID    carvedilol  3.125 mg PEG Tube BID WC    heparin (porcine)  5,000 Units Subcutaneous Q8H    magnesium oxide  400 mg Oral Daily    metoclopramide  5 mg PEG Tube BID AC    miconazole nitrate   Topical BID    multivitamin  1 tablet Oral Daily    tamsulosin  0.4 mg Oral Nightly    vitamin D3  400 Units Oral Daily    sodium chloride flush  10 mL Intravenous 2 times per day    albuterol-ipratropium  1 puff Inhalation 4x daily         sodium chloride flush, 10 mL, PRN      promethazine, 12.5 mg, Q6H PRN    Or      ondansetron, 4 mg, Q6H PRN      polyethylene glycol, 17 g, Daily PRN      acetaminophen, 650 mg, Q6H PRN    Or      acetaminophen, 650 mg, Q6H PRN      albuterol-ipratropium, 1 puff, Q4H PRN         Objective:    /74   Pulse 97   Temp 96.9 °F (36.1 °C) (Axillary)   Resp 28   Ht 5' 5\" (1.651 m)   Wt 96 lb (43.5 kg)   SpO2 100%   BMI 15.98 kg/m²   General Appearance: alert and oriented to person, place and time and in no acute distress  Skin: warm and dry  Head: normocephalic and atraumatic  Eyes: pupils equal, round, and reactive to light, extraocular eye movements intact, conjunctivae normal  Neck: neck supple and non tender without mass   Pulmonary/Chest: clear to auscultation bilaterally- no wheezes, rales or rhonchi, normal air movement, no respiratory distress  Cardiovascular: normal rate, normal S1 and S2 and no carotid bruits  Abdomen: soft, non-tender, non-distended, normal bowel sounds, no masses or organomegaly  Extremities: no cyanosis, no clubbing and no edema  Neurologic: no cranial nerve deficit and speech normal      Recent Labs     01/14/21  1200 01/15/21  0420   * 153*   K 5.0 4.6   * 120*   CO2 30* 25   BUN 57* 77*   CREATININE 1.2 1.5*   GLUCOSE 142* 300*   CALCIUM 9.3 8.4*       Recent Labs     01/14/21  1200 01/15/21  0420   ALKPHOS 95 73   PROT 6.1* 5.7*   LABALBU 2.8* 2.3*   BILITOT 0.7 0.6   AST 22 17   ALT 28 22       Recent Labs     01/14/21  1200 01/15/21  0420   WBC 14.1* 12.3*   RBC 4.29 3.40*   HGB 14.5 11.3*   HCT 47.4 38.5   .5* 113.2*   MCH 33.8 33.2   MCHC 30.6* 29.4*   RDW 15.5* 15.5*    186   MPV 12.4* 12.2*           Radiology:   XR CHEST PORTABLE   Final Result   Right lower lobe infiltrate, likely reflecting infectious pneumonia with   consideration to viral etiology. Left lower lobe atelectasis and/or   infiltrate          Assessment:  Active Problems:    Severe protein-calorie malnutrition ([address]    Acute hypoxemic respiratory failure due to COVID-19 [facility])  Resolved Problems:    * No resolved hospital problems. *      Plan:  1. Acute respiratory failure with hypoxia-2/2 aspiration pneumonia/recent COVID-19 viral pneumonia? Patient remains on continuous BiPAP at this time. Tolerating well. Continue to wean as tolerated. Maintain SPO2>92%. Continue steroid taper per pulmonology. Continue MDI. Immunology input appreciated. 2. Aspiration pneumonia-Zosyn day 2. Continue nebs. Supportive care. Speech for swallow evaluation. Able to perform at this time as patient is requiring continuous BP.   Revisit once respiratory status has improved. 3. Recent COVID-19 viral pneumonia-completed course of remdesivir. Continues to be COVID-19 positive. Continue dexamethasone. Vitamin supplementation. Follow inflammatory markers. Courage incentive spirometry/pronating. Pulmonology following. 4. Hypernatremia-setting of dehydration. Na+153. Bili improved this morning. Continue IVF hydration. Follow closely. 5. Hx CAD-continue statin/ASA. 6. Hx combined systolic and diastolic CHF-continue to hold furosemide at this time as BP remains borderline. I/Os. Weights daily. Follow volume status closely. 7. Hypothyroidism-continue levothyroxine. 8. Oropharyngeal dysphagia-recent PEG placement. Presented following emesis of tube feeding possible aspiration. Awaiting speech evaluation improvement of respiratory function. 9. HLD-continue statin therapy. 10. +Blood Cultures-? Contamination. 1/4 BC and positive cocci in clusters. BC pending. Follow. With positive culture will consult ID. NOTE: This report was transcribed using voice recognition software. Every effort was made to ensure accuracy; however, inadvertent computerized transcription errors may be present. Electronically signed by Arlen Mendoza CNP on 1/15/2021 at 1:03 PM none

## 2021-01-15 NOTE — PROGRESS NOTES
Speech Language Pathology      NAME:  Iva Calle  :  7/3/1927  DATE: 1/15/2021  ROOM:  [address] [place], [place] [id number]-         Order received and appreciated. Chart reviewed. Pt unavailable at this time due to:  [] HOLD per RN  [] Off unit for testing/ procedure    [] With medical staff   [] Declined intervention  [] Sleeping/ Lethargic   [x] Other: currently on continuous BiPAP, unable to tolerate being off for long enough to tolerate oral intake. Re-consult with improved respiratory status. Acute hypoxemic respiratory failure due to COVID-19 ([facility] [U07.1, J96.01]            David VALDOVINOS CCC/SLP [id number]  Speech-Language Pathologist

## 2021-01-16 NOTE — CONSULTS
[address] [place] [address] [facility]  Consultation Note     Admit Date: 1/14/2021  9:52 AM    Reason for Consult: Bacteremia    Attending Physician:  Sandeep Gomez MD    HISTORY OF PRESENT ILLNESS:             The history is obtained from extensive review of available past medical records. The patient is a 80 y.o. male who was admitted to the hospital on 12/22/2020 with acute respiratory failure secondary to SARS-CoV-2 infection and aspiration pneumonia. He was treated with Remdesivir and Unasyn. This was switched over to Augmentin. He was discharged to SNF on 12/31/2020. The patient comes back to the ED on 1/14/2020 with shortness of breath and tube feeding coming out of his mouth. They suspect he may have aspirated. White count was 14.1. He was admitted to the Covid unit. He was treated with Zosyn. Seen by pulmonary. Seen by nephrology. Blood cultures have turned positive for Staphylococcus aureus (MRSA). ID was asked to see him in consultation.       Past Medical History:        Diagnosis Date    CAD (coronary artery disease)     Cardiomyopathy [facility]     [facility]estive [facility] failure) [facility])     DJD (degenerative joint disease) 3/10/2012    Hyperlipidemia     Hypothyroid     Hypothyroid     Myocardial infarction acute ([facility] 3/12/12    BMS to LAD     Past Surgical History:        Procedure Laterality Date    CORONARY ANGIOPLASTY WITH STENT PLACEMENT  03/09/2012    Dr Dakota Walton:  3.5x22 stent to mid LAD  EF=20%    GASTROSTOMY TUBE PLACEMENT N/A 10/7/2020    EGD PEG TUBE PLACEMENT performed by Juan Manuel Santamaria MD at [address] [place] [address]       Current Medications:   Scheduled Meds:   piperacillin-tazobactam  3.375 g Intravenous Q8H    dexamethasone  4 mg Intravenous Daily    sodium chloride flush  10 mL Intravenous 2 times per day    ascorbic acid  500 mg Oral Daily    aspirin  81 mg PEG Tube Daily    calcium-vitamin D  1 tablet Oral BID    carvedilol  3.125 mg PEG Tube BID WC    heparin (porcine)  5,000 Units Subcutaneous Q8H    magnesium oxide  400 mg Oral Daily    metoclopramide  5 mg PEG Tube BID AC    miconazole nitrate   Topical BID    multivitamin  1 tablet Oral Daily    tamsulosin  0.4 mg Oral Nightly    vitamin D3  400 Units Oral Daily    sodium chloride flush  10 mL Intravenous 2 times per day    albuterol-ipratropium  1 puff Inhalation 4x daily     Continuous Infusions:   dextrose 100 mL/hr at 01/16/21 1107    sodium chloride Stopped (01/16/21 1228)     PRN Meds:sodium chloride flush, sodium chloride flush, promethazine **OR** ondansetron, polyethylene glycol, acetaminophen **OR** acetaminophen, albuterol-ipratropium    Allergies:  Quinolones and Septra [bactrim]    Social History:   Social History     Socioeconomic History    Marital status:       Spouse name: None    Number of children: None    Years of education: None    Highest education level: None   Occupational History    None   Social Needs    Financial resource strain: None    Food insecurity     Worry: None     Inability: None    Transportation needs     Medical: None     Non-medical: None   Tobacco Use    Smoking status: Never Smoker    Smokeless tobacco: Never Used   Substance and Sexual Activity    Alcohol use: Never     Frequency: Never    Drug use: Never    Sexual activity: Never   Lifestyle    Physical activity     Days per week: None     Minutes per session: None    Stress: None   Relationships    Social connections     Talks on phone: None     Gets together: None     Attends [employer] service: None     Active member of club or organization: None     Attends meetings of clubs or organizations: None     Relationship status: None    Intimate partner violence     Fear of current or ex partner: None     Emotionally abused: None     Physically abused: None     Forced sexual activity: None   Other Topics Concern    None   Social History Narrative  None      The patient says that she drove treated her long distance from [place] [id number] to [employer]    Family History:       Family history unknown: Yes   . Otherwise non-pertinent to the chief complaint. REVIEW OF SYSTEMS:    Constitutional: Negative for fevers, chills, diaphoresis  Neurologic: Negative   Psychiatric: Negative  Rheumatologic: Negative   Endocrine: Negative  Hematologic: Negative  Immunologic: Negative  ENT: Dysphagia  Respiratory: Admits to having a cough. Cardiovascular: Negative  GI: Denies abdominal pain  : Negative  Musculoskeletal: Negative  Skin: No rashes. PHYSICAL EXAM:    Vitals:   BP (!) 84/50   Pulse 81   Temp 97.1 °F (36.2 °C) (Axillary)   Resp 20   Ht 5' 5\" (1.651 m)   Wt 98 lb 3.2 oz (44.5 kg)   SpO2 98%   BMI 16.34 kg/m²   Constitutional: The patient is lying in bed. He is emaciated. He is awake, slow mentation but answers a few questions, albeit with some difficulty speaking. Skin: Warm and dry. No rashes were noted. HEENT: Eyes show round, and reactive pupils. No jaundice. Dry mouth, no ulcerations, no thrush. Neck: Supple to movements. No lymphadenopathy. Chest: No use of accessory muscles to breathe. Symmetrical expansion. Auscultation reveals crackles and rhonchi right base posteriorly. Cardiovascular: S1 and S2 are rhythmic and regular. No murmurs appreciated. Abdomen: Positive bowel sounds to auscultation. Benign to palpation. No masses felt. No hepatosplenomegaly. PEG. Extremities: No clubbing, no cyanosis, no edema.   Lines: peripheral      CBC+dif:  Recent Labs     01/15/21  0420 01/16/21  0430 01/16/21  1250   WBC 12.3* 9.1 7.8   HGB 11.3* 10.0* 9.4*   HCT 38.5 32.6* 32.1*   .2* 111.3* 111.8*    144 144   NEUTROABS 10.64*  --  6.73     Lab Results   Component Value Date    CRP 2.6 (H) 12/22/2020    CRP 7.1 (H) 10/05/2020      No results found for: CRPHS  Lab Results   Component Value Date    SEDRATE 26 (H) 10/05/2020     Lab Results Component Value Date    ALT 16 01/16/2021    AST 16 01/16/2021    ALKPHOS 60 01/16/2021    BILITOT 0.4 01/16/2021     Lab Results   Component Value Date     01/16/2021    K 3.7 01/16/2021    K 4.6 01/15/2021     01/16/2021    CO2 26 01/16/2021    BUN 65 01/16/2021    CREATININE 1.1 01/16/2021    GFRAA >60 01/16/2021    LABGLOM >60 01/16/2021    GLUCOSE 111 01/16/2021    GLUCOSE 95 03/11/2012    PROT 5.1 01/16/2021    LABALBU 2.2 01/16/2021    LABALBU 3.4 03/11/2012    CALCIUM 8.6 01/16/2021    BILITOT 0.4 01/16/2021    ALKPHOS 60 01/16/2021    AST 16 01/16/2021    ALT 16 01/16/2021       Lab Results   Component Value Date    PROTIME 14.6 01/14/2021    PROTIME 11.7 03/09/2012    INR 1.3 01/14/2021       Lab Results   Component Value Date    TSH 0.026 10/05/2020       Lab Results   Component Value Date    COLORU Yellow 01/14/2021    PHUR 6.5 01/14/2021    WBCUA 0-1 01/14/2021    RBCUA >20 01/14/2021    MUCUS Present 10/05/2020    BACTERIA NONE SEEN 01/14/2021    CLARITYU Clear 01/14/2021    SPECGRAV 1.010 01/14/2021    LEUKOCYTESUR Negative 01/14/2021    UROBILINOGEN 0.2 01/14/2021    BILIRUBINUR Negative 01/14/2021    BLOODU LARGE 01/14/2021    GLUCOSEU Negative 01/14/2021    AMORPHOUS FEW 05/27/2020       Lab Results   Component Value Date    HCO3 26.4 01/14/2021    BE 3.3 01/14/2021    O2SAT 93.5 01/14/2021    PH 7.490 01/14/2021    PCO2 35.4 01/14/2021    PO2 63.5 01/14/2021     Radiology:      Microbiology:  Pending  Recent Labs     01/14/21  1200   BC 24 Hours no growth  Gram stain performed from blood culture bottle media  Gram positive cocci in clusters  Previously positive blood culture called  *     Recent Labs     01/14/21  1140   ORG Staphylococcus aureus*     Recent Labs     01/14/21  1140   BLOODCULT2 Gram stain performed from blood culture bottle media  Gram positive cocci in clusters  *  Sensitivity to follow     No results for input(s): STREPNEUMAGU in the last 72 hours.   No results for input(s): LP1UAG in the last 72 hours. No results for input(s): ASO in the last 72 hours. No results for input(s): CULTRESP in the last 72 hours. No results for input(s): PROCAL in the last 72 hours. Assessment:  · Recent SARS-CoV-2 infection with pneumonia. Treated with Remdesivir last month  · HCAP, probably secondary to MRSA  · MRSA bacteremia associated to the above  · Leukocytosis secondary to pneumonia, improving    Plan:    · Stop Zosyn  · Start oral Linezolid  · Repeat blood cultures x2  · Check cultures, baseline ESR, CRP  · Monitor labs  · Will follow with you    Thank you for having us see this patient in consultation. I will be discussing this case with the treating physicians.     Tiffanie Boyer  3:35 PM  1/16/2021

## 2021-01-16 NOTE — CONSULTS
Nephrology Consult  [facility]    CC:   Hypernatremia     HPI:   Manuela Clarke is a 80year old male we were asked to see regarding hypernatremia. He has been NPO and had a TF at the [facility] but with a rate of 16 cc/hr. He had been found with emesis of TF and likely aspiration and was sent to the ED. He is not personally examined due to Covid + status. Discussed with LIZBETH Aragon. Just started TF at 6 (six) cc/hr with free water 50 cc Q 4 hours.      PMH:    Past Medical History:   Diagnosis Date    CAD (coronary artery disease)     Cardiomyopathy ([address]     CHF (congestive heart failure) (HCC)     DJD (degenerative joint disease) 3/10/2012    Hyperlipidemia     Hypothyroid     Hypothyroid     Myocardial infarction acute ([address] 3/12/12    BMS to LAD       Patient Active Problem List   Diagnosis    Acute transmural anterior wall MI ([address]    DJD (degenerative joint disease)    Hyperlipidemia    Hypothyroid    CAD (coronary artery disease)    Cardiomyopathy ([address]    CHF (congestive heart failure) (HCC)    Myocardial infarction acute (HCC)    Syncope and collapse    Closed fracture of pelvis (HCC)    Orthostatic hypotension    Closed right hip fracture, with routine healing, subsequent encounter    Traumatic brain injury ([address]    Anemia    Bone lesion    Age-related physical debility    Pneumonia due to organism    Severe protein-calorie malnutrition ([address]    COVID-19 virus detected    Acute respiratory failure with hypoxia ([address]    Pneumonia due to COVID-19 virus    Hypotension due to hypovolemia    Acute hypoxemic respiratory failure due to COVID-19 (HCC)    Aspiration pneumonia (HCC)    Hypernatremia       Meds:     dexamethasone  4 mg Intravenous Daily    piperacillin-tazobactam  3.375 g Intravenous Q12H    sodium chloride flush  10 mL Intravenous 2 times per day    ascorbic acid  500 mg Oral Daily    aspirin  81 mg PEG Tube Daily    calcium-vitamin D  1 tablet Oral BID    tablet 0    calcium-vitamin D (OSCAL-500) 500-200 MG-UNIT per tablet Take 1 tablet by mouth 2 times daily (Patient taking differently: 1 tablet by PEG Tube route 2 times daily ) 60 tablet 0    Multiple Vitamin (MULTIVITAMIN) TABS tablet Take 1 tablet by mouth daily (Patient taking differently: 1 tablet by PEG Tube route daily ) 30 tablet 0    Ascorbic Acid (VITAMIN C) 500 MG CAPS Take 500 mg by mouth daily.  Magnesium 400 MG CAPS Take 400 mg by mouth daily       VITAMIN A PO 5,000 Units by PEG Tube route daily       vitamin D (ERGOCALCIFEROL) 400 UNITS CAPS Take 400 Units by mouth daily.  Heparin Sodium, Porcine, (HEPARIN, PORCINE,) 50098 UNIT/ML injection Inject 0.5 mLs into the skin every 8 hours Re evalute after 21 days. + COVID      ipratropium-albuterol (DUONEB) 0.5-2.5 (3) MG/3ML SOLN nebulizer solution Inhale 3 mLs into the lungs 4 times daily 360 mL 0    ipratropium-albuterol (DUONEB) 0.5-2.5 (3) MG/3ML SOLN nebulizer solution Inhale 3 mLs into the lungs every 4 hours as needed for Shortness of Breath 360 mL 0       Allergies:    Quinolones and Septra [bactrim]    Social History:     reports that he has never smoked. He has never used smokeless tobacco. He reports that he does not drink alcohol or use drugs.     Family History:         Family history unknown: Yes       ROS:     Deferred due to Covid +    Physical Exam:      Patient Vitals for the past 24 hrs:   BP Temp Temp src Pulse Resp SpO2 Weight   01/16/21 0600 -- -- -- -- -- -- 94 lb 9.6 oz (42.9 kg)   01/16/21 0415 (!) 94/48 96.7 °F (35.9 °C) Axillary 86 22 96 % --   01/15/21 2100 (!) 135/98 96.4 °F (35.8 °C) Axillary 87 24 100 % --   01/15/21 1710 -- -- -- -- -- 100 % --   01/15/21 1708 -- -- -- -- -- 91 % --   01/15/21 1445 -- -- -- -- -- 100 % --   01/15/21 1415 110/70 98.7 °F (37.1 °C) Oral 100 22 100 % --   01/15/21 1115 108/74 96.9 °F (36.1 °C) Axillary 97 28 100 % --   01/15/21 1003 -- -- -- -- 24 -- --         Intake/Output Summary (Last 24 hours) at 1/16/2021 0837  Last data filed at 1/16/2021 0452  Gross per 24 hour   Intake 1119 ml   Output 950 ml   Net 169 ml     PE not done due to covid +     Data:    Recent Labs     01/14/21  1200 01/15/21  0420 01/16/21  0430   WBC 14.1* 12.3* 9.1   HGB 14.5 11.3* 10.0*   HCT 47.4 38.5 32.6*   .5* 113.2* 111.3*    186 144       Recent Labs     01/14/21  1200 01/15/21  0420 01/16/21  0430   * 153* 156*   K 5.0 4.6 3.7   * 120* 124*   CO2 30* 25 26   CREATININE 1.2 1.5* 1.1   BUN 57* 77* 65*   LABGLOM 56 44 >60   GLUCOSE 142* 300* 111*   CALCIUM 9.3 8.4* 8.6       Vit D, 25-Hydroxy   Date Value Ref Range Status   05/28/2020 47 30 - 100 ng/mL Final     Comment:     <20 ng/mL. ........... Mick Dirk Deficient  20-30 ng/mL. ......... Mick Dirk Insufficient   ng/mL. ........ Mick Dirk Sufficient  >100 ng/mL. .......... Mick Dirk Toxic         No results found for: PTH    Recent Labs     01/14/21  1200 01/15/21  0420 01/16/21  0430   ALT 28 22 16   AST 22 17 16   ALKPHOS 95 73 60   BILITOT 0.7 0.6 0.4       Recent Labs     01/14/21  1200 01/15/21  0420 01/16/21  0430   LABALBU 2.8* 2.3* 2.2*       Ferritin   Date Value Ref Range Status   12/30/2020 846 ng/mL Final     Comment:     FERRITIN Reference Ranges:  Adult Males   20 - 60 years:    30 - 400 ng/mL  Adult females 16 - 60 years:    15 - 150 ng/mL  Adults greater than 60 years:   no established reference range  Pediatrics:                     no established reference range         Vitamin B-12   Date Value Ref Range Status   05/28/2020 >2000 (H) 211 - 946 pg/mL Final       Folate   Date Value Ref Range Status   05/28/2020 19.5 4.8 - 24.2 ng/mL Final         Lab Results   Component Value Date    COLORU Yellow 01/14/2021    NITRU Negative 01/14/2021    GLUCOSEU Negative 01/14/2021    KETUA Negative 01/14/2021    UROBILINOGEN 0.2 01/14/2021    BILIRUBINUR Negative 01/14/2021       No results found for: JUAN, CREURRAN, MACREATRATIO, OSMOU    No components found for: URIC    No results found for: LIPIDPAN      Assessment and Plan:    1. Hyperchloremic Hypernatremia-   Likely in the setting of lack of free water as he is NPO  Check urine studies  Na 156 with free water deficit of 2.2 liters  Change IVF to D5% at 100 cc/hr  TF restarted at 6 cc/hr with free water 50 cc Q 4 hours. Rate at [facility] noted to be only 16 cc/hr. Will attempt to add free water as able. 2. Acute kidney injury-  Likely prerenal due to dehydration  Follow urine studies ordered  Creatinine 1.5-->1.1 with IVF    3.  Anemia-   Will check iron studies, B-12 and folate    Thank you for allowing us to participate in [facility]' care    SAHRA Lawson - CNP

## 2021-01-16 NOTE — PROGRESS NOTES
atraumatic  Eyes: pupils equal, round, and reactive to light, extraocular eye movements intact, conjunctivae normal  Neck: supple and non-tender without mass  Pulmonary/Chest: diffuse bi basilar mild ronchii  Cardiovascular: normal rate, regular rhythm, normal S1 and S2, 2/6 LIVIA  Abdomen: soft, non-tender, non-distended, normal bowel sounds, no masses or organomegaly  Extremities: no cyanosis, clubbing  Musculoskeletal: normal range of motion  Neurologic:no cranial nerve deficit,  speech normal      Recent Labs     01/14/21  1200 01/15/21  0420 01/16/21  0430   * 153* 156*   K 5.0 4.6 3.7   * 120* 124*   CO2 30* 25 26   BUN 57* 77* 65*   CREATININE 1.2 1.5* 1.1   GLUCOSE 142* 300* 111*   CALCIUM 9.3 8.4* 8.6       Recent Labs     01/14/21  1200 01/15/21  0420 01/16/21  0430   WBC 14.1* 12.3* 9.1   RBC 4.29 3.40* 2.93*   HGB 14.5 11.3* 10.0*   HCT 47.4 38.5 32.6*   .5* 113.2* 111.3*   MCH 33.8 33.2 34.1   MCHC 30.6* 29.4* 30.7*   RDW 15.5* 15.5* 15.0    186 144   MPV 12.4* 12.2* 12.2*       Labs and images reviewed    Radiology:   XR CHEST PORTABLE   Final Result   Right lower lobe infiltrate, likely reflecting infectious pneumonia with   consideration to viral etiology. Left lower lobe atelectasis and/or   infiltrate          Assessment:    Active Problems:    Severe protein-calorie malnutrition (HCC)    Acute hypoxemic respiratory failure due to COVID-19 ([facility]    Aspiration pneumonia (HCC)    Hypernatremia  Resolved Problems:    * No resolved hospital problems. *      Plan:  Acute hypoxic respiratory failure secondary to aspiration pneumonia-on BiPAP at this time, pulmonary is consulted & following. Try to wean as possible. Now on high flow O2.     Aspiration pneumonia-on IV Zosyn, nebulized treatments, continue other supportive treatment.  SLP consulted for swallow eval. will start tube feeds at low rate, aspiration precautions as above . Discussed with bedside nurse Hypernatremia/dehydration -we will give gentle IV fluids D5 one half at 75 an hour . Up trending - renal consulted , IVf changed to D5W, monitor. Staph Bacteremia - ID consulted , ? source     CAD /combined systolic& diastolic CHF ,HPL - on BB  ,asa  as per home . Parameters to hold coreg added. Lasix on hold sec borderline BP. Will reinstate when possible.     Oropharyngeal dysphagia status post PEG- SLP consulted. TF started at low rate , d/w bedside nurse take aspiration precautions.     Hypothyroidism - on supplements as per home.     Recent Covid pneumonia and hypoxia though hypoxia likely secondary to aspiration-we will continue dexamethasone as started from ER, as per pulm taper steroids                                             Code Status: DNRCCA , I have d/w dtr Marylou Conception.  [phone]  DVT prophylaxis: sc heparin        Electronically signed by Elena Palm MD on 1/16/2021 at 8:12 AM

## 2021-01-16 NOTE — PROGRESS NOTES
Clinical Documentation Improvement Specialist  [email or web address]  [phone]  Options provided:  -- Viral Sepsis, present on admission  -- No Sepsis, aspiration pneumonia only  -- Sepsis was ruled out  -- Other - I will add my own diagnosis  -- Disagree - Not applicable / Not valid  -- Disagree - Clinically unable to determine / Unknown  -- Refer to Clinical Documentation Reviewer    PROVIDER RESPONSE TEXT:    This patient has viral sepsis which was present on admission.     Query created by: Shaheen Reese on 1/15/2021 1:27 PM      Electronically signed by:  Jodi Thomas MD 1/16/2021 6:00 PM

## 2021-01-17 NOTE — PROGRESS NOTES
[address] [place] [address] [facility]  [facility]  Progress Note    CC: in bed- tired  Face to face encounter   SUBJECTIVE:  Patient is tolerating medications. No reported adverse drug reactions. ROS: No nausea, vomiting, diarrhea. Tired this am . On room air. IV infiltrated to left forearm    Medications:  Scheduled Meds:   linezolid  600 mg Per NG tube 2 times per day    dexamethasone  4 mg Intravenous Daily    sodium chloride flush  10 mL Intravenous 2 times per day    ascorbic acid  500 mg Oral Daily    aspirin  81 mg PEG Tube Daily    calcium-vitamin D  1 tablet Oral BID    carvedilol  3.125 mg PEG Tube BID WC    heparin (porcine)  5,000 Units Subcutaneous Q8H    magnesium oxide  400 mg Oral Daily    metoclopramide  5 mg PEG Tube BID AC    miconazole nitrate   Topical BID    multivitamin  1 tablet Oral Daily    tamsulosin  0.4 mg Oral Nightly    vitamin D3  400 Units Oral Daily    sodium chloride flush  10 mL Intravenous 2 times per day    albuterol-ipratropium  1 puff Inhalation 4x daily     Continuous Infusions:   dextrose 100 mL/hr at 01/16/21 2018    sodium chloride Stopped (01/16/21 1935)     PRN Meds:sodium chloride flush, sodium chloride flush, promethazine **OR** ondansetron, polyethylene glycol, acetaminophen **OR** acetaminophen, albuterol-ipratropium  OBJECTIVE:  Patient Vitals for the past 24 hrs:   BP Temp Temp src Pulse Resp SpO2 Height Weight   01/17/21 1030 (!) 104/49 97.9 °F (36.6 °C) Axillary 79 18 99 % -- --   01/17/21 0530 (!) 102/53 -- -- -- -- 94 % -- --   01/17/21 0415 -- -- -- -- -- -- 5' 5\" (1.651 m) 96 lb 4 oz (43.7 kg)   01/16/21 2215 (!) 101/52 -- -- 78 20 95 % -- --   01/16/21 2000 (!) 85/46 97.7 °F (36.5 °C) Axillary 80 20 98 % -- --   01/16/21 1730 (!) 97/56 96.9 °F (36.1 °C) Axillary 79 20 97 % -- --   01/16/21 1320 -- -- -- -- 20 98 % -- --     Constitutional: The patient is awake, alert,very thin   Skin: Warm and dry. No rashes were noted.  Has lesions to ASSESSMENT:  · HCAP- probably secondary to MRSA  · MRSA bacteremia   · Recent SARS- CoV-2  · Leukocytosis - improved     PLAN:  · Continue Linezolid  · On dexamethasone  · Check cultures  · Repeat blood culture- pending   · Monitor labs    [address]  11:07 AM  1/17/2021     Patient seen and examined. I had a face to face encounter with the patient. Agree with exam.  Assessment and plan as outlined above and directed by me. Addition and corrections were done as deemed appropriate. My exam and plan include: Nursing reports that the patient is producing yellow/greenish sputum. There was not collected, however. Respiratory culture and Gram stain were ordered today. He seems to be doing better. Continue Linezolid and will add B6 to reduce risk of neuropathy.     Asaf Franco  1/17/2021  3:21 PM

## 2021-01-17 NOTE — PROGRESS NOTES
Nephrology Progress Note  [facility]      HPI:  From consult 1/16/21-   Leyla Ravi is a 80year old male we were asked to see regarding hypernatremia. He has been NPO and had a TF at the [facility] but with a rate of 16 cc/hr. He had been found with emesis of TF and likely aspiration and was sent to the ED. He is not personally examined due to Covid + status. Discussed with RN Hector Ferguson. Just started TF at 6 (six) cc/hr with free water 50 cc Q 4 hours. His baseline creatinine is 0.7 from 12/30/2020.    1/17/21- seen thru the window. Discussed with staff.      PMH:    Past Medical History:   Diagnosis Date    CAD (coronary artery disease)     Cardiomyopathy ([address]     CHF (congestive heart failure) (HCC)     DJD (degenerative joint disease) 3/10/2012    Hyperlipidemia     Hypothyroid     Hypothyroid     Myocardial infarction acute ([address] 3/12/12    BMS to LAD       Patient Active Problem List   Diagnosis    Acute transmural anterior wall MI ([address]    DJD (degenerative joint disease)    Hyperlipidemia    Hypothyroid    CAD (coronary artery disease)    Cardiomyopathy ([address]    CHF (congestive heart failure) (HCC)    Myocardial infarction acute (HCC)    Syncope and collapse    Closed fracture of pelvis (HCC)    Orthostatic hypotension    Closed right hip fracture, with routine healing, subsequent encounter    Traumatic brain injury ([address]    Anemia    Bone lesion    Age-related physical debility    Pneumonia due to organism    Severe protein-calorie malnutrition ([address]    COVID-19 virus detected    Acute respiratory failure with hypoxia ([address]    Pneumonia due to COVID-19 virus    Hypotension due to hypovolemia    Acute hypoxemic respiratory failure due to COVID-19 ([address]    Aspiration pneumonia (HCC)    Hypernatremia       Meds:     linezolid  600 mg Per NG tube 2 times per day    dexamethasone  4 mg Intravenous Daily    sodium chloride flush  10 mL Intravenous 2 times per day    ascorbic acid 500 mg Oral Daily    aspirin  81 mg PEG Tube Daily    calcium-vitamin D  1 tablet Oral BID    carvedilol  3.125 mg PEG Tube BID WC    heparin (porcine)  5,000 Units Subcutaneous Q8H    magnesium oxide  400 mg Oral Daily    metoclopramide  5 mg PEG Tube BID AC    miconazole nitrate   Topical BID    multivitamin  1 tablet Oral Daily    tamsulosin  0.4 mg Oral Nightly    vitamin D3  400 Units Oral Daily    sodium chloride flush  10 mL Intravenous 2 times per day    albuterol-ipratropium  1 puff Inhalation 4x daily        dextrose 100 mL/hr at 01/16/21 2018    sodium chloride Stopped (01/16/21 1935)       Meds prn:     sodium chloride flush, sodium chloride flush, promethazine **OR** ondansetron, polyethylene glycol, acetaminophen **OR** acetaminophen, albuterol-ipratropium    Meds prior to admission:     No current facility-administered medications on file prior to encounter. Current Outpatient Medications on File Prior to Encounter   Medication Sig Dispense Refill    Tamsulosin HCl (FLOMAX PO) 0.4 mg by PEG Tube route nightly      Dexamethasone (DECADRON PO) 6 mg by PEG Tube route daily      Dextromethorphan-guaiFENesin  MG/5ML SYRP Take 5 mLs by mouth 4 times daily      tuberculin (TUBERSOL) 5 UNIT/0.1ML injection Inject 5 Units into the skin once      white petrolatum OINT ointment Apply topically 2 times daily as needed (dry skin) Apply to peg tube site (Patient taking differently: Apply topically 2 times daily Apply to peg tube site)  0    miconazole nitrate 2 % OINT Apply topically 2 times daily  0    furosemide (LASIX) 20 MG tablet Take 1 tablet by mouth daily Hold 3 days. Check BMP and resume if labs/ bp ok.  (Patient taking differently: Take 20 mg by mouth daily ) 60 tablet 0    carvedilol (COREG) 3.125 MG tablet 1 tablet by PEG Tube route 2 times daily (with meals) 60 tablet 0    metoclopramide (REGLAN) 5 MG tablet 1 tablet by PEG Tube route 2 times daily (before meals) 60 tablet 0    aspirin 81 MG chewable tablet 1 tablet by PEG Tube route daily 30 tablet 0    calcium-vitamin D (OSCAL-500) 500-200 MG-UNIT per tablet Take 1 tablet by mouth 2 times daily (Patient taking differently: 1 tablet by PEG Tube route 2 times daily ) 60 tablet 0    Multiple Vitamin (MULTIVITAMIN) TABS tablet Take 1 tablet by mouth daily (Patient taking differently: 1 tablet by PEG Tube route daily ) 30 tablet 0    Ascorbic Acid (VITAMIN C) 500 MG CAPS Take 500 mg by mouth daily.  Magnesium 400 MG CAPS Take 400 mg by mouth daily       VITAMIN A PO 5,000 Units by PEG Tube route daily       vitamin D (ERGOCALCIFEROL) 400 UNITS CAPS Take 400 Units by mouth daily.  Heparin Sodium, Porcine, (HEPARIN, PORCINE,) 47974 UNIT/ML injection Inject 0.5 mLs into the skin every 8 hours Re evalute after 21 days. + COVID      ipratropium-albuterol (DUONEB) 0.5-2.5 (3) MG/3ML SOLN nebulizer solution Inhale 3 mLs into the lungs 4 times daily 360 mL 0    ipratropium-albuterol (DUONEB) 0.5-2.5 (3) MG/3ML SOLN nebulizer solution Inhale 3 mLs into the lungs every 4 hours as needed for Shortness of Breath 360 mL 0       Allergies:    Quinolones and Septra [bactrim]    Social History:     reports that he has never smoked. He has never used smokeless tobacco. He reports that he does not drink alcohol or use drugs.     Family History:         Family history unknown: Yes       ROS:     Deferred due to Covid +    Physical Exam:      Patient Vitals for the past 24 hrs:   BP Temp Temp src Pulse Resp SpO2 Height Weight   01/17/21 0530 (!) 102/53 -- -- -- -- 94 % -- --   01/17/21 0415 -- -- -- -- -- -- 5' 5\" (1.651 m) 96 lb 4 oz (43.7 kg)   01/16/21 2215 (!) 101/52 -- -- 78 20 95 % -- --   01/16/21 2000 (!) 85/46 97.7 °F (36.5 °C) Axillary 80 20 98 % -- --   01/16/21 1730 (!) 97/56 96.9 °F (36.1 °C) Axillary 79 20 97 % -- --   01/16/21 1320 -- -- -- -- 20 98 % -- --   01/16/21 0918 (!) 84/50 97.1 °F (36.2 °C) Axillary 81 20 94 % -- 98 lb 3.2 oz (44.5 kg)         Intake/Output Summary (Last 24 hours) at 1/17/2021 0843  Last data filed at 1/16/2021 1016  Gross per 24 hour   Intake 50 ml   Output --   Net 50 ml     PE not done due to covid +     Data:    Recent Labs     01/15/21  0420 01/16/21  0430 01/16/21  1250   WBC 12.3* 9.1 7.8   HGB 11.3* 10.0* 9.4*   HCT 38.5 32.6* 32.1*   .2* 111.3* 111.8*    144 144       Recent Labs     01/16/21  0430 01/16/21  1608 01/17/21  0150   * 153* 149*   K 3.7 3.6 3.8   * 120* 119*   CO2 26 28 25   CREATININE 1.1 1.0 0.9   BUN 65* 54* 47*   LABGLOM >60 >60 >60   GLUCOSE 111* 112* 102*   CALCIUM 8.6 9.1 8.8       Vit D, 25-Hydroxy   Date Value Ref Range Status   05/28/2020 47 30 - 100 ng/mL Final     Comment:     <20 ng/mL. ........... Donna Severance Deficient  20-30 ng/mL. ......... Donna Severance Insufficient   ng/mL. ........ Donna Severance Sufficient  >100 ng/mL. .......... Donna Severance Toxic         No results found for: PTH    Recent Labs     01/15/21  0420 01/16/21  0430 01/17/21  0150   ALT 22 16 15   AST 17 16 15   ALKPHOS 73 60 68   BILITOT 0.6 0.4 0.5       Recent Labs     01/15/21  0420 01/16/21  0430 01/17/21  0150   LABALBU 2.3* 2.2* 2.3*       Ferritin   Date Value Ref Range Status   12/30/2020 846 ng/mL Final     Comment:     FERRITIN Reference Ranges:  Adult Males   20 - 60 years:    30 - 400 ng/mL  Adult females 16 - 60 years:    15 - 150 ng/mL  Adults greater than 60 years:   no established reference range  Pediatrics:                     no established reference range         Vitamin B-12   Date Value Ref Range Status   05/28/2020 >2000 (H) 211 - 946 pg/mL Final       Folate   Date Value Ref Range Status   05/28/2020 19.5 4.8 - 24.2 ng/mL Final         Lab Results   Component Value Date    COLORU Yellow 01/14/2021    NITRU Negative 01/14/2021    GLUCOSEU Negative 01/14/2021    KETUA Negative 01/14/2021    UROBILINOGEN 0.2 01/14/2021    BILIRUBINUR Negative 01/14/2021       No results found for: Marlo Garsia, CORRINA TELLEZ    No components found for: URIC    No results found for: LIPIDPAN      Assessment and Plan:    1. Hypernatremia-   Secondary to increased incentive losses from pneumonia and decreased access to free water, due to TF and AMS  Na 156-->153-->149  Cl 124-->120-->119  Changed IVF to D5% at 100 cc/hr  TF  at 6 cc/hr with free water 50 cc Q 4 hours. Rate at [facility] noted to be only 16 cc/hr. Will attempt to add free water as able. 2. Acute kidney injury-  Due to decreased effective renal perfusion with FeNa <1% and urine Cl<20   Baseline creatinine 0.7 from 12/30/2020  Creatinine 1.5-->1.1-->1.0-->0.9 with IVF    3. Anemia-   Secondary to recent hospitalizations, inflammatory disease from Covid and pneumonia  Iron studies, B-12 and folate- not done will reorder as well as CBC for the am.     Thank you for allowing us to participate in Edgardo Hogan' care    SAHRA Kessler - CNP   Pt seen and discussed with the RN-pt continues to receive only 6ml(1tsp)/hr of TF and max dose is 16ml/hr at the [facility] which explains the severe malnutrition with BMI 16  Free water is 50ml every 4hrs  All Labs reviewed  A/P:  1. Hypernatremia-titrate the free water    2.  Malnutrition-consider titrating TF to a higher rate than 16ml     Agree with the remainder as above    MUKESH Zavala MD

## 2021-01-17 NOTE — PROGRESS NOTES
Mauri Dumont Hospitalist   Progress Note    Admitting Date and Time: 1/14/2021  9:52 AM  Admit Dx: Acute hypoxemic respiratory failure due to COVID-19 ([facility] [U07.1, J96.01]     Seen for follow on multiple problems as listed below    Subjective:  Titrated off of O2 , currently on RA maintaining good sats, Over all with improvement. Noted  T feeds are started and progressing well- with aspiration precautions. No overt sob , n/v/abd pain. ROS: denies fever, chills, cp, sob, n/v, HA unless stated above.      vitamin B-6  50 mg Oral Daily    linezolid  600 mg Per NG tube 2 times per day    dexamethasone  4 mg Intravenous Daily    sodium chloride flush  10 mL Intravenous 2 times per day    ascorbic acid  500 mg Oral Daily    aspirin  81 mg PEG Tube Daily    calcium-vitamin D  1 tablet Oral BID    carvedilol  3.125 mg PEG Tube BID WC    heparin (porcine)  5,000 Units Subcutaneous Q8H    magnesium oxide  400 mg Oral Daily    metoclopramide  5 mg PEG Tube BID AC    miconazole nitrate   Topical BID    multivitamin  1 tablet Oral Daily    tamsulosin  0.4 mg Oral Nightly    vitamin D3  400 Units Oral Daily    sodium chloride flush  10 mL Intravenous 2 times per day    albuterol-ipratropium  1 puff Inhalation 4x daily         sodium chloride flush, 10 mL, PRN      sodium chloride flush, 10 mL, PRN      promethazine, 12.5 mg, Q6H PRN    Or      ondansetron, 4 mg, Q6H PRN      polyethylene glycol, 17 g, Daily PRN      acetaminophen, 650 mg, Q6H PRN    Or      acetaminophen, 650 mg, Q6H PRN      albuterol-ipratropium, 1 puff, Q4H PRN         Objective:    BP (!) 104/49   Pulse 79   Temp 97.9 °F (36.6 °C) (Axillary)   Resp 18   Ht 5' 5\" (1.651 m)   Wt 96 lb 4 oz (43.7 kg)   SpO2 99%   BMI 16.02 kg/m²   General Appearance: alert and oriented to himself & person , forgetful,, in no acute distress, frail appearing   Skin: warm and dry,   Head: normocephalic and atraumatic  Eyes: pupils equal, round, and reactive to light, extraocular eye movements intact, conjunctivae normal  Neck: supple and non-tender without mass  Pulmonary/Chest: diffuse bi basilar mild ronchii  Cardiovascular: normal rate, regular rhythm, normal S1 and S2, 2/6 LIVIA  Abdomen: soft, non-tender, non-distended, normal bowel sounds, no masses or organomegaly  Extremities: no cyanosis, clubbing  Musculoskeletal: normal range of motion  Neurologic:no cranial nerve deficit,  speech normal      Recent Labs     01/16/21  1608 01/17/21  0150 01/17/21  1230   * 149* 148*   K 3.6 3.8 3.8   * 119* 114*   CO2 28 25 28   BUN 54* 47* 35*   CREATININE 1.0 0.9 0.8   GLUCOSE 112* 102* 86   CALCIUM 9.1 8.8 9.2       Recent Labs     01/15/21  0420 01/16/21  0430 01/16/21  1250   WBC 12.3* 9.1 7.8   RBC 3.40* 2.93* 2.87*   HGB 11.3* 10.0* 9.4*   HCT 38.5 32.6* 32.1*   .2* 111.3* 111.8*   MCH 33.2 34.1 32.8   MCHC 29.4* 30.7* 29.3*   RDW 15.5* 15.0 14.8    144 144   MPV 12.2* 12.2* 11.8       Labs and images reviewed    Radiology:   XR CHEST PORTABLE   Final Result   Right lower lobe infiltrate, likely reflecting infectious pneumonia with   consideration to viral etiology. Left lower lobe atelectasis and/or   infiltrate          Assessment:    Active Problems:    Severe protein-calorie malnutrition (HCC)    Acute hypoxemic respiratory failure due to COVID-19 ([facility]    Aspiration pneumonia (HCC)    Hypernatremia  Resolved Problems:    * No resolved hospital problems. *      Plan:  Acute hypoxic respiratory failure secondary to aspiration pneumonia-on BiPAP at this time, pulmonary is consulted & following. Try to wean as possible.   Now on high flow O2.1/17 - weaned off of O2 and maintaining good saad on RA     Aspiration pneumonia-on IV Zosyn, nebulized treatments, continue other supportive treatment.  SLP consulted for swallow eval. will start tube feeds at low rate & increase as possible to achieve goal rate ,take aspiration precautions as above . Discussed with bedside nurse     Hypernatremia/dehydration -we will give gentle IV fluids D5 one half at 75 an hour . Up trending - renal consulted & following  , IVf changed to D5W, monitor. T feeds started at low rate with concern of aspiration will increse to 10cc/hr & if proceeding well will increase to 16 ml /hr Take SLP and dietician's opinion tomorrow . As said will take aspiration precautions. MRSA  Bacteremia - ID consulted  & following. As per ID HCAP probably sec to HCAP - on Linezolid as per ID .     CAD /combined systolic& diastolic CHF ,HPL - on BB  ,asa  as per home . Parameters to hold coreg added. Lasix on hold sec borderline BP. Will reinstate when possible.     Oropharyngeal dysphagia status post PEG- SLP consulted. TF started at low rate , d/w bedside nurse take aspiration precautions.     Hypothyroidism - on supplements as per home.     Recent Covid pneumonia and hypoxia though hypoxia likely secondary to aspiration-we will continue dexamethasone as started from ER, as per pulm taper steroids    Anemia  -work up ordered asper renal , monitor . Severe PCMN - on T feeds.                                             Code Status: DNRCCA , I have d/w dtr Yasmine Cotter.    DVT prophylaxis: sc heparin        Electronically signed by Shelley Peoples MD on 1/17/2021 at 3:25 PM

## 2021-01-17 NOTE — PROGRESS NOTES
Pulmonary Progress Note    Admit Date: 2021  Hospital day                               PCP: Ankita Hall MD    Chief Complaint (s):  Patient Active Problem List   Diagnosis    Acute transmural anterior wall MI ([facility]    DJD (degenerative joint disease)    Hyperlipidemia    Hypothyroid    CAD (coronary artery disease)    Cardiomyopathy ([facility]    CHF (congestive heart failure) (HCC)    Myocardial infarction acute ([facility]    Syncope and collapse    Closed fracture of pelvis ([facility]    Orthostatic hypotension    Closed right hip fracture, with routine healing, subsequent encounter    Traumatic brain injury ([facility]    Anemia    Bone lesion    Age-related physical debility    Pneumonia due to organism    Severe protein-calorie malnutrition ([facility]    COVID-19 virus detected    Acute respiratory failure with hypoxia ([facility]    Pneumonia due to COVID-19 virus    Hypotension due to hypovolemia    Acute hypoxemic respiratory failure due to COVID-19 ([address]    Aspiration pneumonia (HCC)    Hypernatremia       Subjective:  · Brielle Cheek is a sitting up, awake and alert this p.m. Seen by infectious disease, those notes are reviewed.       Vitals:  VITALS:  BP (!) 105/58   Pulse 79   Temp 97.8 °F (36.6 °C) (Axillary)   Resp 20   Ht 5' 5\" (1.651 m)   Wt 96 lb 4 oz (43.7 kg)   SpO2 99%   BMI 16.02 kg/m²     24HR INTAKE/OUTPUT:      Intake/Output Summary (Last 24 hours) at 2021 1711  Last data filed at 2021 1619  Gross per 24 hour   Intake 641 ml   Output --   Net 641 ml       24HR PULSE OXIMETRY RANGE:    SpO2  Av.6 %  Min: 94 %  Max: 99 %    Medications:  IV:   dextrose 100 mL/hr at 21    sodium chloride Stopped (21 193)       Scheduled Meds:   vitamin B-6  50 mg Oral Daily    linezolid  600 mg Per NG tube 2 times per day    sodium chloride flush  10 mL Intravenous 2 times per day    ascorbic acid  500 mg Oral Daily    aspirin  81 mg PEG Tube Daily    calcium-vitamin D  1 tablet Oral BID    carvedilol  3.125 mg PEG Tube BID WC    heparin (porcine)  5,000 Units Subcutaneous Q8H    magnesium oxide  400 mg Oral Daily    metoclopramide  5 mg PEG Tube BID AC    miconazole nitrate   Topical BID    multivitamin  1 tablet Oral Daily    tamsulosin  0.4 mg Oral Nightly    vitamin D3  400 Units Oral Daily    sodium chloride flush  10 mL Intravenous 2 times per day    albuterol-ipratropium  1 puff Inhalation 4x daily       Diet:   DIET TUBE FEED CONTINUOUS/CYCLIC NPO; 1.5 Calorie with Fiber; Gastrostomy; Continuous; 6; 6; 24     EXAM:  General: No distress. Alert. Eyes: PERRL. No sclera icterus. No conjunctival injection. ENT: No discharge. Pharynx clear. Neck: Trachea midline. Normal thyroid. Resp: No accessory muscle use. No rales. No wheezing. Few rhonchi. CV: Regular rate. Regular rhythm. No murmur or rub. Abd: Non-tender. Non-distended. No masses. No organomegaly. Normal bowel sounds. Skin: Warm and dry. No nodule on exposed extremities. No rash on exposed extremities. Ext: No cyanosis, clubbing, edema  Lymph: No cervical LAD. No supraclavicular LAD. M/S: No cyanosis. No joint deformity. No clubbing. Neuro: Awake. Follows commands. Positive pupils/gag/corneals. Normal pain response. Results:  CBC:   Recent Labs     01/15/21  0420 01/16/21  0430 01/16/21  1250   WBC 12.3* 9.1 7.8   HGB 11.3* 10.0* 9.4*   HCT 38.5 32.6* 32.1*   .2* 111.3* 111.8*    144 144     BMP:   Recent Labs     01/16/21  1608 01/17/21  0150 01/17/21  1230   * 149* 148*   K 3.6 3.8 3.8   * 119* 114*   CO2 28 25 28   BUN 54* 47* 35*   CREATININE 1.0 0.9 0.8     LIVER PROFILE:   Recent Labs     01/15/21  0420 01/16/21  0430 01/17/21  0150   AST 17 16 15   ALT 22 16 15   BILITOT 0.6 0.4 0.5   ALKPHOS 73 60 68     PT/INR: No results for input(s): PROTIME, INR in the last 72 hours. APTT: No results for input(s):  APTT in the last 72 hours. Pathology:  1. N/A      Microbiology:  1. None    Recent ABG:   No results for input(s): PH, PO2, PCO2, HCO3, BE, O2SAT, METHB, O2HB, COHB, O2CON, HHB, THB in the last 72 hours. FiO2 : 50 %       Recent Films:  XR CHEST PORTABLE   Final Result   Right lower lobe infiltrate, likely reflecting infectious pneumonia with   consideration to viral etiology. Left lower lobe atelectasis and/or   infiltrate          Assessment:  1. Aspiration pneumonia  2. Recent Covid infection  3. MRSA bacteremia    Plan:  1. Wean FiO2 as tolerated    Time at the bedside, reviewing labs and radiographs, reviewing updated notes and consultations, discussing with staff and family was more than 35 minutes. Please note that voice recognition technology was used in the preparation of this note and make therefore it may contain inadvertent transcription errors. If the patient is a COVID 19 isolation patient, the above physical exam reflects that of the examining physician for the day. Aquilla Gosselin, M.D., F.C.C.P.     [facility] [address] [facility], [address] [place] [address], [address] [place] [address]

## 2021-01-18 NOTE — PROGRESS NOTES
[facility] [address] [facility]  [address], [address]  [facility], [facility]      Pulmonary Progress Note      SUBJECTIVE:  Lying down in bed on 2 li NC, minimal coughing with unclear sputum production, occasionally nodding to questions, unclear comprehension, hard of hearing. On IVF and trying to get back on TF.     OBJECTIVE    Medications    Continuous Infusions:   dextrose 100 mL/hr at 21 1019    sodium chloride Stopped (21 193)       Scheduled Meds:   vitamin B-6  50 mg Oral Daily    linezolid  600 mg Per NG tube 2 times per day    sodium chloride flush  10 mL Intravenous 2 times per day    ascorbic acid  500 mg Oral Daily    aspirin  81 mg PEG Tube Daily    calcium-vitamin D  1 tablet Oral BID    carvedilol  3.125 mg PEG Tube BID WC    heparin (porcine)  5,000 Units Subcutaneous Q8H    magnesium oxide  400 mg Oral Daily    metoclopramide  5 mg PEG Tube BID AC    miconazole nitrate   Topical BID    multivitamin  1 tablet Oral Daily    tamsulosin  0.4 mg Oral Nightly    vitamin D3  400 Units Oral Daily    sodium chloride flush  10 mL Intravenous 2 times per day    albuterol-ipratropium  1 puff Inhalation 4x daily       PRN Meds:sodium chloride flush, sodium chloride flush, promethazine **OR** ondansetron, polyethylene glycol, acetaminophen **OR** acetaminophen, albuterol-ipratropium    Physical    VITALS:  BP (!) 107/50   Pulse 78   Temp 96.6 °F (35.9 °C) (Axillary)   Resp 20   Ht 5' 5\" (1.651 m)   Wt 102 lb 8 oz (46.5 kg)   SpO2 92%   BMI 17.06 kg/m²     24HR INTAKE/OUTPUT:      Intake/Output Summary (Last 24 hours) at 2021 1246  Last data filed at 2021 0800  Gross per 24 hour   Intake 641 ml   Output 1200 ml   Net -559 ml       24HR PULSE OXIMETRY RANGE:    SpO2  Av.3 %  Min: 87 %  Max: 95 %    General appearance: appears stated age and cooperative  Lungs: rhonchi bilaterally  Heart: regular rate and rhythm, S1, S2 normal, no murmur, click, rub or gallop  Abdomen: (+) PEG  Extremities: extremities normal, atraumatic, no cyanosis or edema  Neurologic: Mental status: awake, occ nodding to questions, moving extremities minimally. Data    CBC:   Recent Labs     01/16/21  0430 01/16/21  1250 01/18/21  0230   WBC 9.1 7.8 5.5   HGB 10.0* 9.4* 8.5*   HCT 32.6* 32.1* 27.7*   .3* 111.8* 107.4*    144 153       BMP:  Recent Labs     01/17/21  0150 01/17/21  1230 01/18/21  0230   * 148* 139   K 3.8 3.8 4.0   * 114* 108*   CO2 25 28 26   BUN 47* 35* 27*   CREATININE 0.9 0.8 0.7    ALB:3,BILIDIR:3,BILITOT:3,ALKPHOS:3)@    PT/INR: No results for input(s): PROTIME, INR in the last 72 hours. ABG:   No results for input(s): PH, PO2, PCO2, HCO3, BE, O2SAT, METHB, O2HB, COHB, O2CON, HHB, THB in the last 72 hours. FiO2 : 50 %       Radiology/Other tests reviewed:m none    Assessment:     Active Problems:    Severe protein-calorie malnutrition (HCC)    Acute hypoxemic respiratory failure due to COVID-19 (HCC)    Aspiration pneumonia (HCC)    Hypernatremia  Resolved Problems:    * No resolved hospital problems. *      Plan:       1. Cont with antibiotics as per ID  2. Cont with oxygen, taper as tolerated  3. Cont with nebs, observe respiratory function  4. On IVF and slow TF, watch fluid balance, diurese as per PCP          Time at the bedside, reviewing labs and radiographs, reviewing notes and consultations, discussing with staff and family was more than 35 minutes. Thanks for letting us see this patient in consultation. Please contact us with any questions. Office [phone] or after hours through [employer], x [phone].

## 2021-01-18 NOTE — PROGRESS NOTES
movements. No lymphadenopathy. Chest: No use of accessory muscles to breathe. Symmetrical expansion. Auscultation reveals crackles to right side. Diminished overall-  poor effort   Cardiovascular: S1 and S2 are rhythmic and regular. No murmurs appreciated. Abdomen: Positive bowel sounds to auscultation. Benign to palpation. PEG  Extremities: No edema. Muscle wasting. PIV- infiltrated to left forearm.    Miguel- yellow urine     Laboratory and Tests Review:  Lab Results   Component Value Date    WBC 5.5 01/18/2021    WBC 7.8 01/16/2021    WBC 9.1 01/16/2021    HGB 8.5 (L) 01/18/2021    HCT 27.7 (L) 01/18/2021    .4 (H) 01/18/2021     01/18/2021     Lab Results   Component Value Date    NEUTROABS 6.73 01/16/2021    NEUTROABS 10.64 (H) 01/15/2021    NEUTROABS 12.69 (H) 01/14/2021     Lab Results   Component Value Date    CRP 9.2 (H) 01/16/2021    CRP 2.6 (H) 12/22/2020    CRP 7.1 (H) 10/05/2020     Lab Results   Component Value Date    SEDRATE 102 (H) 01/16/2021    SEDRATE 26 (H) 10/05/2020     Lab Results   Component Value Date    ALT 15 01/17/2021    AST 15 01/17/2021    ALKPHOS 68 01/17/2021    BILITOT 0.5 01/17/2021     Lab Results   Component Value Date     01/18/2021    K 4.0 01/18/2021    K 4.6 01/15/2021     01/18/2021    CO2 26 01/18/2021    BUN 27 01/18/2021    CREATININE 0.7 01/18/2021    GFRAA >60 01/18/2021    LABGLOM >60 01/18/2021    GLUCOSE 114 01/18/2021    GLUCOSE 95 03/11/2012    PROT 5.2 01/17/2021    LABALBU 2.3 01/17/2021    LABALBU 3.4 03/11/2012    CALCIUM 8.4 01/18/2021    BILITOT 0.5 01/17/2021    ALKPHOS 68 01/17/2021    AST 15 01/17/2021    ALT 15 01/17/2021     Radiology:  Reviewed     Microbiology:   Blood cultures 1/14/2021 MRSA in 2 of 2 sets  Blood cultures 1/16/2021: Negative so far  Urine culture 1/42/21: Negative  Respiratory culture 1/72/21: OP adri present    ASSESSMENT:  · HCAP probably secondary to MRSA  · MRSA bacteremia   · Recent SARS- CoV-2  · Leukocytosis secondary to bacterial pneumonia    PLAN:  · Continue oral Linezolid  · On dexamethasone  · Check cultures  · Repeat blood culture- pending   · Monitor labs    Asaf Franco  1/18/2021  12:44 PM

## 2021-01-18 NOTE — PROGRESS NOTES
Department of Internal Medicine  Nephrology Attending Progress Note        SUBJECTIVE:  Mr Chalino Vyas is resting in bed tolerating feeding, hypernatremia has improved with IV free water.    Physical Exam:    Vitals:    01/18/21 1010   BP:    Pulse:    Resp:    Temp:    SpO2: 92%       I/O last 24 hours:  Intake/Output inaccurate intake/no output    Weight:102    Not examined    DATA:    CBC with Differential:    Lab Results   Component Value Date    WBC 5.5 01/18/2021    RBC 2.58 01/18/2021    HGB 8.5 01/18/2021    HCT 27.7 01/18/2021     01/18/2021    .4 01/18/2021    MCH 32.9 01/18/2021    MCHC 30.7 01/18/2021    RDW 13.7 01/18/2021    NRBC 0.0 01/14/2021    METASPCT 3.6 12/30/2020    LYMPHOPCT 8.4 01/16/2021    MONOPCT 4.7 01/16/2021    BASOPCT 0.0 01/16/2021    MONOSABS 0.37 01/16/2021    LYMPHSABS 0.66 01/16/2021    EOSABS 0.01 01/16/2021    BASOSABS 0.00 01/16/2021     CMP:    Lab Results   Component Value Date     01/18/2021    K 4.0 01/18/2021    K 4.6 01/15/2021     01/18/2021    CO2 26 01/18/2021    BUN 27 01/18/2021    CREATININE 0.7 01/18/2021    GFRAA >60 01/18/2021    LABGLOM >60 01/18/2021    GLUCOSE 114 01/18/2021    GLUCOSE 95 03/11/2012    PROT 5.2 01/17/2021    LABALBU 2.3 01/17/2021    LABALBU 3.4 03/11/2012    CALCIUM 8.4 01/18/2021    BILITOT 0.5 01/17/2021    ALKPHOS 68 01/17/2021    AST 15 01/17/2021    ALT 15 01/17/2021     Magnesium:    Lab Results   Component Value Date    MG 1.9 05/31/2020     Phosphorus:    Lab Results   Component Value Date    PHOS 2.6 05/31/2020            vitamin B-6  50 mg Oral Daily    linezolid  600 mg Per NG tube 2 times per day    sodium chloride flush  10 mL Intravenous 2 times per day    ascorbic acid  500 mg Oral Daily    aspirin  81 mg PEG Tube Daily    calcium-vitamin D  1 tablet Oral BID    carvedilol  3.125 mg PEG Tube BID     heparin (porcine)  5,000 Units Subcutaneous Q8H    magnesium oxide  400 mg Oral Daily    metoclopramide  5 mg PEG Tube BID AC    miconazole nitrate   Topical BID    multivitamin  1 tablet Oral Daily    tamsulosin  0.4 mg Oral Nightly    vitamin D3  400 Units Oral Daily    sodium chloride flush  10 mL Intravenous 2 times per day    albuterol-ipratropium  1 puff Inhalation 4x daily       sodium chloride flush, sodium chloride flush, promethazine **OR** ondansetron, polyethylene glycol, acetaminophen **OR** acetaminophen, albuterol-ipratropium    IMPRESSION/RECOMMENDATIONS:      Hyponatremia has resolved will DC IV fluids and continue with oral free water, continue holding diuretics.   Acute kidney injury resolved  Anemia with good iron folate and B12 level, suspect chronic disease with frequent blood draws  Malnutrition may need further feedings      Deisi Blackburn MD  1/18/2021 3:57 PM

## 2021-01-18 NOTE — PROGRESS NOTES
Mauri Dumont Hospitalist   Progress Note    Admitting Date and Time: 1/14/2021  9:52 AM  Admit Dx: Acute hypoxemic respiratory failure due to COVID-19 ([facility] [U07.1, J96.01]     Seen for follow on multiple problems as listed below    Subjective:  Currently on O2 2 l , CXR ordered , IVF dc'd T feeds acee pted well. D/w nursing. He denies CP ,sob or abd pain. No n/v/d.      ROS: denies fever, chills, cp, sob, n/v, HA unless stated above.      midodrine  2.5 mg Oral BID WC    vitamin B-6  50 mg Oral Daily    linezolid  600 mg Per NG tube 2 times per day    sodium chloride flush  10 mL Intravenous 2 times per day    ascorbic acid  500 mg Oral Daily    aspirin  81 mg PEG Tube Daily    calcium-vitamin D  1 tablet Oral BID    carvedilol  3.125 mg PEG Tube BID WC    heparin (porcine)  5,000 Units Subcutaneous Q8H    magnesium oxide  400 mg Oral Daily    metoclopramide  5 mg PEG Tube BID AC    miconazole nitrate   Topical BID    multivitamin  1 tablet Oral Daily    tamsulosin  0.4 mg Oral Nightly    vitamin D3  400 Units Oral Daily    sodium chloride flush  10 mL Intravenous 2 times per day    albuterol-ipratropium  1 puff Inhalation 4x daily         sodium chloride flush, 10 mL, PRN      sodium chloride flush, 10 mL, PRN      promethazine, 12.5 mg, Q6H PRN    Or      ondansetron, 4 mg, Q6H PRN      polyethylene glycol, 17 g, Daily PRN      acetaminophen, 650 mg, Q6H PRN    Or      acetaminophen, 650 mg, Q6H PRN      albuterol-ipratropium, 1 puff, Q4H PRN         Objective:    BP (!) 107/50   Pulse 78   Temp 96.6 °F (35.9 °C) (Axillary)   Resp 20   Ht 5' 5\" (1.651 m)   Wt 102 lb 8 oz (46.5 kg)   SpO2 92%   BMI 17.06 kg/m²   General Appearance: alert and oriented to himself & person , forgetful,, in no acute distress, frail appearing   Skin: warm and dry,   Head: normocephalic and atraumatic  Eyes: pupils equal, round, and reactive to light, extraocular eye movements intact, conjunctivae normal  Neck: supple and non-tender without mass  Pulmonary/Chest: diffuse bi basilar mild ronchii  Cardiovascular: normal rate, regular rhythm, normal S1 and S2, 2/6 LIVIA  Abdomen: soft, non-tender, non-distended, normal bowel sounds, no masses or organomegaly  Extremities: no cyanosis, clubbing  Musculoskeletal: normal range of motion  Neurologic:no cranial nerve deficit,  speech normal      Recent Labs     01/17/21  0150 01/17/21  1230 01/18/21  0230   * 148* 139   K 3.8 3.8 4.0   * 114* 108*   CO2 25 28 26   BUN 47* 35* 27*   CREATININE 0.9 0.8 0.7   GLUCOSE 102* 86 114*   CALCIUM 8.8 9.2 8.4*       Recent Labs     01/16/21  0430 01/16/21  1250 01/18/21  0230   WBC 9.1 7.8 5.5   RBC 2.93* 2.87* 2.58*   HGB 10.0* 9.4* 8.5*   HCT 32.6* 32.1* 27.7*   .3* 111.8* 107.4*   MCH 34.1 32.8 32.9   MCHC 30.7* 29.3* 30.7*   RDW 15.0 14.8 13.7    144 153   MPV 12.2* 11.8 12.2*       Labs and images reviewed    Radiology:   XR CHEST PORTABLE   Final Result   Right lower lobe infiltrate, likely reflecting infectious pneumonia with   consideration to viral etiology. Left lower lobe atelectasis and/or   infiltrate      XR CHEST PORTABLE    (Results Pending)       Assessment:    Active Problems:    Severe protein-calorie malnutrition (HCC)    Acute hypoxemic respiratory failure due to COVID-19 ([facility]    Aspiration pneumonia (HCC)    Hypernatremia  Resolved Problems:    * No resolved hospital problems. *      Plan:  Acute hypoxic respiratory failure secondary to aspiration pneumonia-on BiPAP at this time, pulmonary is consulted & following. Try to wean as possible. Now on high flow O2.1/17 - weaned off of O2 and maintaining good saad on RA. Currently on O2 2 l , CXR pending . Follow     Aspiration pneumonia-on IV Zosyn, nebulized treatments, continue other supportive treatment.  SLP consulted for swallow eval. will start tube feeds at low rate & increase as possible to achieve goal rate ,take aspiration precautions as above . Discussed with bedside nurse Continue abx as per ID. As per ID probably HCAP secondary to MRSA bacteremia-on Zyvox. Hypernatremia/dehydration - resolved , renal following & managing . IVF dc'd. As above T feeds accepted well . Dietician following. Shaina Leon MRSA  Bacteremia - ID consulted  & following. As per ID HCAP probably sec to MRSA - on Linezolid as per ID .     CAD /combined systolic& diastolic CHF ,HPL - on BB  ,asa  as per home . Parameters to hold coreg added. Lasix on hold sec borderline BP. Will reinstate when possible. As above CXR is  Ordered & pending. Bp still on lower side , if CXR shows fluid Ol we will give lasix , communicated with bedside nurse.     Oropharyngeal dysphagia status post PEG- SLP consulted. TF  procceding well ,will take  aspiration precautions.     Hypothyroidism - on supplements as per home.     Recent Covid pneumonia and hypoxia though hypoxia likely secondary to aspiration-we will continue dexamethasone as started from ER, as per pulm taper steroids. Now off of steroids. Anemia   Of chronic disease, monitor. Severe PCMN - on T feeds.                                             Code Status: DNRCCA , I have d/w dtr Brandy Gloria.    DVT prophylaxis: sc heparin        Electronically signed by Atul Bonilla MD on 1/18/2021 at 4:49 PM

## 2021-01-18 NOTE — CARE COORDINATION
Social work / Discharge Planning:       [address]. Plan is to return to [facility]. NO precert needed. N-17 and ambulance form completed.   Electronically signed by RADHA Cruz on 1/18/2021 at 10:17 AM

## 2021-01-19 NOTE — PROGRESS NOTES
[address] [place] [address] [facility]  Progress Note    CC: in bed- tired  Face to face encounter   SUBJECTIVE:  No new complaints today. Does not move much around in bed. Tolerating antibiotics. Medications:  Scheduled Meds:   midodrine  2.5 mg Oral BID WC    vitamin B-6  50 mg Oral Daily    linezolid  600 mg Per NG tube 2 times per day    sodium chloride flush  10 mL Intravenous 2 times per day    ascorbic acid  500 mg Oral Daily    aspirin  81 mg PEG Tube Daily    calcium-vitamin D  1 tablet Oral BID    carvedilol  3.125 mg PEG Tube BID WC    heparin (porcine)  5,000 Units Subcutaneous Q8H    magnesium oxide  400 mg Oral Daily    metoclopramide  5 mg PEG Tube BID AC    miconazole nitrate   Topical BID    multivitamin  1 tablet Oral Daily    tamsulosin  0.4 mg Oral Nightly    vitamin D3  400 Units Oral Daily    sodium chloride flush  10 mL Intravenous 2 times per day    albuterol-ipratropium  1 puff Inhalation 4x daily     Continuous Infusions:    PRN Meds:sodium chloride flush, sodium chloride flush, promethazine **OR** ondansetron, polyethylene glycol, acetaminophen **OR** acetaminophen, albuterol-ipratropium  OBJECTIVE:  Patient Vitals for the past 24 hrs:   BP Temp Temp src Pulse Resp SpO2 Height Weight   01/19/21 1215 83/60 97.2 °F (36.2 °C) Axillary 87 22 96 % -- --   01/19/21 0315 -- -- -- -- -- -- 5' 5\" (1.651 m) 104 lb 2 oz (47.2 kg)   01/18/21 2015 112/60 97.8 °F (36.6 °C) Axillary 80 22 92 % -- --   01/18/21 1715 (!) 113/58 96.3 °F (35.7 °C) Axillary 64 22 92 % -- --     Constitutional: The patient is lying in bed. He is asleep. No distress. Emaciated. Skin: Warm and dry. No rashes were noted. Has lesions to head area. Head: Eyes show round, and reactive pupils. No jaundice. Mouth: Moist mucous membranes, no ulcerations, no thrush. Neck: Supple to movements. No lymphadenopathy. Chest: No use of accessory muscles to breathe. Symmetrical expansion. Auscultation reveals crackles to right side. Diminished overall-  poor effort   Cardiovascular: S1 and S2 are rhythmic and regular. No murmurs appreciated. Abdomen: Positive bowel sounds to auscultation. Benign to palpation. PEG  Extremities: No edema. Muscle wasting. PIV- infiltrated to left forearm. Miguel- yellow urine     Laboratory and Tests Review:  Lab Results   Component Value Date    WBC 5.5 01/18/2021    WBC 7.8 01/16/2021    WBC 9.1 01/16/2021    HGB 8.5 (L) 01/18/2021    HCT 27.7 (L) 01/18/2021    .4 (H) 01/18/2021     01/18/2021     Lab Results   Component Value Date    NEUTROABS 6.73 01/16/2021    NEUTROABS 10.64 (H) 01/15/2021    NEUTROABS 12.69 (H) 01/14/2021     Lab Results   Component Value Date    CRP 9.2 (H) 01/16/2021    CRP 2.6 (H) 12/22/2020    CRP 7.1 (H) 10/05/2020     Lab Results   Component Value Date    SEDRATE 102 (H) 01/16/2021    SEDRATE 26 (H) 10/05/2020     Lab Results   Component Value Date    ALT 15 01/17/2021    AST 15 01/17/2021    ALKPHOS 68 01/17/2021    BILITOT 0.5 01/17/2021     Lab Results   Component Value Date     01/18/2021    K 4.4 01/18/2021    K 4.6 01/15/2021     01/18/2021    CO2 24 01/18/2021    BUN 19 01/18/2021    CREATININE 0.7 01/18/2021    GFRAA >60 01/18/2021    LABGLOM >60 01/18/2021    GLUCOSE 109 01/18/2021    GLUCOSE 95 03/11/2012    PROT 5.2 01/17/2021    LABALBU 2.3 01/17/2021    LABALBU 3.4 03/11/2012    CALCIUM 8.6 01/18/2021    BILITOT 0.5 01/17/2021    ALKPHOS 68 01/17/2021    AST 15 01/17/2021    ALT 15 01/17/2021     Radiology:  Reviewed     Microbiology:   Blood cultures 1/14/2021 MRSA in 2 of 2 sets  Blood cultures 1/16/2021: Negative so far  Urine culture 1/42/21: Negative  Respiratory culture 1/72/21: OP adri present. MRSA    ASSESSMENT:  · HCAP secondary to MRSA  · MRSA bacteremia secondary to HCAP, resolving.   Repeat blood cultures are negative so far  · Recent SARS- CoV-2  · Leukocytosis secondary to bacterial pneumonia    PLAN:  · Continue oral Linezolid and pyridoxine, day 3 of minimum 10  · On dexamethasone  · Check repeat blood cultures   · Monitor labs    Court Mcgregor  1/19/2021  3:10 PM

## 2021-01-19 NOTE — PROGRESS NOTES
[facility]  [address] [facility], [address], [address] [facility]      Pulmonary Progress Note      SUBJECTIVE:  Lying down in bed on 2 li NC, looking comfortable with respiratory function with minimal coughing (?) mild-mod sputum production (supposedly suctioning himself occasionally. Occasionally nodding to questions though unclear comprehension, hard of hearing.     OBJECTIVE    Medications    Continuous Infusions:      Scheduled Meds:   midodrine  2.5 mg Oral BID WC    vitamin B-6  50 mg Oral Daily    linezolid  600 mg Per NG tube 2 times per day    sodium chloride flush  10 mL Intravenous 2 times per day    ascorbic acid  500 mg Oral Daily    aspirin  81 mg PEG Tube Daily    calcium-vitamin D  1 tablet Oral BID    carvedilol  3.125 mg PEG Tube BID WC    heparin (porcine)  5,000 Units Subcutaneous Q8H    magnesium oxide  400 mg Oral Daily    metoclopramide  5 mg PEG Tube BID AC    miconazole nitrate   Topical BID    multivitamin  1 tablet Oral Daily    tamsulosin  0.4 mg Oral Nightly    vitamin D3  400 Units Oral Daily    sodium chloride flush  10 mL Intravenous 2 times per day    albuterol-ipratropium  1 puff Inhalation 4x daily       PRN Meds:sodium chloride flush, sodium chloride flush, promethazine **OR** ondansetron, polyethylene glycol, acetaminophen **OR** acetaminophen, albuterol-ipratropium    Physical    VITALS:  BP 83/60   Pulse 87   Temp 97.2 °F (36.2 °C) (Axillary)   Resp 22   Ht 5' 5\" (1.651 m)   Wt 104 lb 2 oz (47.2 kg)   SpO2 96%   BMI 17.33 kg/m²     24HR INTAKE/OUTPUT:      Intake/Output Summary (Last 24 hours) at 2021 1353  Last data filed at 2021 0524  Gross per 24 hour   Intake 1161 ml   Output 1775 ml   Net -614 ml       24HR PULSE OXIMETRY RANGE:    SpO2  Av.3 %  Min: 92 %  Max: 96 %    General appearance: appears stated age and cooperative  Lungs: rhonchi bilaterally  Heart: regular rate and rhythm, S1, S2 normal, no murmur, click, rub or gallop  Abdomen: (+) PEG  Extremities: extremities normal, atraumatic, no cyanosis or edema  Neurologic: Mental status: awake, occ nodding to questions, moving extremities minimally. Data    CBC:   Recent Labs     01/18/21  0230   WBC 5.5   HGB 8.5*   HCT 27.7*   .4*          BMP:  Recent Labs     01/17/21  1230 01/18/21  0230 01/18/21  1730   * 139 139   K 3.8 4.0 4.4   * 108* 110*   CO2 28 26 24   BUN 35* 27* 19   CREATININE 0.8 0.7 0.7    ALB:3,BILIDIR:3,BILITOT:3,ALKPHOS:3)@    PT/INR: No results for input(s): PROTIME, INR in the last 72 hours. ABG:   No results for input(s): PH, PO2, PCO2, HCO3, BE, O2SAT, METHB, O2HB, COHB, O2CON, HHB, THB in the last 72 hours. FiO2 : 50 %       Radiology/Other tests reviewed: none    Assessment:     Active Problems:    Severe protein-calorie malnutrition (HCC)    Acute hypoxemic respiratory failure due to COVID-19 (HCC)    Aspiration pneumonia (HCC)    Hypernatremia  Resolved Problems:    * No resolved hospital problems. *      Plan:       1. Cont with antibiotics as per ID  2. Cont with oxygen, taper as tolerated  3. Cont with nebs, observe respiratory function  4. Watch fluid balance, diurese as per PCP and Renal      Time at the bedside, reviewing labs and radiographs, reviewing notes and consultations, discussing with staff and family was more than 35 minutes. Thanks for letting us see this patient in consultation. Please contact us with any questions. Office [phone] or after hours through [employer], x [phone].

## 2021-01-19 NOTE — PROGRESS NOTES
appearing   Skin: warm and dry,   Head: normocephalic and atraumatic  Eyes: pupils equal, round, and reactive to light, extraocular eye movements intact, conjunctivae normal  Neck: supple and non-tender without mass  Pulmonary/Chest: diffuse bi basilar mild ronchii  Cardiovascular: normal rate, regular rhythm, normal S1 and S2, 2/6 LIVIA  Abdomen: soft, non-tender, non-distended, normal bowel sounds, no masses or organomegaly  Extremities: no cyanosis, clubbing  Musculoskeletal: normal range of motion  Neurologic:no cranial nerve deficit,  speech normal      Recent Labs     01/17/21  1230 01/18/21  0230 01/18/21  1730   * 139 139   K 3.8 4.0 4.4   * 108* 110*   CO2 28 26 24   BUN 35* 27* 19   CREATININE 0.8 0.7 0.7   GLUCOSE 86 114* 109*   CALCIUM 9.2 8.4* 8.6       Recent Labs     01/18/21  0230   WBC 5.5   RBC 2.58*   HGB 8.5*   HCT 27.7*   .4*   MCH 32.9   MCHC 30.7*   RDW 13.7      MPV 12.2*       Labs and images reviewed    Radiology:   XR CHEST PORTABLE   Final Result   1. Mixed improvement and worsening in bilateral multifocal pneumonia, as   described. 2.  Unchanged background chronic interstitial lung disease/COPD.   3.  Otherwise, no significant change. [facility] RECOMMENDATION:   1. Recommend continued follow-up thoracic imaging to resolution. .      XR CHEST PORTABLE   Final Result   Right lower lobe infiltrate, likely reflecting infectious pneumonia with   consideration to viral etiology. Left lower lobe atelectasis and/or   infiltrate          Assessment:    Active Problems:    Severe protein-calorie malnutrition (HCC)    Acute hypoxemic respiratory failure due to COVID-19 ([facility] [address] [address]    Aspiration pneumonia (HCC)    Hypernatremia  Resolved Problems:    * No resolved hospital problems.  *      Plan:  Acute hypoxic respiratory failure secondary to aspiration pneumonia-was on BiPAP , currently weaned to 3 L NC,continue to titrate as possible.      Aspiration pneumonia-was on IV Zosyn, nebulized treatments, continue other supportive treatment.   Continue abx as per ID. As per ID probably HCAP secondary to MRSA bacteremia-on Zyvox. Hypernatremia/dehydration - resolved , renal following & managing . IVF dc'd. As above T feeds accepted well . Dietician following. Take aspiration precaution. Wava Prim MRSA  Bacteremia - ID consulted  & following. As per ID HCAP probably sec to MRSA - on Linezolid as per ID .     CAD /combined systolic& diastolic CHF ,HPL - on BB  ,asa  as per home . Parameters to hold coreg added. Lasix on hold sec borderline BP. Will reinstate when possible.     Oropharyngeal dysphagia status post PEG- SLP consulted. TF  procceding well ,will take  aspiration precautions.     Hypothyroidism - on supplements as per home.     Recent Covid pneumonia and hypoxia though hypoxia likely secondary to aspiration-we will continue dexamethasone as started from ER, as per pulm taper steroids. Now off of steroids. Anemia   Of chronic disease, monitor. Severe PCMN - on T feeds.                                             Code Status: I have d/w dtr Marylou Conception.   And re address advance directives - he is [facility]. Will consult palliative care.     DVT prophylaxis: sc heparin        Electronically signed by Elena Palm MD on 1/19/2021 at 4:42 PM

## 2021-01-19 NOTE — PROGRESS NOTES
Comprehensive Nutrition Assessment    Type and Reason for Visit:  Reassess    Nutrition Recommendations/Plan: Continue NPO. Highly recommend to slowly increase EN to goal rate of 50 ml/hr (please see previous note for full EN recommendations) if medically appropriate (however increased risk for aspiration noted). If unable to tolerate EN at goal, would then highly recommend to consider placing G-J Tube to reduce risk for aspiration. If unable to tolerate EN in next ~1-2 days, would then recommend to consider PN support d/t minimal EN intake since adm. Nutrition Assessment:  Pt continues to decline from a nutritional stand-point AEB pt remains on EN feeds however only @ 10 ml/hr d/t aspiration precautions per EMR review. Remains at risk d/t Severe Malnutrition w/ suspected Asp PNE w/ COVID19+ and TBI/Dysphagia hx. Highly recommend to trial mild increase in EN feeding rate if medical appropriate. Malnutrition Assessment:  Malnutrition Status:  Severe malnutrition    Context:  Chronic Illness     Findings of the 6 clinical characteristics of malnutrition:  Energy Intake:  7 - 75% or less estimated energy requirements for 1 month or longer  Weight Loss:  7 - Greater than 10% over 6 months(~15% wt loss x ~7 months per EMR)     Body Fat Loss:  Unable to assess     Muscle Mass Loss:  Unable to assess    Fluid Accumulation:  No significant fluid accumulation     Strength:  Not Performed    Estimated Daily Nutrient Needs:  Energy (kcal):  6845-3453(30-35 kcals/kg per ABW); Weight Used for Energy Requirements:  Admission     Protein (g):  65-80(1.5-1.8 gm/kg per ABW); Weight Used for Protein Requirements:  Current        Fluid (ml/day):  7198-4017; Method Used for Fluid Requirements:  1 ml/kcal      Nutrition Related Findings:   AMSx3, poor dentition, Abd/BS WDL, +PEG, No Edema, -I/O's(BSE EDDY 2/2 BiPAP per SLP 1/15)      Wounds:  None       Current Nutrition Therapies:    Current Tube Feeding (TF) Orders:  · Feeding Route: PEG  · Formula: 1.5 Calorie with Fiber  · Schedule: Continuous  · Additives/Modulars: (none)  · Water Flushes: 100 ml q 4 hrs= 600 ml total flush  · Current TF & Flush Orders Provides: Same as Goal  · Goal TF & Flush Orders Provides: 360 kcals, 15 gm Pro, 782 ml total water (TF+Flush). Anthropometric Measures:  · Height: 5' 5\" (165.1 cm)  · Current Body Weight: 104 lb (47.2 kg)(bed 1/19)   · Admission Body Weight: 96 lb (43.5 kg)(bed 1/14)    · Usual Body Weight: 113 lb (51.3 kg)(actual 6/3/20 per EMR, although CHF hx noted w/ possible fluid shifts, pt w/ suspected actual wt loss, ~15% x ~7 months per EMR)     · Ideal Body Weight: 136 lbs; % Ideal Body Weight     · BMI: 17.3  · Adjusted Body Weight:  ; No Adjustment   · Adjusted BMI:      · BMI Categories: Underweight (BMI less than 22) age over 72       Nutrition Diagnosis:   · Severe malnutrition, In context of chronic illness related to cognitive or neurological impairment(2/2 TBI/Dysphagia hx w/ COVID19+) as evidenced by poor intake prior to admission, weight loss, weight loss greater than or equal to 10% in 6 months      Nutrition Interventions:   Food and/or Nutrient Delivery:  Continue NPO, Modify Tube Feeding(Continue NPO. Highly Recommend to slowly increase EN to goal rate of 50 ml/hr if medically appropriate.   If unable to tolerate EN at goal, would then highly recommend to consider placing G-J Tube to reduce risk for aspiration.)  Nutrition Education/Counseling:  No recommendation at this time   Coordination of Nutrition Care:  Continue to monitor while inpatient    Goals:  Pt to tolerate EN at goal.       Nutrition Monitoring and Evaluation:   Behavioral-Environmental Outcomes:  None Identified   Food/Nutrient Intake Outcomes:  Enteral Nutrition Intake/Tolerance  Physical Signs/Symptoms Outcomes:  Biochemical Data, GI Status, Nausea or Vomiting, Fluid Status or Edema, Nutrition Focused Physical Findings, Skin, Weight Discharge Planning:     Too soon to determine     Electronically signed by Je Charles RD, LD on 1/19/21 at 3:32 PM EST    Contact: ext [phone]

## 2021-01-19 NOTE — PROGRESS NOTES
Nephrology Progress Note  [facility]      HPI:  From consult 1/16/21-   Adelaide Prader is a 80year old male we were asked to see regarding hypernatremia. He has been NPO and had a TF at the [facility] but with a rate of 16 cc/hr. He had been found with emesis of TF and likely aspiration and was sent to the ED. He is not personally examined due to Covid + status. Discussed with LIZBETH Mota. Just started TF at 6 (six) cc/hr with free water 50 cc Q 4 hours. His baseline creatinine is 0.7 from 12/30/2020.    1/19/21- Patient not seen in person due to +COVID. Seen through window of room, resting in bed, very cachectic.  Updates received from nursing, on 3L NC    PMH:    Past Medical History:   Diagnosis Date    CAD (coronary artery disease)     Cardiomyopathy ([address]     CHF (congestive heart failure) (HCC)     DJD (degenerative joint disease) 3/10/2012    Hyperlipidemia     Hypothyroid     Hypothyroid     Myocardial infarction acute ([address] 3/12/12    BMS to LAD       Patient Active Problem List   Diagnosis    Acute transmural anterior wall MI ([address]    DJD (degenerative joint disease)    Hyperlipidemia    Hypothyroid    CAD (coronary artery disease)    Cardiomyopathy ([address]    CHF (congestive heart failure) (HCC)    Myocardial infarction acute ([address]    Syncope and collapse    Closed fracture of pelvis (HCC)    Orthostatic hypotension    Closed right hip fracture, with routine healing, subsequent encounter    Traumatic brain injury ([address]    Anemia    Bone lesion    Age-related physical debility    Pneumonia due to organism    Severe protein-calorie malnutrition ([address]    COVID-19 virus detected    Acute respiratory failure with hypoxia ([address]    Pneumonia due to COVID-19 virus    Hypotension due to hypovolemia    Acute hypoxemic respiratory failure due to COVID-19 (HCC)    Aspiration pneumonia (HCC)    Hypernatremia       Meds:     midodrine  2.5 mg Oral BID WC    vitamin B-6  50 mg Oral Daily    ml     PE not done due to covid +     Data:    Recent Labs     01/16/21  1250 01/18/21  0230   WBC 7.8 5.5   HGB 9.4* 8.5*   HCT 32.1* 27.7*   .8* 107.4*    153       Recent Labs     01/17/21  1230 01/18/21  0230 01/18/21  1730   * 139 139   K 3.8 4.0 4.4   * 108* 110*   CO2 28 26 24   CREATININE 0.8 0.7 0.7   BUN 35* 27* 19   LABGLOM >60 >60 >60   GLUCOSE 86 114* 109*   CALCIUM 9.2 8.4* 8.6       Vit D, 25-Hydroxy   Date Value Ref Range Status   05/28/2020 47 30 - 100 ng/mL Final     Comment:     <20 ng/mL. ........... [place] Fallow Deficient  20-30 ng/mL. ......... [place] Fallow Insufficient   ng/mL. ........ Teddy Fallow Sufficient  >100 ng/mL. .......... Teddy Fallow Toxic         No results found for: PTH    Recent Labs     01/17/21  0150   ALT 15   AST 15   ALKPHOS 68   BILITOT 0.5       Recent Labs     01/17/21  0150   LABALBU 2.3*       Ferritin   Date Value Ref Range Status   01/18/2021 916 ng/mL Final     Comment:     FERRITIN Reference Ranges:  Adult Males   20 - 60 years:    30 - 400 ng/mL  Adult females 16 - 61 years:    15 - 150 ng/mL  Adults greater than 60 years:   no established reference range  Pediatrics:                     no established reference range       Iron   Date Value Ref Range Status   01/18/2021 51 (L) 59 - 158 mcg/dL Final     TIBC   Date Value Ref Range Status   01/18/2021 121 (L) 250 - 450 mcg/dL Final       Vitamin B-12   Date Value Ref Range Status   01/18/2021 1309 (H) 211 - 946 pg/mL Final       Folate   Date Value Ref Range Status   01/18/2021 17.5 4.8 - 24.2 ng/mL Final         Lab Results   Component Value Date    COLORU Yellow 01/14/2021    NITRU Negative 01/14/2021    GLUCOSEU Negative 01/14/2021    KETUA Negative 01/14/2021    UROBILINOGEN 0.2 01/14/2021    BILIRUBINUR Negative 01/14/2021       No results found for: JUAN, CREURRAN, MACREATRATIO, OSMOU    No components found for: URIC    No results found for: LIPIDPAN      Assessment and Plan:    1.  Hypernatremia-   Secondary to increased incentive losses from pneumonia and decreased access to free water, due to TF and AMS  Na 156-->153-->149-->139  Cl 124-->120-->119-->110  TF  at 10 cc/hr with free water 100 cc Q 4 hours. Rate at [facility] noted to be only 16 cc/hr. 2. Acute kidney injury-  Due to decreased effective renal perfusion with FeNa <1% and urine Cl<20   Baseline creatinine 0.7 from 12/30/2020  Creatinine 1.5-->1.1-->1.0-->0.9-->0.7  UO 2975mL in last 24 hours  IVF now off, DEBORAH resolved    3. Anemia-   Secondary to recent hospitalizations, inflammatory disease from Covid and pneumonia  Ferritin 916, iron sat 42%, Folate 17.5, B12 1309    4. Malnutrition  TF currently running at 10mL/hr and max dose is 16ml/hr at the [facility] which explains the severe malnutrition with BMI 16  consider titrating TF to a higher rate than 16ml/hr    Thank you for allowing us to participate in [facility] care    SAHRA Brooks - NP   Pt remains in COVID Isolation -No new issues  All Labs reviewed  A/P:  1. Hypernatremia-Na+ down to 139    2. Stage I DEBORAH-resolved-Electrolytes and Acid-base compensated    Little else to add from a renal perspective-will sign off  Thank you for allowing our service to participate in   Diane moira Zavala MD

## 2021-01-19 NOTE — PLAN OF CARE
Problem: Malnutrition  (NI-5.2)  Goal: Food and/or Nutrient Delivery  Continue NPO. Highly Recommend to slowly increase EN to goal rate of 50 ml/hr if medically appropriate. If unable to tolerate EN at goal, would then highly recommend to consider placing G-J Tube to reduce risk for aspiration. Description: Individualized approach for food/nutrient provision.   Outcome: Met This Shift

## 2021-01-19 NOTE — CARE COORDINATION
Social work / Discharge planning:       [address] Discharge plan is to return to [facility]. No precert needed. N-17 and ambulance form completed.   Electronically signed by RADHA Green on 1/19/2021 at 8:22 AM

## 2021-01-20 NOTE — CARE COORDINATION
Social work / Discharge Planning:        [address]. Discharge plan is to return to [facility]. No precert needed. N-17 and ambulance form completed.     Electronically signed by RADHA Stapleton on 1/20/2021 at 10:03 AM

## 2021-01-20 NOTE — CONSULTS
Palliative Care Department  [phone]  Palliative Care Initial Consult  Provider Earl Louise MD      PATIENT: Vu Mckenzie  : 7/3/1927  MRN: [id number]  ADMISSION DATE: 2021  9:52 AM  Referring Provider: Cassandra Dacosta MD    Palliative Medicine was consulted on hospital day 6 for assistance with Family support    HPI:     Javier Suarez is a 80 y.o. y/o male with a history of being admitted on 2020 with COVID-19 pneumonia and was discharged to [facility]. He completed remdesivir course. He was sent to ER for evaluation because he was noted to have tube feeds coming out of his mouth, he aspirated and also was hypoxic, poorly responsive. who presented to [facility]) on 2021 with shortness of breath due to aspiration. ASSESSMENT/PLAN:     Pertinent Hospital Diagnoses      Acute hypoxic respiratory failure due to Covid pneumonia    Palliative Care Encounter / Counseling Regarding Goals of Care  Please see detailed goals of care discussion as below   At this time, Vu Mckenzie, Does Not have capacity for medical decision-making. Capacity is time limited and situation/question specific   Outcome of goals of care meeting:   Code status DNR-CC   Advanced Directives: no POA or living will in epic   Surrogate/Legal NOK:   Jeannie Richmond, [employer], [facility], Daughter [phone]    Thank you for the opportunity to participate in the care of Vu Mckenzie. Earl Louise MD  Palliative Medicine     SUBJECTIVE:     Details of Conversation: Erika Blas through the window of his room. He was asleep, spoke to his neighbor Beatriz Almonte. He told me that he is healthcare power of , and daughter is financial power of . Gave her on an update about Wiliam Bruneian condition. He appreciated, asked a few questions. Spoke to Norah Molina patient's daughter, gave her an update. She also asked some questions about his condition and how he is.   Gave them both support, and comfort. Prognosis: Poor    OBJECTIVE:     BP (!) 86/51   Pulse 78   Temp 96.7 °F (35.9 °C) (Axillary)   Resp 20   Ht 5' 5\" (1.651 m)   Wt 100 lb 6.4 oz (45.5 kg)   SpO2 97%   BMI 16.71 kg/m²     Physical Examination:  Due to the current efforts to prevent transmission of COVID-19 and also the need to preserve PPE for other caregivers, a face-to-face encounter with the patient was not performed. That being said, all relevant records and diagnostic tests were reviewed, including laboratory results and imaging. Please reference any relevant documentation elsewhere. Care will be coordinated with the primary service and other specialties as appropriate. Objective data reviewed: labs, images, records, medication use, vitals and chart    Time/Communication  Greater than 50% of time spent, total 35 minutes in counseling and coordination of care at the bedside regarding Family support. Thank you for allowing Palliative Medicine to participate in the care of Serge Cap. Note: This report was completed using computerDimeres voiced recognition software. Every effort has been made to ensure accuracy; however, inadvertent computerized transcription errors may be present.

## 2021-01-20 NOTE — PROGRESS NOTES
[facility] [address] [facility]  [address] [facility], [address]  [facility], [place], [address] [facility]      Pulmonary Progress Note      SUBJECTIVE:  Lying down in bed on 2-3 li NC, looking comfortable with respiratory function with minimal coughing (?) mild-mod sputum production. Occasionally nodding to questions though unclear comprehension, hard of hearing, sleepy.     OBJECTIVE    Medications    Continuous Infusions:      Scheduled Meds:   midodrine  2.5 mg Oral BID WC    vitamin B-6  50 mg Oral Daily    linezolid  600 mg Per NG tube 2 times per day    sodium chloride flush  10 mL Intravenous 2 times per day    ascorbic acid  500 mg Oral Daily    aspirin  81 mg PEG Tube Daily    calcium-vitamin D  1 tablet Oral BID    carvedilol  3.125 mg PEG Tube BID WC    heparin (porcine)  5,000 Units Subcutaneous Q8H    magnesium oxide  400 mg Oral Daily    metoclopramide  5 mg PEG Tube BID AC    miconazole nitrate   Topical BID    multivitamin  1 tablet Oral Daily    tamsulosin  0.4 mg Oral Nightly    vitamin D3  400 Units Oral Daily    sodium chloride flush  10 mL Intravenous 2 times per day    albuterol-ipratropium  1 puff Inhalation 4x daily       PRN Meds:sodium chloride flush, sodium chloride flush, promethazine **OR** ondansetron, polyethylene glycol, acetaminophen **OR** acetaminophen, albuterol-ipratropium    Physical    VITALS:  BP (!) 86/51   Pulse 78   Temp 96.7 °F (35.9 °C) (Axillary)   Resp 20   Ht 5' 5\" (1.651 m)   Wt 100 lb 6.4 oz (45.5 kg)   SpO2 97%   BMI 16.71 kg/m²     24HR INTAKE/OUTPUT:      Intake/Output Summary (Last 24 hours) at 2021 1617  Last data filed at 2021 0853  Gross per 24 hour   Intake 10 ml   Output 700 ml   Net -690 ml       24HR PULSE OXIMETRY RANGE:    SpO2  Av.6 %  Min: 94 %  Max: 97 %    General appearance: appears stated age and cooperative  Lungs: rhonchi bilaterally  Heart: regular rate and rhythm, S1, S2 normal, no murmur, click, rub or gallop  Abdomen: (+) PEG  Extremities: extremities normal, atraumatic, no cyanosis or edema  Neurologic: Mental status: awake, occ nodding to questions, moving extremities minimally. Data    CBC:   Recent Labs     01/18/21  0230   WBC 5.5   HGB 8.5*   HCT 27.7*   .4*          BMP:  Recent Labs     01/18/21  0230 01/18/21  1730 01/20/21  0500    139 140   K 4.0 4.4 3.8   * 110* 106   CO2 26 24 28   BUN 27* 19 17   CREATININE 0.7 0.7 0.8    ALB:3,BILIDIR:3,BILITOT:3,ALKPHOS:3)@    PT/INR: No results for input(s): PROTIME, INR in the last 72 hours. ABG:   No results for input(s): PH, PO2, PCO2, HCO3, BE, O2SAT, METHB, O2HB, COHB, O2CON, HHB, THB in the last 72 hours. FiO2 : 50 %       Radiology/Other tests reviewed: none    Assessment:     Active Problems:    Severe protein-calorie malnutrition (HCC)    Acute hypoxemic respiratory failure due to COVID-19 (HCC)    Aspiration pneumonia (HCC)    Hypernatremia  Resolved Problems:    * No resolved hospital problems. *      Plan:       1. Cont with antibiotics as per ID, CXR tomorrow  2. Cont with oxygen, taper as tolerated  3. Cont with nebs, observe respiratory function  4. Watch fluid balance, diurese as per PCP and Renal      Time at the bedside, reviewing labs and radiographs, reviewing notes and consultations, discussing with staff and family was more than 35 minutes. Thanks for letting us see this patient in consultation. Please contact us with any questions. Office [phone] or after hours through adQ, x [phone].

## 2021-01-20 NOTE — PROGRESS NOTES
[address] [place] [address] [facility]  Progress Note    CC: in bed- tired  Face to face encounter   SUBJECTIVE:  The patient is tolerating antibiotics. No nausea or vomiting. No dyspnea. Minimal cough. Medications:  Scheduled Meds:   midodrine  2.5 mg Oral BID WC    vitamin B-6  50 mg Oral Daily    linezolid  600 mg Per NG tube 2 times per day    sodium chloride flush  10 mL Intravenous 2 times per day    ascorbic acid  500 mg Oral Daily    aspirin  81 mg PEG Tube Daily    calcium-vitamin D  1 tablet Oral BID    carvedilol  3.125 mg PEG Tube BID WC    heparin (porcine)  5,000 Units Subcutaneous Q8H    magnesium oxide  400 mg Oral Daily    metoclopramide  5 mg PEG Tube BID AC    miconazole nitrate   Topical BID    multivitamin  1 tablet Oral Daily    tamsulosin  0.4 mg Oral Nightly    vitamin D3  400 Units Oral Daily    sodium chloride flush  10 mL Intravenous 2 times per day    albuterol-ipratropium  1 puff Inhalation 4x daily     Continuous Infusions:    PRN Meds:sodium chloride flush, sodium chloride flush, promethazine **OR** ondansetron, polyethylene glycol, acetaminophen **OR** acetaminophen, albuterol-ipratropium  OBJECTIVE:  Patient Vitals for the past 24 hrs:   BP Temp Temp src Pulse Resp SpO2 Weight   01/20/21 0821 (!) 86/51 96.7 °F (35.9 °C) Axillary 78 20 97 % --   01/20/21 0445 (!) 90/50 98.6 °F (37 °C) Axillary 85 22 95 % 100 lb 6.4 oz (45.5 kg)   01/20/21 0138 -- -- -- -- -- 94 % --   01/19/21 2042 116/60 98.3 °F (36.8 °C) Axillary 71 24 97 % --   01/19/21 1830 (!) 118/59 96.8 °F (36 °C) Axillary 74 22 95 % --     Constitutional: The patient is lying in bed. He is asleep. No distress. Emaciated. Skin: Warm and dry. No rashes were noted. Has lesions to head area. Head: Eyes show round, and reactive pupils. No jaundice. Mouth: Moist mucous membranes, no ulcerations, no thrush. Neck: Supple to movements. Chest: No respiratory distress. Good breath sounds. Scattered rhonchi. Cardiovascular: S1 and S2 are rhythmic and regular. No murmurs appreciated. Abdomen: Positive bowel sounds to auscultation. Benign to palpation. PEG  Extremities: No edema. Muscle wasting. Peripheral IV. Miguel- yellow urine     Laboratory and Tests Review:  Lab Results   Component Value Date    WBC 5.5 01/18/2021    WBC 7.8 01/16/2021    WBC 9.1 01/16/2021    HGB 8.5 (L) 01/18/2021    HCT 27.7 (L) 01/18/2021    .4 (H) 01/18/2021     01/18/2021     Lab Results   Component Value Date    NEUTROABS 6.73 01/16/2021    NEUTROABS 10.64 (H) 01/15/2021    NEUTROABS 12.69 (H) 01/14/2021     Lab Results   Component Value Date    CRP 9.2 (H) 01/16/2021    CRP 2.6 (H) 12/22/2020    CRP 7.1 (H) 10/05/2020     Lab Results   Component Value Date    SEDRATE 102 (H) 01/16/2021    SEDRATE 26 (H) 10/05/2020     Lab Results   Component Value Date    ALT 15 01/17/2021    AST 15 01/17/2021    ALKPHOS 68 01/17/2021    BILITOT 0.5 01/17/2021     Lab Results   Component Value Date     01/20/2021    K 3.8 01/20/2021    K 4.6 01/15/2021     01/20/2021    CO2 28 01/20/2021    BUN 17 01/20/2021    CREATININE 0.8 01/20/2021    GFRAA >60 01/20/2021    LABGLOM >60 01/20/2021    GLUCOSE 88 01/20/2021    GLUCOSE 95 03/11/2012    PROT 5.2 01/17/2021    LABALBU 2.3 01/17/2021    LABALBU 3.4 03/11/2012    CALCIUM 8.5 01/20/2021    BILITOT 0.5 01/17/2021    ALKPHOS 68 01/17/2021    AST 15 01/17/2021    ALT 15 01/17/2021     Radiology:  Reviewed     Microbiology:   Blood cultures 1/14/2021 MRSA in 2 of 2 sets  Blood cultures 1/16/2021: Negative so far  Urine culture 1/42/21: Negative  Respiratory culture 1/72/21: OP adri present. MRSA    ASSESSMENT:  · HCAP secondary to MRSA  · MRSA bacteremia secondary to HCAP, resolving.   Repeat blood cultures are negative so far  · Recent SARS- CoV-2  · Leukocytosis secondary to bacterial pneumonia    PLAN:  · Continue oral Linezolid and pyridoxine, day 4 of minimum

## 2021-01-20 NOTE — PROGRESS NOTES
[facility] Hospitalist   Progress Note    Admitting Date and Time: 1/14/2021  9:52 AM  Admit Dx: Acute hypoxemic respiratory failure due to COVID-19 ([facility] [U07.1, J96.01]     Seen for follow on multiple problems as listed below    Subjective:    Frail-appearing, weak, requires 3 L nasal cannula, blood pressure low 86/51, on chest x-ray worsening of bilateral multifocal infiltrate due to recurrent aspiration, has cardiac history, and Coreg/Lasix on hold. With this limited choices available. We will try to discuss with daughter for hospice care. Per se he denies any chest pain nausea vomiting or abdominal pain. Short of breath persists. ROS: denies fever, chills, cp,, n/v, HA unless stated above.      midodrine  2.5 mg Oral BID WC    vitamin B-6  50 mg Oral Daily    linezolid  600 mg Per NG tube 2 times per day    sodium chloride flush  10 mL Intravenous 2 times per day    ascorbic acid  500 mg Oral Daily    aspirin  81 mg PEG Tube Daily    calcium-vitamin D  1 tablet Oral BID    carvedilol  3.125 mg PEG Tube BID WC    heparin (porcine)  5,000 Units Subcutaneous Q8H    magnesium oxide  400 mg Oral Daily    metoclopramide  5 mg PEG Tube BID AC    miconazole nitrate   Topical BID    multivitamin  1 tablet Oral Daily    tamsulosin  0.4 mg Oral Nightly    vitamin D3  400 Units Oral Daily    sodium chloride flush  10 mL Intravenous 2 times per day    albuterol-ipratropium  1 puff Inhalation 4x daily         sodium chloride flush, 10 mL, PRN      sodium chloride flush, 10 mL, PRN      promethazine, 12.5 mg, Q6H PRN    Or      ondansetron, 4 mg, Q6H PRN      polyethylene glycol, 17 g, Daily PRN      acetaminophen, 650 mg, Q6H PRN    Or      acetaminophen, 650 mg, Q6H PRN      albuterol-ipratropium, 1 puff, Q4H PRN         Objective:    BP (!) 86/51   Pulse 78   Temp 96.7 °F (35.9 °C) (Axillary)   Resp 20   Ht 5' 5\" (1.651 m)   Wt 100 lb 6.4 oz (45.5 kg)   SpO2 97%   BMI 16.71 kg/m²   General Appearance: alert and oriented to himself & person , forgetful,, in no acute distress, frail  & cachectic appearing   Skin: warm and dry,   Head: normocephalic and atraumatic  Eyes: pupils equal, round, and reactive to light, extraocular eye movements intact, conjunctivae normal  Neck: supple and non-tender without mass  Pulmonary/Chest: diffuse bi basilar mild ronchii  Cardiovascular: normal rate, regular rhythm, normal S1 and S2, 2/6 LIVIA  Abdomen: soft, non-tender, non-distended, normal bowel sounds, no masses or organomegaly  Extremities: no cyanosis, clubbing  Musculoskeletal: normal range of motion  Neurologic:no cranial nerve deficit,  speech normal      Recent Labs     01/18/21  0230 01/18/21  1730 01/20/21  0500    139 140   K 4.0 4.4 3.8   * 110* 106   CO2 26 24 28   BUN 27* 19 17   CREATININE 0.7 0.7 0.8   GLUCOSE 114* 109* 88   CALCIUM 8.4* 8.6 8.5*       Recent Labs     01/18/21  0230   WBC 5.5   RBC 2.58*   HGB 8.5*   HCT 27.7*   .4*   MCH 32.9   MCHC 30.7*   RDW 13.7      MPV 12.2*       Labs and images reviewed    Radiology:   XR CHEST PORTABLE   Final Result   1. Mixed improvement and worsening in bilateral multifocal pneumonia, as   described. 2.  Unchanged background chronic interstitial lung disease/COPD.   3.  Otherwise, no significant change. Nurys Marrero RECOMMENDATION:   1. Recommend continued follow-up thoracic imaging to resolution. .      XR CHEST PORTABLE   Final Result   Right lower lobe infiltrate, likely reflecting infectious pneumonia with   consideration to viral etiology. Left lower lobe atelectasis and/or   infiltrate      XR CHEST PORTABLE    (Results Pending)       Assessment:    Active Problems:    Severe protein-calorie malnutrition (HCC)    Acute hypoxemic respiratory failure due to COVID-19 ([facility] [address]    Aspiration pneumonia (HCC)    Hypernatremia  Resolved Problems:    * No resolved hospital problems.  *      Plan:  Acute hypoxic respiratory failure secondary to aspiration pneumonia-was on BiPAP , currently weaned to 3 L NC,continue to titrate as possible.      Aspiration pneumonia-was on IV Zosyn, nebulized treatments, continue other supportive treatment.   Continue abx as per ID. As per ID probably HCAP secondary to MRSA bacteremia-on Zyvox. Hypernatremia/dehydration - resolved , renal following & managing . IVF dc'd. As above T feeds accepted well . Dietician following. Take aspiration precaution. MRSA  Bacteremia - ID consulted  & following. As per ID HCAP probably sec to MRSA - on Linezolid as per ID .     CAD /combined systolic& diastolic CHF ,HPL - on BB  ,asa  as per home . Parameters to hold coreg added. Lasix on hold sec borderline BP. Will reinstate when possible. As per renal midodrine is added.     Oropharyngeal dysphagia status post PEG- SLP consulted. TF  procceding well ,will take  aspiration precautions.     Hypothyroidism - on supplements as per home.     Recent Covid pneumonia and hypoxia though hypoxia likely secondary to aspiration-we will continue dexamethasone as started from ER, as per pulm taper steroids. Now off of steroids. Anemia   Of chronic disease, monitor. Severe PCMN - on T feeds.                                             Code Status: I have d/w dtr Sharyle Cons.   And re address advance directives - he is [facility]. Palliative care is consulted. Today she agreed for Hospice evaluation. If possible we can take him to NH with hospice care. Bedside nurse updated.     DVT prophylaxis: sc heparin        Electronically signed by Sherri Carlos MD on 1/20/2021 at 4:54 PM

## 2021-01-21 NOTE — PROGRESS NOTES
[facility] [place]    Liaison Information Visit Note              Patient Name: Cong Nino   :  7/3/1927  MRN:  [id number]    [facility] date:  2021    Admitted from: [address] Physician:  Daria Ring MD   PCP:  Carla Nunez MD      Primary Insurance: Payor: MEDICARE /  /  /    Secondary Insurance:  [employer]    Emergency Contact:      Contact/Relation:   /         Phone:       Contact/Relation:   /     Phone:     Advance Directive  Advance directives received Yes  Patient has completed an advance directive  and Patient has a documented healthcare surrogate  Discussed with: Family member  DPOA-HC Name-Relation:    Phone:       Terminal Diagnosis Aspiration pneumonia due to dysphagia; severe protein malnutrition as confirmed by Dr. Daria Ring Problem List:   Patient Active Problem List   Diagnosis Code    Acute transmural anterior wall MI ([address] I21.09    DJD (degenerative joint disease) M19.90    Hyperlipidemia E78.5    Hypothyroid E03.9    CAD (coronary artery disease) I25.10    Cardiomyopathy ([address] I42.9    CHF (congestive heart failure) ([address] I50.9    Myocardial infarction acute ([address] I21.9    Syncope and collapse R55    Closed fracture of pelvis ([address] S32. 9XXA    Orthostatic hypotension I95.1    Closed right hip fracture, with routine healing, subsequent encounter S72.001D    Traumatic brain injury ([address] S06. 9X9A    Anemia D64.9    Bone lesion M89.9    Age-related physical debility R54    Pneumonia due to organism J18.9    Severe protein-calorie malnutrition ([address] E43    COVID-19 virus detected U07.1    Acute respiratory failure with hypoxia (HCC) J96.01    Pneumonia due to COVID-19 virus U07.1, J12.82    Hypotension due to hypovolemia I95.89, E86.1    Acute hypoxemic respiratory failure due to COVID-19 (HCC) U07.1, J96.01    Aspiration pneumonia (HCC) J69.0    Hypernatremia E87.0       Code Status Order: DNR-CC Past Medical History:        Diagnosis Date    CAD (coronary artery disease)     Cardiomyopathy [facility]     CH[facility]congestive heart failure) [facility]     DJD (degenerative joint disease) 3/10/2012    Hyperlipidemia     Hypothyroid     Hypothyroid     Myocardial infarction acute ([facility] 3/12/12    BMS to LAD     Past Surgical History:        Procedure Laterality Date    CORONARY ANGIOPLASTY WITH STENT PLACEMENT  03/09/2012    Dr Fifi Villagran:  3.5x22 stent to mid LAD  EF=20%    GASTROSTOMY TUBE PLACEMENT N/A 10/7/2020    EGD PEG TUBE PLACEMENT performed by Harsh Quintana MD at [address] [place] [address]         Allergies:  Quinolones and Septra [bactrim]    Family Goal: Comfort Care    Meeting held with Patient, Daryle Skillern, and Daughter, Noemi Woods    The hospice benefit and philosophy were explained including that hospice is end of life care in which, per Medicare, a patient has a terminal diagnosis that life expectancy would be 6 months or less. Hospice care is a service that is covered by most insurance plans. The following levels of hospice care were discussed including, routine level of hospice care at private home or facility, which patient/family is responsible for any room and board fees at the facility, and general in patient level of care (GIP) at the [facility] for short term symptom management. Per Medicare guidelines, a patient is considered appropriate for GIP if they have uncontrolled symptoms such as pain, agitation, labored breathing or nausea/vomiting. Once symptoms become managed, the patient would need to be moved to a lower level of care such as home with hospice, ECF with hospice or the Transition program.  [facility] transition program also explained which is routine hospice care provided at the [facility] instead of an ECF or home. The transition program is private pay $300/day for room/board.   Room/board for the transition program is not covered by Medicaid as would be in an ECF. Family informed that with the routine level of care at home or ECF,  the hospice team consists of the RN who visits 1-3 times a week, a  who visits within the first five days of the hospice election, the personal care team who visit 1-3 times a week, non-medical volunteers and Chaplains. Explained that at home in routine level of care, familles are responsible for the 24 hour care. Discussed that under hospice care patient would not receive chemotherapy, radiation, immune therapy, IV antibiotics, dialysis or blood transfusions. Explained that once in hospice care, all aggressive treatments would be stopped and allow nature to takes its course with focus on comfort care for the patient. Placed a call to [facility]. He informed me daughter Liz Fitzgerald would be the one this nurse should discuss hospice with. Called [facility]. Reviewed hospice services. Liz Fitzgerald would like her father to return to [facility] with hospice services, if he would be agreeable. This nurse met with patient, Luis Rand at his bedside. Liz Fitzgerald was on the phone. After some discussion Luis Rand was understanding he was not strong enough to return home and would need to go to [facility]. Updated bedside nurse and case management. Nakia in case management set up patient for discharge at 2200 this evening. Intake updated and will obtain consents from daughter and admit tomorrow. Spoke with [facility]. No equipment needed at this time. Dr. Ruthy Temple is the medical director. Discharge Plan:  Discharge Disposition; ECF with hospice  Facility Name: [facility] plan:  1. Discharge to [facility] today and admit tomorrow into hospice  2. Please call Jose Chairez [phone] with any questions. 3. Patient not currently under the care of hospice.     Electronically signed by Darryl Mittal RN on 1/21/2021 at 4:13 PM

## 2021-01-21 NOTE — DISCHARGE SUMMARY
[facility] Physician Discharge Summary       [place] [address]  [phone]          Activity level: As tolerated     Dispo: To ECF with hospice    Condition on discharge: Stable     Patient ID:  Yady Chang  [id number]  44 y.o.  7/3/1927    Admit date: 1/14/2021    Discharge date and time:  1/21/2021  5:53 PM    Admission Diagnoses: Active Problems:    Severe protein-calorie malnutrition ([address]    Acute hypoxemic respiratory failure due to COVID-19 (HCC)    Aspiration pneumonia (HCC)    Hypernatremia  Resolved Problems:    * No resolved hospital problems. *      Discharge Diagnoses: Active Problems:    Severe protein-calorie malnutrition ([address]    Acute hypoxemic respiratory failure due to COVID-19 (HCC)    Aspiration pneumonia (HCC)    Hypernatremia  Resolved Problems:    * No resolved hospital problems. *      Consults:  IP CONSULT TO PULMONOLOGY  IP CONSULT TO IV TEAM  IP CONSULT TO NEPHROLOGY  IP CONSULT TO INFECTIOUS DISEASES  IP CONSULT TO IV TEAM  IP CONSULT TO IV TEAM  IP CONSULT TO PALLIATIVE CARE  IP CONSULT TO HOSPICE    Procedures: none    Hospital Course:   Patient Yady Chang is a 80 y.o. presented with Acute hypoxemic respiratory failure due to COVID-19 ([address] [U07.1, J96.01]  Patient was admitted on 1/14/2021 for healthcare associated pneumonia secondary to MRSA. Infectious disease was consulted and the patient was treated for MRSA with oral linezolid and pyridoxine. Patient should be treated for a minimum of 10 days. Oxygen has been weaned to 3 L. Patient tolerating tube feeds fairly. Patient will continue to be followed by hospice care and be changed to DNR CC upon discharge to [facility].     Discharge Exam:    General Appearance: alert and oriented to person, place and time and in no acute distress  Skin: warm and dry  Head: normocephalic and atraumatic  Eyes: pupils equal, round, and reactive to light, extraocular eye movements intact, conjunctivae normal  Neck: neck supple and non tender without mass   Pulmonary/Chest: clear to auscultation bilaterally- no wheezes, rales or rhonchi, normal air movement, no respiratory distress  Cardiovascular: normal rate, normal S1 and S2 and no carotid bruits  Abdomen: soft, non-tender, non-distended, normal bowel sounds, no masses or organomegaly  Extremities: no cyanosis, no clubbing and no edema  Neurologic: no cranial nerve deficit and speech normal    I/O last 3 completed shifts: In: 643 [I.V.:20; NG/GT:623]  Out: 325 [Urine:325]  No intake/output data recorded. LABS:  Recent Labs     01/20/21  0500 01/21/21  0438    137   K 3.8 3.9    107   CO2 28 26   BUN 17 16   CREATININE 0.8 0.7   GLUCOSE 88 83   CALCIUM 8.5* 8.5*       Recent Labs     01/21/21  0438   WBC 6.5   RBC 3.09*   HGB 10.1*   HCT 32.2*   .2*   MCH 32.7   MCHC 31.4*   RDW 14.5      MPV 10.8       No results for input(s): POCGLU in the last 72 hours. Imaging:  Xr Chest Portable    Result Date: 1/14/2021  EXAMINATION: ONE XRAY VIEW OF THE CHEST 1/14/2021 10:30 am COMPARISON: December 27, 2020. HISTORY: ORDERING SYSTEM PROVIDED HISTORY: SOB TECHNOLOGIST PROVIDED HISTORY: Reason for exam:->SOB FINDINGS: The cardiac silhouette is within normal limits in size. The aorta is uncoiled, stable. There is right medial lower lobe focal consolidation. There is left lower lobe streaky and patchy airspace opacities that may reflect atelectasis and/or infiltrate. There is no visualized pleural effusion or pneumothorax. The pulmonary vessels are within normal limits. Right lower lobe infiltrate, likely reflecting infectious pneumonia with consideration to viral etiology.   Left lower lobe atelectasis and/or infiltrate      Patient Instructions:      Medication List      START taking these medications    linezolid 100 MG/5ML suspension  Commonly known as: ZYVOX  30 mLs by Per NG tube route every 12 hours for 10 days     midodrine 2.5 MG tablet  Commonly known as: PROAMATINE  Take 1 tablet by mouth 2 times daily (with meals)     pyridoxine 50 MG tablet  Commonly known as: B-6  Take 1 tablet by mouth daily  Start taking on: January 22, 2021        CHANGE how you take these medications    calcium-vitamin D 500-200 MG-UNIT per tablet  Commonly known as: OSCAL-500  Take 1 tablet by mouth 2 times daily  What changed: how to take this     furosemide 20 MG tablet  Commonly known as: LASIX  Take 1 tablet by mouth daily Hold 3 days. Check BMP and resume if labs/ bp ok. What changed: additional instructions     multivitamin Tabs tablet  Take 1 tablet by mouth daily  What changed: how to take this     tamsulosin 0.4 MG capsule  Commonly known as: FLOMAX  Take 1 capsule by mouth nightly  What changed:   · additional instructions  · Another medication with the same name was removed. Continue taking this medication, and follow the directions you see here.      white petrolatum Oint ointment  Apply topically 2 times daily as needed (dry skin) Apply to peg tube site  What changed: when to take this        CONTINUE taking these medications    aspirin 81 MG chewable tablet  1 tablet by PEG Tube route daily     carvedilol 3.125 MG tablet  Commonly known as: COREG  1 tablet by PEG Tube route 2 times daily (with meals)     DECADRON PO     Dextromethorphan-guaiFENesin  MG/5ML Syrp     * ipratropium-albuterol 0.5-2.5 (3) MG/3ML Soln nebulizer solution  Commonly known as: DUONEB  Inhale 3 mLs into the lungs 4 times daily     * ipratropium-albuterol 0.5-2.5 (3) MG/3ML Soln nebulizer solution  Commonly known as: DUONEB  Inhale 3 mLs into the lungs every 4 hours as needed for Shortness of Breath     Magnesium 400 MG Caps     metoclopramide 5 MG tablet  Commonly known as: REGLAN  1 tablet by PEG Tube route 2 times daily (before meals)     miconazole nitrate 2 % Oint  Apply topically 2 times daily

## 2021-01-21 NOTE — CARE COORDINATION
Social work / Discharge Planning:       Discharge to [facility] via [employer]. Social work updated RN and facility liaison. RN will update family. Electronically signed by RADHA Berg on 1/21/2021 at 4:12 PM           time changed. Patient will discharge at 7:30pm [facility]. Social work updated RN and facility liaison. RN will update family. Transportation with [facility] cancelled Maylin Fournier).   Electronically signed by RADHA Berg on 1/21/2021 at 4:44 PM .

## 2021-01-21 NOTE — PROGRESS NOTES
[facility]  [address] [facility], [address], [address] [facility]      Pulmonary Progress Note      SUBJECTIVE:  Lying down in bed on RA, looking comfortable with respiratory function with minimal coughing (?) mild-mod sputum production. Occasionally nodding to questions though unclear comprehension, hard of hearing, sleepy. Possible hospice evaluation.     OBJECTIVE    Medications    Continuous Infusions:      Scheduled Meds:   midodrine  2.5 mg Oral BID WC    vitamin B-6  50 mg Oral Daily    linezolid  600 mg Per NG tube 2 times per day    sodium chloride flush  10 mL Intravenous 2 times per day    ascorbic acid  500 mg Oral Daily    aspirin  81 mg PEG Tube Daily    calcium-vitamin D  1 tablet Oral BID    carvedilol  3.125 mg PEG Tube BID WC    heparin (porcine)  5,000 Units Subcutaneous Q8H    magnesium oxide  400 mg Oral Daily    metoclopramide  5 mg PEG Tube BID AC    miconazole nitrate   Topical BID    multivitamin  1 tablet Oral Daily    tamsulosin  0.4 mg Oral Nightly    vitamin D3  400 Units Oral Daily    sodium chloride flush  10 mL Intravenous 2 times per day    albuterol-ipratropium  1 puff Inhalation 4x daily       PRN Meds:sodium chloride flush, sodium chloride flush, promethazine **OR** ondansetron, polyethylene glycol, acetaminophen **OR** acetaminophen, albuterol-ipratropium    Physical    VITALS:  BP (!) 95/51   Pulse 76   Temp 96.8 °F (36 °C) (Axillary)   Resp 17   Ht 5' 5\" (1.651 m)   Wt 101 lb 6 oz (46 kg)   SpO2 95%   BMI 16.87 kg/m²     24HR INTAKE/OUTPUT:      Intake/Output Summary (Last 24 hours) at 2021 1342  Last data filed at 2021 1130  Gross per 24 hour   Intake 643 ml   Output 325 ml   Net 318 ml       24HR PULSE OXIMETRY RANGE:    SpO2  Av.2 %  Min: 95 %  Max: 97 %    General appearance: appears stated age and cooperative  Lungs: rhonchi bilaterally  Heart: regular rate and rhythm, S1, S2 normal, no murmur, click, rub or gallop  Abdomen: (+) PEG  Extremities: extremities normal, atraumatic, no cyanosis or edema  Neurologic: Mental status: awake, occ nodding to questions, moving extremities minimally. Data    CBC:   Recent Labs     01/21/21  0438   WBC 6.5   HGB 10.1*   HCT 32.2*   .2*          BMP:  Recent Labs     01/18/21  1730 01/20/21  0500 01/21/21  0438    140 137   K 4.4 3.8 3.9   * 106 107   CO2 24 28 26   BUN 19 17 16   CREATININE 0.7 0.8 0.7    ALB:3,BILIDIR:3,BILITOT:3,ALKPHOS:3)@    PT/INR: No results for input(s): PROTIME, INR in the last 72 hours. ABG:   No results for input(s): PH, PO2, PCO2, HCO3, BE, O2SAT, METHB, O2HB, COHB, O2CON, HHB, THB in the last 72 hours. FiO2 : 50 %       Radiology/Other tests reviewed: CXR reviewed with slightly better aeration both lower lung fields compared to previous    Assessment:     Active Problems:    Severe protein-calorie malnutrition (HCC)    Acute hypoxemic respiratory failure due to COVID-19 (HCC)    Aspiration pneumonia (HCC)    Hypernatremia  Resolved Problems:    * No resolved hospital problems. *      Plan:       1. Cont with antibiotics as per ID  2. On RA, watch oxygen saturation  3. Cont with nebs, observe respiratory function  4. Watch fluid balance, diurese as per PCP and Renal  5. For possible hospice evaluation, await family decision about this      Time at the bedside, reviewing labs and radiographs, reviewing notes and consultations, discussing with staff and family was more than 35 minutes. Thanks for letting us see this patient in consultation. Please contact us with any questions. Office [phone] or after hours through [employer], x [phone].

## 2021-01-21 NOTE — PROGRESS NOTES
[address] [place] [address] [facility]  [place]  Progress Note    CC: in bed- tired  Face to face encounter   SUBJECTIVE:  The patient denies cough. Denies dyspnea. No fevers. Tolerating antibiotics. Medications:  Scheduled Meds:   midodrine  2.5 mg Oral BID WC    vitamin B-6  50 mg Oral Daily    linezolid  600 mg Per NG tube 2 times per day    sodium chloride flush  10 mL Intravenous 2 times per day    ascorbic acid  500 mg Oral Daily    aspirin  81 mg PEG Tube Daily    calcium-vitamin D  1 tablet Oral BID    carvedilol  3.125 mg PEG Tube BID WC    heparin (porcine)  5,000 Units Subcutaneous Q8H    magnesium oxide  400 mg Oral Daily    metoclopramide  5 mg PEG Tube BID AC    miconazole nitrate   Topical BID    multivitamin  1 tablet Oral Daily    tamsulosin  0.4 mg Oral Nightly    vitamin D3  400 Units Oral Daily    sodium chloride flush  10 mL Intravenous 2 times per day    albuterol-ipratropium  1 puff Inhalation 4x daily     Continuous Infusions:    PRN Meds:sodium chloride flush, sodium chloride flush, promethazine **OR** ondansetron, polyethylene glycol, acetaminophen **OR** acetaminophen, albuterol-ipratropium  OBJECTIVE:  Patient Vitals for the past 24 hrs:   BP Temp Temp src Pulse Resp SpO2 Weight   01/21/21 0756 (!) 95/51 96.8 °F (36 °C) Axillary 76 17 95 % --   01/21/21 0630 -- -- -- -- -- -- 101 lb 6 oz (46 kg)   01/21/21 0145 (!) 96/51 97.5 °F (36.4 °C) Oral 73 16 97 % --   01/20/21 2200 (!) 101/55 97.8 °F (36.6 °C) Oral 79 14 96 % --   01/20/21 1815 (!) 101/55 96 °F (35.6 °C) Axillary 81 17 96 % --   01/20/21 1802 -- -- -- -- 20 97 % --     Constitutional: The patient is lying in bed. He is asleep. No distress. Emaciated. Awake and alert. Lying in bed. He emaciated. No distress. Skin: Warm and dry. No rashes were noted. Has lesions to head area. Head: Eyes show round, and reactive pupils. No jaundice. Mouth: Moist mucous membranes, no ulcerations, no thrush. Neck: Supple to movements. Chest: No respiratory distress. Good breath sounds. Scattered rhonchi. Cardiovascular: Heart sounds rhythmic and regular. Abdomen: Positive bowel sounds to auscultation. Benign to palpation. PEG  Extremities: No edema. Muscle wasting. Peripheral IV. Miguel- yellow urine     Laboratory and Tests Review:  Lab Results   Component Value Date    WBC 6.5 01/21/2021    WBC 5.5 01/18/2021    WBC 7.8 01/16/2021    HGB 10.1 (L) 01/21/2021    HCT 32.2 (L) 01/21/2021    .2 (H) 01/21/2021     01/21/2021     Lab Results   Component Value Date    NEUTROABS 4.29 01/21/2021    NEUTROABS 6.73 01/16/2021    NEUTROABS 10.64 (H) 01/15/2021     Lab Results   Component Value Date    CRP 9.2 (H) 01/16/2021    CRP 2.6 (H) 12/22/2020    CRP 7.1 (H) 10/05/2020     Lab Results   Component Value Date    SEDRATE 102 (H) 01/16/2021    SEDRATE 26 (H) 10/05/2020     Lab Results   Component Value Date    ALT 15 01/17/2021    AST 15 01/17/2021    ALKPHOS 68 01/17/2021    BILITOT 0.5 01/17/2021     Lab Results   Component Value Date     01/21/2021    K 3.9 01/21/2021    K 4.6 01/15/2021     01/21/2021    CO2 26 01/21/2021    BUN 16 01/21/2021    CREATININE 0.7 01/21/2021    GFRAA >60 01/21/2021    LABGLOM >60 01/21/2021    GLUCOSE 83 01/21/2021    GLUCOSE 95 03/11/2012    PROT 5.2 01/17/2021    LABALBU 2.3 01/17/2021    LABALBU 3.4 03/11/2012    CALCIUM 8.5 01/21/2021    BILITOT 0.5 01/17/2021    ALKPHOS 68 01/17/2021    AST 15 01/17/2021    ALT 15 01/17/2021     Radiology:  Reviewed     Microbiology:   Blood cultures 1/14/2021 MRSA in 2 of 2 sets  Blood cultures 1/16/2021: Negative so far  Urine culture 1/42/21: Negative  Respiratory culture 1/72/21: OP adri present. MRSA    ASSESSMENT:  · HCAP secondary to MRSA  · MRSA bacteremia secondary to HCAP, resolving.   Repeat blood cultures are negative so far  · Recent SARS- CoV-2  · Leukocytosis secondary to bacterial

## 2021-02-16 NOTE — PROGRESS NOTES
2/16/2021    Hieu Marie  7/3/1927    This resident is being seen today for a follow-up evaluation visit. 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 Balbina Wise. It is of note to mention that he had a recent placement of a gastrostomy tube due to underlying dysphagia with severe protein calorie malnutrition and has been learning how to self bolus. He is a 80 y.o. male resident who is being seen today for a follow-up evaluation with resident requesting discontinuation of his hospice services so that he can benefit from physical therapy and Occupational Therapy. Resident does feel that he is able to participate with therapy at this point and would like to get stronger. Staffing did speak with hospice services and they will be coming in tomorrow for evaluation and discontinuation, after they discuss further with this resident. He is a gentleman who is fairly alert and oriented and states that he has a dry cough with shortness of breath from time to time but denies any chest pain. Furthermore he has no complaints of headache or sore throat, stomach discomfort, dysuria or frequency, constipation or diarrhea, fever or chills, recent falls or syncopal events.     Medications: Aspirin 81 mg daily, magnesium 400 mg daily, dextromethorphan/guaifenesin syrup 10/100 mg per 5 mL with 5 mL 4 times daily, Coreg 3.125 mg 2 times daily, Lasix 20 mg daily, vitamin B6 50 mg daily, tamsulosin 0.4 mg daily, Decadron 6 mg daily, ipratropium/albuterol solution 0.52.53 mg per 3 mL with 3 mL 4 times a day, midodrine 2.5 mg 2 times a day, Reglan 5 mg 2 times a day    Objective Vital Signs: Pressure 128/67 pulse 70 respirations 20 temperature 98.6 oxygen saturation 96% weight 85 pounds    Physical examination:Skin is essentially warm and dry. He does have some impairment of his scalp which is monitored accordingly with respect to her wound management nurse. HEENT unremarkable. Neck is supple. Heart regular rate and rhythm. No rubs, gallops or murmurs noted. Lungs are diminished throughout with no rhonchi rales or wheezing noted. Abdomen is scaphoid but soft, supple and non-tender. Bowel sounds are noted x4 quadrants. No rigidity, guarding or rebound tenderness. Negative Christie's, negative McBurney's, negative Bernardo's. Peg tube was inspected and appears to be functioning normally. Extremities; no true pitting edema. Pulses are diminished. No clubbing  or no cyanosis noted. Neurologically he  is alert and oriented x3. No evidence of paralysis or paresthesias noted but does have global weakness noted. Diagnoses and all orders for this visit:    Weakness  Comments:  Resident requesting discontinuation of hospice services with initiation of physical therapy. Severe protein-calorie malnutrition ([address]  Comments:  Maintain enteral feed with n.p.o. status outside of pleasure food her dietary waiver with no thin water    Dysphagia, unspecified type  Comments:  Maintain enteral feed with n.p.o. status. Pleasure food per dietary waiver    Hyperlipidemia, unspecified hyperlipidemia type  Comments:  Controlled with low-dose aspirin. Coronary artery disease involving native coronary artery of native heart without angina pectoris  Comments:  Controlled with low-dose aspirin with the benefit of Coreg. Plan: Plan of care was discussed with the healthcare team and meds and labs were reviewed. The plan at this point is for staffing to further discuss his status with his daughter and hospice services. Hospice services most likely to be discontinued so that the resident can have the benefits of physical therapy and occupational therapy, per his request.  He has been maintain the recommendations at this point to transfer by way of assist x1. I will maintain his n.p.o. status with Jevity and pleasure feeds per dietary waiver. I will otherwise maintain his current medical regimen as clinically indicated and monitor him closely with respect to his appetite given his weight loss. I will furthermore track his intakes, monitor his weights and behaviors, and see him routinely and as needed with further orders forthcoming. DERRICK ANDREW, APRN - CNP      *Note was creating using voice recognition software.   The document was reviewed however grammatical errors may exist.

## 2021-03-02 NOTE — PROGRESS NOTES
3/2/2021    Shereen Yañez  7/3/1927    This resident is being seen today for a skilled evaluation visit. 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 Patria Garcia. It is of note to mention that he had a recent placement of a gastrostomy tube due to underlying dysphagia with severe protein calorie malnutrition and has been learning how to self bolus. He is a 80 y.o. male resident who is being seen today for services with hospice discontinued in the recent week past.  Resident had indicated that he was ready to try to improve his weaknesses and family agreed to discontinuation of hospice services. He has been tolerating this in a satisfactory manner at this time. He does admit to being weak with little energy. He otherwise denies complaints of any headaches, dizziness, sore throat, chest pain, coughing or shortness of breath, nausea or vomiting, constipation or diarrhea, fever or chills, recent falls or syncopal events. Medications: Aspirin 81 mg daily, magnesium 400 mg daily, dextromethorphan/guaifenesin syrup 10/100 mg per 5 mL with 5 mL 4 times daily, Coreg 3.125 mg 2 times daily, Lasix 20 mg daily, vitamin B6 50 mg daily, tamsulosin 0.4 mg daily, Decadron 6 mg daily, ipratropium/albuterol solution 0.52.53 mg per 3 mL with 3 mL 4 times a day, midodrine 2.5 mg 2 times a day, Reglan 5 mg 2 times a day    Objective     Vital Signs: Pressure 118/66 pulse 80 respirations 20 temperature 97.3 oxygen saturation 96% weight 102.2 pounds    Physical examination:Skin is essentially warm and dry.   He does have some impairment of his scalp which is monitored accordingly with respect to her wound management nurse. HEENT extremely hard of hearing but otherwise unremarkable. Neck is supple. Heart regular rate and rhythm. No rubs, gallops or murmurs noted. Lungs are diminished throughout with no rhonchi rales or wheezing noted. Abdomen is scaphoid but soft, supple and non-tender. Bowel sounds are noted x4 quadrants. No rigidity, guarding or rebound tenderness. Negative Christie's, negative McBurney's, negative Bernardo's. Peg tube was inspected and appears to be functioning normally. Extremities; he does have some degree of residual edema to the bilateral ankle and pedal area. Pulses are diminished. No clubbing  or no cyanosis noted. Neurologically he  is alert and oriented x3. No evidence of paralysis or paresthesias noted but does have global weakness noted. Diagnoses and all orders for this visit:    Weakness  Comments:  Resident started on PT/OT. Hospice services discontinued. Coronary artery disease involving native coronary artery of native heart without angina pectoris  Comments:  Stable with low dose Aspirin and Coreg. Hyperlipidemia, unspecified hyperlipidemia type  Comments:  Controlled with low dose Aspirin. Monitor lipid panel accordingly. Severe protein-calorie malnutrition ([address]  Comments:  Maintain regular diet along with enteral feed. Other specified hypothyroidism  Comments:  Hospice services discontinued. Will obtain TSH. Plan: Plan of care was discussed with the healthcare team and meds and labs were reviewed. This resident does maintain the benefits of a regular diet along with enteral feeding with Jevity and we have been obtaining daily weights. Regarding the fact that he has had recent discontinuation of hospice services, I do feel it would be beneficial to obtain some baseline labs. I would therefore recommend a CBC, CMP, vitamin D, B12, TSH, and lipid panel.   He will maintain the benefit of his physical therapy services patient will therapy at this time. I will furthermore track his intakes, monitor his weights and behaviors, and see him routinely and as needed with further orders forthcoming. DERRICK ANDREW, APRN - CNP      *Note was creating using voice recognition software.   The document was reviewed however grammatical errors may exist.

## 2021-03-09 NOTE — PROGRESS NOTES
3/9/2021    Dionna Gray  7/3/1927    This resident is being seen today for a skilled evaluation visit. 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 Katharina Doll. It is of note to mention that he had a recent placement of a gastrostomy tube due to underlying dysphagia with severe protein calorie malnutrition and has been learning how to self bolus. He is a 80 y.o. male resident who is being seen today for a skilled evaluation visit with resident remaining bed to chair confined with recommendations to transfer by way of assist x1. Staff indicates that he has had no changes in behavior in terms of aggressive, combative, or disruptive behaviors. He is a gentleman who remains on daily weights and has the benefit of a regular diet with bolus feeding 5 times daily. Nursing was concerned regarding edema to his bilateral lower extremities and wanted further reevaluation. Furthermore, therapy was concerned regarding his decrease in ability to hear despite utilization of hearing aids. This is a gentleman who had previously been on hospice services which were discontinued in the recent weeks past and was picked up for the therapy program.  He is alert and responsive to verbal and tactile stimuli and offers little to no complaints at this time. He denies any complaints of pain, headaches or dizziness, coughing or shortness of breath, nausea or vomiting, constipation or diarrhea, dysuria or frequency, fever or chills, recent falls or syncopal events.           Medications:   Aspirin 81 mg daily  magnesium 400 mg daily dextromethorphan/guaifenesin syrup 10/100 mg per 5 mL with 5 mL 4 times daily  Coreg 3.125 mg 2 times daily  Lasix 20 mg daily  vitamin B6 50 mg daily  tamsulosin 0.4 mg daily  Decadron 6 mg daily  ipratropium/albuterol solution 0.52.53 mg per 3 mL with 3 mL 4 times a day midodrine 2.5 mg 2 times a day  Reglan 5 mg 2 times a day  Zaroxolyn 2.5 mg M-W-F    Objective     Vital Signs: Pressure 112/66 pulse 74 respirations 18 temperature 97.6 oxygen saturation 96% weight 102.6 pounds    Physical examination:Skin is essentially warm and dry. He does have some impairment of his scalp which is monitored accordingly with respect to her wound management nurse. HEENT extremely hard of hearing and does have evidence of cerumen impaction to the left ear canal but otherwise unremarkable. Neck is supple. Heart regular rate and rhythm. No rubs, gallops or murmurs noted. Lungs are diminished throughout with no rhonchi rales or wheezing noted. Abdomen is scaphoid but soft, supple and non-tender. Bowel sounds are noted x4 quadrants. No rigidity, guarding or rebound tenderness. Negative Christie's, negative McBurney's, negative Bernardo's. Peg tube was inspected and appears to be functioning normally. Extremities; he does have 1+ pitting edema to the bilateral lower extremities, most notably ankle and pedal area. Pulses are diminished. No clubbing  or no cyanosis noted. Neurologically he  is alert and oriented x3. No evidence of paralysis or paresthesias noted but does have global weakness noted. Diagnoses and all orders for this visit:    Hearing loss due to cerumen impaction, left  Comments:  Initiate Debrox drops with 2 drops to the left ear daily for 7 days and flush on day 8    Lower extremity edema  Comments:  Initiate Zaroxolyn 2.5 mg on Monday, Wednesday, and Friday. BMP in 1 week. Resident refuses DOC hose. Recommend offloading.     Severe protein-calorie malnutrition ([facility] [address]  Comments:  Maintain regular diet with enteral feeds with bolus feeding 5 times daily    Hyperlipidemia,

## 2021-03-16 NOTE — PROGRESS NOTES
3/16/2021    Juliana Certain  7/3/1927    This resident is being seen today for a skilled evaluation visit. 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 Darío Palomino. It is of note to mention that he had a recent placement of a gastrostomy tube due to underlying dysphagia with severe protein calorie malnutrition and has been learning how to self bolus. He is a 80 y.o. male resident who is being seen today for a skilled evaluation with some degree of urgency. Nursing did indicate that they found the resident to be unresponsive with no pulse, initially. They did give him a slight sternal rub and he did respond with a slow pulse. They did sternal rub him again and his pulse was more responsive but bounding. Upon my evaluation, the resident was responding but rather slow. His cognition did improve throughout my evaluation. Resident did state that he \"did not feel right. \"  He did state that he did not want to go to the hospital setting at this time. He is agreeable to any work-up that needs to be completed at the facility. He essentially offers no acute complaints but I did notice a slight moist cough. He states that he has no pain, headaches or dizziness, sore throat, shortness of breath, chest pain, nausea or vomiting, constipation or diarrhea, dysuria or frequency, fever or chills, recent falls or syncopal events. He is a gentleman who is bed to chair confined and has been transferring by way of assist x2 per recommendations of PT. Staff indicates that he has had no changes in behavior in terms of aggressive, combative, or disruptive behaviors.   He did not eat this morning per staffing, but does maintain the benefit of bolus feeding with flushes. Medications:   Aspirin 81 mg daily  magnesium 400 mg daily dextromethorphan/guaifenesin syrup 10/100 mg per 5 mL with 5 mL 4 times daily  Coreg 3.125 mg 2 times daily  Lasix 20 mg daily  vitamin B6 50 mg daily  tamsulosin 0.4 mg daily  Decadron 6 mg daily  ipratropium/albuterol solution 0.52.53 mg per 3 mL with 3 mL 4 times a day midodrine 2.5 mg 2 times a day  Reglan 5 mg 2 times a day  Zaroxolyn 2.5 mg M-W-F    Objective     Vital Signs: Pressure 76/58 pulse 87 respirations 18 temperature 97.4 oxygen saturation 93% weight 102.6 pounds    Physical examination:Skin is essentially warm and dry. He does have some impairment of his scalp which is monitored accordingly with respect to her wound management nurse. There was some degree of discoloration to the lateral aspect of his feet with question of slight mottling. HEENT extremely hard of hearing and does have evidence of cerumen impaction to the left ear canal but otherwise unremarkable. Neck is supple. Heart regular rate and rhythm. No rubs, gallops or murmurs noted. Lungs are diminished throughout with no rhonchi rales or wheezing noted. Abdomen is scaphoid but soft, supple and non-tender. Bowel sounds are noted x4 quadrants. No rigidity, guarding or rebound tenderness. Negative Christie's, negative McBurney's, negative Bernardo's. Peg tube was inspected and appears to be functioning normally. Extremities; he does have 1+ pitting edema to the bilateral lower extremities, most notably ankle and pedal area. Pulses are diminished. No clubbing  or no cyanosis noted. Bilateral lower extremities are cool to the touch. Neurologically he  is alert and oriented x3. No evidence of paralysis or paresthesias noted but does have global weakness noted. Diagnoses and all orders for this visit:    Unresponsive episode  Comments:   Will obtain stat chest x-ray with urinalysis with culture and sensitivity and a CBC with a CMP. Cough  Comments: Will obtain chest x-ray given his history of respiratory failure in times past.    Severe protein-calorie malnutrition ([address]  Comments:  Maintain regular diet along with his enteral feed. Age-related physical debility  Comments:  Continue with PT/OT    Acute respiratory failure with hypoxia [facility])  Comments:  Chest x-ray to be obtained    Plan: Plan of care was discussed with the healthcare team and meds and labs were reviewed. Most recent labs from 3/4/2021 with a BUN/creatinine 32/0.6  cholesterol 214 triglycerides 161 H/H 12.6/38.6 with a repeat BMP on 3/15/2021 BUN/creatinine 40/0.9 GFR 79. Given my concern regarding this unresponsive event, oxygen was applied to the resident and I will obtain a chest x-ray along with a urinalysis with culture and sensitivity and a stat CBC and CMP. Staff and will continue to monitor him throughout the day. Again, resident did refuse to be sent to the hospital setting for further evaluation and treatment and stated that he would prefer to have treatment here at the facility. Family to be updated. I will furthermore maintain his recommendations for his regular diet along with his bolus enteral feeds and I will track his intakes, monitor his weights and behaviors, and see him routinely and as needed with further orders forthcoming. SAHRA JENKINS - CNP      *Note was creating using voice recognition software.   The document was reviewed however grammatical errors may exist.

## 2021-03-23 NOTE — PROGRESS NOTES
3/23/2021    Brett Lopez  7/3/1927    This resident is being seen today for a skilled evaluation visit. 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 Mia Lujan. It is of note to mention that he had a recent placement of a gastrostomy tube due to underlying dysphagia with severe protein calorie malnutrition and has been learning how to self bolus. He is a 80 y.o. male resident who is being seen today for a skilled evaluation with which he has had the benefits of physical therapy, Occupational Therapy, and speech therapy. This is a gentleman who was under assessment in the recent week past for what was described as an unresponsive event with no pulse and I did give him the benefit of blood work, chest x-ray, and a urinalysis. Chest x-ray was consistent with a right lower lobe infiltrate and he was therefore placed on Rocephin 1 g IM daily for the course of 5 days. He did have a repeat chest x-ray completed today. Resident was in bed at the time of my evaluation and indicated that he just does not feel good. He had no specific complaints and just appeared to be tired. I did have the opportunity to speak with therapy and they indicate that he has good and bad days, but many times he is up in his wheelchair and self-propelling down the matthew. He currently offers no complaints in terms of pain, headaches or dizziness, sore throat, chest pain, coughing or shortness of breath, nausea or vomiting, constipation or diarrhea, dysuria or frequency, fever or chills, recent falls or syncopal events.   He does remain bed to chair confined with recommendations to transfer by way of assist x2. Medications:   Aspirin 81 mg daily  magnesium 400 mg daily dextromethorphan/guaifenesin syrup 10/100 mg per 5 mL with 5 mL 4 times daily  Coreg 3.125 mg 2 times daily  Lasix 20 mg daily  vitamin B6 50 mg daily  tamsulosin 0.4 mg daily  Decadron 6 mg daily  ipratropium/albuterol solution 0.52.53 mg per 3 mL with 3 mL 4 times a day midodrine 2.5 mg 2 times a day  Reglan 5 mg 2 times a day  Zaroxolyn 2.5 mg M-W-F    Objective     Vital Signs: Pressure 118/72 pulse 74 respirations 18 temperature 97.9 oxygen saturation 97% weight 99.2 pounds    Physical examination:Skin is essentially warm and dry. He does have some impairment of his scalp which is monitored accordingly with respect to her wound management nurse. HEENT extremely hard of hearing and does have evidence of cerumen impaction to the left ear canal but otherwise unremarkable. Neck is supple. Heart regular rate and rhythm. No rubs, gallops or murmurs noted. Lungs are diminished throughout with no rhonchi rales or wheezing noted. Abdomen is scaphoid but soft, supple and non-tender. Bowel sounds are noted x4 quadrants. No rigidity, guarding or rebound tenderness. Negative Christie's, negative McBurney's, negative Bernardo's. Peg tube was inspected and appears to be functioning normally. Extremities; he does have mild pitting edema to the bilateral lower extremities, most notably ankle and pedal area. Pulses are diminished. No clubbing  or no cyanosis noted. Bilateral lower extremities are cool to the touch. Neurologically he  is alert and oriented x3. No evidence of paralysis or paresthesias noted but does have global weakness noted. Diagnoses and all orders for this visit:    Right lower lobe pulmonary infiltrate  Comments:  Treated accordingly with Rocephin x5 days. Will repeat chest x-ray in 2 weeks.     Miguel catheter in place  Comments:  Maintain due to obstructive uropathy and change monthly and as needed    Severe protein-calorie malnutrition ([facility] [address]  Comments:  Maintain regular diet with enteral feed     Age-related physical debility  Comments:  Currently given the benefit of physical therapy and occupational therapy services    Hyperlipidemia, unspecified hyperlipidemia type  Comments:  Controlled with low-dose aspirin. Monitor lipid panel accordingly. Plan: Plan of care was discussed with the healthcare team and meds and labs were reviewed. As stated, chest x-ray from 3/16/2021 with evidence of a right lower lobe infiltrate with which she was treated accordingly with Rocephin therapies. He did have a follow-up chest x-ray today confirming right lower lobe infiltrate with similar findings as compared to previous study. He does not have any further symptomatology with respect to any infectious etiology, so I did ask staff to monitor him for any signs or symptoms of pneumonia. We will maintain the recommendations for close observation and I will repeat his chest x-ray in the course of 2 weeks. He can otherwise maintain his current medical regimen is set forth. Although I do have concerns regarding his weight loss, this resident has had a history of 3-4+ pitting edema in the recent month past.  His edema has been improving and I do feel that some of the weight loss could be fluid loss. I do however want to maintain close observation with respect to appetite, with staff indicating that he eats 50% or more of meals. I will furthermore maintain the benefit of his regular diet with enteral feeds and track his intakes, monitor his weights and behaviors, and see him routinely and as needed with further orders forthcoming. DERRICK ANDREW, APRN - CNP      *Note was creating using voice recognition software.   The document was reviewed however grammatical errors may exist.

## 2021-03-30 NOTE — PROGRESS NOTES
3/30/2021    Vu Mckenzie  7/3/1927    This resident is being seen today for a skilled evaluation visit. 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 Tricia Miller. It is of note to mention that he had a recent placement of a gastrostomy tube due to underlying dysphagia with severe protein calorie malnutrition and has been learning how to self bolus. He is a 80 y.o. male resident who is being seen today for services with this resident remaining bed to chair confined with recommendations to transfer by way of assist x2. He does maintain the benefit of physical therapy, Occupational Therapy and speech therapy services. Staff indicates that he has had no changes in behavior in terms of aggressive, combative, or disruptive behaviors. Staffing did ask that I reevaluate his medical status, especially with respect to his lower extremities. I had given him the benefit of Zaroxolyn in the recent weeks past, which has proven to be effective. This resident was he was recently under assessment for what appeared to be a lower lobe infiltrate and was treated accordingly with Rocephin and has no further sequela in this regard. Resident is fairly alert and oriented and states that he is just sleepy. He states that he has no pain, headaches or dizziness, sore throat, chest pain, coughing or shortness of breath, nausea or vomiting, constipation or diarrhea, fever or chills, recent falls or syncopal events.         Medications:   Aspirin 81 mg daily  magnesium 400 mg daily dextromethorphan/guaifenesin syrup 10/100 mg per 5 mL with 5 mL 4 times daily  Coreg 3.125 mg 2 times daily  Lasix 20 mg daily  vitamin B6 50 mg daily  tamsulosin 0.4 mg daily  Decadron 6 mg daily  ipratropium/albuterol solution 0.52.53 mg per 3 mL with 3 mL 4 times a day midodrine 2.5 mg 2 times a day  Reglan 5 mg 2 times a day  Zaroxolyn 2.5 mg M-W-F    Objective     Vital Signs: Pressure 119/67 pulse 78 respirations 18 temperature 98 oxygen saturation 97% weight 97.8 pounds    Physical examination:Skin is essentially warm and dry. He does have some impairment of his scalp which is monitored accordingly with respect to her wound management nurse. HEENT extremely hard of hearing but otherwise unremarkable. Neck is supple. Heart regular rate and rhythm. No rubs, gallops or murmurs noted. Lungs are diminished throughout with no rhonchi rales or wheezing noted. Abdomen is scaphoid but soft, supple and non-tender. Bowel sounds are noted x4 quadrants. No rigidity, guarding or rebound tenderness. Negative Christie's, negative McBurney's, negative Bernardo's. Peg tube was inspected and appears to be functioning normally. Miguel catheter intact draining clear yellow urine. Extremities; he has no true pitting edema. Pulses are diminished. No clubbing  or no cyanosis noted. Bilateral lower extremities are cool to the touch. Neurologically he  is alert and oriented x3. No evidence of paralysis or paresthesias noted but does have global weakness noted.     Diagnoses and all orders for this visit:    Obstructive uropathy  Comments:  Maintain Miguel catheter with recommendations to change monthly and as needed    Right lower lobe pulmonary infiltrate  Comments:  Improved with the benefit of antibiotic with follow-up chest x-ray recommended for 4/5/2021    Severe protein-calorie malnutrition ([address]  Comments:  Notable weight loss despite regular diet with bolus feed with resident followed by the dietitian    Hyperlipidemia, unspecified hyperlipidemia type  Comments:  Stable with low-dose aspirin with recommendations to monitor lipid panel accordingly    Congestive heart failure, unspecified HF chronicity, unspecified heart failure type ([facility] [address]  Comments:  Discontinue Zaroxolyn but maintain Lasix as directed and monitor GFR accordingly    Plan: Plan of care was discussed with the healthcare team and meds and labs were reviewed. Resident has had improvement over the course of the past couple of weeks. He has no further edema to the bilateral lower extremities, so I am been to discontinue the Zaroxolyn every Monday Wednesday and Friday. I will ask that staff monitor him for any increased edema, weight gain, or shortness of breath. I will maintain his Miguel catheter management due to history of obstructive uropathy and monitor output in this regard. He will maintain his Jevity boluses 3 times daily with recommendations for daily weights. I will continue forth with his current medical regimen as otherwise stated with the benefit of appropriate treatment to his bilateral heels. Resident has been encouraged to lay down between meals for pressure reduction. I will track his intakes, monitor his weights and behaviors, and see him routinely and as needed with further orders forthcoming. DERRICK ANDREW, APRN - CNP      *Note was creating using voice recognition software.   The document was reviewed however grammatical errors may exist.

## 2021-04-06 NOTE — PROGRESS NOTES
4/6/2021    Muscoy Dmitri  7/3/1927    This resident is being seen today for a skilled evaluation visit. 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 Karry Dakin. It is of note to mention that he had a recent placement of a gastrostomy tube due to underlying dysphagia with severe protein calorie malnutrition and has been learning how to self bolus. He is a 80 y.o. male resident who is being seen today for skilled services with concerns of a repeat chest x-ray revealing a new patchy left basilar infiltrate. It is of note to mention that this resident was recently treated with Rocephin from 3/17/2021 to 3/21/2021, for what appeared to be an infiltrate. There is still some underlying concern of aspiration, with resident presenting with a cough, which worsened with drinking. Furthermore, this resident has been under assessment for edema to the lower extremities and did have the benefits of an ultrasound study with evidence of a deep vein thrombosis to the right saphenous, right common femoral and right proximal femoral veins. He was furthermore placed on Eliquis twice daily in this regard. This is a gentleman who is bed to chair confined with recommendations to transfer by way of assist x2 and staff indicates that he has had no changes in behavior in terms of aggressive, combative, or disruptive behaviors. Resident is fairly alert and oriented and states that he is just sleepy. He states that he has no pain, headaches or dizziness, sore throat, chest pain,   nausea or vomiting, constipation or diarrhea, fever or chills, recent falls or syncopal events.         Medications: type  Comments: To be determined with recommendations for urinalysis with culture and sensitivity with aspirin discontinued. Maintains Eliquis for DVT    Severe protein-calorie malnutrition ([address]  Comments:  Maintain regular diet with med Pass program and enteral feed    Plan: Plan of care was discussed with the healthcare team and meds and labs were reviewed. Recent labs from 3/4/2021 with a BUN/creatinine 32/0.6 cholesterol 214 triglycerides 161 H/H 12.6/38.6 TSH 4.305. Chest x-ray was consistent with a new patchy left basilar infiltrate, with which I am going to initiate doxycycline 100 mg twice a day for 10 days with a repeat chest x-ray in 2 weeks. Of note to mention, upon my evaluation of this resident last week, he did appear to have improvement with the Rocephin. I do have concerns regarding aspiration with resident having an increased cough with drinking fluids. Furthermore, at this time I am going to maintain the Eliquis and obtain a CBC regarding the hematuria. I will also send out a urinalysis with culture and sensitivity with respect to hematuria and continue with discontinuation of aspirin. I will furthermore continue forth with his medical regimen as stated with the benefits of his regular diet with med Pass program supplement and enteral feedings. I will track his intakes, monitor his weights and behaviors, and see him routinely and as needed with further orders forthcoming. DERRICK ANDREW, SAHRA - CNP      *Note was creating using voice recognition software.   The document was reviewed however grammatical errors may exist.

## 2022-06-08 NOTE — PROGRESS NOTES
[facility] Quality Flow/Interdisciplinary Rounds Progress Note        Quality Flow Rounds held on January 18, 2021    Disciplines Attending:  Bedside Nurse, ,  and Nursing Unit Leadership    Yady Chang was admitted on 1/14/2021  9:52 AM    Anticipated Discharge Date:  Expected Discharge Date: 01/17/21    Disposition:    Stuart Score:  Stuart Scale Score: 11    Readmission Risk              Risk of Unplanned Readmission:        38           Discussed patient goal for the day, patient clinical progression, and barriers to discharge. The following Goal(s) of the Day/Commitment(s) have been identified:  Patient question aspiration pneumonia via peg tube. Restarted peg tube slowly.       Janelle Dhillon  January 18, 2021 N/A

## 2023-07-25 NOTE — ED NOTES
Patient just got a new CPAP but it isnt working. An order placed to repair or replaced is pending.  Please sign the order and I'll fax it to [facility] Please call Suzi Castro (neighbor) at [phone] with updates and questions.      Mariana Zuñiga RN  05/27/20 2000

## 2024-10-17 NOTE — DISCHARGE SUMMARY
[id number] [facility] Physician Discharge Summary       Esa Fuentes, [address] [facility] [address] [facility] [address]  [facility] [id number]  [phone]    In 2 weeks        Activity level: Up with assistance    Diet: DIET GENERAL;  Dietary Nutrition Supplements: Standard High Calorie Oral Supplement  Dietary Nutrition Supplements: Frozen Oral Supplement        Condition at discharge: Stable    Dispo: [facility])        Patient ID:  Danika Lopez  [id number]  89 y.o.  7/3/1927    Admit date: 5/27/2020    Discharge date and time:  6/3/2020  1:38 PM    Admission Diagnoses: Active Problems:    Syncope and collapse    Closed fracture of pelvis (HCC)    Moderate protein-calorie malnutrition (HCC)    Orthostatic hypotension  Resolved Problems:    * No resolved hospital problems. *      Discharge Diagnoses: Active Problems:    Syncope and collapse    Closed fracture of pelvis (HCC)    Moderate protein-calorie malnutrition (HCC)    Orthostatic hypotension  Resolved Problems:    * No resolved hospital problems. 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 Hypothyroidism, osteopenia presented to [facility] ED following fall at home. Patient with chronic lightheadedness when standing. CT of head with no significant abnormality, C-spine with DJD. CT of pelvis with right inferior pubic ramus fracture patient admitted with concern of possible syncope. Patient BP remains borderline. Orthopedic surgery consulted as well as PT/OT.   After reviewing studies, orthopedic surgery area of focal bone erosion in the right frontal lobe affecting the inner table and the diploic space. The could represent a small focal lytic lesion. The further evaluation with a nuclear medicine bone scan on routine basis are recommended for skeletal survey. No indication for an acute intracranial process. Suspect a small focal lytic lesion in the right frontal bone. Further evaluation with a nuclear medicine bone scan for skeletal survey is recommended on routine basis. Ct Cervical Spine Wo Contrast    Result Date: 5/27/2020  Clinical indications: Pain status post trauma. COMPARISON: None. Exposure control: This examination and all examinations utilizing ionizing radiation at this facility done so according to the ALARA (as low as reasonably achievable) principal for radiation dose reduction. TECHNIQUE: Axial, sagittal, and coronal computed tomography of the cervical spine was performed without contrast. FINDINGS: These images reveal no evidence for acute displaced cortical disruption or spondylolisthesis. There is diffuse loss of disc height, degenerative spondylosis, uncovertebral hypertrophy and facet arthropathy. These degenerative osseous changes result in multilevel central and foraminal stenosis. There are calcifications within the regional arteries. Otherwise regional soft tissue and osseous structures are unremarkable. No acute fracture or spondylolisthesis is identified on CT of cervical spine. Diffuse degenerative disc and facet disease result in multilevel central and foraminal stenosis. Atherosclerotic vascular disease. Ct Pelvis Wo Contrast Additional Contrast? None    Result Date: 5/27/2020  This examination and all examinations utilizing ionizing radiation at this facility are done so according to the ALARA (as low as reasonably achievable) principal for radiation dose reduction. Clinical indications: Pain status post trauma. Pelvic fracture.  COMPARISON: Conventional radiographs of no

## (undated) DEVICE — SPONGE,DRAIN,NONWVN,4"X4",6PLY,STRL,LF: Brand: MEDLINE

## (undated) DEVICE — BINDER ABD SM MED 30 IN - 45 IN HT 12 IN

## (undated) DEVICE — GRADUATE TRIANG MEASURE 1000ML BLK PRNT

## (undated) DEVICE — 3M™ MICROPORE™ TAPE, 1530-2: Brand: 3M™ MICROPORE™

## (undated) DEVICE — Device

## (undated) DEVICE — BLOCK BITE 60FR RUBBER ADLT DENTAL

## (undated) DEVICE — TOWEL,OR,DSP,ST,BLUE,STD,6/PK,12PK/CS: Brand: MEDLINE

## (undated) DEVICE — SPONGE GZ W4XL4IN RAYON POLY FILL CVR W/ NONWOVEN FAB